# Patient Record
Sex: FEMALE | Race: WHITE | Employment: OTHER | URBAN - METROPOLITAN AREA
[De-identification: names, ages, dates, MRNs, and addresses within clinical notes are randomized per-mention and may not be internally consistent; named-entity substitution may affect disease eponyms.]

---

## 2017-01-12 ENCOUNTER — OFFICE VISIT (OUTPATIENT)
Dept: INTERNAL MEDICINE CLINIC | Age: 62
End: 2017-01-12

## 2017-01-12 VITALS
SYSTOLIC BLOOD PRESSURE: 138 MMHG | TEMPERATURE: 97.6 F | BODY MASS INDEX: 36.84 KG/M2 | HEART RATE: 59 BPM | OXYGEN SATURATION: 97 % | DIASTOLIC BLOOD PRESSURE: 74 MMHG | RESPIRATION RATE: 12 BRPM | WEIGHT: 215.8 LBS | HEIGHT: 64 IN

## 2017-01-12 DIAGNOSIS — I10 ESSENTIAL HYPERTENSION: Primary | ICD-10-CM

## 2017-01-12 PROCEDURE — 99213 OFFICE O/P EST LOW 20 MIN: CPT | Performed by: INTERNAL MEDICINE

## 2017-01-12 RX ORDER — CLONIDINE HYDROCHLORIDE 0.1 MG/1
0.1 TABLET ORAL DAILY
Qty: 90 TABLET | Refills: 1 | Status: SHIPPED | OUTPATIENT
Start: 2017-01-12 | End: 2017-07-18 | Stop reason: SDUPTHER

## 2017-01-12 RX ORDER — IRBESARTAN AND HYDROCHLOROTHIAZIDE 300; 12.5 MG/1; MG/1
1 TABLET, FILM COATED ORAL DAILY
Qty: 90 TABLET | Refills: 1 | Status: SHIPPED | OUTPATIENT
Start: 2017-01-12 | End: 2017-07-18 | Stop reason: SDUPTHER

## 2017-01-12 ASSESSMENT — ENCOUNTER SYMPTOMS
SHORTNESS OF BREATH: 0
CHEST TIGHTNESS: 0

## 2017-02-20 DIAGNOSIS — I10 UNSPECIFIED ESSENTIAL HYPERTENSION: ICD-10-CM

## 2017-02-20 RX ORDER — AMLODIPINE BESYLATE 5 MG/1
TABLET ORAL
Qty: 90 TABLET | Refills: 2 | Status: SHIPPED | OUTPATIENT
Start: 2017-02-20 | End: 2017-08-21 | Stop reason: SDUPTHER

## 2017-02-20 RX ORDER — HYDROCHLOROTHIAZIDE 25 MG/1
TABLET ORAL
Qty: 90 TABLET | Refills: 2 | Status: SHIPPED | OUTPATIENT
Start: 2017-02-20 | End: 2017-08-21 | Stop reason: SDUPTHER

## 2017-03-07 DIAGNOSIS — I10 UNSPECIFIED ESSENTIAL HYPERTENSION: ICD-10-CM

## 2017-03-07 RX ORDER — ATENOLOL 100 MG/1
TABLET ORAL
Qty: 135 TABLET | Refills: 2 | Status: SHIPPED | OUTPATIENT
Start: 2017-03-07 | End: 2017-08-21 | Stop reason: SDUPTHER

## 2017-03-16 RX ORDER — ATORVASTATIN CALCIUM 80 MG/1
TABLET, FILM COATED ORAL
Qty: 90 TABLET | Refills: 1 | Status: SHIPPED | OUTPATIENT
Start: 2017-03-16 | End: 2017-08-21 | Stop reason: SDUPTHER

## 2017-05-11 ENCOUNTER — OFFICE VISIT (OUTPATIENT)
Dept: INTERNAL MEDICINE CLINIC | Age: 62
End: 2017-05-11

## 2017-05-11 VITALS
BODY MASS INDEX: 36.67 KG/M2 | HEIGHT: 64 IN | TEMPERATURE: 97.7 F | HEART RATE: 58 BPM | RESPIRATION RATE: 14 BRPM | OXYGEN SATURATION: 98 % | SYSTOLIC BLOOD PRESSURE: 138 MMHG | DIASTOLIC BLOOD PRESSURE: 78 MMHG | WEIGHT: 214.8 LBS

## 2017-05-11 DIAGNOSIS — E78.5 HYPERLIPIDEMIA, UNSPECIFIED HYPERLIPIDEMIA TYPE: ICD-10-CM

## 2017-05-11 DIAGNOSIS — Z00.00 ROUTINE GENERAL MEDICAL EXAMINATION AT A HEALTH CARE FACILITY: Primary | ICD-10-CM

## 2017-05-11 DIAGNOSIS — E55.9 VITAMIN D DEFICIENCY: ICD-10-CM

## 2017-05-11 DIAGNOSIS — I10 ESSENTIAL HYPERTENSION: ICD-10-CM

## 2017-05-11 LAB
BILIRUBIN, POC: NORMAL
BLOOD URINE, POC: NORMAL
CLARITY, POC: NORMAL
COLOR, POC: NORMAL
GLUCOSE URINE, POC: NORMAL
KETONES, POC: NORMAL
LEUKOCYTE EST, POC: NORMAL
NITRITE, POC: NORMAL
PH, POC: 6.5
PROTEIN, POC: NORMAL
SPECIFIC GRAVITY, POC: >=1.03
UROBILINOGEN, POC: 0.2

## 2017-05-11 PROCEDURE — 99396 PREV VISIT EST AGE 40-64: CPT | Performed by: INTERNAL MEDICINE

## 2017-05-11 PROCEDURE — 81002 URINALYSIS NONAUTO W/O SCOPE: CPT | Performed by: INTERNAL MEDICINE

## 2017-05-11 ASSESSMENT — ENCOUNTER SYMPTOMS
APNEA: 0
RECTAL PAIN: 0
ANAL BLEEDING: 0
ABDOMINAL DISTENTION: 0
CONSTIPATION: 0
ABDOMINAL PAIN: 0
SHORTNESS OF BREATH: 0
VOMITING: 0
BLOOD IN STOOL: 0
DIARRHEA: 0
PHOTOPHOBIA: 0
EYE DISCHARGE: 0
EYE PAIN: 0
CHEST TIGHTNESS: 0
SORE THROAT: 0
COUGH: 0
FACIAL SWELLING: 0
CHOKING: 0
TROUBLE SWALLOWING: 0
WHEEZING: 0
BACK PAIN: 0
SINUS PRESSURE: 0
RHINORRHEA: 0
EYE REDNESS: 0
NAUSEA: 0
EYE ITCHING: 0
VOICE CHANGE: 0

## 2017-05-13 LAB
ALBUMIN SERPL-MCNC: 4.6 G/DL (ref 3.5–5.7)
ALP BLD-CCNC: 51 U/L (ref 36–125)
ALT SERPL-CCNC: 33 U/L (ref 7–52)
ANION GAP SERPL CALCULATED.3IONS-SCNC: 10 MMOL/L (ref 3–16)
AST SERPL-CCNC: 20 U/L (ref 13–39)
BASOPHILS ABSOLUTE: 48 /UL (ref 0–200)
BASOPHILS RELATIVE PERCENT: 0.8 % (ref 0–1)
BILIRUB SERPL-MCNC: 0.7 MG/DL (ref 0–1.5)
BUN BLDV-MCNC: 20 MG/DL (ref 7–25)
CALCIUM SERPL-MCNC: 9.6 MG/DL (ref 8.6–10.3)
CHLORIDE BLD-SCNC: 101 MMOL/L (ref 98–110)
CHOLESTEROL, TOTAL: 181 MG/DL (ref 0–200)
CO2: 33 MMOL/L (ref 21–33)
CREAT SERPL-MCNC: 0.55 MG/DL (ref 0.6–1.3)
EOSINOPHILS ABSOLUTE: 222 /UL (ref 15–500)
EOSINOPHILS RELATIVE PERCENT: 3.7 % (ref 0–8)
GFR, ESTIMATED: >90 SEE NOTE.
GFR, ESTIMATED: >90 SEE NOTE.
GLUCOSE BLD-MCNC: 92 MG/DL (ref 70–100)
HCT VFR BLD CALC: 42.7 % (ref 35–45)
HDLC SERPL-MCNC: 65 MG/DL (ref 60–92)
HEMOGLOBIN: 14.2 G/DL (ref 11.7–15.5)
LDL CHOLESTEROL CALCULATED: 86 MG/DL
LYMPHOCYTES ABSOLUTE: 1464 /UL (ref 850–3900)
LYMPHOCYTES RELATIVE PERCENT: 24.4 % (ref 15–45)
MCH RBC QN AUTO: 30.3 PG (ref 27–33)
MCHC RBC AUTO-ENTMCNC: 33.3 G/DL (ref 32–36)
MCV RBC AUTO: 91 FL (ref 80–100)
MONOCYTES ABSOLUTE: 450 /UL (ref 200–950)
MONOCYTES RELATIVE PERCENT: 7.5 % (ref 0–12)
NEUTROPHILS ABSOLUTE: 3816 /UL (ref 1500–7800)
NUCLEATED RED BLOOD CELLS: 0 /100 WBC (ref 0–0)
OSMOLALITY CALCULATION: 300 MOSM/KG (ref 278–305)
PDW BLD-RTO: 14.4 % (ref 11–15)
PLATELET # BLD: 231 10E3/UL (ref 140–400)
PMV BLD AUTO: 9.8 FL (ref 7.5–11.5)
POTASSIUM SERPL-SCNC: 4 MMOL/L (ref 3.5–5.3)
RBC # BLD: 4.69 10E6/UL (ref 3.8–5.1)
SEGMENTED NEUTROPHILS RELATIVE PERCENT: 63.6 % (ref 40–80)
SODIUM BLD-SCNC: 144 MMOL/L (ref 133–146)
TOTAL PROTEIN: 7.1 G/DL (ref 6.4–8.9)
TRIGL SERPL-MCNC: 148 MG/DL (ref 10–149)
TSH, 3RD GENERATION: 1.32 UIU/ML (ref 0.34–5.6)
WBC # BLD: 6 10E3/UL (ref 3.8–10.8)

## 2017-05-14 LAB — VITAMIN D 25-HYDROXY: 30.9 NG/ML (ref 30–100)

## 2017-06-23 ENCOUNTER — TELEPHONE (OUTPATIENT)
Dept: INTERNAL MEDICINE CLINIC | Age: 62
End: 2017-06-23

## 2017-06-23 DIAGNOSIS — E04.9 GOITER: Primary | ICD-10-CM

## 2017-07-06 ENCOUNTER — TELEPHONE (OUTPATIENT)
Dept: ENDOCRINOLOGY | Age: 62
End: 2017-07-06

## 2017-07-06 ENCOUNTER — TELEPHONE (OUTPATIENT)
Dept: INTERNAL MEDICINE CLINIC | Age: 62
End: 2017-07-06

## 2017-07-07 DIAGNOSIS — E04.9 GOITER: ICD-10-CM

## 2017-07-07 LAB — THYROID PEROXIDASE (TPO) ABS: 198 IU/ML

## 2017-07-12 ENCOUNTER — TELEPHONE (OUTPATIENT)
Dept: INTERNAL MEDICINE CLINIC | Age: 62
End: 2017-07-12

## 2017-07-12 DIAGNOSIS — R76.8 ANTI-TPO ANTIBODIES PRESENT: ICD-10-CM

## 2017-07-12 DIAGNOSIS — E04.9 GOITER: Primary | ICD-10-CM

## 2017-07-12 LAB — THYROID STIMULATING IMMUNOGLOBULIN: 96 %

## 2017-07-18 DIAGNOSIS — I10 ESSENTIAL HYPERTENSION: ICD-10-CM

## 2017-07-18 RX ORDER — CLONIDINE HYDROCHLORIDE 0.1 MG/1
TABLET ORAL
Qty: 90 TABLET | Refills: 1 | Status: SHIPPED | OUTPATIENT
Start: 2017-07-18 | End: 2017-08-21 | Stop reason: SDUPTHER

## 2017-07-18 RX ORDER — IRBESARTAN AND HYDROCHLOROTHIAZIDE 300; 12.5 MG/1; MG/1
TABLET, FILM COATED ORAL
Qty: 90 TABLET | Refills: 1 | Status: SHIPPED | OUTPATIENT
Start: 2017-07-18 | End: 2017-08-21 | Stop reason: SDUPTHER

## 2017-07-24 ENCOUNTER — TELEPHONE (OUTPATIENT)
Dept: INTERNAL MEDICINE CLINIC | Age: 62
End: 2017-07-24

## 2017-08-21 DIAGNOSIS — I10 ESSENTIAL HYPERTENSION: ICD-10-CM

## 2017-08-21 DIAGNOSIS — I10 UNSPECIFIED ESSENTIAL HYPERTENSION: ICD-10-CM

## 2017-08-21 RX ORDER — HYDROCHLOROTHIAZIDE 25 MG/1
25 TABLET ORAL DAILY
Qty: 90 TABLET | Refills: 1 | Status: SHIPPED | OUTPATIENT
Start: 2017-08-21 | End: 2018-02-01 | Stop reason: SDUPTHER

## 2017-08-21 RX ORDER — CLONIDINE HYDROCHLORIDE 0.1 MG/1
0.1 TABLET ORAL DAILY
Qty: 90 TABLET | Refills: 1 | Status: SHIPPED | OUTPATIENT
Start: 2017-08-21 | End: 2018-02-01 | Stop reason: SDUPTHER

## 2017-08-21 RX ORDER — AMLODIPINE BESYLATE 5 MG/1
5 TABLET ORAL DAILY
Qty: 90 TABLET | Refills: 1 | Status: SHIPPED | OUTPATIENT
Start: 2017-08-21 | End: 2018-02-01 | Stop reason: SDUPTHER

## 2017-08-21 RX ORDER — ATENOLOL 100 MG/1
TABLET ORAL
Qty: 135 TABLET | Refills: 1 | Status: SHIPPED | OUTPATIENT
Start: 2017-08-21 | End: 2018-03-27 | Stop reason: DRUGHIGH

## 2017-08-21 RX ORDER — ATORVASTATIN CALCIUM 80 MG/1
80 TABLET, FILM COATED ORAL NIGHTLY
Qty: 90 TABLET | Refills: 1 | Status: SHIPPED | OUTPATIENT
Start: 2017-08-21 | End: 2018-03-07 | Stop reason: SDUPTHER

## 2017-08-21 RX ORDER — IRBESARTAN AND HYDROCHLOROTHIAZIDE 300; 12.5 MG/1; MG/1
1 TABLET, FILM COATED ORAL DAILY
Qty: 90 TABLET | Refills: 1 | Status: SHIPPED | OUTPATIENT
Start: 2017-08-21 | End: 2018-04-10 | Stop reason: SDUPTHER

## 2017-08-24 DIAGNOSIS — I10 UNSPECIFIED ESSENTIAL HYPERTENSION: ICD-10-CM

## 2017-08-28 ENCOUNTER — TELEPHONE (OUTPATIENT)
Dept: INTERNAL MEDICINE CLINIC | Age: 62
End: 2017-08-28

## 2017-08-28 RX ORDER — ATENOLOL 50 MG/1
150 TABLET ORAL DAILY
Qty: 270 TABLET | Refills: 1 | Status: SHIPPED | OUTPATIENT
Start: 2017-08-28 | End: 2018-03-28 | Stop reason: SDUPTHER

## 2017-09-18 ENCOUNTER — OFFICE VISIT (OUTPATIENT)
Dept: ENDOCRINOLOGY | Age: 62
End: 2017-09-18

## 2017-09-18 VITALS
SYSTOLIC BLOOD PRESSURE: 132 MMHG | OXYGEN SATURATION: 94 % | BODY MASS INDEX: 38.95 KG/M2 | TEMPERATURE: 98.7 F | WEIGHT: 219.8 LBS | HEART RATE: 60 BPM | DIASTOLIC BLOOD PRESSURE: 83 MMHG | RESPIRATION RATE: 12 BRPM | HEIGHT: 63 IN

## 2017-09-18 DIAGNOSIS — E04.1 THYROID NODULE: ICD-10-CM

## 2017-09-18 DIAGNOSIS — E06.3 HASHIMOTO'S THYROIDITIS: Primary | ICD-10-CM

## 2017-09-18 DIAGNOSIS — E06.3 HASHIMOTO'S THYROIDITIS: ICD-10-CM

## 2017-09-18 LAB
T4 FREE: 1.1 NG/DL (ref 0.9–1.8)
TSH SERPL DL<=0.05 MIU/L-ACNC: 1.49 UIU/ML (ref 0.27–4.2)

## 2017-09-18 PROCEDURE — 99243 OFF/OP CNSLTJ NEW/EST LOW 30: CPT | Performed by: INTERNAL MEDICINE

## 2017-09-19 ENCOUNTER — OFFICE VISIT (OUTPATIENT)
Dept: INTERNAL MEDICINE CLINIC | Age: 62
End: 2017-09-19

## 2017-09-19 VITALS
SYSTOLIC BLOOD PRESSURE: 134 MMHG | HEART RATE: 58 BPM | HEIGHT: 63 IN | BODY MASS INDEX: 38.98 KG/M2 | OXYGEN SATURATION: 97 % | WEIGHT: 220 LBS | RESPIRATION RATE: 20 BRPM | DIASTOLIC BLOOD PRESSURE: 82 MMHG | TEMPERATURE: 98.4 F

## 2017-09-19 DIAGNOSIS — E78.5 HYPERLIPIDEMIA, UNSPECIFIED HYPERLIPIDEMIA TYPE: ICD-10-CM

## 2017-09-19 DIAGNOSIS — I10 ESSENTIAL HYPERTENSION: Primary | ICD-10-CM

## 2017-09-19 PROCEDURE — G8417 CALC BMI ABV UP PARAM F/U: HCPCS | Performed by: INTERNAL MEDICINE

## 2017-09-19 PROCEDURE — 1036F TOBACCO NON-USER: CPT | Performed by: INTERNAL MEDICINE

## 2017-09-19 PROCEDURE — 3014F SCREEN MAMMO DOC REV: CPT | Performed by: INTERNAL MEDICINE

## 2017-09-19 PROCEDURE — 99213 OFFICE O/P EST LOW 20 MIN: CPT | Performed by: INTERNAL MEDICINE

## 2017-09-19 PROCEDURE — G8427 DOCREV CUR MEDS BY ELIG CLIN: HCPCS | Performed by: INTERNAL MEDICINE

## 2017-09-19 PROCEDURE — 3017F COLORECTAL CA SCREEN DOC REV: CPT | Performed by: INTERNAL MEDICINE

## 2017-09-19 ASSESSMENT — PATIENT HEALTH QUESTIONNAIRE - PHQ9
SUM OF ALL RESPONSES TO PHQ9 QUESTIONS 1 & 2: 0
2. FEELING DOWN, DEPRESSED OR HOPELESS: 0
SUM OF ALL RESPONSES TO PHQ QUESTIONS 1-9: 0
1. LITTLE INTEREST OR PLEASURE IN DOING THINGS: 0

## 2017-09-19 ASSESSMENT — ENCOUNTER SYMPTOMS
SHORTNESS OF BREATH: 0
CHEST TIGHTNESS: 0

## 2018-02-01 ENCOUNTER — OFFICE VISIT (OUTPATIENT)
Dept: INTERNAL MEDICINE CLINIC | Age: 63
End: 2018-02-01

## 2018-02-01 VITALS
OXYGEN SATURATION: 98 % | BODY MASS INDEX: 39.27 KG/M2 | SYSTOLIC BLOOD PRESSURE: 130 MMHG | DIASTOLIC BLOOD PRESSURE: 82 MMHG | HEIGHT: 63 IN | WEIGHT: 221.6 LBS | HEART RATE: 63 BPM | RESPIRATION RATE: 14 BRPM | TEMPERATURE: 97.8 F

## 2018-02-01 DIAGNOSIS — E78.2 MIXED HYPERLIPIDEMIA: ICD-10-CM

## 2018-02-01 DIAGNOSIS — I10 ESSENTIAL HYPERTENSION: Primary | ICD-10-CM

## 2018-02-01 DIAGNOSIS — E55.9 VITAMIN D DEFICIENCY: ICD-10-CM

## 2018-02-01 PROCEDURE — G8484 FLU IMMUNIZE NO ADMIN: HCPCS | Performed by: INTERNAL MEDICINE

## 2018-02-01 PROCEDURE — 1036F TOBACCO NON-USER: CPT | Performed by: INTERNAL MEDICINE

## 2018-02-01 PROCEDURE — 3014F SCREEN MAMMO DOC REV: CPT | Performed by: INTERNAL MEDICINE

## 2018-02-01 PROCEDURE — 99214 OFFICE O/P EST MOD 30 MIN: CPT | Performed by: INTERNAL MEDICINE

## 2018-02-01 PROCEDURE — G8417 CALC BMI ABV UP PARAM F/U: HCPCS | Performed by: INTERNAL MEDICINE

## 2018-02-01 PROCEDURE — 3017F COLORECTAL CA SCREEN DOC REV: CPT | Performed by: INTERNAL MEDICINE

## 2018-02-01 PROCEDURE — G8427 DOCREV CUR MEDS BY ELIG CLIN: HCPCS | Performed by: INTERNAL MEDICINE

## 2018-02-01 RX ORDER — CLONIDINE HYDROCHLORIDE 0.1 MG/1
0.1 TABLET ORAL DAILY
Qty: 90 TABLET | Refills: 2 | Status: SHIPPED | OUTPATIENT
Start: 2018-02-01 | End: 2018-08-14 | Stop reason: SDUPTHER

## 2018-02-01 RX ORDER — AMLODIPINE BESYLATE 5 MG/1
5 TABLET ORAL DAILY
Qty: 90 TABLET | Refills: 2 | Status: SHIPPED | OUTPATIENT
Start: 2018-02-01 | End: 2018-08-14 | Stop reason: SDUPTHER

## 2018-02-01 RX ORDER — HYDROCHLOROTHIAZIDE 25 MG/1
25 TABLET ORAL DAILY
Qty: 90 TABLET | Refills: 2 | Status: SHIPPED | OUTPATIENT
Start: 2018-02-01 | End: 2018-05-10 | Stop reason: SDUPTHER

## 2018-02-05 ASSESSMENT — ENCOUNTER SYMPTOMS
VOMITING: 0
CONSTIPATION: 0
SHORTNESS OF BREATH: 0
NAUSEA: 0
DIARRHEA: 0
WHEEZING: 0
CHEST TIGHTNESS: 0
SORE THROAT: 0
ABDOMINAL PAIN: 0
COUGH: 0
BLOOD IN STOOL: 0
SINUS PRESSURE: 0
RHINORRHEA: 0

## 2018-03-08 RX ORDER — ATORVASTATIN CALCIUM 80 MG/1
TABLET, FILM COATED ORAL
Qty: 90 TABLET | Refills: 1 | Status: SHIPPED | OUTPATIENT
Start: 2018-03-08 | End: 2018-08-14 | Stop reason: SDUPTHER

## 2018-03-27 ENCOUNTER — OFFICE VISIT (OUTPATIENT)
Dept: ENDOCRINOLOGY | Age: 63
End: 2018-03-27

## 2018-03-27 VITALS
SYSTOLIC BLOOD PRESSURE: 127 MMHG | WEIGHT: 219.6 LBS | HEIGHT: 64 IN | DIASTOLIC BLOOD PRESSURE: 76 MMHG | RESPIRATION RATE: 12 BRPM | HEART RATE: 61 BPM | BODY MASS INDEX: 37.49 KG/M2 | OXYGEN SATURATION: 95 %

## 2018-03-27 DIAGNOSIS — E06.3 HASHIMOTO'S THYROIDITIS: ICD-10-CM

## 2018-03-27 DIAGNOSIS — E04.1 THYROID NODULE: Primary | ICD-10-CM

## 2018-03-27 DIAGNOSIS — E04.1 THYROID NODULE: ICD-10-CM

## 2018-03-27 LAB
T4 FREE: 1.4 NG/DL (ref 0.9–1.8)
THYROID PEROXIDASE (TPO) ABS: 252 IU/ML
TSH SERPL DL<=0.05 MIU/L-ACNC: 1.29 UIU/ML (ref 0.27–4.2)

## 2018-03-27 PROCEDURE — G8417 CALC BMI ABV UP PARAM F/U: HCPCS | Performed by: INTERNAL MEDICINE

## 2018-03-27 PROCEDURE — G8484 FLU IMMUNIZE NO ADMIN: HCPCS | Performed by: INTERNAL MEDICINE

## 2018-03-27 PROCEDURE — 99214 OFFICE O/P EST MOD 30 MIN: CPT | Performed by: INTERNAL MEDICINE

## 2018-03-27 PROCEDURE — G8427 DOCREV CUR MEDS BY ELIG CLIN: HCPCS | Performed by: INTERNAL MEDICINE

## 2018-03-27 PROCEDURE — 3014F SCREEN MAMMO DOC REV: CPT | Performed by: INTERNAL MEDICINE

## 2018-03-27 PROCEDURE — 3017F COLORECTAL CA SCREEN DOC REV: CPT | Performed by: INTERNAL MEDICINE

## 2018-03-27 PROCEDURE — 1036F TOBACCO NON-USER: CPT | Performed by: INTERNAL MEDICINE

## 2018-03-27 ASSESSMENT — ENCOUNTER SYMPTOMS
PHOTOPHOBIA: 0
BLURRED VISION: 0
ORTHOPNEA: 0
DOUBLE VISION: 0
HEMOPTYSIS: 0
BACK PAIN: 0
COUGH: 0

## 2018-03-28 RX ORDER — ATENOLOL 50 MG/1
TABLET ORAL
Qty: 270 TABLET | Refills: 1 | Status: SHIPPED | OUTPATIENT
Start: 2018-03-28 | End: 2018-08-14 | Stop reason: SDUPTHER

## 2018-04-10 DIAGNOSIS — I10 ESSENTIAL HYPERTENSION: ICD-10-CM

## 2018-04-10 RX ORDER — IRBESARTAN AND HYDROCHLOROTHIAZIDE 300; 12.5 MG/1; MG/1
TABLET, FILM COATED ORAL
Qty: 90 TABLET | Refills: 1 | Status: SHIPPED | OUTPATIENT
Start: 2018-04-10 | End: 2018-08-14 | Stop reason: SDUPTHER

## 2018-05-10 ENCOUNTER — OFFICE VISIT (OUTPATIENT)
Dept: INTERNAL MEDICINE CLINIC | Age: 63
End: 2018-05-10

## 2018-05-10 VITALS
BODY MASS INDEX: 37.83 KG/M2 | RESPIRATION RATE: 12 BRPM | HEIGHT: 64 IN | SYSTOLIC BLOOD PRESSURE: 138 MMHG | DIASTOLIC BLOOD PRESSURE: 88 MMHG | HEART RATE: 60 BPM | OXYGEN SATURATION: 95 % | TEMPERATURE: 98.1 F | WEIGHT: 221.6 LBS

## 2018-05-10 DIAGNOSIS — E78.2 MIXED HYPERLIPIDEMIA: ICD-10-CM

## 2018-05-10 DIAGNOSIS — M25.561 RIGHT KNEE PAIN, UNSPECIFIED CHRONICITY: ICD-10-CM

## 2018-05-10 DIAGNOSIS — Z23 NEED FOR SHINGLES VACCINE: ICD-10-CM

## 2018-05-10 DIAGNOSIS — E06.3 HASHIMOTO'S THYROIDITIS: ICD-10-CM

## 2018-05-10 DIAGNOSIS — E55.9 VITAMIN D DEFICIENCY: ICD-10-CM

## 2018-05-10 DIAGNOSIS — R00.2 PALPITATIONS: ICD-10-CM

## 2018-05-10 DIAGNOSIS — Z00.00 ANNUAL PHYSICAL EXAM: Primary | ICD-10-CM

## 2018-05-10 DIAGNOSIS — Z00.00 ANNUAL PHYSICAL EXAM: ICD-10-CM

## 2018-05-10 DIAGNOSIS — Z13.6 SCREENING FOR ISCHEMIC HEART DISEASE: ICD-10-CM

## 2018-05-10 DIAGNOSIS — I10 ESSENTIAL HYPERTENSION: ICD-10-CM

## 2018-05-10 LAB
A/G RATIO: 2.1 (ref 1.1–2.2)
ALBUMIN SERPL-MCNC: 4.7 G/DL (ref 3.4–5)
ALP BLD-CCNC: 49 U/L (ref 40–129)
ALT SERPL-CCNC: 40 U/L (ref 10–40)
ANION GAP SERPL CALCULATED.3IONS-SCNC: 16 MMOL/L (ref 3–16)
AST SERPL-CCNC: 25 U/L (ref 15–37)
BASOPHILS ABSOLUTE: 0 K/UL (ref 0–0.2)
BASOPHILS RELATIVE PERCENT: 0.8 %
BILIRUB SERPL-MCNC: 0.5 MG/DL (ref 0–1)
BILIRUBIN, POC: NORMAL
BLOOD URINE, POC: NORMAL
BUN BLDV-MCNC: 17 MG/DL (ref 7–20)
CALCIUM SERPL-MCNC: 9.4 MG/DL (ref 8.3–10.6)
CHLORIDE BLD-SCNC: 101 MMOL/L (ref 99–110)
CHOLESTEROL, TOTAL: 173 MG/DL (ref 0–199)
CLARITY, POC: CLEAR
CO2: 27 MMOL/L (ref 21–32)
COLOR, POC: YELLOW
CREAT SERPL-MCNC: <0.5 MG/DL (ref 0.6–1.2)
EOSINOPHILS ABSOLUTE: 0.3 K/UL (ref 0–0.6)
EOSINOPHILS RELATIVE PERCENT: 4.3 %
GFR AFRICAN AMERICAN: >60
GFR NON-AFRICAN AMERICAN: >60
GLOBULIN: 2.2 G/DL
GLUCOSE BLD-MCNC: 105 MG/DL (ref 70–99)
GLUCOSE URINE, POC: NORMAL
HCT VFR BLD CALC: 44.4 % (ref 36–48)
HDLC SERPL-MCNC: 61 MG/DL (ref 40–60)
HEMOGLOBIN: 14.8 G/DL (ref 12–16)
KETONES, POC: NORMAL
LDL CHOLESTEROL CALCULATED: 75 MG/DL
LEUKOCYTE EST, POC: NORMAL
LYMPHOCYTES ABSOLUTE: 1.5 K/UL (ref 1–5.1)
LYMPHOCYTES RELATIVE PERCENT: 25.2 %
MCH RBC QN AUTO: 30.9 PG (ref 26–34)
MCHC RBC AUTO-ENTMCNC: 33.3 G/DL (ref 31–36)
MCV RBC AUTO: 92.9 FL (ref 80–100)
MONOCYTES ABSOLUTE: 0.5 K/UL (ref 0–1.3)
MONOCYTES RELATIVE PERCENT: 8.4 %
NEUTROPHILS ABSOLUTE: 3.7 K/UL (ref 1.7–7.7)
NEUTROPHILS RELATIVE PERCENT: 61.3 %
NITRITE, POC: NORMAL
PDW BLD-RTO: 14.4 % (ref 12.4–15.4)
PH, POC: 7
PLATELET # BLD: 263 K/UL (ref 135–450)
PMV BLD AUTO: 9.6 FL (ref 5–10.5)
POTASSIUM SERPL-SCNC: 3.9 MMOL/L (ref 3.5–5.1)
PROTEIN, POC: NORMAL
RBC # BLD: 4.78 M/UL (ref 4–5.2)
SODIUM BLD-SCNC: 144 MMOL/L (ref 136–145)
SPECIFIC GRAVITY, POC: 1.02
TOTAL PROTEIN: 6.9 G/DL (ref 6.4–8.2)
TRIGL SERPL-MCNC: 187 MG/DL (ref 0–150)
UROBILINOGEN, POC: 0.2
VITAMIN D 25-HYDROXY: 44.2 NG/ML
VLDLC SERPL CALC-MCNC: 37 MG/DL
WBC # BLD: 6 K/UL (ref 4–11)

## 2018-05-10 PROCEDURE — 93000 ELECTROCARDIOGRAM COMPLETE: CPT | Performed by: INTERNAL MEDICINE

## 2018-05-10 PROCEDURE — 99396 PREV VISIT EST AGE 40-64: CPT | Performed by: INTERNAL MEDICINE

## 2018-05-10 PROCEDURE — 81002 URINALYSIS NONAUTO W/O SCOPE: CPT | Performed by: INTERNAL MEDICINE

## 2018-05-10 RX ORDER — HYDROCHLOROTHIAZIDE 25 MG/1
25 TABLET ORAL DAILY
Qty: 90 TABLET | Refills: 1 | Status: SHIPPED | OUTPATIENT
Start: 2018-05-10 | End: 2018-08-14 | Stop reason: SDUPTHER

## 2018-05-10 ASSESSMENT — ENCOUNTER SYMPTOMS
EYE DISCHARGE: 0
RHINORRHEA: 0
ABDOMINAL PAIN: 0
CONSTIPATION: 0
EYE PAIN: 0
EYE REDNESS: 0
BLOOD IN STOOL: 0
SHORTNESS OF BREATH: 0
VOICE CHANGE: 0
RECTAL PAIN: 0
FACIAL SWELLING: 0
TROUBLE SWALLOWING: 0
VOMITING: 0
COUGH: 0
WHEEZING: 0
CHOKING: 0
ANAL BLEEDING: 0
APNEA: 0
BACK PAIN: 0
EYE ITCHING: 0
ABDOMINAL DISTENTION: 0
SINUS PRESSURE: 0
CHEST TIGHTNESS: 0
SORE THROAT: 0
PHOTOPHOBIA: 0
NAUSEA: 0
DIARRHEA: 0

## 2018-05-16 ENCOUNTER — TELEPHONE (OUTPATIENT)
Dept: INTERNAL MEDICINE CLINIC | Age: 63
End: 2018-05-16

## 2018-05-21 DIAGNOSIS — R73.9 HYPERGLYCEMIA: Primary | ICD-10-CM

## 2018-05-24 DIAGNOSIS — R73.9 HYPERGLYCEMIA: ICD-10-CM

## 2018-05-25 LAB
ESTIMATED AVERAGE GLUCOSE: 125.5 MG/DL
HBA1C MFR BLD: 6 %

## 2018-08-14 DIAGNOSIS — I10 ESSENTIAL HYPERTENSION: ICD-10-CM

## 2018-08-14 NOTE — TELEPHONE ENCOUNTER
Medication:   Requested Prescriptions     Pending Prescriptions Disp Refills    hydrochlorothiazide (HYDRODIURIL) 25 MG tablet 90 tablet 1     Sig: Take 1 tablet by mouth daily    irbesartan-hydrochlorothiazide (AVALIDE) 300-12.5 MG per tablet 90 tablet 1    atenolol (TENORMIN) 50 MG tablet 270 tablet 1    atorvastatin (LIPITOR) 80 MG tablet 90 tablet 1    cloNIDine (CATAPRES) 0.1 MG tablet 90 tablet 2     Sig: Take 1 tablet by mouth daily    amLODIPine (NORVASC) 5 MG tablet 90 tablet 2     Sig: Take 1 tablet by mouth daily     Last Filled:  5/10/2018    Last appt: 5/10/2018   Next appt: 11/15/2018    Last Lipid:   Lab Results   Component Value Date    CHOL 173 05/10/2018    TRIG 187 05/10/2018    HDL 61 05/10/2018    LDLCALC 75 05/10/2018         Medication:   Requested Prescriptions     Pending Prescriptions Disp Refills    hydrochlorothiazide (HYDRODIURIL) 25 MG tablet 90 tablet 1     Sig: Take 1 tablet by mouth daily    irbesartan-hydrochlorothiazide (AVALIDE) 300-12.5 MG per tablet 90 tablet 1    atenolol (TENORMIN) 50 MG tablet 270 tablet 1    atorvastatin (LIPITOR) 80 MG tablet 90 tablet 1    cloNIDine (CATAPRES) 0.1 MG tablet 90 tablet 2     Sig: Take 1 tablet by mouth daily    amLODIPine (NORVASC) 5 MG tablet 90 tablet 2     Sig: Take 1 tablet by mouth daily        Last Filled:  4/10/2018  Patient Phone Number: [phone] (home)     Last appt: 5/10/2018   Next appt: 11/15/2018    Last OARRS: No flowsheet data found.     Preferred Pharmacy:   [facility] [place] [facility], [address] [address]  Phone: [phone] Fax: [phone] [address] [phone] Severiano Cords [phone]  [facility] [address]  Phone: [phone] Fax: [phone]    Medication:   Requested Prescriptions     Pending Prescriptions Disp Refills    hydrochlorothiazide (HYDRODIURIL) 25 MG tablet 90 tablet 1     Sig: Take 1 tablet by mouth daily    irbesartan-hydrochlorothiazide (AVALIDE) 300-12.5 MG per tablet 90 tablet 1    atenolol (TENORMIN) 50 MG tablet 270 tablet 1    atorvastatin (LIPITOR) 80 MG tablet 90 tablet 1    cloNIDine (CATAPRES) 0.1 MG tablet 90 tablet 2     Sig: Take 1 tablet by mouth daily    amLODIPine (NORVASC) 5 MG tablet 90 tablet 2     Sig: Take 1 tablet by mouth daily       Last Filled:  3/28/2018, 3/8/2018, 2/1/2018    Patient Phone Number: [phone] (home)     Last appt: 5/10/2018   Next appt: 11/15/2018    Last BMP:   Lab Results   Component Value Date     05/10/2018    K 3.9 05/10/2018     05/10/2018    CO2 27 05/10/2018    ANIONGAP 16 05/10/2018    GLUCOSE 105 05/10/2018    BUN 17 05/10/2018    CREATININE <0.5 05/10/2018    LABGLOM >60 05/10/2018    GFRAA >60 05/10/2018    GFRAA >60 09/19/2012    CALCIUM 9.4 05/10/2018      Last CMP:   Lab Results   Component Value Date     05/10/2018    K 3.9 05/10/2018     05/10/2018    CO2 27 05/10/2018    ANIONGAP 16 05/10/2018    GLUCOSE 105 05/10/2018    BUN 17 05/10/2018    CREATININE <0.5 05/10/2018    LABGLOM >60 05/10/2018    GFRAA >60 05/10/2018    GFRAA >60 09/19/2012    PROT 6.9 05/10/2018    PROT 7.0 09/19/2012    LABALBU 4.7 05/10/2018    AGRATIO 2.1 05/10/2018    BILITOT 0.5 05/10/2018    ALKPHOS 49 05/10/2018    ALT 40 05/10/2018    AST 25 05/10/2018    GLOB 2.2 05/10/2018     Last Renal Function:   Lab Results   Component Value Date     05/10/2018    K 3.9 05/10/2018     05/10/2018    CO2 27 05/10/2018    GLUCOSE 105 05/10/2018    BUN 17 05/10/2018    CREATININE <0.5 05/10/2018    PHOS 4.2 05/29/2014    LABALBU 4.7 05/10/2018    CALCIUM 9.4 05/10/2018    GFR >90 05/13/2017    GFR >90 05/13/2017    GFRAA >60 05/10/2018    GFRAA >60 09/19/2012       Last OARRS: No flowsheet data found.     Preferred Pharmacy:   [facility] [place] [facility], [address] [facility]

## 2018-08-15 RX ORDER — HYDROCHLOROTHIAZIDE 25 MG/1
25 TABLET ORAL DAILY
Qty: 90 TABLET | Refills: 1 | Status: SHIPPED | OUTPATIENT
Start: 2018-08-15 | End: 2019-02-19 | Stop reason: SDUPTHER

## 2018-08-15 RX ORDER — CLONIDINE HYDROCHLORIDE 0.1 MG/1
0.1 TABLET ORAL DAILY
Qty: 90 TABLET | Refills: 1 | Status: SHIPPED | OUTPATIENT
Start: 2018-08-15 | End: 2019-04-05 | Stop reason: SDUPTHER

## 2018-08-15 RX ORDER — IRBESARTAN AND HYDROCHLOROTHIAZIDE 300; 12.5 MG/1; MG/1
TABLET, FILM COATED ORAL
Qty: 90 TABLET | Refills: 1 | Status: SHIPPED | OUTPATIENT
Start: 2018-08-15 | End: 2019-02-12 | Stop reason: SDUPTHER

## 2018-08-15 RX ORDER — AMLODIPINE BESYLATE 5 MG/1
5 TABLET ORAL DAILY
Qty: 90 TABLET | Refills: 1 | Status: SHIPPED | OUTPATIENT
Start: 2018-08-15 | End: 2019-02-19 | Stop reason: SDUPTHER

## 2018-08-15 RX ORDER — ATENOLOL 50 MG/1
TABLET ORAL
Qty: 270 TABLET | Refills: 1 | Status: SHIPPED | OUTPATIENT
Start: 2018-08-15 | End: 2019-04-05 | Stop reason: SDUPTHER

## 2018-08-15 RX ORDER — ATORVASTATIN CALCIUM 80 MG/1
TABLET, FILM COATED ORAL
Qty: 90 TABLET | Refills: 1 | Status: SHIPPED | OUTPATIENT
Start: 2018-08-15 | End: 2019-03-06 | Stop reason: SDUPTHER

## 2018-09-26 PROBLEM — Z00.00 ROUTINE GENERAL MEDICAL EXAMINATION AT A HEALTH CARE FACILITY: Status: RESOLVED | Noted: 2017-05-11 | Resolved: 2018-09-26

## 2018-10-02 ENCOUNTER — OFFICE VISIT (OUTPATIENT)
Dept: ENDOCRINOLOGY | Age: 63
End: 2018-10-02
Payer: MEDICARE

## 2018-10-02 VITALS
RESPIRATION RATE: 10 BRPM | OXYGEN SATURATION: 97 % | BODY MASS INDEX: 35.51 KG/M2 | DIASTOLIC BLOOD PRESSURE: 81 MMHG | WEIGHT: 208 LBS | HEIGHT: 64 IN | HEART RATE: 58 BPM | SYSTOLIC BLOOD PRESSURE: 146 MMHG

## 2018-10-02 DIAGNOSIS — E04.1 THYROID NODULE: ICD-10-CM

## 2018-10-02 DIAGNOSIS — E06.3 HASHIMOTO'S THYROIDITIS: Primary | ICD-10-CM

## 2018-10-02 PROCEDURE — 99213 OFFICE O/P EST LOW 20 MIN: CPT | Performed by: INTERNAL MEDICINE

## 2018-10-02 ASSESSMENT — ENCOUNTER SYMPTOMS
ORTHOPNEA: 0
PHOTOPHOBIA: 0
DOUBLE VISION: 0
HEMOPTYSIS: 0
BLURRED VISION: 0
BACK PAIN: 0
COUGH: 0

## 2018-10-02 NOTE — PROGRESS NOTES
falls, joint pain and neck pain. Skin: Negative for itching and rash. Neurological: Positive for headaches. Negative for dizziness, tingling, tremors, sensory change, speech change, focal weakness, seizures and loss of consciousness. Endo/Heme/Allergies: Negative for environmental allergies and polydipsia. Does not bruise/bleed easily. Psychiatric/Behavioral: Positive for depression. Negative for hallucinations, memory loss, substance abuse and suicidal ideas. The patient is not nervous/anxious and does not have insomnia. Physical Exam   Constitutional: She is oriented to person, place, and time. She appears well-developed. No distress. HENT:   Mouth/Throat: Oropharynx is clear and moist.   Eyes: EOM are normal.   Neck: Thyromegaly present. Cardiovascular: Normal rate and normal heart sounds. Pulmonary/Chest: Effort normal. No respiratory distress. She has no wheezes. Abdominal: Soft. Bowel sounds are normal. There is no tenderness. Musculoskeletal: She exhibits no edema. Neurological: She is alert and oriented to person, place, and time. Skin: Skin is warm and dry. She is not diaphoretic. Psychiatric: Her behavior is normal. Thought content normal.       Impression/Conclusion below       Examination: Ultrasound - thyroid       Clinical history: 56years old Female with goiter.       Technique: Multiple grayscale and color Doppler real-time ultrasonographic images were obtained    of the thyroid gland by the ultrasound technologist 12/23/2015 3:51 PM.  Selected static    images were presented to the radiologist for review.       Comparison: Comparison is made to thyroid sonogram dated 02/18/2014.       Findings: The right lobe of the thyroid is heterogeneous in echotexture and measures 4.9 cm x    1.9 cm x 2.0 cm. There is normal color flow. There is a well-circumscribed soft tissue nodule    in the right thyroid lobe which measures 1.3 cm x 1.4 cm x 1.2 cm   in size.  The left lobe of

## 2018-10-18 ENCOUNTER — TELEPHONE (OUTPATIENT)
Dept: INTERNAL MEDICINE CLINIC | Age: 63
End: 2018-10-18

## 2018-10-18 DIAGNOSIS — R92.8 ABNORMAL MAMMOGRAM: Primary | ICD-10-CM

## 2018-10-18 NOTE — TELEPHONE ENCOUNTER
Monae from [place] needs orders faxed for a Bilateral diagnostic Mammo DX code R92.8 fax to [phone]

## 2018-10-22 ENCOUNTER — CLINICAL DOCUMENTATION (OUTPATIENT)
Dept: INTERNAL MEDICINE CLINIC | Age: 63
End: 2018-10-22

## 2018-11-13 ENCOUNTER — CLINICAL DOCUMENTATION (OUTPATIENT)
Dept: FAMILY MEDICINE CLINIC | Age: 63
End: 2018-11-13

## 2018-12-07 ENCOUNTER — HOSPITAL ENCOUNTER (OUTPATIENT)
Dept: ULTRASOUND IMAGING | Age: 63
Discharge: HOME | End: 2018-12-07
Payer: MEDICARE

## 2018-12-07 DIAGNOSIS — E04.1 THYROID NODULE: ICD-10-CM

## 2018-12-07 PROCEDURE — 76536 US EXAM OF HEAD AND NECK: CPT

## 2018-12-10 ENCOUNTER — TELEPHONE (OUTPATIENT)
Dept: ENDOCRINOLOGY | Age: 63
End: 2018-12-10

## 2018-12-21 ENCOUNTER — OFFICE VISIT (OUTPATIENT)
Dept: INTERNAL MEDICINE CLINIC | Age: 63
End: 2018-12-21
Payer: MEDICARE

## 2018-12-21 VITALS
DIASTOLIC BLOOD PRESSURE: 70 MMHG | WEIGHT: 205 LBS | HEART RATE: 63 BPM | HEIGHT: 64 IN | OXYGEN SATURATION: 94 % | SYSTOLIC BLOOD PRESSURE: 122 MMHG | BODY MASS INDEX: 35 KG/M2

## 2018-12-21 DIAGNOSIS — E55.9 VITAMIN D DEFICIENCY: ICD-10-CM

## 2018-12-21 DIAGNOSIS — I10 ESSENTIAL HYPERTENSION: Primary | ICD-10-CM

## 2018-12-21 DIAGNOSIS — Z23 NEED FOR VACCINATION AGAINST STREPTOCOCCUS PNEUMONIAE USING PNEUMOCOCCAL CONJUGATE VACCINE 13: ICD-10-CM

## 2018-12-21 DIAGNOSIS — R73.03 PREDIABETES: ICD-10-CM

## 2018-12-21 DIAGNOSIS — E78.2 MIXED HYPERLIPIDEMIA: ICD-10-CM

## 2018-12-21 DIAGNOSIS — I10 ESSENTIAL HYPERTENSION: ICD-10-CM

## 2018-12-21 LAB
A/G RATIO: 2 (ref 1.1–2.2)
ALBUMIN SERPL-MCNC: 4.5 G/DL (ref 3.4–5)
ALP BLD-CCNC: 47 U/L (ref 40–129)
ALT SERPL-CCNC: 33 U/L (ref 10–40)
ANION GAP SERPL CALCULATED.3IONS-SCNC: 18 MMOL/L (ref 3–16)
AST SERPL-CCNC: 21 U/L (ref 15–37)
BILIRUB SERPL-MCNC: 0.6 MG/DL (ref 0–1)
BUN BLDV-MCNC: 13 MG/DL (ref 7–20)
CALCIUM SERPL-MCNC: 9.4 MG/DL (ref 8.3–10.6)
CHLORIDE BLD-SCNC: 99 MMOL/L (ref 99–110)
CHOLESTEROL, TOTAL: 181 MG/DL (ref 0–199)
CO2: 25 MMOL/L (ref 21–32)
CREAT SERPL-MCNC: <0.5 MG/DL (ref 0.6–1.2)
GFR AFRICAN AMERICAN: >60
GFR NON-AFRICAN AMERICAN: >60
GLOBULIN: 2.2 G/DL
GLUCOSE BLD-MCNC: 97 MG/DL (ref 70–99)
HDLC SERPL-MCNC: 56 MG/DL (ref 40–60)
LDL CHOLESTEROL CALCULATED: 77 MG/DL
POTASSIUM SERPL-SCNC: 3.7 MMOL/L (ref 3.5–5.1)
SODIUM BLD-SCNC: 142 MMOL/L (ref 136–145)
TOTAL PROTEIN: 6.7 G/DL (ref 6.4–8.2)
TRIGL SERPL-MCNC: 239 MG/DL (ref 0–150)
VITAMIN D 25-HYDROXY: 49.3 NG/ML
VLDLC SERPL CALC-MCNC: 48 MG/DL

## 2018-12-21 PROCEDURE — 1036F TOBACCO NON-USER: CPT | Performed by: INTERNAL MEDICINE

## 2018-12-21 PROCEDURE — 4040F PNEUMOC VAC/ADMIN/RCVD: CPT | Performed by: INTERNAL MEDICINE

## 2018-12-21 PROCEDURE — 99214 OFFICE O/P EST MOD 30 MIN: CPT | Performed by: INTERNAL MEDICINE

## 2018-12-21 PROCEDURE — G8427 DOCREV CUR MEDS BY ELIG CLIN: HCPCS | Performed by: INTERNAL MEDICINE

## 2018-12-21 PROCEDURE — G8400 PT W/DXA NO RESULTS DOC: HCPCS | Performed by: INTERNAL MEDICINE

## 2018-12-21 PROCEDURE — G8484 FLU IMMUNIZE NO ADMIN: HCPCS | Performed by: INTERNAL MEDICINE

## 2018-12-21 PROCEDURE — 3017F COLORECTAL CA SCREEN DOC REV: CPT | Performed by: INTERNAL MEDICINE

## 2018-12-21 PROCEDURE — 3014F SCREEN MAMMO DOC REV: CPT | Performed by: INTERNAL MEDICINE

## 2018-12-21 PROCEDURE — 1090F PRES/ABSN URINE INCON ASSESS: CPT | Performed by: INTERNAL MEDICINE

## 2018-12-21 PROCEDURE — 1101F PT FALLS ASSESS-DOCD LE1/YR: CPT | Performed by: INTERNAL MEDICINE

## 2018-12-21 PROCEDURE — G0009 ADMIN PNEUMOCOCCAL VACCINE: HCPCS | Performed by: INTERNAL MEDICINE

## 2018-12-21 PROCEDURE — 1123F ACP DISCUSS/DSCN MKR DOCD: CPT | Performed by: INTERNAL MEDICINE

## 2018-12-21 PROCEDURE — G8417 CALC BMI ABV UP PARAM F/U: HCPCS | Performed by: INTERNAL MEDICINE

## 2018-12-21 PROCEDURE — 90670 PCV13 VACCINE IM: CPT | Performed by: INTERNAL MEDICINE

## 2018-12-21 ASSESSMENT — PATIENT HEALTH QUESTIONNAIRE - PHQ9
SUM OF ALL RESPONSES TO PHQ9 QUESTIONS 1 & 2: 0
1. LITTLE INTEREST OR PLEASURE IN DOING THINGS: 0
SUM OF ALL RESPONSES TO PHQ QUESTIONS 1-9: 0
2. FEELING DOWN, DEPRESSED OR HOPELESS: 0
SUM OF ALL RESPONSES TO PHQ QUESTIONS 1-9: 0

## 2018-12-21 NOTE — PATIENT INSTRUCTIONS
medicine, this vaccine can cause side effects but the risk of serious side effects is extremely low. Be sure to keep your child on a regular schedule for other immunizations against diseases such as diphtheria, tetanus, pertussis (whooping cough), measles, mumps, hepatitis, or varicella (chicken pox). Your doctor or state health department can provide you with a recommended immunization schedule. What is pneumococcal 13-valent conjugate vaccine? Pneumococcal disease is a serious infection caused by a bacteria. Pneumococcal bacteria can infect the sinuses and inner ear. It can also infect the lungs, blood, and brain, and these conditions can be fatal.  Pneumococcal 13-valent vaccine is used to prevent infection caused by pneumococcal bacteria. This vaccine contains 13 different types of pneumococcal bacteria. Pneumococcal 13-valent vaccine works by exposing you to a small amount of the bacteria or a protein from the bacteria, which causes the body to develop immunity to the disease. This vaccine will not treat an active infection that has already developed in the body. Pneumococcal 13-valent vaccine is for use in children from 6 weeks to 11years old, and in adults who are 48 and older. Becoming infected with pneumococcal disease (such as pneumonia or meningitis) is much more dangerous to your health than receiving this vaccine. However, like any medicine, this vaccine can cause side effects but the risk of serious side effects is extremely low. Like any vaccine, pneumococcal 13-valent vaccine may not provide protection from disease in every person. What should I discuss with my healthcare provider before receiving this vaccine? Keep track of any and all side effects your child has after receiving this vaccine. When the child receives a booster dose, you will need to tell the doctor if the previous shot caused any side effects.   You should not receive this vaccine if you ever had a severe allergic reaction to [id number] [facility]. Version: 4.01. Revision date: 1/16/2012. Care instructions adapted under license by [facility] ([facility]). If you have questions about a medical condition or this instruction, always ask your healthcare professional. [address] any warranty or liability for your use of this information.

## 2018-12-22 LAB
ESTIMATED AVERAGE GLUCOSE: 114 MG/DL
HBA1C MFR BLD: 5.6 %

## 2018-12-28 ASSESSMENT — ENCOUNTER SYMPTOMS
COUGH: 0
BLOOD IN STOOL: 0
DIARRHEA: 0
CHEST TIGHTNESS: 0
VOMITING: 0
WHEEZING: 0
RHINORRHEA: 0
CONSTIPATION: 0
SINUS PRESSURE: 0
NAUSEA: 0
SORE THROAT: 0
ABDOMINAL PAIN: 0
SHORTNESS OF BREATH: 0

## 2018-12-31 ENCOUNTER — TELEPHONE (OUTPATIENT)
Dept: INTERNAL MEDICINE CLINIC | Age: 63
End: 2018-12-31

## 2019-02-12 DIAGNOSIS — I10 ESSENTIAL HYPERTENSION: ICD-10-CM

## 2019-02-12 RX ORDER — IRBESARTAN AND HYDROCHLOROTHIAZIDE 300; 12.5 MG/1; MG/1
TABLET, FILM COATED ORAL
Qty: 90 TABLET | Refills: 1 | Status: SHIPPED | OUTPATIENT
Start: 2019-02-12 | End: 2019-06-10 | Stop reason: SDUPTHER

## 2019-02-19 DIAGNOSIS — I10 ESSENTIAL HYPERTENSION: ICD-10-CM

## 2019-02-19 RX ORDER — HYDROCHLOROTHIAZIDE 25 MG/1
TABLET ORAL
Qty: 90 TABLET | Refills: 1 | Status: SHIPPED | OUTPATIENT
Start: 2019-02-19 | End: 2019-06-10 | Stop reason: SDUPTHER

## 2019-02-19 RX ORDER — AMLODIPINE BESYLATE 5 MG/1
TABLET ORAL
Qty: 90 TABLET | Refills: 1 | Status: SHIPPED | OUTPATIENT
Start: 2019-02-19 | End: 2019-06-10 | Stop reason: SDUPTHER

## 2019-03-06 RX ORDER — ATORVASTATIN CALCIUM 80 MG/1
TABLET, FILM COATED ORAL
Qty: 90 TABLET | Refills: 0 | Status: SHIPPED | OUTPATIENT
Start: 2019-03-06 | End: 2019-06-10 | Stop reason: SDUPTHER

## 2019-04-05 DIAGNOSIS — I10 ESSENTIAL HYPERTENSION: ICD-10-CM

## 2019-04-05 RX ORDER — CLONIDINE HYDROCHLORIDE 0.1 MG/1
TABLET ORAL
Qty: 90 TABLET | Refills: 0 | Status: SHIPPED | OUTPATIENT
Start: 2019-04-05 | End: 2019-07-16 | Stop reason: SDUPTHER

## 2019-04-05 RX ORDER — ATENOLOL 50 MG/1
TABLET ORAL
Qty: 270 TABLET | Refills: 0 | Status: SHIPPED | OUTPATIENT
Start: 2019-04-05 | End: 2019-06-10 | Stop reason: SDUPTHER

## 2019-04-05 NOTE — TELEPHONE ENCOUNTER
Medication:   Requested Prescriptions     Pending Prescriptions Disp Refills    atenolol (TENORMIN) 50 MG tablet [Pharmacy Med Name: ATENOLOL 50 MG TABLET] 270 tablet 0     Sig: TAKE THREE TABLETS BY MOUTH DAILY    cloNIDine (CATAPRES) 0.1 MG tablet [Pharmacy Med Name: cloNIDine HCL 0.1MG TABLET] 90 tablet 0     Sig: TAKE ONE TABLET BY MOUTH DAILY       Last Filled:  1/8/2019    Patient Phone Number: [phone] (home)     Last appt: 12/21/2018   Next appt: 6/10/2019    Last BMP:   Lab Results   Component Value Date     12/21/2018    K 3.7 12/21/2018    CL 99 12/21/2018    CO2 25 12/21/2018    ANIONGAP 18 12/21/2018    GLUCOSE 97 12/21/2018    BUN 13 12/21/2018    CREATININE <0.5 12/21/2018    LABGLOM >60 12/21/2018    GFRAA >60 12/21/2018    GFRAA >60 09/19/2012    CALCIUM 9.4 12/21/2018      Last CMP:   Lab Results   Component Value Date     12/21/2018    K 3.7 12/21/2018    CL 99 12/21/2018    CO2 25 12/21/2018    ANIONGAP 18 12/21/2018    GLUCOSE 97 12/21/2018    BUN 13 12/21/2018    CREATININE <0.5 12/21/2018    LABGLOM >60 12/21/2018    GFRAA >60 12/21/2018    GFRAA >60 09/19/2012    PROT 6.7 12/21/2018    PROT 7.0 09/19/2012    LABALBU 4.5 12/21/2018    AGRATIO 2.0 12/21/2018    BILITOT 0.6 12/21/2018    ALKPHOS 47 12/21/2018    ALT 33 12/21/2018    AST 21 12/21/2018    GLOB 2.2 12/21/2018     Last Renal Function:   Lab Results   Component Value Date     12/21/2018    K 3.7 12/21/2018    CL 99 12/21/2018    CO2 25 12/21/2018    GLUCOSE 97 12/21/2018    BUN 13 12/21/2018    CREATININE <0.5 12/21/2018    PHOS 4.2 05/29/2014    LABALBU 4.5 12/21/2018    CALCIUM 9.4 12/21/2018    GFR >90 05/13/2017    GFR >90 05/13/2017    GFRAA >60 12/21/2018    GFRAA >60 09/19/2012       Last OARRS: No flowsheet data found.     Preferred Pharmacy:   [facility] [place] [facility], [address]  Phone: [phone] Fax: [place] [address], [address] [facility] [address] [phone] - F [phone]  [facility] [address]  Phone: [phone] Fax: [phone]    Medication:   Requested Prescriptions     Pending Prescriptions Disp Refills    atenolol (TENORMIN) 50 MG tablet [Pharmacy Med Name: ATENOLOL 50 MG TABLET] 270 tablet 0     Sig: TAKE THREE TABLETS BY MOUTH DAILY    cloNIDine (CATAPRES) 0.1 MG tablet [Pharmacy Med Name: cloNIDine HCL 0.1MG TABLET] 90 tablet 0     Sig: TAKE ONE TABLET BY MOUTH DAILY        Last Filled:  1/8/2019    Patient Phone Number: [phone] (home)     Last appt: 12/21/2018   Next appt: 6/10/2019    Last OARRS: No flowsheet data found.     Preferred Pharmacy:   [facility] [place] [address]  [address] [place] [address]  Phone: [phone] Fax: [phone] [facility] [address] [facility] [address] [phone] - f [phone]  [facility] [address]  Phone: [phone] Fax: [phone]

## 2019-04-16 ENCOUNTER — OFFICE VISIT (OUTPATIENT)
Dept: ENDOCRINOLOGY | Age: 64
End: 2019-04-16
Payer: MEDICARE

## 2019-04-16 VITALS
HEIGHT: 64 IN | WEIGHT: 209.2 LBS | DIASTOLIC BLOOD PRESSURE: 80 MMHG | RESPIRATION RATE: 16 BRPM | HEART RATE: 58 BPM | BODY MASS INDEX: 35.71 KG/M2 | OXYGEN SATURATION: 98 % | SYSTOLIC BLOOD PRESSURE: 127 MMHG

## 2019-04-16 DIAGNOSIS — E06.3 HASHIMOTO'S THYROIDITIS: Primary | ICD-10-CM

## 2019-04-16 DIAGNOSIS — E04.1 THYROID NODULE: ICD-10-CM

## 2019-04-16 PROCEDURE — G8417 CALC BMI ABV UP PARAM F/U: HCPCS | Performed by: INTERNAL MEDICINE

## 2019-04-16 PROCEDURE — G8427 DOCREV CUR MEDS BY ELIG CLIN: HCPCS | Performed by: INTERNAL MEDICINE

## 2019-04-16 PROCEDURE — 99213 OFFICE O/P EST LOW 20 MIN: CPT | Performed by: INTERNAL MEDICINE

## 2019-04-16 PROCEDURE — 1036F TOBACCO NON-USER: CPT | Performed by: INTERNAL MEDICINE

## 2019-04-16 PROCEDURE — 3017F COLORECTAL CA SCREEN DOC REV: CPT | Performed by: INTERNAL MEDICINE

## 2019-04-16 PROCEDURE — G8400 PT W/DXA NO RESULTS DOC: HCPCS | Performed by: INTERNAL MEDICINE

## 2019-04-16 PROCEDURE — 4040F PNEUMOC VAC/ADMIN/RCVD: CPT | Performed by: INTERNAL MEDICINE

## 2019-04-16 PROCEDURE — 1123F ACP DISCUSS/DSCN MKR DOCD: CPT | Performed by: INTERNAL MEDICINE

## 2019-04-16 PROCEDURE — 3014F SCREEN MAMMO DOC REV: CPT | Performed by: INTERNAL MEDICINE

## 2019-04-16 PROCEDURE — 1090F PRES/ABSN URINE INCON ASSESS: CPT | Performed by: INTERNAL MEDICINE

## 2019-04-16 ASSESSMENT — ENCOUNTER SYMPTOMS
ORTHOPNEA: 0
PHOTOPHOBIA: 0
COUGH: 0
BACK PAIN: 0
BLURRED VISION: 0
DOUBLE VISION: 0
HEMOPTYSIS: 0

## 2019-04-16 NOTE — PROGRESS NOTES
Endocrinology  Edwina Villalpando M.D. Phone: [phone]   FAX: [phone]       Abram Ramos   YOB: 1955    Date of Visit:  4/16/2019    Allergies   Allergen Reactions    Sulfa Antibiotics Rash     Outpatient Medications Marked as Taking for the 4/16/19 encounter (Office Visit) with Mari Pal MD   Medication Sig Dispense Refill    atenolol (TENORMIN) 50 MG tablet TAKE THREE TABLETS BY MOUTH DAILY 270 tablet 0    cloNIDine (CATAPRES) 0.1 MG tablet TAKE ONE TABLET BY MOUTH DAILY 90 tablet 0    atorvastatin (LIPITOR) 80 MG tablet TAKE ONE TABLET BY MOUTH NIGHTLY 90 tablet 0    hydrochlorothiazide (HYDRODIURIL) 25 MG tablet TAKE ONE TABLET BY MOUTH DAILY 90 tablet 1    amLODIPine (NORVASC) 5 MG tablet TAKE ONE TABLET BY MOUTH DAILY 90 tablet 1    irbesartan-hydrochlorothiazide (AVALIDE) 300-12.5 MG per tablet TAKE ONE TABLET BY MOUTH DAILY 90 tablet 1    Cholecalciferol (VITAMIN D3) 1000 UNITS TABS Take 1 tablet by mouth daily 30 tablet 5    Multiple Vitamin (MULTIVITAMIN PO) Take  by mouth. Vitals:    04/16/19 1053 04/16/19 1058   BP: (!) 158/85 127/80   Site: Right Upper Arm Left Upper Arm   Position: Sitting Sitting   Cuff Size: Large Adult Large Adult   Pulse: 58    Resp: 16    SpO2: 98%    Weight: 209 lb 3.2 oz (94.9 kg)    Height: 5' 4\" (1.626 m)      Body mass index is 35.91 kg/m².      Wt Readings from Last 3 Encounters:   04/16/19 209 lb 3.2 oz (94.9 kg)   12/21/18 205 lb (93 kg)   10/02/18 208 lb (94.3 kg)     BP Readings from Last 3 Encounters:   04/16/19 127/80   12/21/18 122/70   10/02/18 (!) 146/81        Past Medical History:   Diagnosis Date   Jose Quick MD    Goiter     History of mammogram 10/[facility]    [facility] - [facility]    Hyperlipidemia     Hypertension     Onychomycosis     Pap smear for cervical cancer screening 2/2010    Dr. Gabriela Arellano - wnl    Screening for osteoporosis     Dexa 12/2005 - wnl     Past Surgical History:   Procedure Laterality Date    APPENDECTOMY  2/4/2000    COLONOSCOPY  3/2011    Dr. Haseeb To - 2 years  f/u    COLONOSCOPY  10/19/2016    Leonie Roberto MD    ESOPHAGEAL DILATATION  4/2014    Bridger Sidhu MD     KNEE SURGERY      right    PILONIDAL CYST EXCISION      UPPER GASTROINTESTINAL ENDOSCOPY  3/19/14    concentric rings in esophagus     Family History   Problem Relation Age of Onset    Other Mother         vascular disease    Hypertension Mother     High Cholesterol Mother     Diabetes Mother     Heart Disease Mother     Heart Disease Father     Heart Attack Father     Cancer Maternal Grandmother 32        unknown type - cancer     Stroke Neg Hx      Social History     Tobacco Use   Smoking Status Never Smoker   Smokeless Tobacco Never Used      Social History     Substance and Sexual Activity   Alcohol Use Yes    Alcohol/week: 1.8 oz    Types: 3 Glasses of wine per week       HPI    Greg Bedoya is a 70 y.o. female who is here for a follow-up for management of thyroid disease. PCP :  Noah Diggs MD     Patient has a PMH of HTN, HLP, goiter. Patient had Thyroid US done in 12/15 which showed 1.4 cm nodule in right lobe. FNA was done in 01/16 which showed it to be benign nodule. Also had FNA in 2009 which was benign. Mother had hyperthyroidism. No FH of thyroid cancer  No History of exposure to radiation as a child. No tremors, palpitations. No heat or cold intolerance. Has been losing weight with life style changes    Review of Systems   Constitutional: Positive for malaise/fatigue. Negative for chills, diaphoresis, fever and weight loss. Eyes: Negative for blurred vision, double vision and photophobia. Respiratory: Negative for cough and hemoptysis. Cardiovascular: Negative for chest pain, palpitations and orthopnea. Genitourinary: Negative for dysuria, flank pain, frequency, hematuria and urgency. Musculoskeletal: Positive for myalgias. Negative for back pain, falls, joint pain and neck pain. Skin: Negative for itching and rash. Neurological: Positive for headaches. Negative for dizziness, tingling, tremors, sensory change, speech change, focal weakness, seizures and loss of consciousness. Endo/Heme/Allergies: Negative for environmental allergies and polydipsia. Does not bruise/bleed easily. Psychiatric/Behavioral: Positive for depression. Negative for hallucinations, memory loss, substance abuse and suicidal ideas. The patient is not nervous/anxious and does not have insomnia. Physical Exam   Constitutional: She is oriented to person, place, and time. She appears well-developed. No distress. HENT:   Mouth/Throat: Oropharynx is clear and moist.   Eyes: EOM are normal.   Neck: Thyromegaly present. Cardiovascular: Normal rate and normal heart sounds. Pulmonary/Chest: Effort normal. No respiratory distress. She has no wheezes. Abdominal: Soft. Bowel sounds are normal. There is no tenderness. Musculoskeletal: She exhibits no edema. Neurological: She is alert and oriented to person, place, and time. Skin: Skin is warm and dry. She is not diaphoretic. Psychiatric: Her behavior is normal. Thought content normal.       Impression/Conclusion below       Examination: Ultrasound - thyroid       Clinical history: 56years old Female with goiter.       Technique: Multiple grayscale and color Doppler real-time ultrasonographic images were obtained    of the thyroid gland by the ultrasound technologist 12/23/2015 3:51 PM.  Selected static    images were presented to the radiologist for review.       Comparison: Comparison is made to thyroid sonogram dated 02/18/2014.       Findings: The right lobe of the thyroid is heterogeneous in echotexture and measures 4.9 cm x    1.9 cm x 2.0 cm. There is normal color flow.  There is a well-circumscribed soft tissue nodule    in the right thyroid lobe Hashimoto's thyroiditis. This 70 yrs old female was found to have positive TPO antibodies consistent with hashimoto's thyroiditis. TSI were negative      Clinically euthyroid. Discussed the diagnosis of hashimoto's thyroiditis with the patient. .     On  selenium 200 mcg daily. She has felt much better. Will repeat her thyroid hormone  levels. 2. Right thyroid nodule    S/p FNA in 2009 and 2016. Benign both times. Stable in 12/18    Reviewed US images in office today    No suspicious features noted. 3. HTN/HLP. As per PCP. 4. Eye disease. Her ophthalmologist thinks she may have Graves eye disease. Patient says her eye have always been like this  CT orbit normal.   TSI were in normal range.

## 2019-06-10 ENCOUNTER — OFFICE VISIT (OUTPATIENT)
Dept: INTERNAL MEDICINE CLINIC | Age: 64
End: 2019-06-10
Payer: MEDICARE

## 2019-06-10 ENCOUNTER — HOSPITAL ENCOUNTER (OUTPATIENT)
Dept: GENERAL RADIOLOGY | Age: 64
Discharge: HOME | End: 2019-06-10
Payer: MEDICARE

## 2019-06-10 ENCOUNTER — TELEPHONE (OUTPATIENT)
Dept: INTERNAL MEDICINE CLINIC | Age: 64
End: 2019-06-10

## 2019-06-10 VITALS
WEIGHT: 205 LBS | DIASTOLIC BLOOD PRESSURE: 78 MMHG | HEIGHT: 64 IN | SYSTOLIC BLOOD PRESSURE: 130 MMHG | RESPIRATION RATE: 14 BRPM | OXYGEN SATURATION: 96 % | BODY MASS INDEX: 35 KG/M2 | HEART RATE: 56 BPM | TEMPERATURE: 98.4 F

## 2019-06-10 DIAGNOSIS — M54.6 THORACIC BACK PAIN, UNSPECIFIED BACK PAIN LATERALITY, UNSPECIFIED CHRONICITY: ICD-10-CM

## 2019-06-10 DIAGNOSIS — R73.03 PREDIABETES: ICD-10-CM

## 2019-06-10 DIAGNOSIS — Z00.00 WELCOME TO MEDICARE PREVENTIVE VISIT: ICD-10-CM

## 2019-06-10 DIAGNOSIS — I10 ESSENTIAL HYPERTENSION: ICD-10-CM

## 2019-06-10 DIAGNOSIS — E55.9 VITAMIN D DEFICIENCY: ICD-10-CM

## 2019-06-10 DIAGNOSIS — E78.2 MIXED HYPERLIPIDEMIA: ICD-10-CM

## 2019-06-10 DIAGNOSIS — Z00.00 WELCOME TO MEDICARE PREVENTIVE VISIT: Primary | ICD-10-CM

## 2019-06-10 LAB
A/G RATIO: 1.9 (ref 1.1–2.2)
ALBUMIN SERPL-MCNC: 4.8 G/DL (ref 3.4–5)
ALP BLD-CCNC: 53 U/L (ref 40–129)
ALT SERPL-CCNC: 27 U/L (ref 10–40)
ANION GAP SERPL CALCULATED.3IONS-SCNC: 16 MMOL/L (ref 3–16)
APPEARANCE FLUID: CLEAR
AST SERPL-CCNC: 19 U/L (ref 15–37)
BASOPHILS ABSOLUTE: 0 K/UL (ref 0–0.2)
BASOPHILS RELATIVE PERCENT: 0.6 %
BILIRUB SERPL-MCNC: 0.6 MG/DL (ref 0–1)
BILIRUBIN, POC: ABNORMAL
BLOOD URINE, POC: ABNORMAL
BUN BLDV-MCNC: 17 MG/DL (ref 7–20)
CALCIUM SERPL-MCNC: 9.7 MG/DL (ref 8.3–10.6)
CHLORIDE BLD-SCNC: 102 MMOL/L (ref 99–110)
CHOLESTEROL, TOTAL: 181 MG/DL (ref 0–199)
CLARITY, POC: CLEAR
CO2: 26 MMOL/L (ref 21–32)
COLOR, POC: YELLOW
CREAT SERPL-MCNC: <0.5 MG/DL (ref 0.6–1.2)
EOSINOPHILS ABSOLUTE: 0.2 K/UL (ref 0–0.6)
EOSINOPHILS RELATIVE PERCENT: 2.7 %
GFR AFRICAN AMERICAN: >60
GFR NON-AFRICAN AMERICAN: >60
GLOBULIN: 2.5 G/DL
GLUCOSE BLD-MCNC: 102 MG/DL (ref 70–99)
GLUCOSE URINE, POC: ABNORMAL
HCT VFR BLD CALC: 43.2 % (ref 36–48)
HDLC SERPL-MCNC: 62 MG/DL (ref 40–60)
HEMOGLOBIN: 14.8 G/DL (ref 12–16)
KETONES, POC: ABNORMAL
LDL CHOLESTEROL CALCULATED: 85 MG/DL
LEUKOCYTE EST, POC: ABNORMAL
LYMPHOCYTES ABSOLUTE: 1.4 K/UL (ref 1–5.1)
LYMPHOCYTES RELATIVE PERCENT: 23.5 %
MCH RBC QN AUTO: 31.1 PG (ref 26–34)
MCHC RBC AUTO-ENTMCNC: 34.3 G/DL (ref 31–36)
MCV RBC AUTO: 90.6 FL (ref 80–100)
MONOCYTES ABSOLUTE: 0.4 K/UL (ref 0–1.3)
MONOCYTES RELATIVE PERCENT: 6.8 %
NEUTROPHILS ABSOLUTE: 4 K/UL (ref 1.7–7.7)
NEUTROPHILS RELATIVE PERCENT: 66.4 %
NITRITE, POC: ABNORMAL
PDW BLD-RTO: 14.1 % (ref 12.4–15.4)
PH, POC: 7.5
PLATELET # BLD: 255 K/UL (ref 135–450)
PMV BLD AUTO: 9.5 FL (ref 5–10.5)
POTASSIUM SERPL-SCNC: 3.9 MMOL/L (ref 3.5–5.1)
PROTEIN, POC: 30
RBC # BLD: 4.77 M/UL (ref 4–5.2)
SODIUM BLD-SCNC: 144 MMOL/L (ref 136–145)
SPECIFIC GRAVITY, POC: 1.02
TOTAL PROTEIN: 7.3 G/DL (ref 6.4–8.2)
TRIGL SERPL-MCNC: 172 MG/DL (ref 0–150)
UROBILINOGEN, POC: 0.2
VITAMIN D 25-HYDROXY: 44.4 NG/ML
VLDLC SERPL CALC-MCNC: 34 MG/DL
WBC # BLD: 6 K/UL (ref 4–11)

## 2019-06-10 PROCEDURE — 1123F ACP DISCUSS/DSCN MKR DOCD: CPT | Performed by: INTERNAL MEDICINE

## 2019-06-10 PROCEDURE — 72072 X-RAY EXAM THORAC SPINE 3VWS: CPT

## 2019-06-10 PROCEDURE — G0402 INITIAL PREVENTIVE EXAM: HCPCS | Performed by: INTERNAL MEDICINE

## 2019-06-10 PROCEDURE — 4040F PNEUMOC VAC/ADMIN/RCVD: CPT | Performed by: INTERNAL MEDICINE

## 2019-06-10 PROCEDURE — 3017F COLORECTAL CA SCREEN DOC REV: CPT | Performed by: INTERNAL MEDICINE

## 2019-06-10 PROCEDURE — 81002 URINALYSIS NONAUTO W/O SCOPE: CPT | Performed by: INTERNAL MEDICINE

## 2019-06-10 RX ORDER — IBUPROFEN 600 MG/1
600 TABLET ORAL 3 TIMES DAILY PRN
Qty: 45 TABLET | Refills: 0 | Status: SHIPPED | OUTPATIENT
Start: 2019-06-10 | End: 2021-03-25 | Stop reason: ALTCHOICE

## 2019-06-10 RX ORDER — AMLODIPINE BESYLATE 5 MG/1
5 TABLET ORAL DAILY
Qty: 90 TABLET | Refills: 1 | Status: SHIPPED | OUTPATIENT
Start: 2019-06-10 | End: 2019-12-03 | Stop reason: SDUPTHER

## 2019-06-10 RX ORDER — ATORVASTATIN CALCIUM 80 MG/1
80 TABLET, FILM COATED ORAL NIGHTLY
Qty: 90 TABLET | Refills: 1 | Status: SHIPPED | OUTPATIENT
Start: 2019-06-10 | End: 2019-12-03 | Stop reason: SDUPTHER

## 2019-06-10 RX ORDER — IRBESARTAN AND HYDROCHLOROTHIAZIDE 300; 12.5 MG/1; MG/1
TABLET, FILM COATED ORAL
Qty: 90 TABLET | Refills: 1 | Status: SHIPPED | OUTPATIENT
Start: 2019-06-10 | End: 2019-11-12 | Stop reason: ALTCHOICE

## 2019-06-10 RX ORDER — ATENOLOL 50 MG/1
150 TABLET ORAL DAILY
Qty: 270 TABLET | Refills: 1 | Status: SHIPPED | OUTPATIENT
Start: 2019-06-10 | End: 2019-11-12 | Stop reason: ALTCHOICE

## 2019-06-10 RX ORDER — HYDROCHLOROTHIAZIDE 25 MG/1
25 TABLET ORAL DAILY
Qty: 90 TABLET | Refills: 1 | Status: SHIPPED | OUTPATIENT
Start: 2019-06-10 | End: 2020-02-05

## 2019-06-10 ASSESSMENT — ENCOUNTER SYMPTOMS
VOICE CHANGE: 0
EYE REDNESS: 0
RECTAL PAIN: 0
DIARRHEA: 0
CHEST TIGHTNESS: 0
ANAL BLEEDING: 0
SINUS PRESSURE: 0
SHORTNESS OF BREATH: 0
COUGH: 0
NAUSEA: 0
ABDOMINAL DISTENTION: 0
RHINORRHEA: 0
CONSTIPATION: 0
ABDOMINAL PAIN: 0
BACK PAIN: 1
EYE ITCHING: 0
EYE DISCHARGE: 0
APNEA: 0
WHEEZING: 0
CHOKING: 0
VOMITING: 0
TROUBLE SWALLOWING: 0
PHOTOPHOBIA: 0
SINUS PAIN: 0
FACIAL SWELLING: 0
SORE THROAT: 0
BLOOD IN STOOL: 0
EYE PAIN: 0

## 2019-06-10 ASSESSMENT — ANXIETY QUESTIONNAIRES: GAD7 TOTAL SCORE: 0

## 2019-06-10 ASSESSMENT — PATIENT HEALTH QUESTIONNAIRE - PHQ9
SUM OF ALL RESPONSES TO PHQ QUESTIONS 1-9: 0
SUM OF ALL RESPONSES TO PHQ QUESTIONS 1-9: 0

## 2019-06-10 NOTE — PROGRESS NOTES
Qian Mccartney   YOB: 1955    Date of Visit:  6/10/2019    Chief Complaint   Patient presents with    Annual Exam     Welcome to Medicare initial physical    Hypertension    Hyperlipidemia    Other     Prediabetes       HPI  Pt presents for Welcome to Medicare preventative exam visit. Hypertension-Pt takes Atenolol 50mg 3 tablets daily, Clonidine 0.1mg po q day, HCTZ 25mg po q day, Amlodipine 5mg po q day, and Irbesartan 300-12.5mg po q day. Pt doesn't monitor her BP. Pt states she only adds salt to tomatoes. Pt is not exercising. Hyperlipidemia-Pt takes Atorvastatin 80mg po qhs. Pt denies side effects. Pt decreases fat and cholesterol. Prediabetes-Pt decreases carbohydrates. Vitamin D deficiency-Pt takes Vitamin D 6,000 IU po q day. Pt states she is on a waiting list for the 2nd Shingrix vaccine dose. Pt sees Endocrinology, Dr. Rolando Lozoya for Hashimoto's Thyroiditis and thyroid nodule. Pt sees Dermatology, Dr. 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 Pt c/o back pain under right shoulder blade occasionally for the past 4 months. Pain is dull and intermittent. Pt states the pain feels like she did when she had pleurisy. Pt states she was upset about her dog recently that had to have major surgery for stones in bladder. Pt states it was very expensive and cost $6,000. Review of Systems   Constitutional: Negative for activity change, appetite change, chills, diaphoresis, fatigue, fever and unexpected weight change. HENT: Negative for congestion, ear discharge, ear pain, facial swelling, hearing loss, mouth sores, nosebleeds, postnasal drip, rhinorrhea, sinus pressure, sinus pain, sneezing, sore throat, tinnitus, trouble swallowing and voice change. Eyes: Negative for photophobia, pain, discharge, redness, itching and visual disturbance. Respiratory: Negative for apnea, cough, choking, chest tightness, shortness of breath and wheezing.     Cardiovascular: Negative for chest pain, palpitations and leg swelling. Gastrointestinal: Negative for abdominal distention, abdominal pain, anal bleeding, blood in stool, constipation, diarrhea, nausea, rectal pain and vomiting. Endocrine: Negative for cold intolerance and heat intolerance. Genitourinary: Negative for decreased urine volume, difficulty urinating, dysuria, flank pain, frequency, genital sores, hematuria, pelvic pain, urgency, vaginal bleeding, vaginal discharge and vaginal pain. Musculoskeletal: Positive for back pain. Negative for arthralgias, gait problem, joint swelling, myalgias, neck pain and neck stiffness. Skin: Negative for rash and wound. Neurological: Negative for dizziness, tremors, seizures, syncope, facial asymmetry, speech difficulty, weakness, light-headedness, numbness and headaches. Hematological: Negative for adenopathy. Does not bruise/bleed easily. Psychiatric/Behavioral: Negative for decreased concentration, dysphoric mood and sleep disturbance. The patient is not nervous/anxious. All other systems reviewed and are negative.       Past Medical History:   Diagnosis Date    Fissure, anal     Lorenzo YOO    Goiter     [facility]    Hyperlipidemia     Hypertension     Onychomycosis     Pap smear for cervical cancer screening 2/2010    [facility]    Screening for osteoporosis     Dexa 12/2005 - wn       Past Surgical History:   Procedure Laterality Date    APPENDECTOMY  2/4/2000    COLONOSCOPY  3/2011    Dr. Welch Mon - 2 years  f/u    COLONOSCOPY  10/19/2016    Lisa Gannon MD    ESOPHAGEAL DILATATION  4/2014    Harini Santos MD     KNEE SURGERY      right    PILONIDAL CYST EXCISION      UPPER GASTROINTESTINAL ENDOSCOPY  3/19/14    concentric rings in esophagus       Outpatient Medications Marked as Taking for the 6/10/19 encounter (Office Visit) with Robyn Marin MD   Medication Sig Dispense Refill    atorvastatin (LIPITOR) 80 MG tablet Take 1 tablet by mouth nightly TAKE ONE TABLET BY MOUTH NIGHTLY 90 tablet 1    atenolol (TENORMIN) 50 MG tablet Take 3 tablets by mouth daily TAKE THREE TABLETS BY MOUTH DAILY 270 tablet 1    hydrochlorothiazide (HYDRODIURIL) 25 MG tablet Take 1 tablet by mouth daily 90 tablet 1    amLODIPine (NORVASC) 5 MG tablet Take 1 tablet by mouth daily 90 tablet 1    irbesartan-hydrochlorothiazide (AVALIDE) 300-12.5 MG per tablet TAKE ONE TABLET BY MOUTH DAILY 90 tablet 1    Cholecalciferol (VITAMIN D3) 5000 units TABS Take 1 tablet by mouth daily      Selenium 200 MCG CAPS Take 1 capsule by mouth      cloNIDine (CATAPRES) 0.1 MG tablet TAKE ONE TABLET BY MOUTH DAILY 90 tablet 0    Cholecalciferol (VITAMIN D3) 1000 UNITS TABS Take 1 tablet by mouth daily 30 tablet 5    Multiple Vitamin (MULTIVITAMIN PO) Take  by mouth. Allergies   Allergen Reactions    Sulfa Antibiotics Rash       Social History     Tobacco Use    Smoking status: Never Smoker    Smokeless tobacco: Never Used   Substance Use Topics    Alcohol use:  Yes     Alcohol/week: 1.8 oz     Types: 3 Glasses of wine per week       Family History   Problem Relation Age of Onset    Other Mother         vascular disease    Hypertension Mother     High Cholesterol Mother     Diabetes Mother     Heart Disease Mother     Heart Disease Father     Heart Attack Father     Cancer Maternal Grandmother 32        unknown type - cancer     Stroke Neg Hx        Immunization History   Administered Date(s) Administered    Influenza Whole 12/05/2013    Influenza, Intradermal, Preservative free 12/15/2015    Influenza, Intradermal, Quadrivalent, Preservative Free 12/09/2016    Pneumococcal 13-valent Conjugate (Ndusrrt24) 12/21/2018    Tdap (Boostrix, Adacel) 12/15/2015    Tetanus 11/11/2005    Zoster Live (Zostavax) 06/09/2014    Zoster Subunit (Shingrix) 06/18/2018       Vitals: 06/10/19 0902   BP: 130/78   Site: Left Upper Arm   Position: Sitting   Cuff Size: Large Adult   Pulse: 56   Resp: 14   Temp: 98.4 °F (36.9 °C)   TempSrc: Oral   SpO2: 96%   Weight: 205 lb (93 kg)   Height: 5' 4\" (1.626 m)     Body mass index is 35.19 kg/m². Physical Exam   Constitutional: She is oriented to person, place, and time. She appears well-developed and well-nourished. No distress. HENT:   Head: Normocephalic and atraumatic. Right Ear: Hearing, tympanic membrane and ear canal normal.   Left Ear: Hearing, tympanic membrane and ear canal normal.   Nose: Nose normal.   Mouth/Throat: Uvula is midline, oropharynx is clear and moist and mucous membranes are normal.   Eyes: Pupils are equal, round, and reactive to light. Conjunctivae, EOM and lids are normal.   Neck: Neck supple. Carotid bruit is not present. No thyromegaly present. Cardiovascular: Normal rate, regular rhythm, S1 normal, S2 normal, normal heart sounds and intact distal pulses. Exam reveals no gallop and no friction rub. No murmur heard. Pulmonary/Chest: Effort normal and breath sounds normal. No respiratory distress. She has no wheezes. She has no rhonchi. She has no rales. Abdominal: Soft. Bowel sounds are normal. She exhibits no distension and no mass. There is no hepatosplenomegaly. There is no tenderness. There is no rebound. Genitourinary:   Genitourinary Comments: deferred   Musculoskeletal: Normal range of motion. She exhibits no edema. Right shoulder: She exhibits normal range of motion and no tenderness. Left shoulder: She exhibits normal range of motion and no tenderness. Right elbow: She exhibits no swelling. No tenderness found. Left elbow: She exhibits no swelling. No tenderness found. Right wrist: She exhibits no tenderness and no swelling. Left wrist: She exhibits no tenderness and no swelling. Right hip: She exhibits no tenderness.         Left hip: She exhibits no tenderness. Right knee: She exhibits no swelling. No tenderness found. Left knee: She exhibits no swelling. No tenderness found. Right ankle: She exhibits no swelling. No tenderness. Left ankle: She exhibits no swelling. No tenderness. Cervical back: She exhibits normal range of motion, no tenderness and no spasm. Thoracic back: She exhibits no tenderness and no spasm. Lumbar back: She exhibits normal range of motion, no tenderness and no spasm. Right upper arm: She exhibits no tenderness. Left upper arm: She exhibits no tenderness. Right hand: She exhibits no tenderness and no swelling. Left hand: She exhibits no tenderness and no swelling. Right upper leg: She exhibits no tenderness. Left upper leg: She exhibits no tenderness. Right lower leg: She exhibits no tenderness. Left lower leg: She exhibits no tenderness. Right foot: There is no tenderness and no swelling. Left foot: There is no tenderness and no swelling. Lymphadenopathy:        Head (right side): No submandibular adenopathy present. Head (left side): No submandibular adenopathy present. She has no cervical adenopathy. She has no axillary adenopathy. Neurological: She is alert and oriented to person, place, and time. She has normal strength and normal reflexes. No cranial nerve deficit. Gait normal. She displays no Babinski's sign on the right side. She displays no Babinski's sign on the left side. Skin: Skin is warm, dry and intact. No bruising, no lesion and no rash noted. No erythema. Psychiatric: She has a normal mood and affect.  Her speech is normal.             Results for POC orders placed in visit on 06/10/19   POCT Urinalysis no Micro   Result Value Ref Range    Color, UA Yellow     Clarity, UA Clear     Glucose, UA POC N     Bilirubin, UA N     Ketones, UA N     Spec Grav, UA 1.020     Blood, UA POC N pH, UA 7.5     Protein, UA POC 30     Urobilinogen, UA 0.2     Leukocytes, UA N     Nitrite, UA N     Appearance, Fluid Clear Clear, Slightly Cloudy         Assessment/Plan     1. Welcome to Medicare preventive visit  - CBC Auto Differential; Future  - Comprehensive Metabolic Panel; Future  - Lipid Panel; Future  - Pap smear done by Gynecology on 10/4/17  - Mammogram done on 10/19/18  -Colonoscopy done on 10/19/16  -Regular aerobic exercise  -Tdap done on 12/15/15  -Prevnar done on 12/21/18  -Shingrix dose 1 done on 6/18/18  -Patient is on a waiting list for 2nd dose of Shingrix vaccine  -Dexa Scan done on 11/13/18    2. Mixed hyperlipidemia  -Continue atorvastatin (LIPITOR) 80 MG tablet; Take 1 tablet by mouth nightly TAKE ONE TABLET BY MOUTH NIGHTLY  Dispense: 90 tablet; Refill: 1  -Low fat, low cholesterol diet  -Regular aerobic exercise  - Comprehensive Metabolic Panel; Future  - Lipid Panel; Future    3. Essential hypertension  - stable  - Continue atenolol (TENORMIN) 50 MG tablet; Take 3 tablets by mouth daily TAKE THREE TABLETS BY MOUTH DAILY  Dispense: 270 tablet; Refill: 1  - Continue hydrochlorothiazide (HYDRODIURIL) 25 MG tablet; Take 1 tablet by mouth daily  Dispense: 90 tablet; Refill: 1  - Continue amLODIPine (NORVASC) 5 MG tablet; Take 1 tablet by mouth daily  Dispense: 90 tablet; Refill: 1  - Continue irbesartan-hydrochlorothiazide (AVALIDE) 300-12.5 MG per tablet; TAKE ONE TABLET BY MOUTH DAILY  Dispense: 90 tablet; Refill: 1  -Low sodium diet  -Regular aerobic exercise  - Comprehensive Metabolic Panel; Future  - POCT Urinalysis no Micro    4. Prediabetes  -Low carbohydrate diet  -Regular aerobic exercise  - Hemoglobin A1C; Future    5. Vitamin D deficiency  - Vitamin D 25 Hydroxy; Future  - Cholecalciferol (VITAMIN D3) 6000 IU once daily    6. Thoracic back pain, unspecified back pain laterality, unspecified chronicity  - XR THORACIC SPINE (3 VIEWS);  Future  - ibuprofen (ADVIL;MOTRIN) 600 MG tablet; Take 1 tablet by mouth 3 times daily as needed for Pain  Dispense: 45 tablet; Refill: 0      Discussed medications with patient, who voiced understanding of their use and indications. All questions answered. Return in about 2 weeks (around 6/24/2019) for back pain.

## 2019-06-10 NOTE — PATIENT INSTRUCTIONS
exams can help you stay healthy. Your doctor has checked your overall health and may have suggested ways to take good care of yourself. He or she also may have recommended tests. At home, you can help prevent illness with healthy eating, regular exercise, and other steps. Follow-up care is a key part of your treatment and safety. Be sure to make and go to all appointments, and call your doctor if you are having problems. It's also a good idea to know your test results and keep a list of the medicines you take. How can you care for yourself at home? · Reach and stay at a healthy weight. This will lower your risk for many problems, such as obesity, diabetes, heart disease, and high blood pressure. · Get at least 30 minutes of exercise on most days of the week. Walking is a good choice. You also may want to do other activities, such as running, swimming, cycling, or playing tennis or team sports. · Do not smoke. Smoking can make health problems worse. If you need help quitting, talk to your doctor about stop-smoking programs and medicines. These can increase your chances of quitting for good. · Protect your skin from too much sun. When you're outdoors from 10 a.m. to 4 p.m., stay in the shade or cover up with clothing and a hat with a wide brim. Wear sunglasses that block UV rays. Even when it's cloudy, put broad-spectrum sunscreen (SPF 30 or higher) on any exposed skin. · See a dentist one or two times a year for checkups and to have your teeth cleaned. · Wear a seat belt in the car. Follow your doctor's advice about when to have certain tests. These tests can spot problems early. · Cholesterol. Your doctor will tell you how often to have this done based on your age, family history, or other things that can increase your risk for heart attack and stroke. · Blood pressure. Have your blood pressure checked during a routine doctor visit.  Your doctor will tell you how often to check your blood pressure based on your age, your blood pressure results, and other factors. · Mammogram. Ask your doctor how often you should have a mammogram, which is an X-ray of your breasts. A mammogram can spot breast cancer before it can be felt and when it is easiest to treat. · Pap test and pelvic exam. Ask your doctor how often you should have a Pap test. You may not need to have a Pap test as often as you used to. · Vision. Have your eyes checked every year or two or as often as your doctor suggests. Some experts recommend that you have yearly exams for glaucoma and other age-related eye problems starting at age 48. · Hearing. Tell your doctor if you notice any change in your hearing. You can have tests to find out how well you hear. · Diabetes. Ask your doctor whether you should have tests for diabetes. · Colorectal cancer. Your risk for colorectal cancer gets higher as you get older. Some experts say that adults should start regular screening at age 48 and stop at age 76. Others say to start before age 48 or continue after age 76. Talk with your doctor about your risk and when to start and stop screening. · Thyroid disease. Talk to your doctor about whether to have your thyroid checked as part of a regular physical exam. Women have an increased chance of a thyroid problem. · Osteoporosis. You should begin tests for bone density at age 72. If you are younger than 72, ask your doctor whether you have factors that may increase your risk for this disease. You may want to have this test before age 72. · Heart attack and stroke risk. At least every 4 to 6 years, you should have your risk for heart attack and stroke assessed. Your doctor uses factors such as your age, blood pressure, cholesterol, and whether you smoke or have diabetes to show what your risk for a heart attack or stroke is over the next 10 years. When should you call for help?   Watch closely for changes in your health, and be sure to contact your doctor if you have any

## 2019-06-10 NOTE — PROGRESS NOTES
PHQ Scores 6/10/2019 12/21/2018 9/19/2017 6/14/2016   PHQ2 Score 0 0 0 0   PHQ9 Score 0 0 0 0     Interpretation of Total Score Depression Severity: 1-4 = Minimal depression, 5-9 = Mild depression, 10-14 = Moderate depression, 15-19 = Moderately severe depression, 20-27 = Severe depression

## 2019-06-10 NOTE — TELEPHONE ENCOUNTER
Spoke with pt and informed her of her UA results that she had done in office today. Pt denies any other questions or concerns at this time.

## 2019-06-11 LAB
ESTIMATED AVERAGE GLUCOSE: 122.6 MG/DL
HBA1C MFR BLD: 5.9 %

## 2019-06-13 ENCOUNTER — TELEPHONE (OUTPATIENT)
Dept: INTERNAL MEDICINE CLINIC | Age: 64
End: 2019-06-13

## 2019-06-13 NOTE — TELEPHONE ENCOUNTER
Routed to Dr. Linh Sena   [facility] Thoracic Spine done on 6/10/2019. Please advise.

## 2019-06-17 ENCOUNTER — TELEPHONE (OUTPATIENT)
Dept: INTERNAL MEDICINE CLINIC | Age: 64
End: 2019-06-17

## 2019-06-17 NOTE — TELEPHONE ENCOUNTER
Pt would like to be called for the results of her recent labs and X-ray done back on 6.10.19. Pt phone # [phone].  Please call after 1:00pm

## 2019-07-16 DIAGNOSIS — I10 ESSENTIAL HYPERTENSION: ICD-10-CM

## 2019-07-16 RX ORDER — CLONIDINE HYDROCHLORIDE 0.1 MG/1
TABLET ORAL
Qty: 90 TABLET | Refills: 1 | Status: SHIPPED | OUTPATIENT
Start: 2019-07-16 | End: 2019-12-03 | Stop reason: SDUPTHER

## 2019-11-12 ENCOUNTER — OFFICE VISIT (OUTPATIENT)
Dept: ENDOCRINOLOGY | Age: 64
End: 2019-11-12
Payer: MEDICARE

## 2019-11-12 VITALS
DIASTOLIC BLOOD PRESSURE: 92 MMHG | BODY MASS INDEX: 36.19 KG/M2 | WEIGHT: 212 LBS | HEART RATE: 64 BPM | SYSTOLIC BLOOD PRESSURE: 165 MMHG | OXYGEN SATURATION: 97 % | HEIGHT: 64 IN

## 2019-11-12 DIAGNOSIS — E04.1 THYROID NODULE: ICD-10-CM

## 2019-11-12 DIAGNOSIS — E06.3 HASHIMOTO'S THYROIDITIS: Primary | ICD-10-CM

## 2019-11-12 PROCEDURE — 99213 OFFICE O/P EST LOW 20 MIN: CPT | Performed by: INTERNAL MEDICINE

## 2019-11-12 PROCEDURE — 1123F ACP DISCUSS/DSCN MKR DOCD: CPT | Performed by: INTERNAL MEDICINE

## 2019-11-12 PROCEDURE — G8400 PT W/DXA NO RESULTS DOC: HCPCS | Performed by: INTERNAL MEDICINE

## 2019-11-12 PROCEDURE — G8484 FLU IMMUNIZE NO ADMIN: HCPCS | Performed by: INTERNAL MEDICINE

## 2019-11-12 PROCEDURE — 1090F PRES/ABSN URINE INCON ASSESS: CPT | Performed by: INTERNAL MEDICINE

## 2019-11-12 PROCEDURE — 3017F COLORECTAL CA SCREEN DOC REV: CPT | Performed by: INTERNAL MEDICINE

## 2019-11-12 PROCEDURE — G8427 DOCREV CUR MEDS BY ELIG CLIN: HCPCS | Performed by: INTERNAL MEDICINE

## 2019-11-12 PROCEDURE — 1036F TOBACCO NON-USER: CPT | Performed by: INTERNAL MEDICINE

## 2019-11-12 PROCEDURE — G9899 SCRN MAM PERF RSLTS DOC: HCPCS | Performed by: INTERNAL MEDICINE

## 2019-11-12 PROCEDURE — G8417 CALC BMI ABV UP PARAM F/U: HCPCS | Performed by: INTERNAL MEDICINE

## 2019-11-12 PROCEDURE — 4040F PNEUMOC VAC/ADMIN/RCVD: CPT | Performed by: INTERNAL MEDICINE

## 2019-11-12 ASSESSMENT — ENCOUNTER SYMPTOMS
COUGH: 0
DOUBLE VISION: 0
BACK PAIN: 0
PHOTOPHOBIA: 0
ORTHOPNEA: 0
HEMOPTYSIS: 0
BLURRED VISION: 0

## 2019-12-03 ENCOUNTER — OFFICE VISIT (OUTPATIENT)
Dept: PRIMARY CARE CLINIC | Age: 64
End: 2019-12-03
Payer: MEDICARE

## 2019-12-03 VITALS
OXYGEN SATURATION: 96 % | SYSTOLIC BLOOD PRESSURE: 170 MMHG | WEIGHT: 208 LBS | HEART RATE: 73 BPM | HEIGHT: 64 IN | BODY MASS INDEX: 35.51 KG/M2 | DIASTOLIC BLOOD PRESSURE: 80 MMHG

## 2019-12-03 DIAGNOSIS — R73.03 PREDIABETES: ICD-10-CM

## 2019-12-03 DIAGNOSIS — E78.2 MIXED HYPERLIPIDEMIA: ICD-10-CM

## 2019-12-03 DIAGNOSIS — I10 ESSENTIAL HYPERTENSION: Primary | ICD-10-CM

## 2019-12-03 DIAGNOSIS — E55.9 VITAMIN D DEFICIENCY: ICD-10-CM

## 2019-12-03 LAB
ALBUMIN SERPL-MCNC: 4.5 G/DL (ref 3.4–5)
ALP BLD-CCNC: 56 U/L (ref 40–129)
ALT SERPL-CCNC: 32 U/L (ref 10–40)
AST SERPL-CCNC: 21 U/L (ref 15–37)
BILIRUB SERPL-MCNC: 0.4 MG/DL (ref 0–1)
BILIRUBIN DIRECT: <0.2 MG/DL (ref 0–0.3)
BILIRUBIN, INDIRECT: NORMAL MG/DL (ref 0–1)
CHOLESTEROL, TOTAL: 164 MG/DL (ref 0–199)
HDLC SERPL-MCNC: 69 MG/DL (ref 40–60)
LDL CHOLESTEROL CALCULATED: 62 MG/DL
TOTAL PROTEIN: 7.2 G/DL (ref 6.4–8.2)
TRIGL SERPL-MCNC: 165 MG/DL (ref 0–150)
VLDLC SERPL CALC-MCNC: 33 MG/DL

## 2019-12-03 PROCEDURE — 3017F COLORECTAL CA SCREEN DOC REV: CPT | Performed by: INTERNAL MEDICINE

## 2019-12-03 PROCEDURE — G8427 DOCREV CUR MEDS BY ELIG CLIN: HCPCS | Performed by: INTERNAL MEDICINE

## 2019-12-03 PROCEDURE — G9899 SCRN MAM PERF RSLTS DOC: HCPCS | Performed by: INTERNAL MEDICINE

## 2019-12-03 PROCEDURE — 1036F TOBACCO NON-USER: CPT | Performed by: INTERNAL MEDICINE

## 2019-12-03 PROCEDURE — 1123F ACP DISCUSS/DSCN MKR DOCD: CPT | Performed by: INTERNAL MEDICINE

## 2019-12-03 PROCEDURE — 99213 OFFICE O/P EST LOW 20 MIN: CPT | Performed by: INTERNAL MEDICINE

## 2019-12-03 PROCEDURE — 1090F PRES/ABSN URINE INCON ASSESS: CPT | Performed by: INTERNAL MEDICINE

## 2019-12-03 PROCEDURE — 4040F PNEUMOC VAC/ADMIN/RCVD: CPT | Performed by: INTERNAL MEDICINE

## 2019-12-03 PROCEDURE — G8417 CALC BMI ABV UP PARAM F/U: HCPCS | Performed by: INTERNAL MEDICINE

## 2019-12-03 PROCEDURE — G8400 PT W/DXA NO RESULTS DOC: HCPCS | Performed by: INTERNAL MEDICINE

## 2019-12-03 PROCEDURE — G8482 FLU IMMUNIZE ORDER/ADMIN: HCPCS | Performed by: INTERNAL MEDICINE

## 2019-12-03 RX ORDER — AMLODIPINE BESYLATE 10 MG/1
10 TABLET ORAL DAILY
Qty: 90 TABLET | Refills: 1 | Status: SHIPPED
Start: 2019-12-03 | End: 2020-02-24 | Stop reason: SINTOL

## 2019-12-03 RX ORDER — AMLODIPINE BESYLATE 5 MG/1
10 TABLET ORAL DAILY
COMMUNITY
Start: 2019-12-03 | End: 2020-02-24 | Stop reason: SINTOL

## 2019-12-03 RX ORDER — CARVEDILOL 25 MG/1
25 TABLET ORAL 2 TIMES DAILY
COMMUNITY
Start: 2019-12-03 | End: 2020-06-02 | Stop reason: SDUPTHER

## 2019-12-03 RX ORDER — IRBESARTAN 300 MG/1
300 TABLET ORAL DAILY
COMMUNITY
Start: 2019-12-03 | End: 2020-07-20 | Stop reason: SDUPTHER

## 2019-12-03 RX ORDER — ATORVASTATIN CALCIUM 80 MG/1
80 TABLET, FILM COATED ORAL NIGHTLY
Qty: 90 TABLET | Refills: 1 | Status: SHIPPED | OUTPATIENT
Start: 2019-12-03 | End: 2020-05-26

## 2019-12-03 RX ORDER — CLONIDINE HYDROCHLORIDE 0.1 MG/1
0.05 TABLET ORAL DAILY
Qty: 45 TABLET | Refills: 0 | Status: SHIPPED | OUTPATIENT
Start: 2019-12-03 | End: 2020-02-24

## 2019-12-03 ASSESSMENT — ENCOUNTER SYMPTOMS
SHORTNESS OF BREATH: 0
CHEST TIGHTNESS: 0

## 2019-12-04 LAB
ESTIMATED AVERAGE GLUCOSE: 108.3 MG/DL
HBA1C MFR BLD: 5.4 %
VITAMIN D 25-HYDROXY: 41.8 NG/ML

## 2019-12-23 ENCOUNTER — OFFICE VISIT (OUTPATIENT)
Dept: PRIMARY CARE CLINIC | Age: 64
End: 2019-12-23
Payer: MEDICARE

## 2019-12-23 VITALS
HEART RATE: 72 BPM | BODY MASS INDEX: 35.82 KG/M2 | OXYGEN SATURATION: 94 % | WEIGHT: 209.8 LBS | SYSTOLIC BLOOD PRESSURE: 138 MMHG | HEIGHT: 64 IN | DIASTOLIC BLOOD PRESSURE: 72 MMHG

## 2019-12-23 DIAGNOSIS — I10 ESSENTIAL HYPERTENSION: Primary | ICD-10-CM

## 2019-12-23 DIAGNOSIS — E78.2 MIXED HYPERLIPIDEMIA: ICD-10-CM

## 2019-12-23 DIAGNOSIS — E55.9 VITAMIN D DEFICIENCY: ICD-10-CM

## 2019-12-23 DIAGNOSIS — R73.03 PREDIABETES: ICD-10-CM

## 2019-12-23 PROCEDURE — G8427 DOCREV CUR MEDS BY ELIG CLIN: HCPCS | Performed by: INTERNAL MEDICINE

## 2019-12-23 PROCEDURE — G8482 FLU IMMUNIZE ORDER/ADMIN: HCPCS | Performed by: INTERNAL MEDICINE

## 2019-12-23 PROCEDURE — 1123F ACP DISCUSS/DSCN MKR DOCD: CPT | Performed by: INTERNAL MEDICINE

## 2019-12-23 PROCEDURE — 1090F PRES/ABSN URINE INCON ASSESS: CPT | Performed by: INTERNAL MEDICINE

## 2019-12-23 PROCEDURE — G8400 PT W/DXA NO RESULTS DOC: HCPCS | Performed by: INTERNAL MEDICINE

## 2019-12-23 PROCEDURE — 3017F COLORECTAL CA SCREEN DOC REV: CPT | Performed by: INTERNAL MEDICINE

## 2019-12-23 PROCEDURE — 1036F TOBACCO NON-USER: CPT | Performed by: INTERNAL MEDICINE

## 2019-12-23 PROCEDURE — G8417 CALC BMI ABV UP PARAM F/U: HCPCS | Performed by: INTERNAL MEDICINE

## 2019-12-23 PROCEDURE — 4040F PNEUMOC VAC/ADMIN/RCVD: CPT | Performed by: INTERNAL MEDICINE

## 2019-12-23 PROCEDURE — G9899 SCRN MAM PERF RSLTS DOC: HCPCS | Performed by: INTERNAL MEDICINE

## 2019-12-23 PROCEDURE — 99214 OFFICE O/P EST MOD 30 MIN: CPT | Performed by: INTERNAL MEDICINE

## 2019-12-23 ASSESSMENT — ENCOUNTER SYMPTOMS
SORE THROAT: 0
BLOOD IN STOOL: 0
COUGH: 0
RHINORRHEA: 0
WHEEZING: 0
SHORTNESS OF BREATH: 0
NAUSEA: 0
TROUBLE SWALLOWING: 0
SINUS PRESSURE: 0
CHEST TIGHTNESS: 0
CONSTIPATION: 0
VOMITING: 0
ABDOMINAL PAIN: 0
DIARRHEA: 0

## 2020-02-05 RX ORDER — HYDROCHLOROTHIAZIDE 25 MG/1
TABLET ORAL
Qty: 90 TABLET | Refills: 1 | Status: SHIPPED | OUTPATIENT
Start: 2020-02-05 | End: 2020-08-06

## 2020-02-05 NOTE — TELEPHONE ENCOUNTER
Medication:   Requested Prescriptions     Pending Prescriptions Disp Refills    hydrochlorothiazide (HYDRODIURIL) 25 MG tablet [Pharmacy Med Name: hydroCHLOROthiazide 25 MG TABLET] 90 tablet 0     Sig: TAKE ONE TABLET BY MOUTH DAILY       Last Filled:      Patient Phone Number: [phone] (home)     Last appt: 12/23/2019   Next appt: Visit date not found    Last BMP:   Lab Results   Component Value Date     06/10/2019    K 3.9 06/10/2019     06/10/2019    CO2 26 06/10/2019    ANIONGAP 16 06/10/2019    GLUCOSE 102 06/10/2019    BUN 17 06/10/2019    CREATININE <0.5 06/10/2019    LABGLOM >60 06/10/2019    GFRAA >60 06/10/2019    GFRAA >60 09/19/2012    CALCIUM 9.7 06/10/2019      Last CMP:   Lab Results   Component Value Date     06/10/2019    K 3.9 06/10/2019     06/10/2019    CO2 26 06/10/2019    ANIONGAP 16 06/10/2019    GLUCOSE 102 06/10/2019    BUN 17 06/10/2019    CREATININE <0.5 06/10/2019    LABGLOM >60 06/10/2019    GFRAA >60 06/10/2019    GFRAA >60 09/19/2012    PROT 7.2 12/03/2019    PROT 7.0 09/19/2012    LABALBU 4.5 12/03/2019    AGRATIO 1.9 06/10/2019    BILITOT 0.4 12/03/2019    ALKPHOS 56 12/03/2019    ALT 32 12/03/2019    AST 21 12/03/2019    GLOB 2.5 06/10/2019     Last Renal Function:   Lab Results   Component Value Date     06/10/2019    K 3.9 06/10/2019     06/10/2019    CO2 26 06/10/2019    GLUCOSE 102 06/10/2019    BUN 17 06/10/2019    CREATININE <0.5 06/10/2019    PHOS 4.2 05/29/2014    LABALBU 4.5 12/03/2019    CALCIUM 9.7 06/10/2019    GFR >90 05/13/2017    GFR >90 05/13/2017    GFRAA >60 06/10/2019    GFRAA >60 09/19/2012       Last OARRS: No flowsheet data found.     Preferred Pharmacy:   [facility] [address], [place] [address] [phone] Thais Feliz [phone]  [address] [place] [id number]  Phone: [phone] Fax: [phone]

## 2020-02-19 ENCOUNTER — TELEPHONE (OUTPATIENT)
Dept: PRIMARY CARE CLINIC | Age: 65
End: 2020-02-19

## 2020-02-19 NOTE — TELEPHONE ENCOUNTER
Pt states she believes med amLODIPine (NORVASC) 5 MG tablet is making her legs swell, would like to try another bp med please advise thank you.     [facility] [address], [place] [address] [phone] - F [phone]

## 2020-02-21 NOTE — TELEPHONE ENCOUNTER
Patient called stating that she has been having issues with her feet swelling, patient took herself off of her Amlodipine 5mg for the last 5 days now and hasn't had any swelling. Patient would like to have something different. Please send script to: [facility] in [facility] [phone]    Phone: [phone]       Thank you. ..

## 2020-02-24 RX ORDER — CLONIDINE HYDROCHLORIDE 0.1 MG/1
0.1 TABLET ORAL 2 TIMES DAILY
Qty: 180 TABLET | Refills: 0 | Status: SHIPPED | OUTPATIENT
Start: 2020-02-24 | End: 2020-05-26

## 2020-02-24 NOTE — TELEPHONE ENCOUNTER
Medication:   Requested Prescriptions     Pending Prescriptions Disp Refills    cloNIDine (CATAPRES) 0.1 MG tablet [Pharmacy Med Name: cloNIDine HCL 0.1MG TABLET] 45 tablet 0     Sig: TAKE ONE-HALF TABLET BY MOUTH DAILY       Last Filled:  12/19/2019    Patient Phone Number: [phone] (home)     Last appt: 12/23/2019   Next appt: Visit date not found    Last BMP:   Lab Results   Component Value Date     06/10/2019    K 3.9 06/10/2019     06/10/2019    CO2 26 06/10/2019    ANIONGAP 16 06/10/2019    GLUCOSE 102 06/10/2019    BUN 17 06/10/2019    CREATININE <0.5 06/10/2019    LABGLOM >60 06/10/2019    GFRAA >60 06/10/2019    GFRAA >60 09/19/2012    CALCIUM 9.7 06/10/2019      Last CMP:   Lab Results   Component Value Date     06/10/2019    K 3.9 06/10/2019     06/10/2019    CO2 26 06/10/2019    ANIONGAP 16 06/10/2019    GLUCOSE 102 06/10/2019    BUN 17 06/10/2019    CREATININE <0.5 06/10/2019    LABGLOM >60 06/10/2019    GFRAA >60 06/10/2019    GFRAA >60 09/19/2012    PROT 7.2 12/03/2019    PROT 7.0 09/19/2012    LABALBU 4.5 12/03/2019    AGRATIO 1.9 06/10/2019    BILITOT 0.4 12/03/2019    ALKPHOS 56 12/03/2019    ALT 32 12/03/2019    AST 21 12/03/2019    GLOB 2.5 06/10/2019     Last Renal Function:   Lab Results   Component Value Date     06/10/2019    K 3.9 06/10/2019     06/10/2019    CO2 26 06/10/2019    GLUCOSE 102 06/10/2019    BUN 17 06/10/2019    CREATININE <0.5 06/10/2019    PHOS 4.2 05/29/2014    LABALBU 4.5 12/03/2019    CALCIUM 9.7 06/10/2019    GFR >90 05/13/2017    GFR >90 05/13/2017    GFRAA >60 06/10/2019    GFRAA >60 09/19/2012       Last OARRS: No flowsheet data found.     Preferred Pharmacy:   [facility] [address] [phone] Ashely Velazquez [phone]  [address] [place] [id number]  Phone: [phone] Fax: [phone]

## 2020-02-24 NOTE — TELEPHONE ENCOUNTER
Patient called in stating she called back, and did not speak to anyone. Patient would like a new medication.

## 2020-02-24 NOTE — TELEPHONE ENCOUNTER
Spoke with patient this morning. Recommend patient increase Clonidine 0.1mg to twice daily to replace Amlodipine 10mg that she discontinued. Rx sent to pt's pharmacy.

## 2020-03-26 ENCOUNTER — OFFICE VISIT (OUTPATIENT)
Dept: PRIMARY CARE CLINIC | Age: 65
End: 2020-03-26
Payer: MEDICARE

## 2020-03-26 VITALS
OXYGEN SATURATION: 98 % | DIASTOLIC BLOOD PRESSURE: 82 MMHG | HEIGHT: 64 IN | WEIGHT: 208.2 LBS | BODY MASS INDEX: 35.55 KG/M2 | HEART RATE: 60 BPM | SYSTOLIC BLOOD PRESSURE: 138 MMHG

## 2020-03-26 PROCEDURE — 1036F TOBACCO NON-USER: CPT | Performed by: INTERNAL MEDICINE

## 2020-03-26 PROCEDURE — 3017F COLORECTAL CA SCREEN DOC REV: CPT | Performed by: INTERNAL MEDICINE

## 2020-03-26 PROCEDURE — 1090F PRES/ABSN URINE INCON ASSESS: CPT | Performed by: INTERNAL MEDICINE

## 2020-03-26 PROCEDURE — G0009 ADMIN PNEUMOCOCCAL VACCINE: HCPCS | Performed by: INTERNAL MEDICINE

## 2020-03-26 PROCEDURE — 99213 OFFICE O/P EST LOW 20 MIN: CPT | Performed by: INTERNAL MEDICINE

## 2020-03-26 PROCEDURE — G8417 CALC BMI ABV UP PARAM F/U: HCPCS | Performed by: INTERNAL MEDICINE

## 2020-03-26 PROCEDURE — 90732 PPSV23 VACC 2 YRS+ SUBQ/IM: CPT | Performed by: INTERNAL MEDICINE

## 2020-03-26 PROCEDURE — 1123F ACP DISCUSS/DSCN MKR DOCD: CPT | Performed by: INTERNAL MEDICINE

## 2020-03-26 PROCEDURE — G8482 FLU IMMUNIZE ORDER/ADMIN: HCPCS | Performed by: INTERNAL MEDICINE

## 2020-03-26 PROCEDURE — 4040F PNEUMOC VAC/ADMIN/RCVD: CPT | Performed by: INTERNAL MEDICINE

## 2020-03-26 PROCEDURE — G8427 DOCREV CUR MEDS BY ELIG CLIN: HCPCS | Performed by: INTERNAL MEDICINE

## 2020-03-26 PROCEDURE — G9899 SCRN MAM PERF RSLTS DOC: HCPCS | Performed by: INTERNAL MEDICINE

## 2020-03-26 PROCEDURE — G8400 PT W/DXA NO RESULTS DOC: HCPCS | Performed by: INTERNAL MEDICINE

## 2020-03-26 ASSESSMENT — PATIENT HEALTH QUESTIONNAIRE - PHQ9
SUM OF ALL RESPONSES TO PHQ9 QUESTIONS 1 & 2: 0
2. FEELING DOWN, DEPRESSED OR HOPELESS: 0
SUM OF ALL RESPONSES TO PHQ QUESTIONS 1-9: 0
1. LITTLE INTEREST OR PLEASURE IN DOING THINGS: 0
SUM OF ALL RESPONSES TO PHQ QUESTIONS 1-9: 0

## 2020-03-26 ASSESSMENT — ENCOUNTER SYMPTOMS
SHORTNESS OF BREATH: 0
CHEST TIGHTNESS: 0

## 2020-03-26 NOTE — PROGRESS NOTES
Raysa Browne   Date ofBirth:  1955    Date of Visit:  3/26/2020    Chief Complaint   Patient presents with    Hypertension       HPI  Hypertension- Pt takes Irbesartan 300mg po q day, HCTZ 25mg po q day, Carvedilol 25mg po bid, and Clonidine 0.1mg po bid. Pt states her leg swelling resolved with discontinuation of Amlodipine. Pt hasn't checked her BP. Pt states she hasn't decreasing her salt her lately. Pt is not exercising. Review of Systems   Eyes: Negative for visual disturbance. Respiratory: Negative for chest tightness and shortness of breath. Cardiovascular: Negative for chest pain, palpitations and leg swelling. Genitourinary: Negative for frequency and hematuria. Neurological: Negative for dizziness, syncope, light-headedness and headaches. Allergies   Allergen Reactions    Sulfa Antibiotics Rash     Outpatient Medications Marked as Taking for the 3/26/20 encounter (Office Visit) with Tate Ralph MD   Medication Sig Dispense Refill    cloNIDine (CATAPRES) 0.1 MG tablet Take 1 tablet by mouth 2 times daily 180 tablet 0    hydrochlorothiazide (HYDRODIURIL) 25 MG tablet TAKE ONE TABLET BY MOUTH DAILY 90 tablet 1    carvedilol (COREG) 25 MG tablet Take 1 tablet by mouth 2 times daily      irbesartan (AVAPRO) 300 MG tablet Take 1 tablet by mouth daily      aspirin 81 MG tablet Take 81 mg by mouth daily      Cholecalciferol (VITAMIN D3) 5000 units TABS Take 1 tablet by mouth daily      ibuprofen (ADVIL;MOTRIN) 600 MG tablet Take 1 tablet by mouth 3 times daily as needed for Pain 45 tablet 0    Selenium 200 MCG CAPS Take 1 capsule by mouth      Cholecalciferol (VITAMIN D3) 1000 UNITS TABS Take 1 tablet by mouth daily 30 tablet 5    Multiple Vitamin (MULTIVITAMIN PO) Take  by mouth.              Vitals:    03/26/20 0936   BP: 138/82   Site: Right Upper Arm   Position: Sitting   Cuff Size: Medium Adult   Pulse: 60   SpO2: 98%   Weight: 208 lb 3.2 oz (94.4 kg) Height: 5' 4\" (1.626 m)     Body mass index is 35.74 kg/m². Physical Exam  Nursing note reviewed. Constitutional:       General: She is not in acute distress. Appearance: She is well-developed. HENT:      Mouth/Throat:      Pharynx: Oropharynx is clear. Eyes:      Extraocular Movements: Extraocular movements intact. Pupils: Pupils are equal, round, and reactive to light. Neck:      Musculoskeletal: Neck supple. Thyroid: No thyromegaly. Vascular: No carotid bruit. Cardiovascular:      Rate and Rhythm: Normal rate and regular rhythm. Heart sounds: Normal heart sounds, S1 normal and S2 normal. No murmur. Pulmonary:      Effort: Pulmonary effort is normal.      Breath sounds: Normal breath sounds. Musculoskeletal:      Right lower leg: No edema. Left lower leg: No edema. No results found for this visit on 03/26/20. Lab Review   Orders Only on 12/03/2019   Component Date Value    Cholesterol, Total 12/03/2019 164     Triglycerides 12/03/2019 165*    HDL 12/03/2019 69*    LDL Calculated 12/03/2019 62     VLDL Cholesterol Calcula* 12/03/2019 33     Hemoglobin A1C 12/03/2019 5.4     eAG 12/03/2019 108.3     Vit D, 25-Hydroxy 12/03/2019 41.8     Total Protein 12/03/2019 7.2     Alb 12/03/2019 4.5     Alkaline Phosphatase 12/03/2019 56     ALT 12/03/2019 32     AST 12/03/2019 21     Total Bilirubin 12/03/2019 0.4     Bilirubin, Direct 12/03/2019 <0.2     Bilirubin, Indirect 12/03/2019 see below          Assessment/Plan     1. Essential hypertension  -stable  -Continue same medications  -Low sodium diet  -Regular aerobic exercise    2. Need for 23-polyvalent pneumococcal polysaccharide vaccine  - PNEUMOVAX 23 subcutaneous/IM (Pneumococcal polysaccharide vaccine 23-valent >= 3yo) given        Discussed medications with patient, who voiced understanding of their use and indications. All questions answered.       Return in about 3 months (around 6/26/2020) for Medicare AWV.

## 2020-03-26 NOTE — PATIENT INSTRUCTIONS
1. Essential hypertension  -stable  -Continue same medications  -Low sodium diet  -Regular aerobic exercise    2. Need for 23-polyvalent pneumococcal polysaccharide vaccine  - PNEUMOVAX 23 subcutaneous/IM (Pneumococcal polysaccharide vaccine 23-valent >= 1yo) given        Patient Education        Pneumococcal Polysaccharide Vaccine: What You Need to Know  Why get vaccinated? Pneumococcal polysaccharide vaccine (PPSV23) can prevent pneumococcal disease. Pneumococcal disease refers to any illness caused by pneumococcal bacteria. These bacteria can cause many types of illnesses, including pneumonia, which is an infection of the lungs. Pneumococcal bacteria are one of the most common causes of pneumonia. Besides pneumonia, pneumococcal bacteria can also cause:  · Ear infections,  · Sinus infections  · Meningitis (infection of the tissue covering the brain and spinal cord)  · Bacteremia (bloodstream infection)  Anyone can get pneumococcal disease, but children under 3years of age, people with certain medical conditions, adults 72 years or older, and cigarette smokers are at the highest risk. Most pneumococcal infections are mild. However, some can result in long-term problems, such as brain damage or hearing loss. Meningitis, bacteremia, and pneumonia caused by pneumococcal disease can be fatal.  PPSV23  PPSV23 protects against 23 types of bacteria that cause pneumococcal disease. PPSV23 is recommended for:  · All adults 72 years or older,  · Anyone 2 years or older with certain medical conditions that can lead to an increased risk for pneumococcal disease. Most people need only one dose of PPSV23. A second dose of PPSV23, and another type of pneumococcal vaccine called PCV13, are recommended for certain high-risk groups. Your health care provider can give you more information.   People 65 years or older should get a dose of PPSV23 even if they have already gotten one or more doses of the vaccine before they turned

## 2020-05-26 RX ORDER — CLONIDINE HYDROCHLORIDE 0.1 MG/1
TABLET ORAL
Qty: 180 TABLET | Refills: 0 | Status: SHIPPED | OUTPATIENT
Start: 2020-05-26 | End: 2020-08-26

## 2020-05-26 RX ORDER — ATORVASTATIN CALCIUM 80 MG/1
TABLET, FILM COATED ORAL
Qty: 90 TABLET | Refills: 0 | Status: SHIPPED | OUTPATIENT
Start: 2020-05-26 | End: 2020-08-26

## 2020-06-02 RX ORDER — CARVEDILOL 25 MG/1
25 TABLET ORAL 2 TIMES DAILY
Qty: 60 TABLET | Refills: 2 | Status: SHIPPED | OUTPATIENT
Start: 2020-06-02 | End: 2020-08-29

## 2020-06-19 ENCOUNTER — TELEPHONE (OUTPATIENT)
Dept: PRIMARY CARE CLINIC | Age: 65
End: 2020-06-19

## 2020-06-19 NOTE — TELEPHONE ENCOUNTER
Raysa called today for AWV. Blood Pressure issues. Was offered a VV appointment for refused appointment. Patient would like Sherrill Trammell MD to Call to schedule an in office AWV. [address] [phone] - F [phone]   pharmacy confirmed in [facility].     Patient was made aware of e-visits via [employer]

## 2020-07-17 ENCOUNTER — OFFICE VISIT (OUTPATIENT)
Dept: PRIMARY CARE CLINIC | Age: 65
End: 2020-07-17
Payer: MEDICARE

## 2020-07-17 VITALS
HEART RATE: 86 BPM | SYSTOLIC BLOOD PRESSURE: 132 MMHG | DIASTOLIC BLOOD PRESSURE: 86 MMHG | WEIGHT: 204.6 LBS | OXYGEN SATURATION: 96 % | BODY MASS INDEX: 34.93 KG/M2 | TEMPERATURE: 98.2 F | RESPIRATION RATE: 16 BRPM | HEIGHT: 64 IN

## 2020-07-17 PROCEDURE — 3017F COLORECTAL CA SCREEN DOC REV: CPT | Performed by: INTERNAL MEDICINE

## 2020-07-17 PROCEDURE — 1123F ACP DISCUSS/DSCN MKR DOCD: CPT | Performed by: INTERNAL MEDICINE

## 2020-07-17 PROCEDURE — G0438 PPPS, INITIAL VISIT: HCPCS | Performed by: INTERNAL MEDICINE

## 2020-07-17 PROCEDURE — 4040F PNEUMOC VAC/ADMIN/RCVD: CPT | Performed by: INTERNAL MEDICINE

## 2020-07-17 RX ORDER — LANOLIN ALCOHOL/MO/W.PET/CERES
3 CREAM (GRAM) TOPICAL NIGHTLY
Qty: 30 TABLET | Refills: 1 | Status: SHIPPED | OUTPATIENT
Start: 2020-07-17 | End: 2021-06-15 | Stop reason: ALTCHOICE

## 2020-07-17 ASSESSMENT — PATIENT HEALTH QUESTIONNAIRE - PHQ9
SUM OF ALL RESPONSES TO PHQ QUESTIONS 1-9: 0
SUM OF ALL RESPONSES TO PHQ QUESTIONS 1-9: 0

## 2020-07-17 ASSESSMENT — LIFESTYLE VARIABLES
HOW OFTEN DURING THE LAST YEAR HAVE YOU FOUND THAT YOU WERE NOT ABLE TO STOP DRINKING ONCE YOU HAD STARTED: 0
HOW OFTEN DURING THE LAST YEAR HAVE YOU BEEN UNABLE TO REMEMBER WHAT HAPPENED THE NIGHT BEFORE BECAUSE YOU HAD BEEN DRINKING: 0
HAS A RELATIVE, FRIEND, DOCTOR, OR ANOTHER HEALTH PROFESSIONAL EXPRESSED CONCERN ABOUT YOUR DRINKING OR SUGGESTED YOU CUT DOWN: 0
HOW MANY STANDARD DRINKS CONTAINING ALCOHOL DO YOU HAVE ON A TYPICAL DAY: 0
HOW OFTEN DURING THE LAST YEAR HAVE YOU HAD A FEELING OF GUILT OR REMORSE AFTER DRINKING: 0
AUDIT-C TOTAL SCORE: 1
HOW OFTEN DURING THE LAST YEAR HAVE YOU FAILED TO DO WHAT WAS NORMALLY EXPECTED FROM YOU BECAUSE OF DRINKING: 0
HOW OFTEN DO YOU HAVE A DRINK CONTAINING ALCOHOL: 1
AUDIT TOTAL SCORE: 1
HAVE YOU OR SOMEONE ELSE BEEN INJURED AS A RESULT OF YOUR DRINKING: 0
HOW OFTEN DURING THE LAST YEAR HAVE YOU NEEDED AN ALCOHOLIC DRINK FIRST THING IN THE MORNING TO GET YOURSELF GOING AFTER A NIGHT OF HEAVY DRINKING: 0
HOW OFTEN DO YOU HAVE SIX OR MORE DRINKS ON ONE OCCASION: 0

## 2020-07-17 NOTE — PATIENT INSTRUCTIONS
Personalized Preventive Plan for Anjana Lane - 7/17/2020  Medicare offers a range of preventive health benefits. Some of the tests and screenings are paid in full while other may be subject to a deductible, co-insurance, and/or copay. Some of these benefits include a comprehensive review of your medical history including lifestyle, illnesses that may run in your family, and various assessments and screenings as appropriate. After reviewing your medical record and screening and assessments performed today your provider may have ordered immunizations, labs, imaging, and/or referrals for you. A list of these orders (if applicable) as well as your Preventive Care list are included within your After Visit Summary for your review. Other Preventive Recommendations:    · A preventive eye exam performed by an eye specialist is recommended every 1-2 years to screen for glaucoma; cataracts, macular degeneration, and other eye disorders. · A preventive dental visit is recommended every 6 months. · Try to get at least 150 minutes of exercise per week or 10,000 steps per day on a pedometer . · Order or download the FREE \"Exercise & Physical Activity: Your Everyday Guide\" from [employer]. Call 0-150.473.9735 or search [employer] online. · You need 1380-4910 mg of calcium and 3496-1782 IU of vitamin D per day. It is possible to meet your calcium requirement with diet alone, but a vitamin D supplement is usually necessary to meet this goal.  · When exposed to the sun, use a sunscreen that protects against both UVA and UVB radiation with an SPF of 30 or greater. Reapply every 2 to 3 hours or after sweating, drying off with a towel, or swimming. · Always wear a seat belt when traveling in a car. Always wear a helmet when riding a bicycle or motorcycle.

## 2020-07-17 NOTE — PROGRESS NOTES
Medicare Annual Wellness Visit  Name: Enedina Service Date: 2020   MRN: [id number] Sex: Female   Age: 77 y.o. Ethnicity: Non-/Non    : 1955 Race: White      Raysa Browne is here for Medicare AWV    Screenings for behavioral, psychosocial and functional/safety risks, and cognitive dysfunction are all negative except as indicated below. These results, as well as other patient data from the [address] form, are documented in Flowsheets linked to this Encounter. Hypertension- Pt takes Irbesartan 300mg po q day, HCTZ 25mg po q day, Carvedilol 25mg po bid, and Clonidine 0.1mg po bid. Pt doesn't monitor her BP. Pt walks her dog. Hyperlipidemia-Pt takes Atorvastatin 80mg po qhs. Pt decreases fat and cholesterol.      Prediabetes-Pt decreases carbohydrates. Pt states she lost about 12 lbs when she wasn't eating out. Pt states she has put 1/2 of it back on.     Vitamin D deficiency-Pt takes Vitamin D 6,000 IU po q day. Insomnia- Pt states she doesn't sleep as well since she has been retired 3 years ago. Pt states her sleep hasn't been good for at 1 year. Pt c/o difficulty staying asleep. Pt sleeps 5 hours per night. Pt hasn't tried anything for sleep. Allergies   Allergen Reactions    Sulfa Antibiotics Rash         Prior to Visit Medications    Medication Sig Taking?  Authorizing Provider   melatonin 3 MG TABS tablet Take 1 tablet by mouth nightly Yes Melissa Nye MD   carvedilol (COREG) 25 MG tablet Take 1 tablet by mouth 2 times daily Yes Melissa Nye MD   cloNIDine (CATAPRES) 0.1 MG tablet TAKE ONE TABLET BY MOUTH TWICE A DAY Yes Melissa Nye MD   atorvastatin (LIPITOR) 80 MG tablet TAKE ONE TABLET BY MOUTH ONCE NIGHTLY Yes Melissa Nye MD   hydrochlorothiazide (HYDRODIURIL) 25 MG tablet TAKE ONE TABLET BY MOUTH DAILY Yes Melissa Nye MD   aspirin 81 MG tablet Take 81 mg by mouth daily Yes Historical Provider, MD Fracisco Tang MD as Obstetrician (Obstetrics & Gynecology)  Damir Puri MD as Surgeon (Colon and Rectal Surgery)    Wt Readings from Last 3 Encounters:   07/17/20 204 lb 9.6 oz (92.8 kg)   03/26/20 208 lb 3.2 oz (94.4 kg)   12/23/19 209 lb 12.8 oz (95.2 kg)     Vitals:    07/17/20 0859   BP: 132/86   Pulse: 86   Resp: 16   Temp: 98.2 °F (36.8 °C)   SpO2: 96%   Weight: 204 lb 9.6 oz (92.8 kg)   Height: 5' 4\" (1.626 m)     Body mass index is 35.12 kg/m². Based upon direct observation of the patient, evaluation of cognition reveals recent and remote memory intact. General Appearance: alert and oriented to person, place and time, well-developed and well-nourished, in no acute distress  Skin: warm and dry, no rash or erythema  Head: normocephalic and atraumatic  Eyes: pupils equal, round, and reactive to light, extraocular eye movements intact, conjunctivae normal  ENT: tympanic membrane, external ear and ear canal normal bilaterally, oropharynx clear and moist with normal mucous membranes  Neck: neck supple and non tender without mass, no thyromegaly or thyroid nodules, no cervical lymphadenopathy   Pulmonary/Chest: clear to auscultation bilaterally- no wheezes, rales or rhonchi, normal air movement, no respiratory distress  Cardiovascular: normal rate, regular rhythm, normal S1 and S2, no murmurs and no carotid bruits  Abdomen: soft, non-tender, non-distended, normal bowel sounds, no masses or organomegaly  Extremities: no edema  Neurologic: reflexes normal and symmetric, no cranial nerve deficit, gait, coordination and speech normal    Patient's complete Health Risk Assessment and screening values have been reviewed and are found in Flowsheets. The following problems were reviewed today and where indicated follow up appointments were made and/or referrals ordered.     Positive Risk Factor Screenings with Interventions:     Health Habits/Nutrition:  Health Habits/Nutrition  Do you exercise for at least 20 minutes 2-3 times per week?: (!) No  Have you lost any weight without trying in the past 3 months?: No  Do you eat fewer than 2 meals per day?: No  Have you seen a dentist within the past year?: Yes  Body mass index is 35.12 kg/m². Health Habits/Nutrition Interventions:  · Inadequate physical activity:  patient is not ready to increase his/her physical activity level at this time due to right knee pain and arthritis. Pt has seen [employer] but she is not ready for surgery. Pt states she had an injection done a couple of years ago.     Safety:  Safety  Do you have working smoke detectors?: Yes  Have all throw rugs been removed or fastened?: (!) No  Do you have non-slip mats or surfaces in all bathtubs/showers?: Yes  Do all of your stairways have a railing or banister?: Yes  Are your doorways, halls and stairs free of clutter?: Yes  Do you always fasten your seatbelt when you are in a car?: Yes  Safety Interventions:  · Patient declines any further evaluation/treatment for this issue    Personalized Preventive Plan   Current Health Maintenance Status  Immunization History   Administered Date(s) Administered    Influenza Whole 12/05/2013    Influenza, High Dose (Fluzone 65 yrs and older) 11/17/2019    Influenza, Intradermal, Preservative free 12/15/2015    Influenza, Intradermal, Quadrivalent, Preservative Free 12/09/2016    Pneumococcal Conjugate 13-valent (Udlptzu10) 12/21/2018    Pneumococcal Polysaccharide ([id number]) 03/26/2020    Tdap (Boostrix, Adacel) 12/15/2015    Tetanus 11/11/2005    Zoster Live (Zostavax) 06/09/2014    Zoster Recombinant (Shingrix) 06/18/2018, 07/23/2019        Health Maintenance   Topic Date Due    Annual Wellness Visit (AWV)  06/07/2019    Flu vaccine (1) 09/01/2020    A1C test (Diabetic or Prediabetic)  07/21/2021    Lipid screen  07/21/2021    Potassium monitoring  07/21/2021    Creatinine monitoring  07/21/2021    Breast cancer screen  11/21/2021    DTaP/Tdap/Td vaccine (2 - Td) 12/15/2025    Colon cancer screen colonoscopy  10/19/2026    DEXA (modify frequency per FRAX score)  Completed    Shingles Vaccine  Completed    Pneumococcal 65+ years Vaccine  Completed    Hepatitis C screen  Completed    Hepatitis A vaccine  Aged Out    Hepatitis B vaccine  Aged Out    Hib vaccine  Aged Out    Meningococcal (ACWY) vaccine  Aged Out     Recommendations for "[employer] Due: see orders and patient instructions/AVS.    Recommended screening schedule for the next 5-10 years is provided to the patient in written form: see Patient Instructions/AVS.    Christiane Salazar was seen today for medicare awv. Diagnoses and all orders for this visit:    Routine general medical examination at a health care facility  -Medicare AWV done    Essential hypertension  -stable  -Continue same medications  -Low sodium diet  -Regular aerobic exercise  -Comprehensive Metabolic Panel; Future    Mixed hyperlipidemia  -Continue same medications  -Low fat, low cholesterol diet  -Regular aerobic exercise  -Comprehensive Metabolic Panel; Future  -Lipid Panel; Future    Prediabetes  -Low carbohydrate diet  -Regular aerobic exercise  -Hemoglobin A1C; Future    Vitamin D deficiency  -stable  -Continue same medications  -Vitamin D 25 Hydroxy; Future    Primary insomnia  -start melatonin 3 MG TABS tablet; Take 1 tablet by mouth nightly      Return in 6 weeks (on 8/28/2020) for insomnia.

## 2020-07-20 RX ORDER — IRBESARTAN 300 MG/1
300 TABLET ORAL DAILY
Qty: 30 TABLET | Refills: 5 | Status: SHIPPED | OUTPATIENT
Start: 2020-07-20 | End: 2021-01-19

## 2020-07-20 NOTE — TELEPHONE ENCOUNTER
Pt is asking Dr. Nolberto Dominguez to fill her prescription for Irbesartan 300 mg. Pt takes 1 tablet by mouth daily.     Send to [facility] [address], [place] [address] [phone] - F [phone]     LOV 7/17/20

## 2020-07-20 NOTE — TELEPHONE ENCOUNTER
Medication:   Requested Prescriptions     Pending Prescriptions Disp Refills    irbesartan (AVAPRO) 300 MG tablet 30 tablet 3     Sig: Take 1 tablet by mouth daily        Last Filled:      Patient Phone Number: [phone] (home)     Last appt: 7/17/2020   Next appt: Visit date not found    Last OARRS: No flowsheet data found.     Preferred Pharmacy:   [address] [phone] [facility] [place] [phone]  [address] [place] [id number]  Phone: [phone] Fax: [phone]

## 2020-07-21 DIAGNOSIS — E55.9 VITAMIN D DEFICIENCY: ICD-10-CM

## 2020-07-21 DIAGNOSIS — I10 ESSENTIAL HYPERTENSION: ICD-10-CM

## 2020-07-21 DIAGNOSIS — R73.03 PREDIABETES: ICD-10-CM

## 2020-07-21 DIAGNOSIS — E78.2 MIXED HYPERLIPIDEMIA: ICD-10-CM

## 2020-07-21 LAB
A/G RATIO: 2 (ref 1.1–2.2)
ALBUMIN SERPL-MCNC: 4.5 G/DL (ref 3.4–5)
ALP BLD-CCNC: 48 U/L (ref 40–129)
ALT SERPL-CCNC: 28 U/L (ref 10–40)
ANION GAP SERPL CALCULATED.3IONS-SCNC: 12 MMOL/L (ref 3–16)
AST SERPL-CCNC: 20 U/L (ref 15–37)
BILIRUB SERPL-MCNC: 0.5 MG/DL (ref 0–1)
BUN BLDV-MCNC: 12 MG/DL (ref 7–20)
CALCIUM SERPL-MCNC: 9.1 MG/DL (ref 8.3–10.6)
CHLORIDE BLD-SCNC: 102 MMOL/L (ref 99–110)
CHOLESTEROL, TOTAL: 189 MG/DL (ref 0–199)
CO2: 29 MMOL/L (ref 21–32)
CREAT SERPL-MCNC: <0.5 MG/DL (ref 0.6–1.2)
GFR AFRICAN AMERICAN: >60
GFR NON-AFRICAN AMERICAN: >60
GLOBULIN: 2.2 G/DL
GLUCOSE BLD-MCNC: 98 MG/DL (ref 70–99)
HDLC SERPL-MCNC: 66 MG/DL (ref 40–60)
LDL CHOLESTEROL CALCULATED: 87 MG/DL
POTASSIUM SERPL-SCNC: 4 MMOL/L (ref 3.5–5.1)
SODIUM BLD-SCNC: 143 MMOL/L (ref 136–145)
TOTAL PROTEIN: 6.7 G/DL (ref 6.4–8.2)
TRIGL SERPL-MCNC: 182 MG/DL (ref 0–150)
VITAMIN D 25-HYDROXY: 58.6 NG/ML
VLDLC SERPL CALC-MCNC: 36 MG/DL

## 2020-07-22 LAB
ESTIMATED AVERAGE GLUCOSE: 102.5 MG/DL
HBA1C MFR BLD: 5.2 %

## 2020-07-23 PROBLEM — F51.01 PRIMARY INSOMNIA: Status: ACTIVE | Noted: 2020-07-23

## 2020-07-27 ENCOUNTER — TELEPHONE (OUTPATIENT)
Dept: PRIMARY CARE CLINIC | Age: 65
End: 2020-07-27

## 2020-07-28 ENCOUNTER — TELEPHONE (OUTPATIENT)
Dept: PRIMARY CARE CLINIC | Age: 65
End: 2020-07-28

## 2020-07-28 NOTE — TELEPHONE ENCOUNTER
Spoke with patient telling her that Dr. Amanda Gilbert has not looked at them yet. Office will call once she has.

## 2020-07-28 NOTE — TELEPHONE ENCOUNTER
Patient is returning a call that she received yesterday regarding her results. She would like a call back but isn't going to be home until sometime after 3pm today.      Thank you

## 2020-08-03 NOTE — TELEPHONE ENCOUNTER
Pt states she has been waiting on her est results for almost 2 weeks.  Please review and call pt about her labs

## 2020-08-06 RX ORDER — HYDROCHLOROTHIAZIDE 25 MG/1
TABLET ORAL
Qty: 90 TABLET | Refills: 1 | Status: SHIPPED | OUTPATIENT
Start: 2020-08-06 | End: 2021-01-19

## 2020-08-26 RX ORDER — CLONIDINE HYDROCHLORIDE 0.1 MG/1
TABLET ORAL
Qty: 180 TABLET | Refills: 1 | Status: SHIPPED | OUTPATIENT
Start: 2020-08-26 | End: 2021-03-02

## 2020-08-26 RX ORDER — ATORVASTATIN CALCIUM 80 MG/1
TABLET, FILM COATED ORAL
Qty: 90 TABLET | Refills: 1 | Status: SHIPPED | OUTPATIENT
Start: 2020-08-26 | End: 2021-02-23

## 2020-08-28 NOTE — TELEPHONE ENCOUNTER
Medication:   Requested Prescriptions     Pending Prescriptions Disp Refills    carvedilol (COREG) 25 MG tablet [Pharmacy Med Name: CARVEDILOL 25 MG TABLET] 180 tablet 1     Sig: TAKE ONE TABLET BY MOUTH TWICE A DAY       Last appt: 7/17/2020   Next appt: Visit date not found    Last OARRS: No flowsheet data found.

## 2020-08-29 RX ORDER — CARVEDILOL 25 MG/1
TABLET ORAL
Qty: 180 TABLET | Refills: 1 | Status: SHIPPED | OUTPATIENT
Start: 2020-08-29 | End: 2021-04-01 | Stop reason: SDUPTHER

## 2020-10-01 ENCOUNTER — NURSE ONLY (OUTPATIENT)
Dept: PRIMARY CARE CLINIC | Age: 65
End: 2020-10-01
Payer: MEDICARE

## 2020-10-01 VITALS — TEMPERATURE: 98 F

## 2020-10-01 PROCEDURE — G0008 ADMIN INFLUENZA VIRUS VAC: HCPCS | Performed by: INTERNAL MEDICINE

## 2020-10-01 PROCEDURE — 90694 VACC AIIV4 NO PRSRV 0.5ML IM: CPT | Performed by: INTERNAL MEDICINE

## 2020-10-01 NOTE — PROGRESS NOTES
NURSE VISIT  - High Dose Flu Shot        . 5cc LEFT Deltoid IM     Vaccine Information Sheet, \"Influenza - Inactivated\"  given to Jenaro Herrera, or parent/legal guardian of  Jenaro Herrera and verbalized understanding. Patient responses:    Have you ever had a reaction to a flu vaccine? No  Do you have any current illness? No  Have you ever had Guillian Lampasas Syndrome? No  Do you have a serious allergy to any of the follow: Neomycin, Polymyxin, Thimerosal, eggs or egg products? No    Flu vaccine given per order. Please see immunization tab. Risks and benefits explained. Current VIS given.

## 2021-01-17 DIAGNOSIS — I10 ESSENTIAL HYPERTENSION: ICD-10-CM

## 2021-01-18 NOTE — TELEPHONE ENCOUNTER
Medication:   Requested Prescriptions     Pending Prescriptions Disp Refills    irbesartan (AVAPRO) 300 MG tablet [Pharmacy Med Name: IRBESARTAN 300 MG TABLET] 90 tablet 4     Sig: TAKE ONE TABLET BY MOUTH DAILY    hydroCHLOROthiazide (HYDRODIURIL) 25 MG tablet [Pharmacy Med Name: hydroCHLOROthiazide 25 MG TABLET] 90 tablet 0     Sig: TAKE ONE TABLET BY MOUTH DAILY       Last Filled:      Patient Phone Number: [phone] (home)     Last appt: 7/17/2020   Next appt: 2/4/2021    Last BMP:   Lab Results   Component Value Date     07/21/2020    K 4.0 07/21/2020     07/21/2020    CO2 29 07/21/2020    ANIONGAP 12 07/21/2020    GLUCOSE 98 07/21/2020    BUN 12 07/21/2020    CREATININE <0.5 07/21/2020    LABGLOM >60 07/21/2020    GFRAA >60 07/21/2020    GFRAA >60 09/19/2012    CALCIUM 9.1 07/21/2020      Last CMP:   Lab Results   Component Value Date     07/21/2020    K 4.0 07/21/2020     07/21/2020    CO2 29 07/21/2020    ANIONGAP 12 07/21/2020    GLUCOSE 98 07/21/2020    BUN 12 07/21/2020    CREATININE <0.5 07/21/2020    LABGLOM >60 07/21/2020    GFRAA >60 07/21/2020    GFRAA >60 09/19/2012    PROT 6.7 07/21/2020    PROT 7.0 09/19/2012    LABALBU 4.5 07/21/2020    AGRATIO 2.0 07/21/2020    BILITOT 0.5 07/21/2020    ALKPHOS 48 07/21/2020    ALT 28 07/21/2020    AST 20 07/21/2020    GLOB 2.2 07/21/2020     Last Renal Function:   Lab Results   Component Value Date     07/21/2020    K 4.0 07/21/2020     07/21/2020    CO2 29 07/21/2020    GLUCOSE 98 07/21/2020    BUN 12 07/21/2020    CREATININE <0.5 07/21/2020    PHOS 4.2 05/29/2014    LABALBU 4.5 07/21/2020    CALCIUM 9.1 07/21/2020    GFR >90 05/13/2017    GFR >90 05/13/2017    GFRAA >60 07/21/2020    GFRAA >60 09/19/2012       Last OARRS: No flowsheet data found.     Preferred Pharmacy:   [facility] [address], [place] [address] [phone] [facility] [phone] [address] [facility]  Phone: [phone] Fax: [phone]

## 2021-01-19 RX ORDER — HYDROCHLOROTHIAZIDE 25 MG/1
TABLET ORAL
Qty: 90 TABLET | Refills: 0 | Status: SHIPPED | OUTPATIENT
Start: 2021-01-19 | End: 2021-05-06

## 2021-01-19 RX ORDER — IRBESARTAN 300 MG/1
TABLET ORAL
Qty: 90 TABLET | Refills: 0 | Status: SHIPPED | OUTPATIENT
Start: 2021-01-19 | End: 2021-04-20

## 2021-02-04 ENCOUNTER — HOSPITAL ENCOUNTER (OUTPATIENT)
Age: 66
Discharge: HOME | End: 2021-02-04
Payer: MEDICARE

## 2021-02-04 ENCOUNTER — HOSPITAL ENCOUNTER (OUTPATIENT)
Dept: GENERAL RADIOLOGY | Age: 66
Discharge: HOME | End: 2021-02-04
Payer: MEDICARE

## 2021-02-04 ENCOUNTER — OFFICE VISIT (OUTPATIENT)
Dept: PRIMARY CARE CLINIC | Age: 66
End: 2021-02-04
Payer: MEDICARE

## 2021-02-04 ENCOUNTER — TELEPHONE (OUTPATIENT)
Dept: PRIMARY CARE CLINIC | Age: 66
End: 2021-02-04

## 2021-02-04 VITALS
SYSTOLIC BLOOD PRESSURE: 136 MMHG | BODY MASS INDEX: 36.01 KG/M2 | WEIGHT: 209.8 LBS | RESPIRATION RATE: 16 BRPM | TEMPERATURE: 97.3 F | OXYGEN SATURATION: 99 % | DIASTOLIC BLOOD PRESSURE: 88 MMHG | HEART RATE: 67 BPM

## 2021-02-04 DIAGNOSIS — I25.10 CORONARY ARTERY DISEASE INVOLVING NATIVE CORONARY ARTERY OF NATIVE HEART, ANGINA PRESENCE UNSPECIFIED: ICD-10-CM

## 2021-02-04 DIAGNOSIS — R06.02 SHORTNESS OF BREATH: ICD-10-CM

## 2021-02-04 DIAGNOSIS — R10.13 EPIGASTRIC ABDOMINAL PAIN: ICD-10-CM

## 2021-02-04 DIAGNOSIS — M54.9 BACK PAIN, UNSPECIFIED BACK LOCATION, UNSPECIFIED BACK PAIN LATERALITY, UNSPECIFIED CHRONICITY: ICD-10-CM

## 2021-02-04 DIAGNOSIS — R10.13 EPIGASTRIC ABDOMINAL PAIN: Primary | ICD-10-CM

## 2021-02-04 LAB
A/G RATIO: 2.1 (ref 1.1–2.2)
ALBUMIN SERPL-MCNC: 4.6 G/DL (ref 3.4–5)
ALP BLD-CCNC: 57 U/L (ref 40–129)
ALT SERPL-CCNC: 36 U/L (ref 10–40)
ANION GAP SERPL CALCULATED.3IONS-SCNC: 13 MMOL/L (ref 3–16)
AST SERPL-CCNC: 26 U/L (ref 15–37)
BASOPHILS ABSOLUTE: 0 K/UL (ref 0–0.2)
BASOPHILS RELATIVE PERCENT: 0.8 %
BILIRUB SERPL-MCNC: 0.4 MG/DL (ref 0–1)
BUN BLDV-MCNC: 12 MG/DL (ref 7–20)
CALCIUM SERPL-MCNC: 9.6 MG/DL (ref 8.3–10.6)
CHLORIDE BLD-SCNC: 103 MMOL/L (ref 99–110)
CO2: 28 MMOL/L (ref 21–32)
CREAT SERPL-MCNC: 0.6 MG/DL (ref 0.6–1.2)
EOSINOPHILS ABSOLUTE: 0.2 K/UL (ref 0–0.6)
EOSINOPHILS RELATIVE PERCENT: 3.7 %
GFR AFRICAN AMERICAN: >60
GFR NON-AFRICAN AMERICAN: >60
GLOBULIN: 2.2 G/DL
GLUCOSE BLD-MCNC: 90 MG/DL (ref 70–99)
HCT VFR BLD CALC: 43.3 % (ref 36–48)
HEMOGLOBIN: 14.3 G/DL (ref 12–16)
LIPASE: 26 U/L (ref 13–60)
LYMPHOCYTES ABSOLUTE: 1.3 K/UL (ref 1–5.1)
LYMPHOCYTES RELATIVE PERCENT: 23.2 %
MCH RBC QN AUTO: 30.5 PG (ref 26–34)
MCHC RBC AUTO-ENTMCNC: 33 G/DL (ref 31–36)
MCV RBC AUTO: 92.3 FL (ref 80–100)
MONOCYTES ABSOLUTE: 0.5 K/UL (ref 0–1.3)
MONOCYTES RELATIVE PERCENT: 9.3 %
NEUTROPHILS ABSOLUTE: 3.6 K/UL (ref 1.7–7.7)
NEUTROPHILS RELATIVE PERCENT: 63 %
PDW BLD-RTO: 13.7 % (ref 12.4–15.4)
PLATELET # BLD: 255 K/UL (ref 135–450)
PMV BLD AUTO: 10.1 FL (ref 5–10.5)
POTASSIUM SERPL-SCNC: 4 MMOL/L (ref 3.5–5.1)
RBC # BLD: 4.69 M/UL (ref 4–5.2)
SODIUM BLD-SCNC: 144 MMOL/L (ref 136–145)
TOTAL PROTEIN: 6.8 G/DL (ref 6.4–8.2)
WBC # BLD: 5.7 K/UL (ref 4–11)

## 2021-02-04 PROCEDURE — 1123F ACP DISCUSS/DSCN MKR DOCD: CPT | Performed by: INTERNAL MEDICINE

## 2021-02-04 PROCEDURE — 1036F TOBACCO NON-USER: CPT | Performed by: INTERNAL MEDICINE

## 2021-02-04 PROCEDURE — 3017F COLORECTAL CA SCREEN DOC REV: CPT | Performed by: INTERNAL MEDICINE

## 2021-02-04 PROCEDURE — G8484 FLU IMMUNIZE NO ADMIN: HCPCS | Performed by: INTERNAL MEDICINE

## 2021-02-04 PROCEDURE — 99214 OFFICE O/P EST MOD 30 MIN: CPT | Performed by: INTERNAL MEDICINE

## 2021-02-04 PROCEDURE — 93000 ELECTROCARDIOGRAM COMPLETE: CPT | Performed by: INTERNAL MEDICINE

## 2021-02-04 PROCEDURE — G8427 DOCREV CUR MEDS BY ELIG CLIN: HCPCS | Performed by: INTERNAL MEDICINE

## 2021-02-04 PROCEDURE — G8400 PT W/DXA NO RESULTS DOC: HCPCS | Performed by: INTERNAL MEDICINE

## 2021-02-04 PROCEDURE — 1090F PRES/ABSN URINE INCON ASSESS: CPT | Performed by: INTERNAL MEDICINE

## 2021-02-04 PROCEDURE — 71046 X-RAY EXAM CHEST 2 VIEWS: CPT

## 2021-02-04 PROCEDURE — G8417 CALC BMI ABV UP PARAM F/U: HCPCS | Performed by: INTERNAL MEDICINE

## 2021-02-04 PROCEDURE — 4040F PNEUMOC VAC/ADMIN/RCVD: CPT | Performed by: INTERNAL MEDICINE

## 2021-02-04 RX ORDER — OMEPRAZOLE 20 MG/1
20 CAPSULE, DELAYED RELEASE ORAL DAILY
Qty: 30 CAPSULE | Refills: 0 | Status: SHIPPED | OUTPATIENT
Start: 2021-02-04 | End: 2021-03-19

## 2021-02-04 SDOH — ECONOMIC STABILITY: TRANSPORTATION INSECURITY
IN THE PAST 12 MONTHS, HAS LACK OF TRANSPORTATION KEPT YOU FROM MEETINGS, WORK, OR FROM GETTING THINGS NEEDED FOR DAILY LIVING?: NO

## 2021-02-04 ASSESSMENT — PATIENT HEALTH QUESTIONNAIRE - PHQ9
2. FEELING DOWN, DEPRESSED OR HOPELESS: 1
SUM OF ALL RESPONSES TO PHQ9 QUESTIONS 1 & 2: 1
SUM OF ALL RESPONSES TO PHQ QUESTIONS 1-9: 1
1. LITTLE INTEREST OR PLEASURE IN DOING THINGS: 0

## 2021-02-04 NOTE — PROGRESS NOTES
Musculoskeletal: Positive for back pain. Negative for arthralgias and myalgias. Skin: Negative for rash. Neurological: Negative for dizziness, tremors, syncope, weakness, light-headedness, numbness and headaches. Psychiatric/Behavioral: Negative for dysphoric mood and sleep disturbance. The patient is not nervous/anxious. Allergies   Allergen Reactions    Sulfa Antibiotics Rash     Outpatient Medications Marked as Taking for the 2/4/21 encounter (Office Visit) with Mary Black MD   Medication Sig Dispense Refill    irbesartan (AVAPRO) 300 MG tablet TAKE ONE TABLET BY MOUTH DAILY 90 tablet 0    hydroCHLOROthiazide (HYDRODIURIL) 25 MG tablet TAKE ONE TABLET BY MOUTH DAILY 90 tablet 0    carvedilol (COREG) 25 MG tablet TAKE ONE TABLET BY MOUTH TWICE A  tablet 1    atorvastatin (LIPITOR) 80 MG tablet TAKE ONE TABLET BY MOUTH ONCE NIGHTLY 90 tablet 1    cloNIDine (CATAPRES) 0.1 MG tablet TAKE ONE TABLET BY MOUTH TWICE A  tablet 1    melatonin 3 MG TABS tablet Take 1 tablet by mouth nightly 30 tablet 1    aspirin 81 MG tablet Take 81 mg by mouth daily      Cholecalciferol (VITAMIN D3) 5000 units TABS Take 1 tablet by mouth daily      ibuprofen (ADVIL;MOTRIN) 600 MG tablet Take 1 tablet by mouth 3 times daily as needed for Pain 45 tablet 0    Selenium 200 MCG CAPS Take 1 capsule by mouth      Cholecalciferol (VITAMIN D3) 1000 UNITS TABS Take 1 tablet by mouth daily 30 tablet 5    Multiple Vitamin (MULTIVITAMIN PO) Take  by mouth. Vitals:    02/04/21 1131   BP: 136/88   Pulse: 67   Resp: 16   Temp: 97.3 °F (36.3 °C)   SpO2: 99%   Weight: 209 lb 12.8 oz (95.2 kg)     Body mass index is 36.01 kg/m². Physical Exam  Nursing note reviewed. Constitutional:       General: She is not in acute distress. Appearance: Normal appearance. She is well-developed. Eyes:      General: Lids are normal.      Extraocular Movements: Extraocular movements intact. Conjunctiva/sclera: Conjunctivae normal.      Pupils: Pupils are equal, round, and reactive to light. Neck:      Musculoskeletal: Neck supple. Thyroid: No thyromegaly. Vascular: No carotid bruit. Cardiovascular:      Rate and Rhythm: Normal rate and regular rhythm. Heart sounds: Normal heart sounds, S1 normal and S2 normal. No murmur. No friction rub. No gallop. Pulmonary:      Effort: Pulmonary effort is normal. No respiratory distress. Breath sounds: Normal breath sounds. No wheezing, rhonchi or rales. Abdominal:      General: Bowel sounds are normal. There is no distension. Palpations: Abdomen is soft. Tenderness: There is no abdominal tenderness. Musculoskeletal:      Right lower leg: No edema. Left lower leg: No edema. Lymphadenopathy:      Head:      Right side of head: No submandibular adenopathy. Left side of head: No submandibular adenopathy. Neurological:      Mental Status: She is alert and oriented to person, place, and time. Psychiatric:         Mood and Affect: Mood normal.           No results found for this visit on 02/04/21. Lab Review   No visits with results within 6 Month(s) from this visit.    Latest known visit with results is:   Orders Only on 07/21/2020   Component Date Value    Vit D, 25-Hydroxy 07/21/2020 58.6     Hemoglobin A1C 07/21/2020 5.2     eAG 07/21/2020 102.5     Cholesterol, Total 07/21/2020 189     Triglycerides 07/21/2020 182*    HDL 07/21/2020 66*    LDL Calculated 07/21/2020 87     VLDL Cholesterol Calcula* 07/21/2020 36     Sodium 07/21/2020 143     Potassium 07/21/2020 4.0     Chloride 07/21/2020 102     CO2 07/21/2020 29     Anion Gap 07/21/2020 12     Glucose 07/21/2020 98     BUN 07/21/2020 12     CREATININE 07/21/2020 <0.5*    GFR Non- 07/21/2020 >60     GFR  07/21/2020 >60     Calcium 07/21/2020 9.1     Total Protein 07/21/2020 6.7  Albumin 07/21/2020 4.5     Albumin/Globulin Ratio 07/21/2020 2.0     Total Bilirubin 07/21/2020 0.5     Alkaline Phosphatase 07/21/2020 48     ALT 07/21/2020 28     AST 07/21/2020 20     Globulin 07/21/2020 2.2          Assessment/Plan     1. Epigastric abdominal pain  - US ABDOMEN COMPLETE; Future  - CBC Auto Differential; Future  - Comprehensive Metabolic Panel; Future  - Lipase; Future  - Start omeprazole (PRILOSEC) 20 MG delayed release capsule; Take 1 capsule by mouth Daily  Dispense: 30 capsule; Refill: 0  - stop Aleve  - Referral to  Dior Tarango MD, Gastroenterology, [address] [place] [address] Dr    2. Shortness of breath  - XR CHEST (2 VW); Future    3. Back pain, unspecified back location, unspecified back pain laterality, unspecified chronicity  - Tylenol 650mg 3 times daily as needed    4. Coronary artery disease involving native coronary artery of native heart, angina presence unspecified  - EKG 12 lead  - Referral to  Luz Maria Harrell MD, Cardiology, [facility]        Discussed medications with patient, who voiced understanding of their use and indications. All questions answered. Return in about 10 days (around 2/14/2021) for abdominal pain, shortness of breath, and results.

## 2021-02-04 NOTE — PATIENT INSTRUCTIONS
-chest xray  -abdominal ultrasound  -Omeprazole 20mg once daily  -stop Aleve  -Referral to Gastroenterology  -Referral to Cardiology  -Have labs done

## 2021-02-09 ENCOUNTER — TELEPHONE (OUTPATIENT)
Dept: PRIMARY CARE CLINIC | Age: 66
End: 2021-02-09

## 2021-02-09 ENCOUNTER — HOSPITAL ENCOUNTER (OUTPATIENT)
Dept: ULTRASOUND IMAGING | Age: 66
Discharge: HOME | End: 2021-02-09
Payer: MEDICARE

## 2021-02-09 DIAGNOSIS — R10.13 EPIGASTRIC ABDOMINAL PAIN: ICD-10-CM

## 2021-02-09 PROCEDURE — 76700 US EXAM ABDOM COMPLETE: CPT

## 2021-02-09 NOTE — TELEPHONE ENCOUNTER
Pt would like to talk to the doctor regarding her US abdomen. Pt thinks it has bad results and would like to talk about them with the doctor.

## 2021-02-12 DIAGNOSIS — K82.8: Primary | ICD-10-CM

## 2021-02-12 ASSESSMENT — ENCOUNTER SYMPTOMS
DIARRHEA: 0
CHEST TIGHTNESS: 0
BLOOD IN STOOL: 0
CONSTIPATION: 0
RHINORRHEA: 0
ABDOMINAL DISTENTION: 0
SINUS PRESSURE: 0
BACK PAIN: 1
COUGH: 0
WHEEZING: 0
ABDOMINAL PAIN: 1
SHORTNESS OF BREATH: 1
SORE THROAT: 0
NAUSEA: 0
VOMITING: 0

## 2021-02-18 NOTE — PROGRESS NOTES
Department of General Surgery - Adult  General Surgery Service  Resident Clinic Note      CHIEF COMPLAINT:      History Obtained From:  patient, electronic medical record    HISTORY OF PRESENT ILLNESS:    The patient is a 77 y.o. female with significant past medical history as below who presents with c/o epigastric abdominal pain. Patient states her pain started 1 year ago. Abdominal pain is dull achy. Patient states a couple of times per month the pain last several hours. Patient states last night at 5:00 pm she had pain that lasted until 1:00am and it was not triggered by food. Patient states several months ago she thought the pain was triggered by spicy food. Patient denies nausea or vomiting. Patient states it could be heartburn but she doesn't know what heartburn feels like. Patient states her back feels uncomfortable sometimes when she is having the pain.      PSH: appendectomy    Past Medical History:   Diagnosis Date    Fissure, anal     Lorenzo YOO    Goiter     History of mammogram 10/2010    [facility]    Hyperlipidemia     Hypertension     Onychomycosis     Pap smear for cervical cancer screening 2/2010    Dr. Richardson Allen - [facility]    Prediabetes 12/23/2019    Screening for osteoporosis     Dexa 12/2005 - [facility]    Vitamin D deficiency 11/10/2011     Past Surgical History:   Procedure Laterality Date    APPENDECTOMY  2/4/2000    COLONOSCOPY  3/2011    Dr. Garrett Matthews - polyps - 2 years  f/u    COLONOSCOPY  10/19/2016    Mirtha Jennings MD    ESOPHAGEAL DILATATION  4/2014    Bella Kathleen MD     KNEE SURGERY      right    PILONIDAL CYST EXCISION      UPPER GASTROINTESTINAL ENDOSCOPY  3/19/14    concentric rings in esophagus     Current Outpatient Medications   Medication Sig Dispense Refill    omeprazole (PRILOSEC) 20 MG delayed release capsule Take 1 capsule by mouth Daily 30 capsule 0    irbesartan (AVAPRO) 300 MG tablet TAKE ONE TABLET BY MOUTH DAILY 90 tablet 0    hydroCHLOROthiazide (HYDRODIURIL) 25 MG tablet TAKE ONE TABLET BY MOUTH DAILY 90 tablet 0    carvedilol (COREG) 25 MG tablet TAKE ONE TABLET BY MOUTH TWICE A  tablet 1    atorvastatin (LIPITOR) 80 MG tablet TAKE ONE TABLET BY MOUTH ONCE NIGHTLY 90 tablet 1    cloNIDine (CATAPRES) 0.1 MG tablet TAKE ONE TABLET BY MOUTH TWICE A  tablet 1    melatonin 3 MG TABS tablet Take 1 tablet by mouth nightly 30 tablet 1    aspirin 81 MG tablet Take 81 mg by mouth daily      Cholecalciferol (VITAMIN D3) 5000 units TABS Take 1 tablet by mouth daily      ibuprofen (ADVIL;MOTRIN) 600 MG tablet Take 1 tablet by mouth 3 times daily as needed for Pain 45 tablet 0    Selenium 200 MCG CAPS Take 1 capsule by mouth      Cholecalciferol (VITAMIN D3) 1000 UNITS TABS Take 1 tablet by mouth daily 30 tablet 5    Multiple Vitamin (MULTIVITAMIN PO) Take  by mouth. No current facility-administered medications for this visit. Allergies   Allergen Reactions    Sulfa Antibiotics Rash     Social History     Tobacco Use    Smoking status: Never Smoker    Smokeless tobacco: Never Used   Substance Use Topics    Alcohol use: Yes     Alcohol/week: 3.0 standard drinks     Types: 3 Glasses of wine per week    Drug use: No     Family History   Problem Relation Age of Onset    Other Mother         vascular disease    Hypertension Mother     High Cholesterol Mother     Diabetes Mother     Heart Disease Mother     Heart Disease Father     Heart Attack Father     Cancer Maternal Grandmother 32        unknown type - cancer     Stroke Neg Hx        REVIEW OF SYSTEMS:    Review of systems was negative except what was mentioned in the HPI.     PHYSICAL EXAM:    LMP 05/01/2008 (Approximate) Comment: 15 years ago  CONSTITUTIONAL:  awake, alert, cooperative, no apparent distress, and appears stated age  LUNGS:  No increased work of breathing, good air exchange, clear to auscultation bilaterally, no crackles or wheezing CARDIOVASCULAR:  Normal apical impulse, regular rate and rhythm, normal S1 and S2, no S3 or S4, and no murmur noted  ABDOMEN:  No scars, normal bowel sounds, soft, non-distended, non-tender, no masses palpated, no hepatosplenomegally  SKIN:  normal skin color, texture, turgor    US 2/9:   Diffuse fatty infiltration of liver       Small lobulated nonshadowing focus within the gallbladder neck probably representing gallbladder sludge (sludge ball). Soft tissue mass not excluded. If there is no surgical intervention, recommend follow-up right upper quadrant ultrasound in 4 months to    confirm stability.       Bilateral renal cysts. ASSESSMENT AND PLAN:  The patient is a 77 y.o. female who presents with biliary colic, with sludge confirmed on US. - recommend lap kathrine with IOC, discussed risks and benefits.   - will schedule     Jamal Hart MD  2/18/2021 5:20 PM    I have seen, examined, and reviewed the patients chart. I agree with the residents assessment and have made appropriate changes.     Shani Gonzalez

## 2021-02-19 ENCOUNTER — OFFICE VISIT (OUTPATIENT)
Dept: SURGERY | Age: 66
End: 2021-02-19
Payer: MEDICARE

## 2021-02-19 VITALS — WEIGHT: 210.2 LBS | TEMPERATURE: 97.9 F | HEIGHT: 64 IN | BODY MASS INDEX: 35.89 KG/M2

## 2021-02-19 DIAGNOSIS — K80.50 BILIARY COLIC: Primary | ICD-10-CM

## 2021-02-19 PROCEDURE — G8400 PT W/DXA NO RESULTS DOC: HCPCS | Performed by: SURGERY

## 2021-02-19 PROCEDURE — G8417 CALC BMI ABV UP PARAM F/U: HCPCS | Performed by: SURGERY

## 2021-02-19 PROCEDURE — 3017F COLORECTAL CA SCREEN DOC REV: CPT | Performed by: SURGERY

## 2021-02-19 PROCEDURE — 1036F TOBACCO NON-USER: CPT | Performed by: SURGERY

## 2021-02-19 PROCEDURE — G8484 FLU IMMUNIZE NO ADMIN: HCPCS | Performed by: SURGERY

## 2021-02-19 PROCEDURE — 1123F ACP DISCUSS/DSCN MKR DOCD: CPT | Performed by: SURGERY

## 2021-02-19 PROCEDURE — G8427 DOCREV CUR MEDS BY ELIG CLIN: HCPCS | Performed by: SURGERY

## 2021-02-19 PROCEDURE — 4040F PNEUMOC VAC/ADMIN/RCVD: CPT | Performed by: SURGERY

## 2021-02-19 PROCEDURE — 99204 OFFICE O/P NEW MOD 45 MIN: CPT | Performed by: SURGERY

## 2021-02-19 PROCEDURE — 1090F PRES/ABSN URINE INCON ASSESS: CPT | Performed by: SURGERY

## 2021-02-23 RX ORDER — ATORVASTATIN CALCIUM 80 MG/1
TABLET, FILM COATED ORAL
Qty: 90 TABLET | Refills: 0 | Status: SHIPPED | OUTPATIENT
Start: 2021-02-23 | End: 2021-05-26

## 2021-02-23 NOTE — TELEPHONE ENCOUNTER
Medication:   Requested Prescriptions     Pending Prescriptions Disp Refills    atorvastatin (LIPITOR) 80 MG tablet [Pharmacy Med Name: ATORVASTATIN 80 MG TABLET] 90 tablet 0     Sig: TAKE ONE TABLET BY MOUTH ONCE NIGHTLY       Last Filled:  8/26/20    Patient Phone Number: [phone] (home)     Last appt: 2/4/2021   Next appt: Visit date not found    Last Lipid:   Lab Results   Component Value Date    CHOL 189 07/21/2020    TRIG 182 07/21/2020    HDL 66 07/21/2020    [address] 87 07/21/2020       Last OARRS: No flowsheet data found.     Preferred Pharmacy:   [facility] [address] [phone] Elio Torres [phone]  [address] [place] [address]  Phone: [phone] Fax: [phone]

## 2021-02-24 ENCOUNTER — TELEPHONE (OUTPATIENT)
Dept: SURGERY | Age: 66
End: 2021-02-24

## 2021-02-24 NOTE — TELEPHONE ENCOUNTER
The patient was in the office to see Dr. Germaine Taylor 2-. The patient would like to move forward with scheduling surgery. Please call.

## 2021-02-26 NOTE — TELEPHONE ENCOUNTER
Patient would like to schedule her gallbladder surgery    Please contact the patient at [phone]

## 2021-03-01 DIAGNOSIS — I10 ESSENTIAL HYPERTENSION: ICD-10-CM

## 2021-03-02 RX ORDER — CLONIDINE HYDROCHLORIDE 0.1 MG/1
TABLET ORAL
Qty: 180 TABLET | Refills: 0 | Status: SHIPPED | OUTPATIENT
Start: 2021-03-02 | End: 2021-03-16

## 2021-03-02 NOTE — TELEPHONE ENCOUNTER
Medication:   Requested Prescriptions     Pending Prescriptions Disp Refills    cloNIDine (CATAPRES) 0.1 MG tablet [Pharmacy Med Name: cloNIDine HCL 0.1MG TABLET] 180 tablet 0     Sig: TAKE ONE TABLET BY MOUTH TWICE A DAY     Last Filled: 8.26.20    Last appt: 2/4/2021   Next appt: 3/3/2021    Last OARRS: No flowsheet data found.

## 2021-03-03 ENCOUNTER — OFFICE VISIT (OUTPATIENT)
Dept: PRIMARY CARE CLINIC | Age: 66
End: 2021-03-03
Payer: MEDICARE

## 2021-03-03 VITALS
DIASTOLIC BLOOD PRESSURE: 105 MMHG | BODY MASS INDEX: 35.34 KG/M2 | HEART RATE: 63 BPM | SYSTOLIC BLOOD PRESSURE: 160 MMHG | RESPIRATION RATE: 14 BRPM | OXYGEN SATURATION: 97 % | TEMPERATURE: 97.9 F | WEIGHT: 206 LBS

## 2021-03-03 DIAGNOSIS — I25.10 ATHEROSCLEROSIS OF NATIVE CORONARY ARTERY OF NATIVE HEART WITHOUT ANGINA PECTORIS: ICD-10-CM

## 2021-03-03 DIAGNOSIS — I10 ESSENTIAL HYPERTENSION: ICD-10-CM

## 2021-03-03 DIAGNOSIS — Z01.818 PREOP EXAMINATION: Primary | ICD-10-CM

## 2021-03-03 DIAGNOSIS — E78.2 MIXED HYPERLIPIDEMIA: ICD-10-CM

## 2021-03-03 DIAGNOSIS — K80.50 BILIARY COLIC: ICD-10-CM

## 2021-03-03 DIAGNOSIS — Z01.818 PREOP EXAMINATION: ICD-10-CM

## 2021-03-03 LAB
ANION GAP SERPL CALCULATED.3IONS-SCNC: 16 MMOL/L (ref 3–16)
BUN BLDV-MCNC: 12 MG/DL (ref 7–20)
CALCIUM SERPL-MCNC: 9.3 MG/DL (ref 8.3–10.6)
CHLORIDE BLD-SCNC: 100 MMOL/L (ref 99–110)
CO2: 26 MMOL/L (ref 21–32)
CREAT SERPL-MCNC: <0.5 MG/DL (ref 0.6–1.2)
GFR AFRICAN AMERICAN: >60
GFR NON-AFRICAN AMERICAN: >60
GLUCOSE BLD-MCNC: 91 MG/DL (ref 70–99)
HCT VFR BLD CALC: 42.1 % (ref 36–48)
HEMOGLOBIN: 14.2 G/DL (ref 12–16)
MCH RBC QN AUTO: 30.7 PG (ref 26–34)
MCHC RBC AUTO-ENTMCNC: 33.7 G/DL (ref 31–36)
MCV RBC AUTO: 91.3 FL (ref 80–100)
PDW BLD-RTO: 14.1 % (ref 12.4–15.4)
PLATELET # BLD: 260 K/UL (ref 135–450)
PMV BLD AUTO: 9.8 FL (ref 5–10.5)
POTASSIUM SERPL-SCNC: 3.8 MMOL/L (ref 3.5–5.1)
RBC # BLD: 4.62 M/UL (ref 4–5.2)
SODIUM BLD-SCNC: 142 MMOL/L (ref 136–145)
WBC # BLD: 6.9 K/UL (ref 4–11)

## 2021-03-03 PROCEDURE — G8484 FLU IMMUNIZE NO ADMIN: HCPCS | Performed by: NURSE PRACTITIONER

## 2021-03-03 PROCEDURE — G8417 CALC BMI ABV UP PARAM F/U: HCPCS | Performed by: NURSE PRACTITIONER

## 2021-03-03 PROCEDURE — 99212 OFFICE O/P EST SF 10 MIN: CPT | Performed by: NURSE PRACTITIONER

## 2021-03-03 PROCEDURE — 1090F PRES/ABSN URINE INCON ASSESS: CPT | Performed by: NURSE PRACTITIONER

## 2021-03-03 PROCEDURE — G8427 DOCREV CUR MEDS BY ELIG CLIN: HCPCS | Performed by: NURSE PRACTITIONER

## 2021-03-03 ASSESSMENT — ENCOUNTER SYMPTOMS
ABDOMINAL PAIN: 0
CONSTIPATION: 0
DIARRHEA: 0
VOMITING: 0
CHEST TIGHTNESS: 0
EYE PAIN: 0
NAUSEA: 0
EYE DISCHARGE: 0
SORE THROAT: 0
BLOOD IN STOOL: 0
COUGH: 0
WHEEZING: 0
BACK PAIN: 0
TROUBLE SWALLOWING: 0
COLOR CHANGE: 0
SHORTNESS OF BREATH: 0
EYE ITCHING: 0
VOICE CHANGE: 0
ABDOMINAL DISTENTION: 0

## 2021-03-03 NOTE — PROGRESS NOTES
Medications Marked as Taking for the 3/3/21 encounter (Office Visit) with TIFFANIE Christensen CNP   Medication Sig Dispense Refill    cloNIDine (CATAPRES) 0.1 MG tablet TAKE ONE TABLET BY MOUTH TWICE A  tablet 0    atorvastatin (LIPITOR) 80 MG tablet TAKE ONE TABLET BY MOUTH ONCE NIGHTLY 90 tablet 0    omeprazole (PRILOSEC) 20 MG delayed release capsule Take 1 capsule by mouth Daily 30 capsule 0    irbesartan (AVAPRO) 300 MG tablet TAKE ONE TABLET BY MOUTH DAILY 90 tablet 0    hydroCHLOROthiazide (HYDRODIURIL) 25 MG tablet TAKE ONE TABLET BY MOUTH DAILY 90 tablet 0    carvedilol (COREG) 25 MG tablet TAKE ONE TABLET BY MOUTH TWICE A  tablet 1    melatonin 3 MG TABS tablet Take 1 tablet by mouth nightly 30 tablet 1    aspirin 81 MG tablet Take 81 mg by mouth daily      Cholecalciferol (VITAMIN D3) 5000 units TABS Take 1 tablet by mouth daily      ibuprofen (ADVIL;MOTRIN) 600 MG tablet Take 1 tablet by mouth 3 times daily as needed for Pain 45 tablet 0    Selenium 200 MCG CAPS Take 1 capsule by mouth      Cholecalciferol (VITAMIN D3) 1000 UNITS TABS Take 1 tablet by mouth daily 30 tablet 5    Multiple Vitamin (MULTIVITAMIN PO) Take  by mouth. Social History     Tobacco Use    Smoking status: Never Smoker    Smokeless tobacco: Never Used   Substance Use Topics    Alcohol use: Yes     Alcohol/week: 3.0 standard drinks     Types: 3 Glasses of wine per week     Family History   Problem Relation Age of Onset    Other Mother         vascular disease    Hypertension Mother     High Cholesterol Mother     Diabetes Mother     Heart Disease Mother     Heart Disease Father     Heart Attack Father     Cancer Maternal Grandmother 32        unknown type - cancer     Stroke Neg Hx      Review of Systems  Review of Systems   Constitutional: Negative for activity change, appetite change, chills, fatigue and unexpected weight change.    HENT: Negative for congestion, ear pain, hearing loss, nosebleeds, postnasal drip, sneezing, sore throat, trouble swallowing and voice change. Eyes: Negative for pain, discharge and itching. Respiratory: Negative for cough, chest tightness, shortness of breath and wheezing. Cardiovascular: Negative for chest pain, palpitations and leg swelling. Gastrointestinal: Negative for abdominal distention, abdominal pain, blood in stool, constipation, diarrhea, nausea and vomiting. Endocrine: Negative for cold intolerance and heat intolerance. Genitourinary: Negative for difficulty urinating and dysuria. Musculoskeletal: Negative for arthralgias, back pain and neck pain. Skin: Negative for color change, pallor and rash. Neurological: Negative for dizziness, tremors, numbness and headaches. Hematological: Does not bruise/bleed easily. Psychiatric/Behavioral: Negative for agitation and sleep disturbance. The patient is not nervous/anxious.        RECENT LABS:  CBC:   Lab Results   Component Value Date    WBC 5.7 02/04/2021    HGB 14.3 02/04/2021    HCT 43.3 02/04/2021    MCH 30.5 02/04/2021    MCHC 33.0 02/04/2021    RDW 13.7 02/04/2021     02/04/2021    MPV 10.1 02/04/2021     CMP:   Lab Results   Component Value Date     02/04/2021    K 4.0 02/04/2021     02/04/2021    CO2 28 02/04/2021    ANIONGAP 13 02/04/2021    GLUCOSE 90 02/04/2021    BUN 12 02/04/2021    CREATININE 0.6 02/04/2021    GFRAA >60 02/04/2021    GFRAA >60 09/19/2012    CALCIUM 9.6 02/04/2021    PROT 6.8 02/04/2021    PROT 7.0 09/19/2012    LABALBU 4.6 02/04/2021    AGRATIO 2.1 02/04/2021    BILITOT 0.4 02/04/2021    ALKPHOS 57 02/04/2021    ALT 36 02/04/2021    AST 26 02/04/2021    GLOB 2.2 02/04/2021       HBA1C:   Lab Results   Component Value Date    LABA1C 5.2 07/21/2020    .5 07/21/2020       OBJECTIVE:   BP (!) 160/105 (Site: Left Upper Arm, Position: Sitting)   Pulse 63   Temp 97.9 °F (36.6 °C)   Resp 14   Wt 206 lb (93.4 kg)   LMP 05/01/2008 (Approximate) Comment: 15 years ago  SpO2 97%   BMI 35.34 kg/m²  Weight: 206 lb (93.4 kg)   Physical Exam  Vitals signs and nursing note reviewed. Constitutional:       General: She is not in acute distress. Appearance: Normal appearance. She is well-developed. She is not diaphoretic. Neck:      Musculoskeletal: Normal range of motion and neck supple. Thyroid: No thyromegaly. Vascular: No carotid bruit or JVD. Trachea: No tracheal deviation. Cardiovascular:      Rate and Rhythm: Normal rate and regular rhythm. Heart sounds: Normal heart sounds. No murmur. No friction rub. Pulmonary:      Effort: Pulmonary effort is normal. No respiratory distress. Breath sounds: Normal breath sounds. No stridor. No wheezing, rhonchi or rales. Chest:      Chest wall: No tenderness. Abdominal:      General: Abdomen is flat. There is no distension. Palpations: Abdomen is soft. Musculoskeletal: Normal range of motion. General: No deformity. Right lower leg: No edema. Left lower leg: No edema. Lymphadenopathy:      Cervical: No cervical adenopathy. Skin:     General: Skin is warm and dry. Coloration: Skin is not pale. Findings: No erythema or rash. Neurological:      General: No focal deficit present. Mental Status: She is alert and oriented to person, place, and time. Motor: No weakness. Gait: Gait normal.   Psychiatric:         Behavior: Behavior normal.         Thought Content: Thought content normal.         Judgment: Judgment normal.      Comments: Patient anxious       EKG Interpretation: normal EKG, normal sinus rhythm, normal sinus rhythm, unchanged from previous tracings. From 2/4/21    Lab Review: Yes. Labs outside of 30 day window of scheduled surgery. Will check labs today. ASSESSMENT:      1. Preop examination  EKG from 2/4/21 reviewed. Has known CAD and uncontrolled HTN. Has not seen cardiology since 2019.  Needs clearance from cardiology to proceed with planned surgery. Discussed with patient. Scheduled with Dr Jose M Canales, [facility] for tomorrow 3/4 at 8:30am for further evaluation. Previous cardiology records available in [facility]. - Basic Metabolic Panel; Future  - CBC; Future    2. Biliary colic  Continue per surgeon. 3. Essential hypertension  Uncontrolled. Asymptomatic. Patient states she is very stressed today. Advised to check BP at home and keep BP log. Will need adjustments in BP medication regimen. 4. Atherosclerosis of native coronary artery of native heart without angina pectoris  Previous cardiology records reviewed. Scheduled with cardiology on 3/4 for cardiac clearance. 5. Mixed hyperlipidemia  On statin. 77 y.o. patient approved for Surgery      PLAN:  1. Preoperative workup as follows: ECG, hemoglobin, hematocrit, electrolytes, creatinine, glucose, CARDIAC CLEARANCE  2. Change in medication regimen before surgery:Take BP on morning of surgery with sip of water, and hold all other medications until after surgery  3. Patient not considered cleared for planned surgery pending cardiac clearance.      Electronically signed by TIFFANIE Taylor CNP on 3/3/2021 at 12:20 PM

## 2021-03-04 ENCOUNTER — TELEPHONE (OUTPATIENT)
Dept: SURGERY | Age: 66
End: 2021-03-04

## 2021-03-04 ENCOUNTER — OFFICE VISIT (OUTPATIENT)
Dept: CARDIOLOGY CLINIC | Age: 66
End: 2021-03-04
Payer: MEDICARE

## 2021-03-04 VITALS
BODY MASS INDEX: 34.83 KG/M2 | SYSTOLIC BLOOD PRESSURE: 158 MMHG | WEIGHT: 204 LBS | OXYGEN SATURATION: 98 % | DIASTOLIC BLOOD PRESSURE: 98 MMHG | HEIGHT: 64 IN | TEMPERATURE: 96.1 F | HEART RATE: 66 BPM

## 2021-03-04 DIAGNOSIS — I25.10 ATHEROSCLEROSIS OF NATIVE CORONARY ARTERY OF NATIVE HEART WITHOUT ANGINA PECTORIS: ICD-10-CM

## 2021-03-04 DIAGNOSIS — R06.02 SOB (SHORTNESS OF BREATH) ON EXERTION: Primary | ICD-10-CM

## 2021-03-04 DIAGNOSIS — I51.89 DIASTOLIC DYSFUNCTION: ICD-10-CM

## 2021-03-04 DIAGNOSIS — E78.2 MIXED HYPERLIPIDEMIA: ICD-10-CM

## 2021-03-04 DIAGNOSIS — I10 ESSENTIAL HYPERTENSION: ICD-10-CM

## 2021-03-04 PROCEDURE — G8417 CALC BMI ABV UP PARAM F/U: HCPCS | Performed by: INTERNAL MEDICINE

## 2021-03-04 PROCEDURE — 1090F PRES/ABSN URINE INCON ASSESS: CPT | Performed by: INTERNAL MEDICINE

## 2021-03-04 PROCEDURE — G8484 FLU IMMUNIZE NO ADMIN: HCPCS | Performed by: INTERNAL MEDICINE

## 2021-03-04 PROCEDURE — G8427 DOCREV CUR MEDS BY ELIG CLIN: HCPCS | Performed by: INTERNAL MEDICINE

## 2021-03-04 PROCEDURE — 99205 OFFICE O/P NEW HI 60 MIN: CPT | Performed by: INTERNAL MEDICINE

## 2021-03-04 NOTE — TELEPHONE ENCOUNTER
Pt has to cancel surgery on March 10 that she needs to reschedule  She has to do a stress test that day  She is also cancelling her covid test for today  Please call  Pt: [phone]

## 2021-03-04 NOTE — TELEPHONE ENCOUNTER
Spoke with patient, surgery canceled due to patient not able to obtain testing needed for cardiac clearance prior to surgery. Patient states she will get her results and call us to reschedule once that is done.

## 2021-03-04 NOTE — PROGRESS NOTES
Cc: exertional dyspnea, atypical chest pain, family history of CAD    HPI:     Patient is a 77years old woman, overweight, with history of hypertension, prediabetes, hyperlipidemia, CAD, HFpEF. Patient was assessed with a right upper quadrant ultrasound 2/9/2021: Fatty liver, no gallstones however there was sludge in the gallbladder. Patient was seen by surgery, has cholecystectomy scheduled for 3/10/2021. She is here for cardiac clearance. Echo 10/2019: Normal except for indeterminate diastolic function    CCTA 10/2019: Moderate diffuse calcifications of the coronaries without any significant stenosis    Patient has been complaining of some discomfort under her breasts, epigastric and possibly midsternal, unrelated to activity. She also admits to some exertional shortness of breath. She denies any orthopnea or lower extremity edema. She was seen by cardiologist at [facility] about 2 years ago, had a cardiac work-up, no intervention was recommended, she quit going to her appointments. Her BP at home remains around 130/70s. She does not check her blood pressure frequently. Patient's father, smoker, had CABG at the age of 50years old and a repeat CABG at the age of 61years old; he passed away as a complication after his second bypass. ECG 2/4/2021: Normal      Histories     Past Medical History:   has a past medical history of Fissure, anal, Goiter, History of mammogram, Hyperlipidemia, Hypertension, Onychomycosis, Pap smear for cervical cancer screening, Prediabetes, Screening for osteoporosis, and Vitamin D deficiency. Surgical History:   has a past surgical history that includes Appendectomy (2/4/2000); knee surgery; Pilonidal cyst excision; Colonoscopy (3/2011); Upper gastrointestinal endoscopy (3/19/14); Esophagus dilation (4/2014); and Colonoscopy (10/19/2016). Social History:   reports that she has never smoked.  She has never used smokeless tobacco. She reports current alcohol use of about EYES: No visual changes or diplopia. No scleral icterus. ENT: No Headaches, hearing loss or vertigo. No mouth sores or sore throat. CARDIOVASCULAR: + chest pain/chest pressure/chest discomfort. No palpitations. No edema. RESPIRATORY: + dyspnea. No cough or wheezing, no sputum production. GASTROINTESTINAL: No N/V/D. + abdominal pain, appetite loss, blood in stools. GENITOURINARY: No dysuria, trouble voiding, or hematuria. MUSCULOSKELETAL:  No gait disturbance, weakness or joint complaints. NEUROLOGICAL: No headache, diplopia, change in muscle strength, numbness or tingling. No change in gait, balance, coordination, mood, affect, memory, mentation, behavior. PSHYCH: No anxiety, loss of interest, change in sexual behavior, feelings of self-harm, or confusion. ENDOCRINE: No excessive thirst, fluid intake, or urination. No tremor. HEMATOLOGIC: No abnormal bruising or bleeding. ALLERGY: No nasal congestion or hives.       Physical Examination:     Vitals:    03/04/21 0823   BP: (!) 158/98   Pulse: 66   Temp: 96.1 °F (35.6 °C)   SpO2: 98%   Weight: 204 lb (92.5 kg)   Height: 5' 4\" (1.626 m)       Wt Readings from Last 3 Encounters:   03/04/21 204 lb (92.5 kg)   03/03/21 206 lb (93.4 kg)   02/19/21 210 lb 3.2 oz (95.3 kg)         General Appearance:  Alert, cooperative, no distress, appears stated age Appropriate weight   Head:  Normocephalic, without obvious abnormality, atraumatic   Eyes:  PERRL, conjunctiva/corneas clear EOM intact  Ears normal   Throat no lesions       Nose: Nares normal, no drainage or sinus tenderness   Throat: Lips, mucosa, and tongue normal   Neck: Supple, symmetrical, trachea midline, no adenopathy, thyroid: not enlarged, symmetric, no tenderness/mass/nodules, no carotid bruit       Lungs:   Clear to auscultation bilaterally, respirations unlabored   Chest Wall:  No tenderness or deformity   Heart:  Regular rhythm, rate is controlled, S1, S2 normal, there is no murmur, there is no rub or Pressure: 158 mmHg      Is BP treated: Yes      HDL Cholesterol: 66 mg/dL      Total Cholesterol: 189 mg/dL      Assessment / Plan:      Diagnosis Orders   1. SOB (shortness of breath) on exertion  Stress test (Lexiscan)   2. Atherosclerosis of native coronary artery of native heart without angina pectoris     3. Essential hypertension     4. Mixed hyperlipidemia     5. Diastolic dysfunction          1. Exertional dyspnea:  Patient symptoms could be related to progression of her well-known diffuse CAD. We will obtain a Lexiscan nuclear stress test    2. Cardiac clearance:  Plan #1. Patient symptoms are concerning for possible progression of her well-known CAD. She has family history of premature CAD and coronary CTA which suggested moderate diffuse CAD in 2019. We will obtain a stress test for risk stratification    3. Essential hypertension:  Apparently patient's blood pressure is well controlled at home on current medications. In the clinic today her blood pressure was severely elevated. Continue with carvedilol 25 twice daily, clonidine 0.1 twice daily, hydrochlorothiazide 25 daily, irbesartan 300 mg daily. Low-salt diet  I asked her to start checking her blood pressure frequently and call us if values are elevated. 4.  Hyperlipidemia:  Patient is on Lipitor 80 mg daily    5. CAD:  Plan #1 and #2. Will assess with stress test  Continue aspirin 81 (held for cholecystectomy), beta-blocker, statin. 6.  Chronic HFpEF:  Patient appears euvolemic and is hemodynamically stable. Continue with hydrochlorothiazide for euvolemia  Control BP        Return in about 6 months (around 9/4/2021). I have spent 80 minutes of face to face time with the patient with more than 50% spent counseling and coordinating care. I have personally reviewed the reports and images of labs, radiological studies, cardiac studies including ECG's and telemetry, current and old medical records.  The note was completed using EMR and Dragon dictation system. Every effort was made to ensure accuracy; however, inadvertent computerized transcription errors may be present. All questions and concerns were addressed to the patient/family. Alternatives to my treatment were discussed. I would like to thank you for providing me the opportunity to participate in the care of your patient. If you have any questions, please do not hesitate to contact me.      Lacy Raines MD, [place] [address] Phone: [phone]  Heart Failure Hotline: [phone]  Fax: [phone]

## 2021-03-10 ENCOUNTER — HOSPITAL ENCOUNTER (OUTPATIENT)
Dept: NON INVASIVE DIAGNOSTICS | Age: 66
Discharge: HOME | DRG: 287 | End: 2021-03-10
Payer: MEDICARE

## 2021-03-10 DIAGNOSIS — R06.02 SOB (SHORTNESS OF BREATH) ON EXERTION: ICD-10-CM

## 2021-03-10 PROCEDURE — 2580000003 HC RX 258: Performed by: INTERNAL MEDICINE

## 2021-03-10 PROCEDURE — 93017 CV STRESS TEST TRACING ONLY: CPT

## 2021-03-10 PROCEDURE — 6370000000 HC RX 637 (ALT 250 FOR IP): Performed by: INTERNAL MEDICINE

## 2021-03-10 PROCEDURE — 78452 HT MUSCLE IMAGE SPECT MULT: CPT

## 2021-03-10 PROCEDURE — 3430000000 HC RX DIAGNOSTIC RADIOPHARMACEUTICAL: Performed by: INTERNAL MEDICINE

## 2021-03-10 PROCEDURE — A9502 TC99M TETROFOSMIN: HCPCS | Performed by: INTERNAL MEDICINE

## 2021-03-10 RX ORDER — SODIUM CHLORIDE 0.9 % (FLUSH) 0.9 %
10 SYRINGE (ML) INJECTION PRN
Status: DISCONTINUED | OUTPATIENT
Start: 2021-03-10 | End: 2021-03-11 | Stop reason: HOSPADM

## 2021-03-10 RX ORDER — NITROGLYCERIN 400 UG/1
1 SPRAY ORAL EVERY 5 MIN PRN
Status: DISCONTINUED | OUTPATIENT
Start: 2021-03-10 | End: 2021-03-11 | Stop reason: HOSPADM

## 2021-03-10 RX ORDER — NITROGLYCERIN 400 UG/1
1 SPRAY ORAL ONCE
Status: COMPLETED | OUTPATIENT
Start: 2021-03-10 | End: 2021-03-10

## 2021-03-10 RX ADMIN — NITROGLYCERIN 1 SPRAY: 400 SPRAY ORAL at 09:22

## 2021-03-10 RX ADMIN — TETROFOSMIN 10.4 MILLICURIE: 1.38 INJECTION, POWDER, LYOPHILIZED, FOR SOLUTION INTRAVENOUS at 07:51

## 2021-03-10 RX ADMIN — NITROGLYCERIN 1 SPRAY: 400 SPRAY ORAL at 09:00

## 2021-03-10 RX ADMIN — Medication 10 ML: at 07:51

## 2021-03-10 RX ADMIN — NITROGLYCERIN 1 SPRAY: 400 SPRAY ORAL at 09:09

## 2021-03-10 NOTE — PROGRESS NOTES
In response to elevated bp's prior to starting the Mariola Apolinar, Dr Fabian Marx was contacted and the pt has been given three sprays of nitro sl at 10 min intervals and was allowed to take her daily dose of bp meds

## 2021-03-10 NOTE — PROGRESS NOTES
Unable to get bp under control pt test was cancelled for today and rescheduled for tomorrow.   Per dr Guillaume Davis direction the pt was advised to take her meds as ordered prior to test and seek medical advise from pcp if bp continues to be elevated today  She will take her bp at home to monitor

## 2021-03-11 ENCOUNTER — HOSPITAL ENCOUNTER (INPATIENT)
Age: 66
LOS: 2 days | Discharge: HOME | DRG: 287 | End: 2021-03-13
Attending: EMERGENCY MEDICINE | Admitting: INTERNAL MEDICINE
Payer: MEDICARE

## 2021-03-11 ENCOUNTER — TELEPHONE (OUTPATIENT)
Dept: CARDIOLOGY CLINIC | Age: 66
End: 2021-03-11

## 2021-03-11 DIAGNOSIS — I10 HYPERTENSION, UNSPECIFIED TYPE: Primary | ICD-10-CM

## 2021-03-11 LAB
ANION GAP SERPL CALCULATED.3IONS-SCNC: 11 MMOL/L (ref 3–16)
BASOPHILS ABSOLUTE: 0 K/UL (ref 0–0.2)
BASOPHILS RELATIVE PERCENT: 0.8 %
BUN BLDV-MCNC: 11 MG/DL (ref 7–20)
CALCIUM SERPL-MCNC: 9.2 MG/DL (ref 8.3–10.6)
CHLORIDE BLD-SCNC: 101 MMOL/L (ref 99–110)
CO2: 26 MMOL/L (ref 21–32)
CREAT SERPL-MCNC: 0.5 MG/DL (ref 0.6–1.2)
EKG ATRIAL RATE: 57 BPM
EKG DIAGNOSIS: NORMAL
EKG P AXIS: 55 DEGREES
EKG P-R INTERVAL: 180 MS
EKG Q-T INTERVAL: 494 MS
EKG QRS DURATION: 90 MS
EKG QTC CALCULATION (BAZETT): 480 MS
EKG R AXIS: 58 DEGREES
EKG T AXIS: 54 DEGREES
EKG VENTRICULAR RATE: 57 BPM
EOSINOPHILS ABSOLUTE: 0.2 K/UL (ref 0–0.6)
EOSINOPHILS RELATIVE PERCENT: 3.2 %
GFR AFRICAN AMERICAN: >60
GFR NON-AFRICAN AMERICAN: >60
GLUCOSE BLD-MCNC: 103 MG/DL (ref 70–99)
HCT VFR BLD CALC: 44.8 % (ref 36–48)
HEMOGLOBIN: 14.9 G/DL (ref 12–16)
LYMPHOCYTES ABSOLUTE: 1.5 K/UL (ref 1–5.1)
LYMPHOCYTES RELATIVE PERCENT: 26.4 %
MCH RBC QN AUTO: 30.6 PG (ref 26–34)
MCHC RBC AUTO-ENTMCNC: 33.2 G/DL (ref 31–36)
MCV RBC AUTO: 92.2 FL (ref 80–100)
MONOCYTES ABSOLUTE: 0.5 K/UL (ref 0–1.3)
MONOCYTES RELATIVE PERCENT: 9.4 %
NEUTROPHILS ABSOLUTE: 3.5 K/UL (ref 1.7–7.7)
NEUTROPHILS RELATIVE PERCENT: 60.2 %
PDW BLD-RTO: 13.7 % (ref 12.4–15.4)
PLATELET # BLD: 220 K/UL (ref 135–450)
PMV BLD AUTO: 8.1 FL (ref 5–10.5)
POTASSIUM REFLEX MAGNESIUM: 6 MMOL/L (ref 3.5–5.1)
POTASSIUM SERPL-SCNC: 3.6 MMOL/L (ref 3.5–5.1)
PRO-BNP: 79 PG/ML (ref 0–124)
RBC # BLD: 4.86 M/UL (ref 4–5.2)
SODIUM BLD-SCNC: 138 MMOL/L (ref 136–145)
TROPONIN: <0.01 NG/ML
WBC # BLD: 5.8 K/UL (ref 4–11)

## 2021-03-11 PROCEDURE — 6370000000 HC RX 637 (ALT 250 FOR IP): Performed by: STUDENT IN AN ORGANIZED HEALTH CARE EDUCATION/TRAINING PROGRAM

## 2021-03-11 PROCEDURE — 6360000002 HC RX W HCPCS: Performed by: PHYSICIAN ASSISTANT

## 2021-03-11 PROCEDURE — 80048 BASIC METABOLIC PNL TOTAL CA: CPT

## 2021-03-11 PROCEDURE — 99223 1ST HOSP IP/OBS HIGH 75: CPT | Performed by: INTERNAL MEDICINE

## 2021-03-11 PROCEDURE — 96374 THER/PROPH/DIAG INJ IV PUSH: CPT

## 2021-03-11 PROCEDURE — 6370000000 HC RX 637 (ALT 250 FOR IP): Performed by: PHYSICIAN ASSISTANT

## 2021-03-11 PROCEDURE — 6370000000 HC RX 637 (ALT 250 FOR IP)

## 2021-03-11 PROCEDURE — 36415 COLL VENOUS BLD VENIPUNCTURE: CPT

## 2021-03-11 PROCEDURE — 99284 EMERGENCY DEPT VISIT MOD MDM: CPT

## 2021-03-11 PROCEDURE — 84484 ASSAY OF TROPONIN QUANT: CPT

## 2021-03-11 PROCEDURE — 2580000003 HC RX 258: Performed by: STUDENT IN AN ORGANIZED HEALTH CARE EDUCATION/TRAINING PROGRAM

## 2021-03-11 PROCEDURE — 85025 COMPLETE CBC W/AUTO DIFF WBC: CPT

## 2021-03-11 PROCEDURE — 83880 ASSAY OF NATRIURETIC PEPTIDE: CPT

## 2021-03-11 PROCEDURE — 6360000002 HC RX W HCPCS: Performed by: STUDENT IN AN ORGANIZED HEALTH CARE EDUCATION/TRAINING PROGRAM

## 2021-03-11 PROCEDURE — 93005 ELECTROCARDIOGRAM TRACING: CPT | Performed by: PHYSICIAN ASSISTANT

## 2021-03-11 PROCEDURE — 84132 ASSAY OF SERUM POTASSIUM: CPT

## 2021-03-11 PROCEDURE — 1200000000 HC SEMI PRIVATE

## 2021-03-11 RX ORDER — LOSARTAN POTASSIUM 50 MG/1
100 TABLET ORAL NIGHTLY
Status: DISCONTINUED | OUTPATIENT
Start: 2021-03-11 | End: 2021-03-13 | Stop reason: HOSPADM

## 2021-03-11 RX ORDER — PANTOPRAZOLE SODIUM 40 MG/1
40 TABLET, DELAYED RELEASE ORAL
Status: DISCONTINUED | OUTPATIENT
Start: 2021-03-12 | End: 2021-03-13 | Stop reason: HOSPADM

## 2021-03-11 RX ORDER — PROMETHAZINE HYDROCHLORIDE 12.5 MG/1
12.5 TABLET ORAL EVERY 6 HOURS PRN
Status: DISCONTINUED | OUTPATIENT
Start: 2021-03-11 | End: 2021-03-13 | Stop reason: HOSPADM

## 2021-03-11 RX ORDER — CLONIDINE HYDROCHLORIDE 0.1 MG/1
0.1 TABLET ORAL 2 TIMES DAILY
Status: DISCONTINUED | OUTPATIENT
Start: 2021-03-11 | End: 2021-03-13 | Stop reason: HOSPADM

## 2021-03-11 RX ORDER — HYDRALAZINE HYDROCHLORIDE 20 MG/ML
10 INJECTION INTRAMUSCULAR; INTRAVENOUS ONCE
Status: COMPLETED | OUTPATIENT
Start: 2021-03-11 | End: 2021-03-11

## 2021-03-11 RX ORDER — AMLODIPINE BESYLATE 5 MG/1
5 TABLET ORAL ONCE
Status: COMPLETED | OUTPATIENT
Start: 2021-03-11 | End: 2021-03-11

## 2021-03-11 RX ORDER — SODIUM CHLORIDE 0.9 % (FLUSH) 0.9 %
10 SYRINGE (ML) INJECTION EVERY 12 HOURS SCHEDULED
Status: DISCONTINUED | OUTPATIENT
Start: 2021-03-11 | End: 2021-03-12

## 2021-03-11 RX ORDER — MELATONIN
1000 DAILY
Status: DISCONTINUED | OUTPATIENT
Start: 2021-03-11 | End: 2021-03-11 | Stop reason: SDUPTHER

## 2021-03-11 RX ORDER — ACETAMINOPHEN 325 MG/1
650 TABLET ORAL EVERY 6 HOURS PRN
Status: DISCONTINUED | OUTPATIENT
Start: 2021-03-11 | End: 2021-03-13 | Stop reason: HOSPADM

## 2021-03-11 RX ORDER — AMLODIPINE BESYLATE 5 MG/1
5 TABLET ORAL DAILY
Status: DISCONTINUED | OUTPATIENT
Start: 2021-03-11 | End: 2021-03-11

## 2021-03-11 RX ORDER — POLYETHYLENE GLYCOL 3350 17 G/17G
17 POWDER, FOR SOLUTION ORAL DAILY PRN
Status: DISCONTINUED | OUTPATIENT
Start: 2021-03-11 | End: 2021-03-13 | Stop reason: HOSPADM

## 2021-03-11 RX ORDER — ACETAMINOPHEN 650 MG/1
650 SUPPOSITORY RECTAL EVERY 6 HOURS PRN
Status: DISCONTINUED | OUTPATIENT
Start: 2021-03-11 | End: 2021-03-13 | Stop reason: HOSPADM

## 2021-03-11 RX ORDER — HYDROCHLOROTHIAZIDE 25 MG/1
25 TABLET ORAL DAILY
Status: DISCONTINUED | OUTPATIENT
Start: 2021-03-12 | End: 2021-03-13 | Stop reason: HOSPADM

## 2021-03-11 RX ORDER — ONDANSETRON 2 MG/ML
4 INJECTION INTRAMUSCULAR; INTRAVENOUS EVERY 6 HOURS PRN
Status: DISCONTINUED | OUTPATIENT
Start: 2021-03-11 | End: 2021-03-13 | Stop reason: HOSPADM

## 2021-03-11 RX ORDER — LANOLIN ALCOHOL/MO/W.PET/CERES
3 CREAM (GRAM) TOPICAL NIGHTLY
Status: DISCONTINUED | OUTPATIENT
Start: 2021-03-11 | End: 2021-03-13 | Stop reason: HOSPADM

## 2021-03-11 RX ORDER — CARVEDILOL 25 MG/1
25 TABLET ORAL 2 TIMES DAILY
Status: DISCONTINUED | OUTPATIENT
Start: 2021-03-11 | End: 2021-03-13 | Stop reason: HOSPADM

## 2021-03-11 RX ORDER — SODIUM CHLORIDE 0.9 % (FLUSH) 0.9 %
10 SYRINGE (ML) INJECTION PRN
Status: DISCONTINUED | OUTPATIENT
Start: 2021-03-11 | End: 2021-03-12

## 2021-03-11 RX ORDER — ATORVASTATIN CALCIUM 40 MG/1
80 TABLET, FILM COATED ORAL NIGHTLY
Status: DISCONTINUED | OUTPATIENT
Start: 2021-03-11 | End: 2021-03-13 | Stop reason: HOSPADM

## 2021-03-11 RX ORDER — HYDRALAZINE HYDROCHLORIDE 20 MG/ML
10 INJECTION INTRAMUSCULAR; INTRAVENOUS EVERY 6 HOURS PRN
Status: DISCONTINUED | OUTPATIENT
Start: 2021-03-11 | End: 2021-03-13 | Stop reason: HOSPADM

## 2021-03-11 RX ORDER — ASPIRIN 81 MG/1
81 TABLET ORAL DAILY
Status: DISCONTINUED | OUTPATIENT
Start: 2021-03-11 | End: 2021-03-13 | Stop reason: HOSPADM

## 2021-03-11 RX ADMIN — ATORVASTATIN CALCIUM 80 MG: 40 TABLET, FILM COATED ORAL at 20:16

## 2021-03-11 RX ADMIN — LOSARTAN POTASSIUM 100 MG: 50 TABLET, FILM COATED ORAL at 20:54

## 2021-03-11 RX ADMIN — Medication 5000 UNITS: at 20:16

## 2021-03-11 RX ADMIN — AMLODIPINE BESYLATE 5 MG: 5 TABLET ORAL at 13:00

## 2021-03-11 RX ADMIN — ASPIRIN 81 MG: 81 TABLET, FILM COATED ORAL at 20:15

## 2021-03-11 RX ADMIN — ENOXAPARIN SODIUM 40 MG: 40 INJECTION SUBCUTANEOUS at 20:16

## 2021-03-11 RX ADMIN — CARVEDILOL 25 MG: 25 TABLET, FILM COATED ORAL at 20:16

## 2021-03-11 RX ADMIN — AMLODIPINE BESYLATE 5 MG: 5 TABLET ORAL at 10:31

## 2021-03-11 RX ADMIN — HYDRALAZINE HYDROCHLORIDE 10 MG: 20 INJECTION INTRAMUSCULAR; INTRAVENOUS at 15:35

## 2021-03-11 RX ADMIN — CLONIDINE HYDROCHLORIDE 0.1 MG: 0.1 TABLET ORAL at 20:15

## 2021-03-11 RX ADMIN — ACETAMINOPHEN 650 MG: 325 TABLET ORAL at 20:17

## 2021-03-11 RX ADMIN — Medication 10 ML: at 20:16

## 2021-03-11 RX ADMIN — Medication 3 MG: at 23:06

## 2021-03-11 ASSESSMENT — PAIN DESCRIPTION - DESCRIPTORS
DESCRIPTORS: ACHING
DESCRIPTORS: ACHING

## 2021-03-11 ASSESSMENT — PAIN DESCRIPTION - FREQUENCY
FREQUENCY: CONTINUOUS
FREQUENCY: CONTINUOUS

## 2021-03-11 ASSESSMENT — PAIN DESCRIPTION - ORIENTATION: ORIENTATION: RIGHT

## 2021-03-11 ASSESSMENT — PAIN SCALES - GENERAL
PAINLEVEL_OUTOF10: 3
PAINLEVEL_OUTOF10: 4

## 2021-03-11 ASSESSMENT — PAIN DESCRIPTION - PROGRESSION
CLINICAL_PROGRESSION: NOT CHANGED
CLINICAL_PROGRESSION: NOT CHANGED

## 2021-03-11 ASSESSMENT — PAIN - FUNCTIONAL ASSESSMENT: PAIN_FUNCTIONAL_ASSESSMENT: ACTIVITIES ARE NOT PREVENTED

## 2021-03-11 ASSESSMENT — PAIN DESCRIPTION - PAIN TYPE
TYPE: ACUTE PAIN
TYPE: CHRONIC PAIN

## 2021-03-11 ASSESSMENT — PAIN DESCRIPTION - ONSET: ONSET: ON-GOING

## 2021-03-11 ASSESSMENT — PAIN DESCRIPTION - LOCATION
LOCATION: EYE
LOCATION: HEAD

## 2021-03-11 NOTE — ED NOTES
Report called to Pawel elizabeth RN. Patient will be transported shortly.      Cesar Andino RN  03/11/21 5036

## 2021-03-11 NOTE — TELEPHONE ENCOUNTER
Patients blood pressure was very high 3-10-21 and was only able to do part of the test yesterday chemical stress test with [facility]. Patient is to go back to [facility] today 3-11-21 and finish test but her blood pressure is still high and she did take her blood pressure medicine this morning. 256/13 yesterday with no medicine  218/110 with medicine this morning. She is to be there at 10:30 am today. What should she do?

## 2021-03-11 NOTE — H&P
Internal Medicine PGY-1 Resident History & Physical      PCP: Jose Juan Johnson MD    Date of Admission: 3/11/2021    Date of Service: Pt seen/examined on 3/11/21 and admitted to Inpatient with expected LOS greaterthan two midnights due to medical therapy. Chief Complaint: HTN    History Of Present Illness:     77 y.o. female with PMH HTN who presented to [facility] at the behest of her cardiologist.  Patient was getting cardiology clearance in order to have a cholecystectomy performed. She was told to get a stress test done but when she presented to the hospital for her blood pressure was 256/113 and she was only able to complete part of the chemical stress test.  Of note she was told not to take her blood pressure medications during this day. She was given sublingual nitroglycerin x3 with no reduction in her blood pressure. She was then told to go home and take her medications and then come back to the hospital the following day to complete the stress test.  However when she arrived her blood pressure was 218/110 despite taking the medications. Patient was then instructed to come to the ED for further management of her blood pressure so that she could get it under control and get the stress test the following day. While in the ED patient recently started noticing that she was dizzy and lightheaded shortly after eating a sandwich. She endorses some weakness and shortness of breath at rest.  Patient denies fevers, chills, chest pain, abdominal pain, nausea and vomiting. No changes in vision. Patient has been on blood pressure medications her entire life and reports that her dad had 2 bypasses at an early age and passed away at 61 will get the second one. In the ED patient was afebrile and hemodynamically stable. Her blood pressure was 203/96 on admission. Labs significant for a potassium of 6 (secondary to hemolysis, repeat 3.6), troponin less than 0.01.   EKG with sinus bradycardia and prolonged QTC of 480. Patient will be admitted for further management of her hypertension with the plan of getting a stress test tomorrow. Past Medical History:          Diagnosis Date    Fissure, anal     Lorenzo YOO    Goiter     [facility] of [facility]ogram 10/2010    [facility]    Hyperlipidemia     Hypertension     Onychomycosis     Pap smear for cervical cancer screening 2/2010    Dr. Leighann Savage - [facility]    Prediabetes 12/23/2019    Screening for osteoporosis     Dexa 12/2005 - wnl    Vitamin D deficiency 11/10/2011       Past Surgical History:          Procedure Laterality Date    APPENDECTOMY  2/4/2000    COLONOSCOPY  3/2011    Dr. Lesly Haile - polyps - 2 years  f/u    COLONOSCOPY  10/19/2016    Oneal Pa MD    ESOPHAGEAL DILATATION  4/2014    De Anne MD     KNEE SURGERY      right    PILONIDAL CYST EXCISION      UPPER GASTROINTESTINAL ENDOSCOPY  3/19/14    concentric rings in esophagus       Medications Prior to Admission:      Prior to Admission medications    Medication Sig Start Date End Date Taking?  Authorizing Provider   cloNIDine (CATAPRES) 0.1 MG tablet TAKE ONE TABLET BY MOUTH TWICE A DAY 3/2/21  Yes Juan F Rader MD   atorvastatin (LIPITOR) 80 MG tablet TAKE ONE TABLET BY MOUTH ONCE NIGHTLY 2/23/21  Yes Juan F Rader MD   omeprazole (PRILOSEC) 20 MG delayed release capsule Take 1 capsule by mouth Daily 2/4/21  Yes Juan F Rader MD   irbesartan (AVAPRO) 300 MG tablet TAKE ONE TABLET BY MOUTH DAILY  Patient taking differently: 300 mg nightly  1/19/21  Yes Juan F Rader MD   hydroCHLOROthiazide (HYDRODIURIL) 25 MG tablet TAKE ONE TABLET BY MOUTH DAILY 1/19/21  Yes Juan F Rader MD   carvedilol (COREG) 25 MG tablet TAKE ONE TABLET BY MOUTH TWICE A DAY 8/29/20  Yes Juan F Rader MD   melatonin 3 MG TABS tablet Take 1 tablet by mouth nightly 7/17/20  Yes Juan F Rader MD   aspirin 81 MG tablet Take 81 mg by mouth daily   Yes Historical Provider, MD   Cholecalciferol (VITAMIN D3) 5000 units TABS Take 1 tablet by mouth daily 6/10/19  Yes Frantz Rodriguez MD   ibuprofen (ADVIL;MOTRIN) 600 MG tablet Take 1 tablet by mouth 3 times daily as needed for Pain 6/10/19  Yes Frantz Rodriguez MD   Selenium 200 MCG CAPS Take 1 capsule by mouth   Yes Historical Provider, MD   Cholecalciferol (VITAMIN D3) 1000 UNITS TABS Take 1 tablet by mouth daily 1/11/16  Yes Frantz Rodriguez MD   Multiple Vitamin (MULTIVITAMIN PO) Take  by mouth. Yes Historical Provider, MD       Allergies:  Sulfa antibiotics    Social History:      The patient currently lives with her . TOBACCO:   reports that she has never smoked. She has never used smokeless tobacco.  ETOH:  reports current alcohol use of about 3.0 standard drinks of alcohol per week. Family History:     Patient states that her dad had bypass surgery x2 and passed away after getting the second one. Problem Relation Age of Onset    Other Mother         vascular disease    Hypertension Mother     High Cholesterol Mother     Diabetes Mother     Heart Disease Mother     Heart Disease Father     Heart Attack Father     Cancer Maternal Grandmother 32        unknown type - cancer     Stroke Neg Hx        REVIEW OF SYSTEMS: Pertinent positives as noted in the HPI. All other systems reviewed and negative. ROS: Review of Systems    PHYSICAL EXAM PERFORMED:    BP (!) 189/94   Pulse 64   Temp 98.4 °F (36.9 °C)   Resp 16   Ht 5' 4\" (1.626 m)   Wt 202 lb (91.6 kg)   LMP 05/01/2008 (Approximate) Comment: 15 years ago  SpO2 96%   BMI 34.67 kg/m²     General appearance:  No apparent distress, appears stated age and cooperative. HEENT:  Normal cephalic,atraumatic without obvious deformity. Pupils equal, round, and reactive to light. Extra ocular muscles intact. Conjunctivae/corneas clear. Neck: Supple, with full range of motion. No jugular venous distention. Trachea midline. Respiratory:  Normal respiratory effort. Clear to auscultation, bilaterally without Rales/Wheezes/Rhonchi. Cardiovascular:  Regular rate and rhythm with normal S1/S2 without murmurs, rubs or gallops. Abdomen: Soft, non-tender, non-distended with normal bowel sounds. Musculoskeletal:  No clubbing, cyanosis or edema bilaterally. Full range of motion without deformity. Skin: Skin color, texture, turgor normal.  Norashes or lesions. Neurologic:  Neurovascularly intact without any focal sensory/motor deficits. Cranialnerves: II-XII intact, grossly non-focal.  Psychiatric:  Alert and oriented, thought content appropriate,normal insight  Capillary Refill: Brisk,< 3 seconds   Peripheral Pulses: +2 palpable, equal bilaterally       Labs:     Recent Labs     03/11/21  1129   WBC 5.8   HGB 14.9   HCT 44.8        Recent Labs     03/11/21  1129 03/11/21  1226     --    K 6.0* 3.6     --    CO2 26  --    BUN 11  --    CREATININE 0.5*  --    CALCIUM 9.2  --      No results for input(s): AST, ALT, BILIDIR, BILITOT, ALKPHOS in the last 72 hours. No results for input(s): INR in the last 72 hours.   Recent Labs     03/11/21  1129   TROPONINI <0.01       Urinalysis:      Lab Results   Component Value Date    NITRU NEGATIVE 09/21/2012    BLOODU N 06/10/2019    BLOODU NEGATIVE 09/21/2012    SPECGRAV 1.020 06/10/2019    SPECGRAV >=1.030 09/21/2012    GLUCOSEU N 06/10/2019    GLUCOSEU NEGATIVE 09/21/2012    GLUCOSEU Negative 01/11/2012       Radiology:     No orders to display     ASSESSMENT & PLAN:  Hawa Ruelas is a 77 y.o. female w/ PMH HTN who presented for blood pressure control in order to get a stress test.    Active Hospital Problems    Diagnosis Date Noted    Hypertension [I10] 03/11/2021     Hypertensive urgency  Patient's blood pressure 256/113 yesterday while getting a stress test.  This morning despite taking her blood pressure medications her blood pressure was 218/110 when trying to finish the stress test.  On admission her blood pressure was 203/96. No signs of endorgan damage. Patient's blood pressure overall is not well controlled at home.  -Continue home medications including Coreg 25 mg twice daily, clonidine 0.1 mg twice daily, hydrochlorothiazide 25 mg p.o. daily and losartan 100 mg nightly  -Hydralazine 10 mg IV every 6 hours as needed for systolic blood pressure greater than 185  -Low-salt diet  - cards following  - Chemical stress test tomorrow     Planned laparoscopic cholecystectomy with cholangiogram 2/2 biliary colic  RUQ US 7/95 revealed fatty liver, no gallstones but sludge was present. Patient had a planned cholecystectomy surgery for 3/10/21 but required cardiology clearance. Due to her exertional dyspnea decision was made to get a Lexiscan nuclear stress test in order to clear for the surgery. CAD  Stress echo in 2013 was normal.  Last echo in 10/19 normal except for indeterminate diastolic function. Cardiac CTA 10/19 with calcium score 496  -Home aspirin and statin    Mixed hyperlipidemia  -Home statin    DVT Prophylaxis: Lovenox  Diet: No diet orders on file   Code Status: No Order    PT/OT Eval Status: Ordered    Dispo - [facility]    I will discuss the patient with the senior resident and Dr. Viktoria Beasley.     Patricia Gillespie DO  Internal Medicine Resident, PGY-1

## 2021-03-11 NOTE — ED NOTES
Patient allowed to eat per MD. Patient given [address] boxed lunch and water. Patient denies any further needs at this time.       Nidia Chaudhary RN  03/11/21 2384

## 2021-03-11 NOTE — ED PROVIDER NOTES
1 [facility]  EMERGENCY DEPARTMENT ENCOUNTER          PHYSICIAN ASSISTANT NOTE       Date of evaluation: 3/11/2021    Chief Complaint     Hypertension      History of Present Illness     Jamil Pederson is a 77 y.o. female who presents with complaints of high blood pressure. Patient was obtaining preoperative clearance by cardiology for cholecystectomy. She was going to undergo a stress test yesterday and was told to hold BP medications. Patient was noted to be hypertensive to the 886D systolic. They were unable to decrease it with 3 sprays of nitro sl. They instructed her to take her BP medications and return today for stress. Patient took her medications this morning but continues to have elevated pressures. She endorses mild headache and left eye pain but denies any visual changes. Patient denies fever, chills, chest pain, shortness of breath, nausea, vomiting, abdominal pain, diarrhea, or constipation. With the exception of the above, there are no aggravating or alleviating factors. Review of Systems     ROS: Pertinent positive and negative findings as documented in the HPI, otherwise all other systems were reviewed and were negative. Past Medical, Surgical, Family, and Social History     She has a past medical history of Fissure, anal, Goiter, History of mammogram, Hyperlipidemia, Hypertension, Onychomycosis, Pap smear for cervical cancer screening, Prediabetes, Screening for osteoporosis, and Vitamin D deficiency. She has a past surgical history that includes Appendectomy (2/4/2000); knee surgery; Pilonidal cyst excision; Colonoscopy (3/2011); Upper gastrointestinal endoscopy (3/19/14); Esophagus dilation (4/2014); and Colonoscopy (10/19/2016). Her family history includes Cancer (age of onset: 32) in her maternal grandmother; Diabetes in her mother; Heart Attack in her father; Heart Disease in her father and mother; High Cholesterol in her mother; Hypertension in her mother;  Other in long bone or joint deformity. Neuro: A&O x 4. Normal speech without dysarthria or aphasia. Moves all extremities spontaneously and symmetrically. Movements normal without ataxia. Skin: Warm, dry. No obvious rashes, petechiae, or purpura. Psych: Appropriate mood and affect, normal interaction.      Diagnostic Results     EKG   Interpreted in conjunction with emergency department physician Burak Ruiz MD  Rhythm: sinus bradycardia  Rate: normal  Axis: normal  : none  Conduction: normal  ST Segments: no acute change  T Waves: no acute change  Q Waves:none  Clinical Impression: no acute changes  Comparison:  02/04/2021    RADIOLOGY:  No orders to display       LABS:   Results for orders placed or performed during the hospital encounter of 03/11/21   CBC Auto Differential   Result Value Ref Range    WBC 5.8 4.0 - 11.0 K/uL    RBC 4.86 4.00 - 5.20 M/uL    Hemoglobin 14.9 12.0 - 16.0 g/dL    Hematocrit 44.8 36.0 - 48.0 %    MCV 92.2 80.0 - 100.0 fL    MCH 30.6 26.0 - 34.0 pg    MCHC 33.2 31.0 - 36.0 g/dL    RDW 13.7 12.4 - 15.4 %    Platelets 492 898 - 947 K/uL    MPV 8.1 5.0 - 10.5 fL    Neutrophils % 60.2 %    Lymphocytes % 26.4 %    Monocytes % 9.4 %    Eosinophils % 3.2 %    Basophils % 0.8 %    Neutrophils Absolute 3.5 1.7 - 7.7 K/uL    Lymphocytes Absolute 1.5 1.0 - 5.1 K/uL    Monocytes Absolute 0.5 0.0 - 1.3 K/uL    Eosinophils Absolute 0.2 0.0 - 0.6 K/uL    Basophils Absolute 0.0 0.0 - 0.2 K/uL   Basic Metabolic Panel w/ Reflex to MG   Result Value Ref Range    Sodium 138 136 - 145 mmol/L    Potassium reflex Magnesium 6.0 (HH) 3.5 - 5.1 mmol/L    Chloride 101 99 - 110 mmol/L    CO2 26 21 - 32 mmol/L    Anion Gap 11 3 - 16    Glucose 103 (H) 70 - 99 mg/dL    BUN 11 7 - 20 mg/dL    CREATININE 0.5 (L) 0.6 - 1.2 mg/dL    GFR Non-African American >60 >60    GFR African American >60 >60    Calcium 9.2 8.3 - 10.6 mg/dL   Troponin   Result Value Ref Range    Troponin <0.01 <0.01 ng/mL   Potassium   Result Value Ref Range    Potassium 3.6 3.5 - 5.1 mmol/L   EKG 12 Lead   Result Value Ref Range    Ventricular Rate 57 BPM    Atrial Rate 57 BPM    P-R Interval 180 ms    QRS Duration 90 ms    Q-T Interval 494 ms    QTc Calculation (Bazett) 480 ms    P Axis 55 degrees    R Axis 58 degrees    T Axis 54 degrees    Diagnosis       EKG performed in ER and to be interpreted by ER physician. Confirmed by MD, ER ([id number]),  Almira Primrose ([phone]) on 3/11/2021 11:36:32 AM       RECENT VITALS:  BP: (!) 189/94, Temp: 98.4 °F (36.9 °C), Pulse: 64, Resp: 16, SpO2: 96 %       ED Course     Nursing Notes, Past Medical Hx,Past Surgical Hx, Social Hx, Allergies, and Family Hx were reviewed. The patient was given the following medications:  Orders Placed This Encounter   Medications    amLODIPine (NORVASC) tablet 5 mg    amLODIPine (NORVASC) tablet 5 mg    hydrALAZINE (APRESOLINE) injection 10 mg       CONSULTS:  IP CONSULT TO CARDIOLOGY  IP CONSULT TO HOSPITALIST    MEDICAL DECISION MAKING / ASSESSMENT / Eva Juan A is a 77 y.o. female who presents to the emergency department with complaints of hypertension. On presentation, patient is well-appearing and in no acute distress. Patient is afebrile and hypertensive. Lab work overall unremarkable. Patient did complain of mild headache and left eye pain though denies any vision changes. No neuro deficits. Patient received amlodipine 5 mg oral x2 with no change in her blood pressure. I spoke with Dr. Aurora Peabody who recommended admission for IV medication and stress test tomorrow. At this point in time, patient will require admission to the hospital for further management of their clinical condition. I spoke with the admitting team who is in agreement with plan for admission. Patient and family are in agreement with plan for admission. Patient will be cared for in the ED prior to a bed becoming available upstairs.     The patient was seen and evaluated by the attending physician who agreed with the assessment and plan. The patient and / or the family were informed of the results of any tests, a time was given to answer questions, a plan was proposed and they agreed with plan. Clinical Impression     1. Hypertension, unspecified type        Disposition     PATIENT REFERRED TO:  No follow-up provider specified.     DISCHARGE MEDICATIONS:  New Prescriptions    No medications on file       Flores Mcguire [address] [place] [address]  03/11/21 2411

## 2021-03-11 NOTE — PLAN OF CARE
Problem: Cardiac:  Goal: Hemodynamic stability will improve  Description: Hemodynamic stability will improve  3/11/2021 1815 by Rody Mendez RN  Note: /75. Dr Valdo Groves here to see patient, and is aware. Awaiting orders.

## 2021-03-11 NOTE — TELEPHONE ENCOUNTER
Spoke with the patient this morning, /110 after taking medication. Advised patient to go to [facility] ED for blood pressure control. Patient verbalized understanding.

## 2021-03-11 NOTE — PLAN OF CARE
4 Eyes Admission Assessment     I agree as the admission nurse that 2 RN's have performed a thorough Head to Toe Skin Assessment on the patient. ALL assessment sites listed below have been assessed on admission. Areas assessed by both nurses:   [x]   Head, Face, and Ears   [x]   Shoulders, Back, and Chest  [x]   Arms, Elbows, and Hands   [x]   Coccyx, Sacrum, and Ischium  [x]   Legs, Feet, and Heels        Does the Patient have Skin Breakdown?   No         Damon Prevention initiated:  NA   Wound Care Orders initiated:  NA      WO nurse consulted for Pressure Injury (Stage 3,4, Unstageable, DTI, NWPT, and Complex wounds) or Damon score 18 or lower:  NA      Nurse 1 eSignature: Electronically signed by John Alicea RN on 3/11/21 at 6:13 PM EST    **SHARE this note so that the co-signing nurse is able to place an eSignature**    Nurse 2 eSignature: Electronically signed by Awilda Dowsn RN on 3/11/21 at 6:23 PM EST

## 2021-03-11 NOTE — ED TRIAGE NOTES
Pt is alert and oriented times four. Pt here today for HTN. Pt states yesterday she was getting a stress test and did not take her BP meds for the test. Pt states that her BP went high and states was not able to finish the test yesterday. Pt states that she woke up this am and states that she had pressure to her left eye and stated her BP was still elevated. Pt states she did not take her BP meds this am because she was supposed to come back and get the second half of the Stress test today at 1030. Pt placed on monitor. Call light in reach will continue to monitor.

## 2021-03-11 NOTE — ED PROVIDER NOTES
ED Attending Attestation Note     Date of evaluation: 3/11/2021    This patient was seen by the advance practice provider. I have seen and examined the patient, agree with the workup, evaluation, management and diagnosis. The care plan has been discussed. I have reviewed the ECG and concur with the WAYNE's interpretation. My assessment reveals well-appearing female in no acute distress, presenting today for hypertension. Sent in by her cardiologist as they were unable to complete her stress test yesterday and would be unable to complete it today. She denies any chest pain shortness of breath or any other symptoms.       Rhianna Sorto MD  03/11/21 9905

## 2021-03-12 ENCOUNTER — APPOINTMENT (OUTPATIENT)
Dept: CARDIAC CATH/INVASIVE PROCEDURES | Age: 66
DRG: 287 | End: 2021-03-12
Payer: MEDICARE

## 2021-03-12 LAB
ANION GAP SERPL CALCULATED.3IONS-SCNC: 11 MMOL/L (ref 3–16)
BASOPHILS ABSOLUTE: 0.1 K/UL (ref 0–0.2)
BASOPHILS RELATIVE PERCENT: 0.7 %
BUN BLDV-MCNC: 16 MG/DL (ref 7–20)
CALCIUM SERPL-MCNC: 9.7 MG/DL (ref 8.3–10.6)
CHLORIDE BLD-SCNC: 105 MMOL/L (ref 99–110)
CO2: 26 MMOL/L (ref 21–32)
CREAT SERPL-MCNC: 0.6 MG/DL (ref 0.6–1.2)
EOSINOPHILS ABSOLUTE: 0.1 K/UL (ref 0–0.6)
EOSINOPHILS RELATIVE PERCENT: 1.3 %
GFR AFRICAN AMERICAN: >60
GFR NON-AFRICAN AMERICAN: >60
GLUCOSE BLD-MCNC: 116 MG/DL (ref 70–99)
HCT VFR BLD CALC: 43.8 % (ref 36–48)
HEMOGLOBIN: 14.7 G/DL (ref 12–16)
INR BLD: 1.03 (ref 0.86–1.14)
LEFT VENTRICULAR EJECTION FRACTION MODE: NORMAL
LV EF: 55 %
LYMPHOCYTES ABSOLUTE: 1.3 K/UL (ref 1–5.1)
LYMPHOCYTES RELATIVE PERCENT: 15.7 %
MAGNESIUM: 2.2 MG/DL (ref 1.8–2.4)
MCH RBC QN AUTO: 30.8 PG (ref 26–34)
MCHC RBC AUTO-ENTMCNC: 33.6 G/DL (ref 31–36)
MCV RBC AUTO: 91.7 FL (ref 80–100)
MONOCYTES ABSOLUTE: 0.7 K/UL (ref 0–1.3)
MONOCYTES RELATIVE PERCENT: 7.8 %
NEUTROPHILS ABSOLUTE: 6.2 K/UL (ref 1.7–7.7)
NEUTROPHILS RELATIVE PERCENT: 74.5 %
PDW BLD-RTO: 13.6 % (ref 12.4–15.4)
PLATELET # BLD: 236 K/UL (ref 135–450)
PMV BLD AUTO: 7.9 FL (ref 5–10.5)
POTASSIUM REFLEX MAGNESIUM: 3.4 MMOL/L (ref 3.5–5.1)
PROTHROMBIN TIME: 11.9 SEC (ref 10–13.2)
RBC # BLD: 4.78 M/UL (ref 4–5.2)
SODIUM BLD-SCNC: 142 MMOL/L (ref 136–145)
WBC # BLD: 8.4 K/UL (ref 4–11)

## 2021-03-12 PROCEDURE — 85610 PROTHROMBIN TIME: CPT

## 2021-03-12 PROCEDURE — 6360000004 HC RX CONTRAST MEDICATION: Performed by: INTERNAL MEDICINE

## 2021-03-12 PROCEDURE — 97116 GAIT TRAINING THERAPY: CPT

## 2021-03-12 PROCEDURE — 83735 ASSAY OF MAGNESIUM: CPT

## 2021-03-12 PROCEDURE — 93458 L HRT ARTERY/VENTRICLE ANGIO: CPT

## 2021-03-12 PROCEDURE — C1769 GUIDE WIRE: HCPCS

## 2021-03-12 PROCEDURE — 2580000003 HC RX 258: Performed by: NURSE PRACTITIONER

## 2021-03-12 PROCEDURE — 36415 COLL VENOUS BLD VENIPUNCTURE: CPT

## 2021-03-12 PROCEDURE — 80048 BASIC METABOLIC PNL TOTAL CA: CPT

## 2021-03-12 PROCEDURE — 97165 OT EVAL LOW COMPLEX 30 MIN: CPT

## 2021-03-12 PROCEDURE — B2111ZZ FLUOROSCOPY OF MULTIPLE CORONARY ARTERIES USING LOW OSMOLAR CONTRAST: ICD-10-PCS | Performed by: INTERNAL MEDICINE

## 2021-03-12 PROCEDURE — 97161 PT EVAL LOW COMPLEX 20 MIN: CPT

## 2021-03-12 PROCEDURE — 75625 CONTRAST EXAM ABDOMINL AORTA: CPT

## 2021-03-12 PROCEDURE — 93458 L HRT ARTERY/VENTRICLE ANGIO: CPT | Performed by: INTERNAL MEDICINE

## 2021-03-12 PROCEDURE — 75625 CONTRAST EXAM ABDOMINL AORTA: CPT | Performed by: INTERNAL MEDICINE

## 2021-03-12 PROCEDURE — 2500000003 HC RX 250 WO HCPCS

## 2021-03-12 PROCEDURE — C1894 INTRO/SHEATH, NON-LASER: HCPCS

## 2021-03-12 PROCEDURE — 2709999900 HC NON-CHARGEABLE SUPPLY

## 2021-03-12 PROCEDURE — 6370000000 HC RX 637 (ALT 250 FOR IP): Performed by: STUDENT IN AN ORGANIZED HEALTH CARE EDUCATION/TRAINING PROGRAM

## 2021-03-12 PROCEDURE — 99233 SBSQ HOSP IP/OBS HIGH 50: CPT | Performed by: NURSE PRACTITIONER

## 2021-03-12 PROCEDURE — B2151ZZ FLUOROSCOPY OF LEFT HEART USING LOW OSMOLAR CONTRAST: ICD-10-PCS | Performed by: INTERNAL MEDICINE

## 2021-03-12 PROCEDURE — 1200000000 HC SEMI PRIVATE

## 2021-03-12 PROCEDURE — 6360000002 HC RX W HCPCS: Performed by: STUDENT IN AN ORGANIZED HEALTH CARE EDUCATION/TRAINING PROGRAM

## 2021-03-12 PROCEDURE — 4A023N7 MEASUREMENT OF CARDIAC SAMPLING AND PRESSURE, LEFT HEART, PERCUTANEOUS APPROACH: ICD-10-PCS | Performed by: INTERNAL MEDICINE

## 2021-03-12 PROCEDURE — 85025 COMPLETE CBC W/AUTO DIFF WBC: CPT

## 2021-03-12 PROCEDURE — 99152 MOD SED SAME PHYS/QHP 5/>YRS: CPT

## 2021-03-12 PROCEDURE — 97530 THERAPEUTIC ACTIVITIES: CPT

## 2021-03-12 PROCEDURE — 6360000002 HC RX W HCPCS

## 2021-03-12 RX ORDER — POTASSIUM CHLORIDE 20 MEQ/1
40 TABLET, EXTENDED RELEASE ORAL ONCE
Status: COMPLETED | OUTPATIENT
Start: 2021-03-12 | End: 2021-03-12

## 2021-03-12 RX ORDER — SODIUM CHLORIDE 0.9 % (FLUSH) 0.9 %
10 SYRINGE (ML) INJECTION EVERY 12 HOURS SCHEDULED
Status: DISCONTINUED | OUTPATIENT
Start: 2021-03-12 | End: 2021-03-13 | Stop reason: HOSPADM

## 2021-03-12 RX ORDER — SODIUM CHLORIDE 9 MG/ML
INJECTION, SOLUTION INTRAVENOUS CONTINUOUS
Status: ACTIVE | OUTPATIENT
Start: 2021-03-12 | End: 2021-03-13

## 2021-03-12 RX ORDER — ACETAMINOPHEN 325 MG/1
650 TABLET ORAL EVERY 4 HOURS PRN
Status: DISCONTINUED | OUTPATIENT
Start: 2021-03-12 | End: 2021-03-13 | Stop reason: HOSPADM

## 2021-03-12 RX ORDER — SODIUM CHLORIDE 9 MG/ML
INJECTION, SOLUTION INTRAVENOUS CONTINUOUS
Status: DISCONTINUED | OUTPATIENT
Start: 2021-03-12 | End: 2021-03-13

## 2021-03-12 RX ADMIN — IOHEXOL 150 ML: 350 INJECTION, SOLUTION INTRAVENOUS at 14:40

## 2021-03-12 RX ADMIN — Medication 10 ML: at 19:54

## 2021-03-12 RX ADMIN — CARVEDILOL 25 MG: 25 TABLET, FILM COATED ORAL at 08:15

## 2021-03-12 RX ADMIN — PANTOPRAZOLE SODIUM 40 MG: 40 TABLET, DELAYED RELEASE ORAL at 05:40

## 2021-03-12 RX ADMIN — Medication 3 MG: at 19:52

## 2021-03-12 RX ADMIN — ENOXAPARIN SODIUM 40 MG: 40 INJECTION SUBCUTANEOUS at 08:15

## 2021-03-12 RX ADMIN — ACETAMINOPHEN 650 MG: 325 TABLET ORAL at 09:48

## 2021-03-12 RX ADMIN — LOSARTAN POTASSIUM 100 MG: 50 TABLET, FILM COATED ORAL at 19:52

## 2021-03-12 RX ADMIN — CLONIDINE HYDROCHLORIDE 0.1 MG: 0.1 TABLET ORAL at 08:15

## 2021-03-12 RX ADMIN — ACETAMINOPHEN 650 MG: 325 TABLET ORAL at 19:53

## 2021-03-12 RX ADMIN — HYDROCHLOROTHIAZIDE 25 MG: 25 TABLET ORAL at 08:15

## 2021-03-12 RX ADMIN — CLONIDINE HYDROCHLORIDE 0.1 MG: 0.1 TABLET ORAL at 19:52

## 2021-03-12 RX ADMIN — ATORVASTATIN CALCIUM 80 MG: 40 TABLET, FILM COATED ORAL at 19:52

## 2021-03-12 RX ADMIN — ASPIRIN 81 MG: 81 TABLET, FILM COATED ORAL at 08:15

## 2021-03-12 RX ADMIN — POTASSIUM CHLORIDE 40 MEQ: 1500 TABLET, EXTENDED RELEASE ORAL at 09:48

## 2021-03-12 RX ADMIN — Medication 5000 UNITS: at 08:15

## 2021-03-12 RX ADMIN — CARVEDILOL 25 MG: 25 TABLET, FILM COATED ORAL at 19:52

## 2021-03-12 ASSESSMENT — PAIN SCALES - GENERAL
PAINLEVEL_OUTOF10: 3
PAINLEVEL_OUTOF10: 0

## 2021-03-12 NOTE — PROGRESS NOTES
[facility] [address]       In Patient  Progress note        Verna Pablo MD,  [address]   Daily Progress Note      Admit Date:  3/11/2021  Primary Cardiologist :  Charanjit Wesley     CC: Interval Hx:  Ms Sebastian Pretty is followed by cardiology   for HTN urgency / elevated this am / on Norvasc coreg catapres HCTZ   needing clearance for cholocystectomy surgery   with HTN  prediabetes, hyperlipidemia, CAD, HFpEF. Patient was assessed with a right upper quadrant ultrasound 2/9/2021: Fatty liver, no gallstones however there was sludge in the gallbladder. Patient was seen by surgery for cholecystectomy   Plan LHC today  LHC indicated, risks benefits & alternatives has been discussed     Echo 10/2019: Normal except for indeterminate diastolic function  CCTA 64/2306:  Moderate diffuse calcifications of the coronaries without any significant stenosis     I have examined pt   and reviewed notes / any lab work EKGs stress test, angiograms, & images reviewed    Objective:   BP (!) 149/85   Pulse 70   Temp 99.1 °F (37.3 °C) (Oral)   Resp 16   Ht 5' 4\" (1.626 m)   Wt 202 lb 9.6 oz (91.9 kg)   LMP 05/01/2008 (Approximate) Comment: 15 years ago  SpO2 95%   BMI 34.78 kg/m²       Intake/Output Summary (Last 24 hours) at 3/12/2021 0953  Last data filed at 3/11/2021 1934  Gross per 24 hour   Intake 240 ml   Output    Net 240 ml     Wt Readings from Last 3 Encounters:   03/11/21 202 lb 9.6 oz (91.9 kg)   03/04/21 204 lb (92.5 kg)   03/03/21 206 lb (93.4 kg)       Physical Exam:   BP (!) 149/85   Pulse 70   Temp 99.1 °F (37.3 °C) (Oral)   Resp 16   Ht 5' 4\" (1.626 m)   Wt 202 lb 9.6 oz (91.9 kg)   LMP 05/01/2008 (Approximate) Comment: 15 years ago  SpO2 95%   BMI 34.78 kg/m²   BP Readings from Last 3 Encounters:   03/12/21 (!) 149/85   03/04/21 (!) 158/98   03/03/21 (!) 160/105     Pulse Readings from Last 3 Encounters:   03/12/21 70   03/04/21 66   03/03/21 63       Intake/Output Summary (Last 24 hours) at 3/12/2021 0953  Last data filed at 3/11/2021 1934  Gross per 24 hour   Intake 240 ml   Output    Net 240 ml     Wt Readings from Last 2 Encounters:   03/11/21 202 lb 9.6 oz (91.9 kg)   03/04/21 204 lb (92.5 kg)     Constitutional: She is oriented to person, place, and time. She appears well-developed and well-nourished. In no acute distress. Cardiovascular: Normal rate, regular rhythm, normal heart sounds and intact distal pulses. Exam reveals no gallop and no friction rub. No murmur heard. Pulmonary/Chest: Effort normal and breath sounds normal  no respiratory distress. She has no wheezes, rhonchi or rales. Abdominal: Soft, non-tender. Bowel sounds and aorta are normal. She exhibits no organomegaly, mass or bruit. Extremities: No edema, cyanosis, or clubbing. Pulses are 2+ radial/carotid/dorsalis pedis and posterior tibial bilaterally. Neurological: oriented to person place    Skin: Skin is warm and dry. There is no rash or diaphoresis. Psychiatric: She has a normal mood and affect.  Her speech is normal and behavior is normal.     Medications:    aspirin  81 mg Oral Daily    atorvastatin  80 mg Oral Nightly    carvedilol  25 mg Oral BID    vitamin D  5,000 Units Oral Daily    cloNIDine  0.1 mg Oral BID    hydroCHLOROthiazide  25 mg Oral Daily    losartan  100 mg Oral Nightly    melatonin  3 mg Oral Nightly    pantoprazole  40 mg Oral QAM AC    enoxaparin  40 mg Subcutaneous Daily       Recent Labs     03/11/21  1129 03/12/21  0622   WBC 5.8 8.4   HGB 14.9 14.7    236     BMP:    Recent Labs     03/11/21  1129 03/11/21  1226 03/12/21  0622     --  142   K 6.0* 3.6 3.4*   CO2 26  --  26   BUN 11  --  16   CREATININE 0.5*  --  0.6     Reviewed  available lab work,  EKGs, images, LHC       Assessment    HTN urgency  elevated this am / on Norvasc coreg catapres HCTZ     needing clearance for cholocystectomy surgery   Trop <0.01   [facility] today     Echo 10/2019: Normal except for indeterminate diastolic function  CCTA 45/1027: Moderate diffuse calcifications of the coronaries without any significant stenosis    HLD  LDL 80 7/2020     HFpEF   Compensated   ProBNP 79     Gall bladder sludge   Patient was assessed with a right upper quadrant ultrasound 2/9/2021: Fatty liver, no gallstones however there was sludge in the gallbladder.  Patient was seen by surgery for cholecystectomy     Plan   Plan [facility] today   [facility] indicated, risks benefits & alternatives has been discussed     Nitza MORENO, CVNP

## 2021-03-12 NOTE — PROGRESS NOTES
Interventional cardiology progress note    Patient did have cardiac catheterization for preop clearance and because she is having some chest discomfort that could be ischemic. She is planning to have a cholecystectomy soon. Additionally she has significant family risk family history risk factors and elevated lipids. Severe hypertension as well. We also did selective renal arteries to rule out renal artery stenosis. Coronary arteries are normal with right dominance. LV size and function are normal with ejection fraction 55%. Renal arteries are selectively injected and are normal.    At this point I see no contraindications to the gallbladder surgery from a cardiac perspective. She did have significant hypertension that we treated with IV labetalol. We will make arrangements to increase her antihypertensive medications. Additionally she will need to have treatment for her hyperlipidemia.   Larissa Fields MD, [facility]

## 2021-03-12 NOTE — ANESTHESIA PRE-OP
Brief Pre-Op Note/Sedation Assessment      Shahla Adamson  1955  [id number]      [id number]  2:53 PM    Planned Procedure: Cardiac Catheterization Procedure    Post Procedure Plan: Return to same level of care    Consent: I have discussed with the patient and/or the patient representative the indication, alternatives, and the possible risks and/or complications of the planned procedure and the anesthesia methods. The patient and/or patient representative appear to understand and agree to proceed. Chief Complaint: Chest Pain/Pressure  Anginal Equivalent      Indications for Cath Procedure:  Suspected CAD  Anginal Classification within 2 weeks:  CCS II - Slight limitation, with angina only during vigorous physical activity  NYHA Heart Failure Class within 2 weeks: No symptoms  Is Cath Lab Visit Valve-related?: No  Surgical Risk: Intermediate  Functional Type: >= 4 METS without symptoms    Anti- Anginal Meds within 2 weeks:   Yes: Beta Blockers    Stress or Imaging Studies Performed (within 6 months):  None     Vital Signs:  BP (!) 174/99   Pulse 88   Temp 98.4 °F (36.9 °C) (Oral)   Resp 18   Ht 5' 4\" (1.626 m)   Wt 202 lb 9.6 oz (91.9 kg)   LMP 05/01/2008 (Approximate) Comment: 15 years ago  SpO2 96%   BMI 34.78 kg/m²     Allergies:   Allergies   Allergen Reactions    Sulfa Antibiotics Rash       Past Medical History:  Past Medical History:   Diagnosis Date    Fissure, anal     Loernzo YOO    Goiter     [facility] of [facility]ogram 10/[facility]    [facility] [facility]    Hyperlipidemia     Hypertension     Onychomycosis     Pap smear for cervical cancer screening 2/2010    Dr. Prince Bernal - wnl    Prediabetes 12/23/2019    Screening for osteoporosis     Dexa 12/2005 - wnl    Vitamin D deficiency 11/10/2011         Surgical History:  Past Surgical History:   Procedure Laterality Date    APPENDECTOMY  2/4/2000    COLONOSCOPY  3/2011    Dr. Real Leiva - 2 years  f/u    COLONOSCOPY  10/19/2016    Javan Ratliff MD    ESOPHAGEAL DILATATION  4/2014    Hola Echeverria MD     KNEE SURGERY      right    PILONIDAL CYST EXCISION      UPPER GASTROINTESTINAL ENDOSCOPY  3/19/14    concentric rings in esophagus         Medications:  Current Facility-Administered Medications   Medication Dose Route Frequency Provider Last Rate Last Admin    aspirin EC tablet 81 mg  81 mg Oral Daily Con Kin, DO   81 mg at 03/12/21 0815    atorvastatin (LIPITOR) tablet 80 mg  80 mg Oral Nightly Con Kin, DO   80 mg at 03/11/21 2016    carvedilol (COREG) tablet 25 mg  25 mg Oral BID Con Kin, DO   25 mg at 03/12/21 0815    vitamin D (CHOLECALCIFEROL) capsule 5,000 Units  5,000 Units Oral Daily Con Kin, DO   5,000 Units at 03/12/21 0815    cloNIDine (CATAPRES) tablet 0.1 mg  0.1 mg Oral BID Con Kin, DO   0.1 mg at 03/12/21 0815    hydroCHLOROthiazide (HYDRODIURIL) tablet 25 mg  25 mg Oral Daily Con Kin, DO   25 mg at 03/12/21 0815    losartan (COZAAR) tablet 100 mg  100 mg Oral Nightly Con Kin, DO   100 mg at 03/11/21 2054    melatonin tablet 3 mg  3 mg Oral Nightly Con Kin, DO   3 mg at 03/11/21 2306    pantoprazole (PROTONIX) tablet 40 mg  40 mg Oral QAM AC Mirza Belen, DO   40 mg at 03/12/21 0540    hydrALAZINE (APRESOLINE) injection 10 mg  10 mg Intravenous Q6H PRN Con Kin, DO        enoxaparin (LOVENOX) injection 40 mg  40 mg Subcutaneous Daily Con Kin, DO   40 mg at 03/12/21 0815    promethazine (PHENERGAN) tablet 12.5 mg  12.5 mg Oral Q6H PRN Con Kin, DO        Or    ondansetron [facility]) injection 4 mg  4 mg Intravenous Q6H PRN Con Kin, DO        polyethylene glycol (GLYCOLAX) packet 17 g  17 g Oral Daily PRN Con Kin, DO        acetaminophen (TYLENOL) tablet 650 mg  650 mg Oral Q6H PRN Con Kin, DO   650 mg at 03/12/21 0948    Or    acetaminophen (TYLENOL) suppository 650 mg  650 mg Rectal Q6H PRN Con Kin, DO Pre-Sedation:    Pre-Sedation Documentation and Exam:  I have personally completed a history, physical exam & review of systems for this patient (see notes). Prior History of Anesthesia Complications:   none    Modified Mallampati:  I (soft palate, uvula, fauces, tonsillar pillars visible)    ASA Classification:  Class 2 - A normal healthy patient with mild systemic disease      Isabel Scale: Activity:  2 - Able to move 4 extremities voluntarily on command  Respiration:  2 - Able to breathe deeply and cough freely  Circulation:  2 - BP+/- 20mmHg of normal  Consciousness:  2 - Fully awake  Oxygen Saturation (color):  2 - Able to maintain oxygen saturation >92% on room air    Sedation/Anesthesia Plan:  Guard the patient's safety and welfare. Minimize physical discomfort and pain. Minimize negative psychological responses to treatment by providing sedation and analgesia and maximize the potential amnesia. Patient to meet pre-procedure discharge plan.     Medication Planned:  Versed and fentanyl    Patient is an appropriate candidate for plan of sedation: yes      Electronically signed by Thomas Tracy MD on 3/12/2021 at 2:53 PM

## 2021-03-12 NOTE — PROGRESS NOTES
Physical Therapy    Facility/Department: [address]  Initial Assessment/Treatment/Discharge    NAME: Markus Powell  : 1955  MRN: [id number]    Date of Service: 3/12/2021    Discharge Recommendations: Markus Powell scored a 23/24 on the AM-PAC short mobility form. At this time, no further PT is recommended upon discharge. Recommend patient returns to prior setting with prior services. PT Equipment Recommendations  Equipment Needed: No    Assessment   Assessment: 78 yo admitted with HTN. Pt demo completely steady gait in room & escobar & no difficulty up/down flight of stairs. Appears at baseline level  (pt in agreement) & has no current PT needs. Will sign off. Decision Making: Low Complexity  PT Education: PT Role  Patient Education: verb understanding  REQUIRES PT FOLLOW UP: No  Activity Tolerance  Activity Tolerance: Patient Tolerated treatment well ; no dizziness with ambulation. Reports feeling well. Patient Diagnosis(es): The encounter diagnosis was Hypertension, unspecified type. has a past medical history of Fissure, anal, Goiter, History of mammogram, Hyperlipidemia, Hypertension, Onychomycosis, Pap smear for cervical cancer screening, Prediabetes, Screening for osteoporosis, and Vitamin D deficiency. has a past surgical history that includes Appendectomy (2000); knee surgery; Pilonidal cyst excision; Colonoscopy (3/2011); Upper gastrointestinal endoscopy (3/19/14); Esophagus dilation (2014); and Colonoscopy (10/19/2016). Restrictions  Position Activity Restriction  Other position/activity restrictions: up with A prn  Vision/Hearing  Vision: Impaired  Vision Exceptions: Wears glasses for reading  Hearing: Within functional limits     Subjective  General  Chart Reviewed: Yes  Patient assessed for rehabilitation services?: Yes  Additional Pertinent Hx: Adm 3/11 for HTN.   Reports trying to get cardiac clearance for gall bladder surgery & BP was too high so was admitted. Family / Caregiver Present: No  Referring Practitioner: Laron Pitts DO  Diagnosis: HTN  Follows Commands: Within Functional Limits  Subjective  Subjective: Found in bed, agreeable to PT. Pain Screening  Patient Currently in Pain: Denies  Vital Signs  Patient Currently in Pain: Denies       Orientation  Orientation  Overall Orientation Status: Within Normal Limits  Social/Functional History  Social/Functional History  Lives With: Spouse  Type of Home: (Condo)  Home Layout: One level  Home Access: Stairs to enter with rails  Entrance Stairs - Number of Steps: 15 steps to enter  Bathroom Shower/Tub: Walk-in shower  Bathroom Toilet: Standard  Home Equipment: (NONE)  Receives Help From: (none pta)  ADL Assistance: Independent  Homemaking Assistance: Independent  Ambulation Assistance: Independent  Transfer Assistance: Independent  Active : Yes  Occupation: Retired  Additional Comments: Spouse works/travels a lot. Pt reports having a bad knee as well as needing gall bladder surgery. No falls PTA. Objective          AROM RLE (degrees)  RLE AROM: WNL  AROM LLE (degrees)  LLE AROM : WNL  Strength RLE  Strength RLE: WFL  Strength LLE  Strength LLE: WFL        Bed mobility  Supine to Sit: Independent(with ease)  Scooting: Independent  Transfers  Sit to Stand: Independent  Stand to sit: Independent  Ambulation  Ambulation?: Yes  Ambulation 1  Surface: level tile  Device: No Device  Assistance: Independent  Quality of Gait: slight limp due to c/o knee problems & maybe needing TKR  Distance: 10', 120'  Stairs/Curb  Stairs?: Yes(up/down 10 steps with 1 rail SBA)     Balance  Sitting - Static: Good  Sitting - Dynamic: Good  Standing - Static: Good  Standing - Dynamic: Good    Treatment included gait/transfer training, pt education.       Plan   Safety Devices  Type of devices: Call light within reach, Nurse notified, Left in chair(no alarm in use per RN as pt is up ad kellie) AM-PAC Score  AM-PAC Inpatient Mobility Raw Score : 23 (03/12/21 1230)  AM-PAC Inpatient T-Scale Score : 56.93 (03/12/21 1230)  Mobility Inpatient CMS 0-100% Score: 11.2 (03/12/21 1230)  Mobility Inpatient CMS G-Code Modifier : CI (03/12/21 1230)          Goals  Short term goals  Time Frame for Short term goals: N/A       Therapy Time   Individual Concurrent Group Co-treatment   Time In 0821         Time Out 0844         Minutes 23           Timed Code Treatment Minutes:   11    Total Treatment Minutes:  [address] \[facility] [place] [facility]\""

## 2021-03-12 NOTE — DISCHARGE SUMMARY
repeat 3.6), troponin less than 0.01. EKG with sinus bradycardia and prolonged QTC of 480. Patient will be admitted for further management of her hypertension with the plan of getting a stress test tomorrow. Blood pressure improved with home medications and as needed hydralazine. Cardiology was consulted and the decision was made to perform a left heart cath. LHC was performed and revealed non-obstructive CAD with EF 55%. It also revealed no renal artery stenosis. Pt was started on amlodipine with better BP control and will be discharged with the following instructions:    Please follow up with your PCP in 1 week. You are okay from cardiology perspective for surgical clearance. Please note changes in medications:  Please start taking vitamin D 1000U daily. Please start taking amlodipine 5 mg oral daily. Please monitor your blood pressure at home. Disposition:  Home    Physical Exam Performed:     BP (!) 157/81   Pulse 70   Temp 98.4 °F (36.9 °C) (Oral)   Resp 18   Ht 5' 4\" (1.626 m)   Wt 202 lb 9.6 oz (91.9 kg)   LMP 05/01/2008 (Approximate) Comment: 15 years ago  SpO2 95%   BMI 34.78 kg/m²       General appearance:  No apparent distress, appears stated age and cooperative. HEENT:  Normal cephalic,atraumatic without obvious deformity. Pupils equal, round, and reactive to light. Extra ocular muscles intact. Conjunctivae/corneas clear. Neck: Supple, with full range of motion. No jugular venous distention. Trachea midline. Respiratory:  Normal respiratory effort. Clear to auscultation, bilaterally without Rales/Wheezes/Rhonchi. Cardiovascular:  Regular rate and rhythm with normal S1/S2 without murmurs, rubs or gallops. Abdomen: Soft, non-tender, non-distended with normal bowel sounds. Musculoskeletal:  No clubbing, cyanosis or edema bilaterally. Full range of motion without deformity. Skin: Skin color, texture, turgor normal.  Norashes or lesions.   Neurologic:  Neurovascularly intact without any focal sensory/motor deficits. Cranialnerves: II-XII intact, grossly non-focal.  Psychiatric:  Alert and oriented, thought content appropriate,normal insight  Capillary Refill: Brisk,< 3 seconds   Peripheral Pulses: +2 palpable, equal bilaterally     Labs: For convenience and continuity at follow-up the following most recent labs are provided:      CBC:    Lab Results   Component Value Date    WBC 6.8 03/13/2021    HGB 14.2 03/13/2021    HCT 42.2 03/13/2021     03/13/2021       Renal:    Lab Results   Component Value Date     03/13/2021    K 3.3 03/13/2021     03/13/2021    CO2 25 03/13/2021    BUN 12 03/13/2021    CREATININE 0.6 03/13/2021    CALCIUM 9.4 03/13/2021    PHOS 4.2 05/29/2014         Significant Diagnostic Studies    Radiology:   No orders to display          Consults:     IP CONSULT TO CARDIOLOGY  IP CONSULT TO HOSPITALIST    Disposition:  Home       Condition at Discharge: Stable    Discharge Instructions/Follow-up:      Please follow up with your PCP in 1 week. You are okay from cardiology perspective for surgical clearance. Please note changes in medications:  Please start taking vitamin D 1000U daily. Please start taking amlodipine 5 mg oral daily. Please monitor your blood pressure at home. Code Status:  Full Code     Activity: activity as tolerated    Diet: cardiac diet      Discharge Medications:     Current Discharge Medication List           Details   amLODIPine (NORVASC) 5 MG tablet Take 1 tablet by mouth daily  Qty: 30 tablet, Refills: 3      !! vitamin D3 (CHOLECALCIFEROL) 25 MCG (1000 UT) TABS tablet Take 1 tablet by mouth daily  Qty: 30 tablet, Refills: 2       !! - Potential duplicate medications found. Please discuss with provider.            Details   cloNIDine (CATAPRES) 0.1 MG tablet TAKE ONE TABLET BY MOUTH TWICE A DAY  Qty: 180 tablet, Refills: 0    Associated Diagnoses: Essential hypertension      atorvastatin (LIPITOR) 80 MG tablet TAKE ONE TABLET BY MOUTH ONCE NIGHTLY  Qty: 90 tablet, Refills: 0      omeprazole (PRILOSEC) 20 MG delayed release capsule Take 1 capsule by mouth Daily  Qty: 30 capsule, Refills: 0    Associated Diagnoses: Epigastric abdominal pain      irbesartan (AVAPRO) 300 MG tablet TAKE ONE TABLET BY MOUTH DAILY  Qty: 90 tablet, Refills: 0    Associated Diagnoses: Essential hypertension      hydroCHLOROthiazide (HYDRODIURIL) 25 MG tablet TAKE ONE TABLET BY MOUTH DAILY  Qty: 90 tablet, Refills: 0    Associated Diagnoses: Essential hypertension      carvedilol (COREG) 25 MG tablet TAKE ONE TABLET BY MOUTH TWICE A DAY  Qty: 180 tablet, Refills: 1    Associated Diagnoses: Essential hypertension      melatonin 3 MG TABS tablet Take 1 tablet by mouth nightly  Qty: 30 tablet, Refills: 1    Associated Diagnoses: Primary insomnia      aspirin 81 MG tablet Take 81 mg by mouth daily      !! Cholecalciferol (VITAMIN D3) 5000 units TABS Take 1 tablet by mouth daily    Comments: Take with 1000 IU tablet to equal 6000 IU once daily  Associated Diagnoses: Vitamin D deficiency      ibuprofen (ADVIL;MOTRIN) 600 MG tablet Take 1 tablet by mouth 3 times daily as needed for Pain  Qty: 45 tablet, Refills: 0    Associated Diagnoses: Thoracic back pain, unspecified back pain laterality, unspecified chronicity      Selenium 200 MCG CAPS Take 1 capsule by mouth      Multiple Vitamin (MULTIVITAMIN PO) Take  by mouth. !! - Potential duplicate medications found. Please discuss with provider. Time Spent on discharge is more than 30 minutes in the examination, evaluation, counseling and review of medications and discharge plan. Signed:     Shanda Rodriguez DO   3/13/2021

## 2021-03-12 NOTE — PROGRESS NOTES
Internal Medicine PGY-1 Resident Progress Note      PCP: Adeola Colon MD    Date of Admission: 3/11/2021    Date of Service: Pt seen/examined on 3/11/21 and admitted to Inpatient with expected LOS greaterthan two midnights due to medical therapy. Chief Complaint: HTN    Interval HX:  Preet Singer. Patient seen at bedside. Reports that she had a headache this morning which has since gotten better since she has gotten out of bed and sat in the chair. She denies fevers, chills, chest pain, palpitations, shortness of breath, abdominal pain, nausea and vomiting, changes in vision. History Of Present Illness:     77 y.o. female with PMH HTN who presented to [facility]. at the behest of her cardiologist.  Patient was getting cardiology clearance in order to have a cholecystectomy performed. She was told to get a stress test done but when she presented to the hospital for her blood pressure was 256/113 and she was only able to complete part of the chemical stress test.  Of note she was told not to take her blood pressure medications during this day. She was given sublingual nitroglycerin x3 with no reduction in her blood pressure. She was then told to go home and take her medications and then come back to the hospital the following day to complete the stress test.  However when she arrived her blood pressure was 218/110 despite taking the medications. Patient was then instructed to come to the ED for further management of her blood pressure so that she could get it under control and get the stress test the following day. While in the ED patient recently started noticing that she was dizzy and lightheaded shortly after eating a sandwich. She endorses some weakness and shortness of breath at rest.  Patient denies fevers, chills, chest pain, abdominal pain, nausea and vomiting. No changes in vision.   Patient has been on blood pressure medications her entire life and reports that her dad had 2 bypasses at an early age and passed away at 61 will get the second one. In the ED patient was afebrile and hemodynamically stable. Her blood pressure was 203/96 on admission. Labs significant for a potassium of 6 (secondary to hemolysis, repeat 3.6), troponin less than 0.01. EKG with sinus bradycardia and prolonged QTC of 480. Patient will be admitted for further management of her hypertension with the plan of getting a stress test tomorrow. Past Medical History:          Diagnosis Date    Fissure, pearl Ventura MD    Goiter     [facility] of [facility] 10/[facility]    Hyperlipidemia     Hypertension     Onychomycosis     Pap smear for cervical cancer screening 2/2010    Dr. Prince [facility]    Prediabetes 12/23/2019    Screening for osteoporosis     Dexa 12/2005 - [facility]    Vitamin D deficiency 11/10/2011       Past Surgical History:          Procedure Laterality Date    APPENDECTOMY  2/4/2000    COLONOSCOPY  3/2011    Dr. Johna Libman - polyps - 2 years  f/u    COLONOSCOPY  10/19/2016    Corrina Lynch MD    ESOPHAGEAL DILATATION  4/2014    Milton Dawkins MD     KNEE SURGERY      right    PILONIDAL CYST EXCISION      UPPER GASTROINTESTINAL ENDOSCOPY  3/19/14    concentric rings in esophagus       Medications Prior to Admission:      Prior to Admission medications    Medication Sig Start Date End Date Taking?  Authorizing Provider   cloNIDine (CATAPRES) 0.1 MG tablet TAKE ONE TABLET BY MOUTH TWICE A DAY 3/2/21  Yes Adeola Colon MD   atorvastatin (LIPITOR) 80 MG tablet TAKE ONE TABLET BY MOUTH ONCE NIGHTLY 2/23/21  Yes Adeola Colon MD   omeprazole (PRILOSEC) 20 MG delayed release capsule Take 1 capsule by mouth Daily 2/4/21  Yes Adeola Colon MD   irbesartan (AVAPRO) 300 MG tablet TAKE ONE TABLET BY MOUTH DAILY  Patient taking differently: 300 mg nightly  1/19/21  Yes Adeola Colon MD   hydroCHLOROthiazide (HYDRODIURIL) 25 MG tablet TAKE ONE TABLET BY MOUTH DAILY 1/19/21  Yes Paul Wood MD   carvedilol (COREG) 25 MG tablet TAKE ONE TABLET BY MOUTH TWICE A DAY 8/29/20  Yes Paul Wood MD   melatonin 3 MG TABS tablet Take 1 tablet by mouth nightly 7/17/20  Yes Paul Wood MD   aspirin 81 MG tablet Take 81 mg by mouth daily   Yes Historical Provider, MD   Cholecalciferol (VITAMIN D3) 5000 units TABS Take 1 tablet by mouth daily 6/10/19  Yes Paul Wood MD   ibuprofen (ADVIL;MOTRIN) 600 MG tablet Take 1 tablet by mouth 3 times daily as needed for Pain 6/10/19  Yes Paul Wood MD   Selenium 200 MCG CAPS Take 1 capsule by mouth   Yes Historical Provider, MD   Multiple Vitamin (MULTIVITAMIN PO) Take  by mouth. Yes Historical Provider, MD       Allergies:  Sulfa antibiotics    Social History:      The patient currently lives with her . TOBACCO:   reports that she has never smoked. She has never used smokeless tobacco.  ETOH:  reports current alcohol use of about 3.0 standard drinks of alcohol per week. Family History:     Patient states that her dad had bypass surgery x2 and passed away after getting the second one. Problem Relation Age of Onset    Other Mother         vascular disease    Hypertension Mother     High Cholesterol Mother     Diabetes Mother     Heart Disease Mother     Heart Disease Father     Heart Attack Father     Cancer Maternal Grandmother 32        unknown type - cancer     Stroke Neg Hx        REVIEW OF SYSTEMS: Pertinent positives as noted in the HPI. All other systems reviewed and negative. ROS: Review of Systems    PHYSICAL EXAM PERFORMED:    BP (!) 149/85   Pulse 70   Temp 98.7 °F (37.1 °C) (Oral)   Resp 16   Ht 5' 4\" (1.626 m)   Wt 202 lb 9.6 oz (91.9 kg)   LMP 05/01/2008 (Approximate) Comment: 15 years ago  SpO2 95%   BMI 34.78 kg/m²     General appearance:  No apparent distress, appears stated age and cooperative.   HEENT:  Normal cephalic,atraumatic without obvious deformity. Pupils equal, round, and reactive to light. Extra ocular muscles intact. Conjunctivae/corneas clear. Neck: Supple, with full range of motion. No jugular venous distention. Trachea midline. Respiratory:  Normal respiratory effort. Clear to auscultation, bilaterally without Rales/Wheezes/Rhonchi. Cardiovascular:  Regular rate and rhythm with normal S1/S2 without murmurs, rubs or gallops. Abdomen: Soft, non-tender, non-distended with normal bowel sounds. Musculoskeletal:  No clubbing, cyanosis or edema bilaterally. Full range of motion without deformity. Skin: Skin color, texture, turgor normal.  Norashes or lesions. Neurologic:  Neurovascularly intact without any focal sensory/motor deficits. Cranialnerves: II-XII intact, grossly non-focal.  Psychiatric:  Alert and oriented, thought content appropriate,normal insight  Capillary Refill: Brisk,< 3 seconds   Peripheral Pulses: +2 palpable, equal bilaterally     Labs:     Recent Labs     03/11/21  1129 03/12/21  0622   WBC 5.8 8.4   HGB 14.9 14.7   HCT 44.8 43.8    236     Recent Labs     03/11/21  1129 03/11/21  1226 03/12/21  0622     --  142   K 6.0* 3.6 3.4*     --  105   CO2 26  --  26   BUN 11  --  16   CREATININE 0.5*  --  0.6   CALCIUM 9.2  --  9.7     No results for input(s): AST, ALT, BILIDIR, BILITOT, ALKPHOS in the last 72 hours.   Recent Labs     03/12/21  1042   INR 1.03     Recent Labs     03/11/21  1129   TROPONINI <0.01       Urinalysis:      Lab Results   Component Value Date    NITRU NEGATIVE 09/21/2012    BLOODU N 06/10/2019    BLOODU NEGATIVE 09/21/2012    SPECGRAV 1.020 06/10/2019    SPECGRAV >=1.030 09/21/2012    GLUCOSEU N 06/10/2019    GLUCOSEU NEGATIVE 09/21/2012    GLUCOSEU Negative 01/11/2012       Radiology:     No orders to display     ASSESSMENT & PLAN:  Alexis Mars is a 77 y.o. female w/ PMH HTN who presented for blood pressure control in order to get a stress test.    Active Hospital Problems

## 2021-03-12 NOTE — FLOWSHEET NOTE
Spiritual Care Note  Initial visit; met pt and her spouse at bedside while rounding; provided empathic listening and pastoral support as pt shared about her procedure, her health, how she is feeling. She talked about being nervous about all that is going on; we talked about how she jocelyn; she shared prayer is helpful. Her  shared about his health and how he jocelyn also. We closed our visit with prayers of thanksgiving and intercession. 3/12/2021  Chaplain Erma Faust [facility]     03/12/21 1523   Encounter Summary   Services provided to: Patient and family together  (met pt and her  at bedside)   Referral/Consult From: [address] [place] [address] Spouse;Friends/neighbors; Faith/edi community   Continue Visiting   (LO 3 12)   Complexity of Encounter Moderate   Length of Encounter 30 minutes   Routine   Type Initial   Assessment Calm; Approachable; Hopeful   Intervention Active listening;Explored feelings, thoughts, concerns;Explored coping resources;Prayer;Discussed relationship with God   Outcome Comfort;Expressed gratitude;Engaged in conversation

## 2021-03-12 NOTE — PROGRESS NOTES
Occupational Therapy   Occupational Therapy Initial Assessment & Tx  Discharge  Date: 3/12/2021   Patient Name: Sapna Arreguin  MRN: [id number]     : 1955    Date of Service: 3/12/2021    Discharge Recommendations: Sapna Arreguin scored a 24/24 on the AM-PAC ADL Inpatient form. Current research shows that an AM-PAC score of 18 or greater is typically associated with a discharge to the patient's home setting. OT Equipment Recommendations  Equipment Needed: No    Assessment   Assessment: Pt seen after admit to hospital for HTN. She is up moving freely in room to use the bathroom, IND for functional mobility and observed ADLs & transfers. Reports no concerns at baseline. No safety concerns for DC home to current set-up w/out assistance. No further OT services needed. DC acute OT. Prognosis: Good  Decision Making: Low Complexity  OT Education: OT Role;Plan of Care  Patient Education: pt verb understanding to all  REQUIRES OT FOLLOW UP: No  Activity Tolerance  Activity Tolerance: Patient Tolerated treatment well  Activity Tolerance: No c/o pain, SOB or dizziness  Safety Devices  Safety Devices in place: Yes  Type of devices: Call light within reach; Left in chair;Nurse notified(no chair alarm - discussed with RN, pt is up freely in room)           Patient Diagnosis(es): The encounter diagnosis was Hypertension, unspecified type. has a past medical history of Fissure, anal, Goiter, History of mammogram, Hyperlipidemia, Hypertension, Onychomycosis, Pap smear for cervical cancer screening, Prediabetes, Screening for osteoporosis, and Vitamin D deficiency. has a past surgical history that includes Appendectomy (2000); knee surgery; Pilonidal cyst excision; Colonoscopy (3/2011); Upper gastrointestinal endoscopy (3/19/14); Esophagus dilation (2014); and Colonoscopy (10/19/2016).            Restrictions  Position Activity Restriction  Other position/activity restrictions: up with A prn Subjective   General  Chart Reviewed: Yes  Patient assessed for rehabilitation services?: Yes  Additional Pertinent Hx: Hx = goiter, HTN, HLD, s/p esophageal dialation (2014)  Family / Caregiver Present: No  Referring Practitioner: Rosa Damian DO  Diagnosis: 3/11 admit for HTN. Pt was obtaining preop clearance for cholecystectomy, plan for stress test however BP continued to rise (996 systolic) in spite of holding BP medications. Subjective  Subjective: Pt in bed on arrival, pleasant, outgoing & agreeable to OT. Very tangential, energetic & sprawling legs easily in and out of bed  Patient Currently in Pain: Denies  Social/Functional History  Social/Functional History  Lives With: Spouse  Type of Home: (Condo)  Home Layout: One level  Home Access: Stairs to enter with rails  Entrance Stairs - Number of Steps: 15 steps to enter  Bathroom Shower/Tub: Walk-in shower  Bathroom Toilet: Standard  Home Equipment: (NONE)  Receives Help From: (none pta)  ADL Assistance: Independent  Homemaking Assistance: Independent  Ambulation Assistance: Independent  Transfer Assistance: Independent  Active : Yes  Occupation: Retired  Additional Comments: Spouse works/travels a lot. Pt reports having a bad knee as well as needing gall bladder surgery. No falls PTA. Objective        Orientation  Overall Orientation Status: Within Functional Limits     Balance  Sitting Balance: Independent  Standing Balance: Independent  Standing Balance  Time: up to 3 min  Activity: sinkside ADL; hallway mobility  Functional Mobility  Functional - Mobility Device: No device  Activity: To/from bathroom(& hallway mobility; stairs)  Assist Level:  Independent  Functional Mobility Comments: moves steady & safe pace, no LOB  Toilet Transfers  Toilet - Technique: Ambulating  Equipment Used: Standard toilet  Toilet Transfer: Independent  ADL  Grooming: Independent(washing hands sinkside in stance)  LE Dressing: (able to reach BLE freely for footwear, IND)  Toileting: Independent(urine output on toilet, wearing pants)        Bed mobility  Supine to Sit: Independent(with ease)  Scooting: Independent  Transfers  Sit to stand: Independent  Stand to sit: Independent  Vision - Basic Assessment  Prior Vision: Wears glasses only for reading  Patient Visual Report: No visual complaint reported. Vision Comments: able to see TV on wall w/out difficulty  Cognition  Overall Cognitive Status: WNL  Perception  Overall Perceptual Status: WFL     Sensation  Overall Sensation Status: WNL        LUE AROM (degrees)  LUE AROM : WNL  RUE AROM (degrees)  RUE AROM : WNL  LUE Strength  Gross LUE Strength: WFL  RUE Strength  Gross RUE Strength: WFL     Hand Assessment Comment  Hand Assessment Comment: WFL  strength & FM coordination             Plan   Plan  Times per week: DC acute OT    G-Code     OutComes Score                                                  AM-PAC Score        AM-[facility] Inpatient Daily Activity Raw Score: 24 (03/12/21 1142)  AM-PAC Inpatient ADL T-Scale Score : 57.54 (03/12/21 1142)  ADL Inpatient CMS 0-100% Score: 0 (03/12/21 1142)  ADL Inpatient CMS G-Code Modifier : [facility] (03/12/21 1142)          Therapy Time   Individual Concurrent Group Co-treatment   Time In 1105         Time Out 0844         Minutes 23              Timed Code Treatment Minutes:  8     Total Treatment Minutes: [address], s/OT  Goyo Whitley OTR/L #[id number]  Therapist was present, directed the patient's care, made skilled judgement, and was responsible for assessment and treatment of the patient.

## 2021-03-12 NOTE — CARE COORDINATION
Case Management Assessment           Initial Evaluation                Date / Time of Evaluation: 3/12/2021 4:29 PM                 Assessment Completed by: Solo Reyes    Patient Name: Alexis Mars     YOB: 1955  Diagnosis: Hypertension [I10]     Date / Time: 3/11/2021  9:50 AM    Patient Admission Status: Inpatient    If patient is discharged prior to next notation, then this note serves as note for discharge by case management. Current PCP: Kath Baig MD  Clinic Patient: No    Chart Reviewed: Yes  Patient/ Family Interviewed: Yes    Initial assessment completed at bedside with: pt  , yes    Hospitalization in the last 30 days: No    Emergency Contacts:  Extended Emergency Contact Information  Primary Emergency Contact: Henrry Rojo  Address: [address] Phone: [phone]  Mobile Phone: [phone]  Relation: Spouse    Advance Directives:   Code Status: Full [address]: No  Financial  Payor: MEDICARE / Plan: MEDICARE PART A AND B / Product Type: *No Product type* /     Pre-cert required for SNF: No    Pharmacy    [facility] [address], [facility] [phone] [facility] [phone]  [phone]  Phone: [phone] Fax: [phone]      Potential assistance Purchasing Medications: Potential Assistance Purchasing Medications: No  Does Patient want to participate in local refill/ meds to beds program?: Yes    Meds To Beds General Rules:  1. Can ONLY be done Monday- Friday between 8:30am-5pm  2. Prescription(s) must be in pharmacy by 3pm to be filled same day  3. Copy of patient's insurance/ prescription drug card and patient face sheet must be sent along with the prescription(s)  4. Cost of Rx cannot be added to hospital bill. If financial assistance is needed, please contact unit  or ;  Case manager or  CANNOT provide pharmacy voucher for patients co-pays  5. Patients can then  the prescription on their way out of the hospital at discharge, or pharmacy can deliver to the bedside if staff is available. (payment due at time of pick-up or delivery - cash, check, or card accepted)     Able to afford home medications/ co-pay costs: Yes    ADLS  Support Systems: Spouse/Significant Other    PT AM-PAC:   OT AM-PAC:     HOUSING  Home Environment:     Lives With: Spouse  Type of Home: (Condo)  Home Layout: One level  Home Access: Stairs to enter with [address] - Number of Steps: 15 steps to enter  Bathroom Shower/Tub: Walk-in shower  Bathroom Toilet: [address]: (NONE)  Receives Help From: (none pta)  ADL Assistance: Independent  Homemaking Assistance: Independent  Ambulation Assistance: Independent  Steps: 15 steps to enter    Plans to RETURN to current housing: Yes  Barriers to RETURNING to current housin [address]  Currently ACTIVE with  [address]: No  Home Care Agency: Not Applicable    Currently ACTIVE with [place] on Aging: No    Durable Medical Equipment  DME Provider: NA  Equipment: NA    Home Oxygen and [address] prior to admission: No  Dialysis  Active with HD/PD prior to admission: No  Nephrologist: Marta Moran 59:  Not Applicable    DISCHARGE PLAN:  Disposition: Home- No Services Needed    Transportation PLAN for discharge: family     Factors facilitating achievement of predicted outcomes: Family support, Motivated, Cooperative and Pleasant    Barriers to discharge: Pain    Additional Case Management Notes:     Cm met with pt ,  Patient states she lives at home w/ spouse  Indep with Mobility and ADL's PTA :  Plans to return home ,  Was planning for  Cholecystectomy and needed pre op testing ,  Had  Htn urgency and was  Seen by cards for cardiac cath today :  Now cleared  For  Surgery . Plans to return home for a few weeks and rescheduling  Her  Surgery. No C  Needs at this time ,  Will cont to follow . The Plan for Transition of Care is related to the following treatment goals of Hypertension [I10]    The Patient and/or patient representative Raysa and her family were provided with a choice of provider and agrees with the discharge plan Yes    Freedom of choice list was provided with basic dialogue that supports the patient's individualized plan of care/goals and shares the quality data associated with the providers.  Yes    Care Transition patient: No    Morene Councilman, RN  [facility]  Case Management Department  Ph: [phone]

## 2021-03-12 NOTE — PROCEDURES
The [facility] CATH    Milton De Souza   77 y.o., female  1955      3/12/2021    Procedure performed by Dr. Natasha Colon MD, [facility]  Surgical assistants :none    Procedure  Selective Coronary Angiography  Cardiac Catheterization for Coronary Anatomy  Left Heart Catheterization  Left Ventriculogram  Selective renal arteriograms  Radial artery access assisted by ultrasound  Arterial Access Right Radial Artery after a negative Vinicius test  TR Band    Indication:Cardiac cath to rule out ischemic CAD, Possible angioplasty, The procedure and risks described to patient including risk of CVA, MI, bleeding, emergency surgery, death, this patient has severe hypertension. She also has plans to have a gallbladder cholecystectomy soon. Attempts were made to do preop stress test in this patient with multiple coronary risk factors we have been thwarted by her blood pressure. She is admitted through the ED emergency department from the stress test lab yesterday. Because of her chest discomfort and pain which may be coronary ischemia and a very high risk factor and severe hypertension we elected to proceed with angiography with renal arteriograms. Or Consent signed  Unspecified Angina  Anesthesia: Moderate sedation with Versed and Fentanyl IV  Anesthesia Start time 5757  Anesthesia end time 1439  Estimated blood loss :minimal  Abnormal Stress Test      Procedure Description:  After written informed consent was obtained, the patient was   brought to the cardiac catheterization suite, where patient was prepped and   draped in the usual sterile fashion. Local anesthesia was achieved in the   right wrist with 2% lidocaine.  A 5-Bahamian hemostasis sheath was placed into   the right radial artery. The pre cocktail of heparin, verapamil and nitroglycerin was injected into the sheath. A 5 Bahamian J L3 0.5 catheter was introduced; used to engage the left main   coronary artery. Radiographic  images were obtained. The catheter was pulled back then rotated. The 5 Western Melody JR 4 was introduced  to engage the right coronary   artery. Radiographic  images were obtained. The catheter was removed and exchanged over an exchange length 0.35 soft guide wire. We placed the JR4 down the descending aorta and did selective injections into the renal arteries. A 5-Bahamian angled pigtail catheter was then positioned in the left ventricle. Left ventriculogram was performed. After these images were obtained. , the   catheter was flushed, pulled back across the aortic valve revealing no gradient. The catheter was removed. The hemostasis sheath was removed and hemostasis was achieved using a TR Band     There were no complications. Patient tolerated the procedure well. The   patient was transferred to the holding area in stable condition. Contrast consumed 80 cc  Flouro time 4.2 minutes    Results:  Left ventricular pressure 10 mmHg  Aortic pressure 195 mmHg    Coronary anatomy:   The left main coronary artery is normal.     Left anterior descending artery is normal    Circumflex artery is normal.    The renal arteries are selectively injected and are normal    The right coronary artery is a dominant vessel and normal.     Left ventriculogram shows ejection fraction of 55%. Normal wall motion. Impression:  Normal coronary arteries  Normal LV function  Normal renal arteries as selectively injected     complications: none  During the course of the procedure we did administer the radial artery cocktail. We also administered labetalol 20 mg IV x2. Blood pressure did drop to the 140s. We will give her hydralazine on an ongoing basis. Plan:  No coronary lesions to worry about. Normal renal arteries. The blood pressure elevation is not due to renal artery stenosis. Cardiac perspective there are no issues as far as proceeding with the surgery for the gallbladder. We will work to optimize her blood pressure.     This note was likely completed using voice recognition technology and may contain unintended errors  Elvis Reeder M.D., Phil Miller; Pop Simon MD

## 2021-03-13 VITALS
TEMPERATURE: 98.4 F | OXYGEN SATURATION: 95 % | DIASTOLIC BLOOD PRESSURE: 81 MMHG | BODY MASS INDEX: 34.59 KG/M2 | RESPIRATION RATE: 18 BRPM | WEIGHT: 202.6 LBS | HEIGHT: 64 IN | HEART RATE: 70 BPM | SYSTOLIC BLOOD PRESSURE: 157 MMHG

## 2021-03-13 LAB
ANION GAP SERPL CALCULATED.3IONS-SCNC: 13 MMOL/L (ref 3–16)
BASOPHILS ABSOLUTE: 0 K/UL (ref 0–0.2)
BASOPHILS RELATIVE PERCENT: 0.7 %
BUN BLDV-MCNC: 12 MG/DL (ref 7–20)
CALCIUM SERPL-MCNC: 9.4 MG/DL (ref 8.3–10.6)
CHLORIDE BLD-SCNC: 103 MMOL/L (ref 99–110)
CO2: 25 MMOL/L (ref 21–32)
CREAT SERPL-MCNC: 0.6 MG/DL (ref 0.6–1.2)
EOSINOPHILS ABSOLUTE: 0.2 K/UL (ref 0–0.6)
EOSINOPHILS RELATIVE PERCENT: 2.3 %
GFR AFRICAN AMERICAN: >60
GFR NON-AFRICAN AMERICAN: >60
GLUCOSE BLD-MCNC: 105 MG/DL (ref 70–99)
HCT VFR BLD CALC: 42.2 % (ref 36–48)
HEMOGLOBIN: 14.2 G/DL (ref 12–16)
LYMPHOCYTES ABSOLUTE: 1.6 K/UL (ref 1–5.1)
LYMPHOCYTES RELATIVE PERCENT: 23.8 %
MAGNESIUM: 2.2 MG/DL (ref 1.8–2.4)
MCH RBC QN AUTO: 30.9 PG (ref 26–34)
MCHC RBC AUTO-ENTMCNC: 33.7 G/DL (ref 31–36)
MCV RBC AUTO: 91.8 FL (ref 80–100)
MONOCYTES ABSOLUTE: 0.7 K/UL (ref 0–1.3)
MONOCYTES RELATIVE PERCENT: 10.6 %
NEUTROPHILS ABSOLUTE: 4.3 K/UL (ref 1.7–7.7)
NEUTROPHILS RELATIVE PERCENT: 62.6 %
PDW BLD-RTO: 13.8 % (ref 12.4–15.4)
PLATELET # BLD: 213 K/UL (ref 135–450)
PMV BLD AUTO: 7.8 FL (ref 5–10.5)
POTASSIUM REFLEX MAGNESIUM: 3.3 MMOL/L (ref 3.5–5.1)
RBC # BLD: 4.6 M/UL (ref 4–5.2)
SODIUM BLD-SCNC: 141 MMOL/L (ref 136–145)
WBC # BLD: 6.8 K/UL (ref 4–11)

## 2021-03-13 PROCEDURE — 6360000002 HC RX W HCPCS: Performed by: STUDENT IN AN ORGANIZED HEALTH CARE EDUCATION/TRAINING PROGRAM

## 2021-03-13 PROCEDURE — 6370000000 HC RX 637 (ALT 250 FOR IP): Performed by: STUDENT IN AN ORGANIZED HEALTH CARE EDUCATION/TRAINING PROGRAM

## 2021-03-13 PROCEDURE — 85025 COMPLETE CBC W/AUTO DIFF WBC: CPT

## 2021-03-13 PROCEDURE — 99233 SBSQ HOSP IP/OBS HIGH 50: CPT | Performed by: NURSE PRACTITIONER

## 2021-03-13 PROCEDURE — 83735 ASSAY OF MAGNESIUM: CPT

## 2021-03-13 PROCEDURE — 80048 BASIC METABOLIC PNL TOTAL CA: CPT

## 2021-03-13 PROCEDURE — 36415 COLL VENOUS BLD VENIPUNCTURE: CPT

## 2021-03-13 PROCEDURE — 2580000003 HC RX 258: Performed by: NURSE PRACTITIONER

## 2021-03-13 RX ORDER — AMLODIPINE BESYLATE 5 MG/1
5 TABLET ORAL DAILY
Status: DISCONTINUED | OUTPATIENT
Start: 2021-03-13 | End: 2021-03-13 | Stop reason: HOSPADM

## 2021-03-13 RX ORDER — AMLODIPINE BESYLATE 5 MG/1
5 TABLET ORAL DAILY
Qty: 30 TABLET | Refills: 3 | Status: SHIPPED | OUTPATIENT
Start: 2021-03-14 | End: 2021-03-16

## 2021-03-13 RX ORDER — MELATONIN
1000 DAILY
Qty: 30 TABLET | Refills: 2 | Status: SHIPPED | OUTPATIENT
Start: 2021-03-13 | End: 2021-06-18

## 2021-03-13 RX ORDER — POTASSIUM CHLORIDE 20 MEQ/1
40 TABLET, EXTENDED RELEASE ORAL ONCE
Status: COMPLETED | OUTPATIENT
Start: 2021-03-13 | End: 2021-03-13

## 2021-03-13 RX ADMIN — POTASSIUM CHLORIDE 40 MEQ: 1500 TABLET, EXTENDED RELEASE ORAL at 09:32

## 2021-03-13 RX ADMIN — Medication 10 ML: at 09:33

## 2021-03-13 RX ADMIN — ASPIRIN 81 MG: 81 TABLET, FILM COATED ORAL at 09:32

## 2021-03-13 RX ADMIN — CARVEDILOL 25 MG: 25 TABLET, FILM COATED ORAL at 09:32

## 2021-03-13 RX ADMIN — Medication 5000 UNITS: at 09:33

## 2021-03-13 RX ADMIN — HYDROCHLOROTHIAZIDE 25 MG: 25 TABLET ORAL at 09:32

## 2021-03-13 RX ADMIN — ENOXAPARIN SODIUM 40 MG: 40 INJECTION SUBCUTANEOUS at 09:32

## 2021-03-13 RX ADMIN — CLONIDINE HYDROCHLORIDE 0.1 MG: 0.1 TABLET ORAL at 09:32

## 2021-03-13 RX ADMIN — AMLODIPINE BESYLATE 5 MG: 5 TABLET ORAL at 12:07

## 2021-03-13 RX ADMIN — PANTOPRAZOLE SODIUM 40 MG: 40 TABLET, DELAYED RELEASE ORAL at 05:58

## 2021-03-13 ASSESSMENT — PAIN SCALES - GENERAL
PAINLEVEL_OUTOF10: 0

## 2021-03-13 NOTE — PROGRESS NOTES
Internal Medicine PGY-1 Resident Progress Note      PCP: Jennifer Roblero MD    Date of Admission: 3/11/2021    Date of Service: Pt seen/examined on 3/11/21 and admitted to Inpatient with expected LOS greaterthan two midnights due to medical therapy. Chief Complaint: HTN    Interval HX:  Cathi Alvarez. Patient seen at bedside. No complaints and no pain s/p LHC. She denies fevers, chills, chest pain, palpitations, shortness of breath, abdominal pain, nausea and vomiting, changes in vision. History Of Present Illness:     77 y.o. female with PMH HTN who presented to [facility] at the behest of her cardiologist.  Patient was getting cardiology clearance in order to have a cholecystectomy performed. She was told to get a stress test done but when she presented to the hospital for her blood pressure was 256/113 and she was only able to complete part of the chemical stress test.  Of note she was told not to take her blood pressure medications during this day. She was given sublingual nitroglycerin x3 with no reduction in her blood pressure. She was then told to go home and take her medications and then come back to the hospital the following day to complete the stress test.  However when she arrived her blood pressure was 218/110 despite taking the medications. Patient was then instructed to come to the ED for further management of her blood pressure so that she could get it under control and get the stress test the following day. While in the ED patient recently started noticing that she was dizzy and lightheaded shortly after eating a sandwich. She endorses some weakness and shortness of breath at rest.  Patient denies fevers, chills, chest pain, abdominal pain, nausea and vomiting. No changes in vision. Patient has been on blood pressure medications her entire life and reports that her dad had 2 bypasses at an early age and passed away at 61 will get the second one.     In the ED patient was afebrile BY MOUTH TWICE A DAY 8/29/20  Yes Paul Wood MD   melatonin 3 MG TABS tablet Take 1 tablet by mouth nightly 7/17/20  Yes Paul Wood MD   aspirin 81 MG tablet Take 81 mg by mouth daily   Yes Historical Provider, MD   Cholecalciferol (VITAMIN D3) 5000 units TABS Take 1 tablet by mouth daily 6/10/19  Yes Paul Wood MD   ibuprofen (ADVIL;MOTRIN) 600 MG tablet Take 1 tablet by mouth 3 times daily as needed for Pain 6/10/19  Yes Paul Wood MD   Selenium 200 MCG CAPS Take 1 capsule by mouth   Yes Historical Provider, MD   Multiple Vitamin (MULTIVITAMIN PO) Take  by mouth. Yes Historical Provider, MD       Allergies:  Sulfa antibiotics    Social History:      The patient currently lives with her . TOBACCO:   reports that she has never smoked. She has never used smokeless tobacco.  ETOH:  reports current alcohol use of about 3.0 standard drinks of alcohol per week. Family History:     Patient states that her dad had bypass surgery x2 and passed away after getting the second one. Problem Relation Age of Onset    Other Mother         vascular disease    Hypertension Mother     High Cholesterol Mother     Diabetes Mother     Heart Disease Mother     Heart Disease Father     Heart Attack Father     Cancer Maternal Grandmother 32        unknown type - cancer     Stroke Neg Hx        REVIEW OF SYSTEMS: Pertinent positives as noted in the HPI. All other systems reviewed and negative. ROS: Review of Systems    PHYSICAL EXAM PERFORMED:    BP (!) 168/88   Pulse 67   Temp 98.2 °F (36.8 °C) (Oral)   Resp 18   Ht 5' 4\" (1.626 m)   Wt 202 lb 9.6 oz (91.9 kg)   LMP 05/01/2008 (Approximate) Comment: 15 years ago  SpO2 94%   BMI 34.78 kg/m²     General appearance:  No apparent distress, appears stated age and cooperative. HEENT:  Normal cephalic,atraumatic without obvious deformity. Pupils equal, round, and reactive to light. Extra ocular muscles intact. Conjunctivae/corneas clear. Neck: Supple, with full range of motion. No jugular venous distention. Trachea midline. Respiratory:  Normal respiratory effort. Clear to auscultation, bilaterally without Rales/Wheezes/Rhonchi. Cardiovascular:  Regular rate and rhythm with normal S1/S2 without murmurs, rubs or gallops. Abdomen: Soft, non-tender, non-distended with normal bowel sounds. Musculoskeletal:  No clubbing, cyanosis or edema bilaterally. Full range of motion without deformity. Skin: Skin color, texture, turgor normal.  Norashes or lesions. Neurologic:  Neurovascularly intact without any focal sensory/motor deficits. Cranialnerves: II-XII intact, grossly non-focal.  Psychiatric:  Alert and oriented, thought content appropriate,normal insight  Capillary Refill: Brisk,< 3 seconds   Peripheral Pulses: +2 palpable, equal bilaterally     Labs:     Recent Labs     03/11/21  1129 03/12/21  0622 03/13/21  0627   WBC 5.8 8.4 6.8   HGB 14.9 14.7 14.2   HCT 44.8 43.8 42.2    236 213     Recent Labs     03/11/21  1129 03/11/21  1226 03/12/21  0622 03/13/21  0627     --  142 141   K 6.0* 3.6 3.4* 3.3*     --  105 103   CO2 26  --  26 25   BUN 11  --  16 12   CREATININE 0.5*  --  0.6 0.6   CALCIUM 9.2  --  9.7 9.4     No results for input(s): AST, ALT, BILIDIR, BILITOT, ALKPHOS in the last 72 hours.   Recent Labs     03/12/21  1042   INR 1.03     Recent Labs     03/11/21  1129   TROPONINI <0.01       Urinalysis:      Lab Results   Component Value Date    NITRU NEGATIVE 09/21/2012    BLOODU N 06/10/2019    BLOODU NEGATIVE 09/21/2012    SPECGRAV 1.020 06/10/2019    SPECGRAV >=1.030 09/21/2012    GLUCOSEU N 06/10/2019    GLUCOSEU NEGATIVE 09/21/2012    GLUCOSEU Negative 01/11/2012       Radiology:     No orders to display     ASSESSMENT & PLAN:  Alexis Mars is a 77 y.o. female w/ PMH HTN who presented for blood pressure control in order to get a stress test.    Active Hospital Problems Diagnosis Date Noted    Hypertension [I10] 03/11/2021     Hypertensive urgency  Patient's blood pressure 256/113 yesterday while getting a stress test.  This morning despite taking her blood pressure medications her blood pressure was 218/110 when trying to finish the stress test.  On admission her blood pressure was 203/96. No signs of endorgan damage. Patient's blood pressure overall is not well controlled at home.  -Continue home medications including Coreg 25 mg twice daily, clonidine 0.1 mg twice daily, hydrochlorothiazide 25 mg p.o. daily and losartan 100 mg nightly  -Hydralazine 10 mg IV every 6 hours as needed for systolic blood pressure greater than 185  -Low-salt diet  - cards following  - [facility] with no obstructive CAD, EF 55%, no renal artery stenosis. [id number] Kristal Berumen from cards perspective for proceeding with surgery. - Cards recs starting norvasc 5 mg PO daily, will monitor BP and if better this afternoon ok for discharge.  - PT 23/24, OT 24/24    Planned laparoscopic cholecystectomy with cholangiogram 2/2 biliary colic  RUQ US 3/43 revealed fatty liver, no gallstones but sludge was present. Patient had a planned cholecystectomy surgery for 3/10/21 but required cardiology clearance. Due to her exertional dyspnea decision was made to get a Lexiscan nuclear stress test in order to clear for the surgery. CAD  Stress echo in 2013 was normal.  Last echo in 10/19 normal except for indeterminate diastolic function. Cardiac CTA 10/19 with calcium score 496  -Home aspirin and statin  -Plan for left heart cath today    Mixed hyperlipidemia  -Home statin    DVT Prophylaxis: Lovenox  Diet: No diet orders on file   Code Status: Full Code    PT/OT Eval Status: Ordered    Dispo - [facility]    I will discuss the patient with the senior resident and Dr. Brett Leonard.     Anupama Nance,   Internal Medicine Resident, PGY-1

## 2021-03-13 NOTE — PROGRESS NOTES
Patient alert and oriented times four. Patient vss this shift. Patient on Telemetry NSR. Patient iv is in right forearm. Patient up ad kellie. Patient is continent of bowel and bladder. patient had complaints of arthritic knee pain. Patient medicated with tylenol per mar. Patient adbominal sounds active. Patient skin clean and dry. Patient rt radial site is without redness, swelling, pain, oozing, or other complications. Patient breath sounds clear. Patient bed locked in lowest position. Call light bedside table and belongings in reach. Patient encouraged to call with any and all needs. Patient verbalizes understanding and call appropriately. Will continue to monitor.

## 2021-03-13 NOTE — PLAN OF CARE
Problem: Pain:  Goal: Pain level will decrease  Description: Pain level will decrease  Outcome: Ongoing  Note: Patient complained of knee pain and asked for tylenol.  Patient medicated per the Mar. Will continue to monitor     Problem: Cardiac:  Goal: Hemodynamic stability will improve  Description: Hemodynamic stability will improve  Outcome: Ongoing

## 2021-03-13 NOTE — CARE COORDINATION
Case Management Assessment            Discharge Note                    Date / Time of Note: 3/13/2021 3:15 PM                  Discharge Note Completed by: Donte Chakraborty    Patient Name: Alexis Mars   YOB: 1955  Diagnosis: Hypertension [I10]   Date / Time: 3/11/2021  9:50 AM    Current PCP: Jennifer Roblero MD  Clinic patient: No    Hospitalization in the last 30 days: No    Advance Directives:  Code Status: Full Code  [place] DNR form completed and on chart: No    Financial:  Payor: Darcie Michaud / Plan: MEDICARE PART A AND B / Product Type: *No Product type* /      Pharmacy:    [facility] [address], [facility] [phone] [facility] [phone]  [address] [place] [address]  Phone: [phone] Fax: [phone]      Assistance purchasing medications?: Potential Assistance Purchasing Medications: No  Assistance provided by Case Management: None at this time    Does patient want to participate in local refill/ meds to beds program?: Yes    Meds To Beds General Rules:  1. Can ONLY be done Monday- Friday between 8:30am-5pm  2. Prescription(s) must be in pharmacy by 3pm to be filled same day  3. Copy of patient's insurance/ prescription drug card and patient face sheet must be sent along with the prescription(s)  4. Cost of Rx cannot be added to hospital bill. If financial assistance is needed, please contact unit  or ;  or  CANNOT provide pharmacy voucher for patients co-pays  5.  Patients can then  the prescription on their way out of the hospital at discharge, or pharmacy can deliver to the bedside if staff is available. (payment due at time of pick-up or delivery - cash, check, or card accepted)     Able to afford home medications/ co-pay costs: Yes    ADLS:  Current PT AM-PAC Score: 23 /24  Current OT AM-PAC Score: 24 /24      DISCHARGE Disposition: Home- No Services Needed    LOC at

## 2021-03-14 PROBLEM — Z92.89 H/O ANGIOGRAPHY: Status: ACTIVE | Noted: 2021-03-14

## 2021-03-14 NOTE — PROGRESS NOTES
[address]   Cardiology Progress Note     Date: 3/14/2021  Admit Date: 3/11/2021     Reason for consultation: HTN urgency     Chief Complaint: HTN    History obtained from patient and medical record. HPI: Amanda Nichols is a 77 y.o. female with a past medical history of HTN  prediabetes, hyperlipidemia, CAD, HFpEF. Patient was assessed with a right upper quadrant ultrasound 2/9/2021: Fatty liver, no gallstones however there was sludge in the gallbladder. Patient was seen by surgery for cholecystectomy   with HTN     LHC 3/12/2021 Normal coronary arteries  Normal LV function  Normal renal arteries as selectively injected    Interval Hx: Today, she is resting quietly   no complications at [facility] site   b/p elevated add Norvasc 5mg daily    No new complaints today. No major events overnight. Denies having chest pain, palpitations, shortness of breath, orthopnea/PND, cough, or dizziness at the time of this visit. Echo 10/2019: Normal except for indeterminate diastolic function  CCTA 62/6784: BAXSWGAK diffuse calcifications of the coronaries without any significant stenosis    Patient seen and examined. Clinical notes reviewed. Telemetry reviewed / testing reviewed  >20 minutes   Pertinent labs, diagnostic, device, and imaging results reviewed as a part of this visit  EKG TTE stress test: any LHC/RHC reviewed     Past Medical History:  Past Medical History:   Diagnosis Date    pearl Apple MD    Goiter     History of mammogram 10/2010    [facility] - [place]    Hyperlipidemia     Hypertension     Onychomycosis     Pap smear for cervical cancer screening 2/2010    Dr. Prescilla Lombard - wnl    Prediabetes 12/23/2019    Screening for osteoporosis     Dexa 12/2005 - wnl    Vitamin D deficiency 11/10/2011        Past Surgical History:    has a past surgical history that includes Appendectomy (2/4/2000); knee surgery; Pilonidal cyst excision; Colonoscopy (3/2011);  Upper gastrointestinal endoscopy (3/19/14); Esophagus dilation (4/2014); and Colonoscopy (10/19/2016). Social History:  Reviewed. reports that she has never smoked. She has never used smokeless tobacco. She reports current alcohol use of about 3.0 standard drinks of alcohol per week. She reports that she does not use drugs. Allergies: Allergies   Allergen Reactions    Sulfa Antibiotics Rash     Family History:  Reviewed. family history includes Cancer (age of onset: 32) in her maternal grandmother; Diabetes in her mother; Heart Attack in her father; Heart Disease in her father and mother; High Cholesterol in her mother; Hypertension in her mother; Other in her mother. Denies family history of sudden cardiac death, arrhythmia, premature CAD    Home Meds:  Prior to Visit Medications    Medication Sig Taking?  Authorizing Provider   amLODIPine (NORVASC) 5 MG tablet Take 1 tablet by mouth daily Yes Steven Centeno DO   vitamin D3 (CHOLECALCIFEROL) 25 MCG (1000 UT) TABS tablet Take 1 tablet by mouth daily Yes Stevne Centeno DO   cloNIDine (CATAPRES) 0.1 MG tablet TAKE ONE TABLET BY MOUTH TWICE A DAY Yes Abhishek Morris MD   atorvastatin (LIPITOR) 80 MG tablet TAKE ONE TABLET BY MOUTH ONCE NIGHTLY Yes Abhishek Morris MD   omeprazole (PRILOSEC) 20 MG delayed release capsule Take 1 capsule by mouth Daily Yes Abhishek Morris MD   irbesartan (AVAPRO) 300 MG tablet TAKE ONE TABLET BY MOUTH DAILY  Patient taking differently: 300 mg nightly  Yes Abhishek Morris MD   hydroCHLOROthiazide (HYDRODIURIL) 25 MG tablet TAKE ONE TABLET BY MOUTH DAILY Yes Abhishek Morris MD   carvedilol (COREG) 25 MG tablet TAKE ONE TABLET BY MOUTH TWICE A DAY Yes Abhishek Morris MD   melatonin 3 MG TABS tablet Take 1 tablet by mouth nightly Yes Abhishek Morris MD   aspirin 81 MG tablet Take 81 mg by mouth daily Yes Historical Provider, MD   Cholecalciferol (VITAMIN D3) 5000 units TABS Take 1 tablet by mouth daily Yes Tiffany GRIFFITH elevation of JVP. No peripheral edema  · Respiratory: Respirations symmetric and unlabored. Lungs clear to auscultation bilaterally, no wheezing, crackles, or rhonchi  · Gastrointestinal: Abdomen soft and round. Bowel sounds normoactive without tenderness or masses. · Musculoskeletal: Bilateral upper and lower extremity strength 5/5 with full ROM  · Neurologic/Psych: Awake and orientated to person, place and time. Calm affect, appropriate mood    BMP:   Recent Labs     03/11/21  1226 03/12/21  0622 03/13/21 0627   NA  --  142 141   K 3.6 3.4* 3.3*   CL  --  105 103   CO2  --  26 25   BUN  --  16 12   CREATININE  --  0.6 0.6   MG  --  2.20 2.20     Estimated Creatinine Clearance: 100 mL/min (based on SCr of 0.6 mg/dL).    CBC:   Recent Labs     03/12/21 0622 03/13/21 0627   WBC 8.4 6.8   HGB 14.7 14.2   HCT 43.8 42.2   MCV 91.7 91.8    213     Thyroid:   Lab Results   Component Value Date    TSH 0.60 04/16/2020    TSH 1.21 10/02/2018    X1VNFNQ 8.00 01/09/2016     Lipids:   Lab Results   Component Value Date    CHOL 189 07/21/2020    HDL 66 07/21/2020    TRIG 182 07/21/2020     LFTS:   Lab Results   Component Value Date    ALT 36 02/04/2021    AST 26 02/04/2021    ALKPHOS 57 02/04/2021    PROT 6.8 02/04/2021    PROT 7.0 09/19/2012    AGRATIO 2.1 02/04/2021    BILITOT 0.4 02/04/2021     Cardiac Enzymes:   Lab Results   Component Value Date    TROPONINI <0.01 03/11/2021     Coags:   Lab Results   Component Value Date    PROTIME 11.9 03/12/2021    INR 1.03 03/12/2021       Patient Active Problem List    Diagnosis Date Noted    Hypertension 12/16/3105    Biliary colic 69/07/6034    Primary insomnia 07/23/2020    Prediabetes 12/23/2019    Coronary atherosclerosis 11/06/2019    Hyperlipidemia 05/11/2017    Hyperglycemia 03/17/2015    Gastroesophageal reflux 03/18/2014    Thyroid nodule 25/85/1278    Diastolic dysfunction 72/21/0680    Family history of coronary artery disease 12/24/2013    Microscopic hematuria 09/26/2012    Essential hypertension 11/10/2011    Vitamin D deficiency 11/10/2011    Goiter     Onychomycosis         Assessment     HTN urgency  elevated this am / on Norvasc coreg catapres HCTZ      needing clearance for cholocystectomy surgery   Trop <0.01     [facility] 3/12/2021 Normal coronary arteries  Normal LV function  Normal renal arteries as selectively injected     Echo 10/2019: Normal except for indeterminate diastolic function  CCTA 20/3923: VTNZOKTL diffuse calcifications of the coronaries without any significant stenosis     HLD  LDL 80 7/2020      HFpEF   Compensated   ProBNP 79      Gall bladder sludge   Patient was assessed with a right upper quadrant ultrasound 2/9/2021: Fatty liver, no gallstones however there was sludge in the gallbladder. Patient was seen by surgery for cholecystectomy      Plan  start norvasc 5mg for HTN  Observe b/p and if wnl may be discharged  Fu with me in 1-2 weeks       Thank you for allowing to us to participate in the care of [address]     Sridevi Heart APRN-CNP-CVNP    [facility]

## 2021-03-14 NOTE — PROGRESS NOTES
Reviewed discharge instructions with patient   Reviewed discharge medications including dosing, schedule, indication, and adverse reactions. Reviewed which medications were already taken today and next dosage due for each medication. Reviewed signs and symptoms that prompt a call to the physician and appropriate phone numbers. Patient verbalized understanding of all instructions and questions were answered to her. satisfaction. Signed discharge instructions were given to the patient and a copy placed in the paper-lite chart. Patient discharged to home per self with family members.       Juana Bell

## 2021-03-15 ENCOUNTER — TELEPHONE (OUTPATIENT)
Dept: SURGERY | Age: 66
End: 2021-03-15

## 2021-03-15 ENCOUNTER — TELEPHONE (OUTPATIENT)
Dept: PRIMARY CARE CLINIC | Age: 66
End: 2021-03-15

## 2021-03-15 NOTE — TELEPHONE ENCOUNTER
[address] Transitions Initial Follow Up Call    Outreach made within 2 business days of discharge: Yes    Patient: Dean Mendoza Patient : 1955   MRN: [id number]  Reason for Admission: There are no discharge diagnoses documented for the most recent discharge. Discharge Date: 3/13/21       Spoke with: patient    Discharge department/facility: [address] [place] [address] To [facility] Interactive Patient Contact:  Was patient able to fill all prescriptions: Yes  Was patient instructed to bring all medications to the follow-up visit: Yes  Is patient taking all medications as directed in the discharge summary?  Yes  Does patient understand their discharge instructions: Yes  Does patient have questions or concerns that need addressed prior to 7-14 day follow up office visit: yes -    Scheduled appointment with PCP within 7-14 days    Follow Up  Future Appointments   Date Time Provider Josias Camp   3/16/2021  4:30 PM MD JAMES Lopez [facility]   3/19/2021 11:00 AM TIFFANIE Griffiths - CNP [facility]   2021  9:00 AM Sarahy Haas MD [address] [place] [address]

## 2021-03-15 NOTE — TELEPHONE ENCOUNTER
Patient called to update the office and let us know she had been in the hospital for hypertension. Patient states she was cleared for surgery in the hospital.  I informed the patient that she would need to follow up with her primary care doctor and her cardiologist for updated clearance as the patient states her blood pressure is still \"not in control and through the roof\". Patient states she has an appointment with Cardiology this Friday and would call her primary care office to schedule as well.

## 2021-03-16 ENCOUNTER — OFFICE VISIT (OUTPATIENT)
Dept: PRIMARY CARE CLINIC | Age: 66
End: 2021-03-16
Payer: MEDICARE

## 2021-03-16 VITALS
SYSTOLIC BLOOD PRESSURE: 204 MMHG | HEART RATE: 76 BPM | TEMPERATURE: 98 F | WEIGHT: 203.2 LBS | DIASTOLIC BLOOD PRESSURE: 102 MMHG | OXYGEN SATURATION: 98 % | BODY MASS INDEX: 34.88 KG/M2

## 2021-03-16 DIAGNOSIS — I10 UNCONTROLLED HYPERTENSION: Primary | ICD-10-CM

## 2021-03-16 DIAGNOSIS — R42 DIZZINESS: ICD-10-CM

## 2021-03-16 DIAGNOSIS — E87.6 HYPOKALEMIA: ICD-10-CM

## 2021-03-16 PROCEDURE — 99496 TRANSJ CARE MGMT HIGH F2F 7D: CPT | Performed by: INTERNAL MEDICINE

## 2021-03-16 RX ORDER — AMLODIPINE BESYLATE 5 MG/1
TABLET ORAL DAILY
COMMUNITY
Start: 2021-03-16 | End: 2021-04-05 | Stop reason: SDUPTHER

## 2021-03-16 RX ORDER — CLONIDINE HYDROCHLORIDE 0.1 MG/1
0.1 TABLET ORAL 3 TIMES DAILY
COMMUNITY
Start: 2021-03-16 | End: 2021-05-14

## 2021-03-16 ASSESSMENT — ENCOUNTER SYMPTOMS
CHEST TIGHTNESS: 0
SHORTNESS OF BREATH: 0

## 2021-03-16 NOTE — PATIENT INSTRUCTIONS
-Continue same medications  -Increase Amlodipine 5mg to 2 times daily  -Increase Clonidine 0.1mg to 3 times daily  -Low sodium diet  -Have labs done  -Regular aerobic exercise

## 2021-03-16 NOTE — PROGRESS NOTES
Post-Discharge Transitional Care Management Services or Hospital Follow Up      Jason Wright   YOB: 1955    Date of Office Visit:  3/16/2021  Date of Hospital Admission: 3/11/21  Date of Hospital Discharge: 3/13/21  Readmission Risk Score(high >=14%.  Medium >=10%):Readmission Risk Score: 7      Care management risk score Rising risk (score 2-5) and Complex Care (Scores >=6): 1     Non face to face  following discharge, date last encounter closed (first attempt may have been earlier): 3/15/2021  2:54 PM 3/15/2021  2:54 PM    Call initiated 2 business days of discharge: Yes     Patient Active Problem List   Diagnosis    Goiter    Onychomycosis    Essential hypertension    Vitamin D deficiency    Microscopic hematuria    Diastolic dysfunction    Family history of coronary artery disease    Gastroesophageal reflux    Thyroid nodule    Hyperglycemia    Hyperlipidemia    Prediabetes    Primary insomnia    Coronary atherosclerosis    Biliary colic    Hypertension    H/O angiography    Uncontrolled hypertension    Dizziness    Hypokalemia       Allergies   Allergen Reactions    Sulfa Antibiotics Rash       Medications listed as ordered at the time of discharge from hospital   Raysa Browne   Home Medication Instructions RENETTA:    Printed on:03/16/21 0315   Medication Information                      amLODIPine (NORVASC) 5 MG tablet  Take 1 tablet by mouth daily             aspirin 81 MG tablet  Take 81 mg by mouth daily             atorvastatin (LIPITOR) 80 MG tablet  TAKE ONE TABLET BY MOUTH ONCE NIGHTLY             carvedilol (COREG) 25 MG tablet  TAKE ONE TABLET BY MOUTH TWICE A DAY             Cholecalciferol (VITAMIN D3) 5000 units TABS  Take 1 tablet by mouth daily             cloNIDine (CATAPRES) 0.1 MG tablet  TAKE ONE TABLET BY MOUTH TWICE A DAY             hydroCHLOROthiazide (HYDRODIURIL) 25 MG tablet  TAKE ONE TABLET BY MOUTH DAILY             ibuprofen (ADVIL;MOTRIN) 600 MG tablet  Take 1 tablet by mouth 3 times daily as needed for Pain             irbesartan (AVAPRO) 300 MG tablet  TAKE ONE TABLET BY MOUTH DAILY             melatonin 3 MG TABS tablet  Take 1 tablet by mouth nightly             Multiple Vitamin (MULTIVITAMIN PO)  Take  by mouth. Selenium 200 MCG CAPS  Take 1 capsule by mouth             vitamin D3 (CHOLECALCIFEROL) 25 MCG (1000 UT) TABS tablet  Take 1 tablet by mouth daily                   Medications marked \"taking\" at this time  Outpatient Medications Marked as Taking for the 3/16/21 encounter (Office Visit) with Bryce Schwartz MD   Medication Sig Dispense Refill    amLODIPine (NORVASC) 5 MG tablet Take 1 tablet by mouth daily 30 tablet 3    vitamin D3 (CHOLECALCIFEROL) 25 MCG (1000 UT) TABS tablet Take 1 tablet by mouth daily 30 tablet 2    cloNIDine (CATAPRES) 0.1 MG tablet TAKE ONE TABLET BY MOUTH TWICE A  tablet 0    atorvastatin (LIPITOR) 80 MG tablet TAKE ONE TABLET BY MOUTH ONCE NIGHTLY 90 tablet 0    omeprazole (PRILOSEC) 20 MG delayed release capsule Take 1 capsule by mouth Daily 30 capsule 0    irbesartan (AVAPRO) 300 MG tablet TAKE ONE TABLET BY MOUTH DAILY (Patient taking differently: 300 mg nightly ) 90 tablet 0    hydroCHLOROthiazide (HYDRODIURIL) 25 MG tablet TAKE ONE TABLET BY MOUTH DAILY 90 tablet 0    carvedilol (COREG) 25 MG tablet TAKE ONE TABLET BY MOUTH TWICE A  tablet 1    melatonin 3 MG TABS tablet Take 1 tablet by mouth nightly 30 tablet 1    aspirin 81 MG tablet Take 81 mg by mouth daily      Cholecalciferol (VITAMIN D3) 5000 units TABS Take 1 tablet by mouth daily      ibuprofen (ADVIL;MOTRIN) 600 MG tablet Take 1 tablet by mouth 3 times daily as needed for Pain 45 tablet 0    Selenium 200 MCG CAPS Take 1 capsule by mouth      Multiple Vitamin (MULTIVITAMIN PO) Take  by mouth.             Medications patient taking as of now reconciled against medications ordered at time of wheezing, rhonchi or rales. Abdominal:      General: Bowel sounds are normal. There is no distension. Palpations: Abdomen is soft. Tenderness: There is no abdominal tenderness. Musculoskeletal:      Right lower leg: No edema. Left lower leg: No edema. Lymphadenopathy:      Head:      Right side of head: No submandibular adenopathy. Left side of head: No submandibular adenopathy. Neurological:      Mental Status: She is alert and oriented to person, place, and time.    Psychiatric:         Mood and Affect: Mood normal.       Lab Review   Admission on 03/11/2021, Discharged on 03/13/2021   Component Date Value    WBC 03/11/2021 5.8     RBC 03/11/2021 4.86     Hemoglobin 03/11/2021 14.9     Hematocrit 03/11/2021 44.8     MCV 03/11/2021 92.2     MCH 03/11/2021 30.6     MCHC 03/11/2021 33.2     RDW 03/11/2021 13.7     Platelets 39/54/8086 220     MPV 03/11/2021 8.1     Neutrophils % 03/11/2021 60.2     Lymphocytes % 03/11/2021 26.4     Monocytes % 03/11/2021 9.4     Eosinophils % 03/11/2021 3.2     Basophils % 03/11/2021 0.8     Neutrophils Absolute 03/11/2021 3.5     Lymphocytes Absolute 03/11/2021 1.5     Monocytes Absolute 03/11/2021 0.5     Eosinophils Absolute 03/11/2021 0.2     Basophils Absolute 03/11/2021 0.0     Sodium 03/11/2021 138     Potassium reflex Magnesi* 03/11/2021 6.0*    Chloride 03/11/2021 101     CO2 03/11/2021 26     Anion Gap 03/11/2021 11     Glucose 03/11/2021 103*    BUN 03/11/2021 11     CREATININE 03/11/2021 0.5*    GFR Non- 03/11/2021 >60     GFR  03/11/2021 >60     Calcium 03/11/2021 9.2     Troponin 03/11/2021 <0.01     Ventricular Rate 03/11/2021 57     Atrial Rate 03/11/2021 57     P-R Interval 03/11/2021 180     QRS Duration 03/11/2021 90     Q-T Interval 03/11/2021 494     QTc Calculation (Bazett) 03/11/2021 480     P Axis 03/11/2021 55     R Axis 03/11/2021 58     T Axis 03/11/2021 54  Diagnosis 03/11/2021 EKG performed in ER and to be interpreted by ER physician. Confirmed by MD, ER ([id number]),  Marine Flores ([phone]) on 3/11/2021 11:36:32 AM     Potassium 03/11/2021 3.6     Pro-BNP 03/11/2021 79     Sodium 03/12/2021 142     Potassium reflex Magnesi* 03/12/2021 3.4*    Chloride 03/12/2021 105     CO2 03/12/2021 26     Anion Gap 03/12/2021 11     Glucose 03/12/2021 116*    BUN 03/12/2021 16     CREATININE 03/12/2021 0.6     GFR Non- 03/12/2021 >60     GFR  03/12/2021 >60     Calcium 03/12/2021 9.7     WBC 03/12/2021 8.4     RBC 03/12/2021 4.78     Hemoglobin 03/12/2021 14.7     Hematocrit 03/12/2021 43.8     MCV 03/12/2021 91.7     MCH 03/12/2021 30.8     MCHC 03/12/2021 33.6     RDW 03/12/2021 13.6     Platelets 71/49/6790 236     MPV 03/12/2021 7.9     Neutrophils % 03/12/2021 74.5     Lymphocytes % 03/12/2021 15.7     Monocytes % 03/12/2021 7.8     Eosinophils % 03/12/2021 1.3     Basophils % 03/12/2021 0.7     Neutrophils Absolute 03/12/2021 6.2     Lymphocytes Absolute 03/12/2021 1.3     Monocytes Absolute 03/12/2021 0.7     Eosinophils Absolute 03/12/2021 0.1     Basophils Absolute 03/12/2021 0.1     Magnesium 03/12/2021 2.20     Protime 03/12/2021 11.9     INR 03/12/2021 1.03     Left Ventricular Ejectio* 03/12/2021 55     LEFT VENTRICULAR EJECTIO* 03/12/2021 Cardiac Cath     Sodium 03/13/2021 141     Potassium reflex Magnesi* 03/13/2021 3.3*    Chloride 03/13/2021 103     CO2 03/13/2021 25     Anion Gap 03/13/2021 13     Glucose 03/13/2021 105*    BUN 03/13/2021 12     CREATININE 03/13/2021 0.6     GFR Non- 03/13/2021 >60     GFR  03/13/2021 >60     Calcium 03/13/2021 9.4     WBC 03/13/2021 6.8     RBC 03/13/2021 4.60     Hemoglobin 03/13/2021 14.2     Hematocrit 03/13/2021 42.2     MCV 03/13/2021 91.8     MCH 03/13/2021 30.9     MCHC 03/13/2021 33.7     RDW 03/13/2021 13.8     Platelets 05/46/5921 213     MPV 03/13/2021 7.8     Neutrophils % 03/13/2021 62.6     Lymphocytes % 03/13/2021 23.8     Monocytes % 03/13/2021 10.6     Eosinophils % 03/13/2021 2.3     Basophils % 03/13/2021 0.7     Neutrophils Absolute 03/13/2021 4.3     Lymphocytes Absolute 03/13/2021 1.6     Monocytes Absolute 03/13/2021 0.7     Eosinophils Absolute 03/13/2021 0.2     Basophils Absolute 03/13/2021 0.0     Magnesium 03/13/2021 2.20    Orders Only on 03/03/2021   Component Date Value    WBC 03/03/2021 6.9     RBC 03/03/2021 4.62     Hemoglobin 03/03/2021 14.2     Hematocrit 03/03/2021 42.1     MCV 03/03/2021 91.3     MCH 03/03/2021 30.7     MCHC 03/03/2021 33.7     RDW 03/03/2021 14.1     Platelets 60/17/9942 260     MPV 03/03/2021 9.8     Sodium 03/03/2021 142     Potassium 03/03/2021 3.8     Chloride 03/03/2021 100     CO2 03/03/2021 26     Anion Gap 03/03/2021 16     Glucose 03/03/2021 91     BUN 03/03/2021 12     CREATININE 03/03/2021 <0.5*    GFR Non- 03/03/2021 >60     GFR  03/03/2021 >60     Calcium 03/03/2021 9.3    Orders Only on 02/04/2021   Component Date Value    Lipase 02/04/2021 26.0     Sodium 02/04/2021 144     Potassium 02/04/2021 4.0     Chloride 02/04/2021 103     CO2 02/04/2021 28     Anion Gap 02/04/2021 13     Glucose 02/04/2021 90     BUN 02/04/2021 12     CREATININE 02/04/2021 0.6     GFR Non- 02/04/2021 >60     GFR  02/04/2021 >60     Calcium 02/04/2021 9.6     Total Protein 02/04/2021 6.8     Albumin 02/04/2021 4.6     Albumin/Globulin Ratio 02/04/2021 2.1     Total Bilirubin 02/04/2021 0.4     Alkaline Phosphatase 02/04/2021 57     ALT 02/04/2021 36     AST 02/04/2021 26     Globulin 02/04/2021 2.2     WBC 02/04/2021 5.7     RBC 02/04/2021 4.69     Hemoglobin 02/04/2021 14.3     Hematocrit 02/04/2021 43.3     MCV 02/04/2021 92.3     [facility] 02/04/2021 30.5     MCHC 02/04/2021 33.0     RDW 02/04/2021 13.7     Platelets 09/91/2381 255     MPV 02/04/2021 10.1     Neutrophils % 02/04/2021 63.0     Lymphocytes % 02/04/2021 23.2     Monocytes % 02/04/2021 9.3     Eosinophils % 02/04/2021 3.7     Basophils % 02/04/2021 0.8     Neutrophils Absolute 02/04/2021 3.6     Lymphocytes Absolute 02/04/2021 1.3     Monocytes Absolute 02/04/2021 0.5     Eosinophils Absolute 02/04/2021 0.2     Basophils Absolute 02/04/2021 0.0            Assessment/Plan:  1. Uncontrolled hypertension  - blood pressure is very high  [facility] records reviewed  - Blood pressure is still very high  - Continue same medications  - Increase amLODIPine (NORVASC) 5 MG tablet; Take 1 tablet by mouth 2 times daily  - Increase cloNIDine (CATAPRES) 0.1 MG tablet; Take 1 tablet by mouth three times daily  - Basic Metabolic Panel; Future  - Urinalysis; Future  - Regular aerobic exercise    2. Dizziness  - Basic Metabolic Panel; Future  - Urinalysis; Future  - stay hydrated    3. Hypokalemia  - patient was given potassium  - Basic Metabolic Panel; Future        Medical Decision Making: moderate complexity      Return in about 1 week (around 3/23/2021) for uncontrolled hypertension and dizziness.

## 2021-03-17 DIAGNOSIS — I10 UNCONTROLLED HYPERTENSION: ICD-10-CM

## 2021-03-17 DIAGNOSIS — R42 DIZZINESS: ICD-10-CM

## 2021-03-17 DIAGNOSIS — E87.6 HYPOKALEMIA: ICD-10-CM

## 2021-03-17 LAB
ANION GAP SERPL CALCULATED.3IONS-SCNC: 14 MMOL/L (ref 3–16)
BILIRUBIN URINE: NEGATIVE
BLOOD, URINE: NEGATIVE
BUN BLDV-MCNC: 13 MG/DL (ref 7–20)
CALCIUM SERPL-MCNC: 9 MG/DL (ref 8.3–10.6)
CHLORIDE BLD-SCNC: 103 MMOL/L (ref 99–110)
CLARITY: CLEAR
CO2: 27 MMOL/L (ref 21–32)
COLOR: YELLOW
CREAT SERPL-MCNC: 0.6 MG/DL (ref 0.6–1.2)
GFR AFRICAN AMERICAN: >60
GFR NON-AFRICAN AMERICAN: >60
GLUCOSE BLD-MCNC: 116 MG/DL (ref 70–99)
GLUCOSE URINE: NEGATIVE MG/DL
KETONES, URINE: NEGATIVE MG/DL
LEUKOCYTE ESTERASE, URINE: NEGATIVE
MICROSCOPIC EXAMINATION: NORMAL
NITRITE, URINE: NEGATIVE
PH UA: 7.5 (ref 5–8)
POTASSIUM SERPL-SCNC: 3.8 MMOL/L (ref 3.5–5.1)
PROTEIN UA: NEGATIVE MG/DL
SODIUM BLD-SCNC: 144 MMOL/L (ref 136–145)
SPECIFIC GRAVITY UA: 1.02 (ref 1–1.03)
URINE TYPE: NORMAL
UROBILINOGEN, URINE: 0.2 E.U./DL

## 2021-03-19 ENCOUNTER — OFFICE VISIT (OUTPATIENT)
Dept: CARDIOLOGY CLINIC | Age: 66
End: 2021-03-19
Payer: MEDICARE

## 2021-03-19 VITALS
HEART RATE: 72 BPM | BODY MASS INDEX: 35.53 KG/M2 | DIASTOLIC BLOOD PRESSURE: 90 MMHG | SYSTOLIC BLOOD PRESSURE: 168 MMHG | WEIGHT: 207 LBS

## 2021-03-19 DIAGNOSIS — I50.32 CHRONIC HEART FAILURE WITH PRESERVED EJECTION FRACTION (HFPEF) (HCC): ICD-10-CM

## 2021-03-19 DIAGNOSIS — I25.10 ATHEROSCLEROSIS OF NATIVE CORONARY ARTERY OF NATIVE HEART WITHOUT ANGINA PECTORIS: ICD-10-CM

## 2021-03-19 DIAGNOSIS — I10 UNCONTROLLED HYPERTENSION: Primary | ICD-10-CM

## 2021-03-19 DIAGNOSIS — Z82.49 FAMILY HISTORY OF CORONARY ARTERY DISEASE: ICD-10-CM

## 2021-03-19 PROCEDURE — G8484 FLU IMMUNIZE NO ADMIN: HCPCS | Performed by: INTERNAL MEDICINE

## 2021-03-19 PROCEDURE — 4040F PNEUMOC VAC/ADMIN/RCVD: CPT | Performed by: INTERNAL MEDICINE

## 2021-03-19 PROCEDURE — 99215 OFFICE O/P EST HI 40 MIN: CPT | Performed by: INTERNAL MEDICINE

## 2021-03-19 PROCEDURE — G8427 DOCREV CUR MEDS BY ELIG CLIN: HCPCS | Performed by: INTERNAL MEDICINE

## 2021-03-19 PROCEDURE — G8417 CALC BMI ABV UP PARAM F/U: HCPCS | Performed by: INTERNAL MEDICINE

## 2021-03-19 PROCEDURE — G8400 PT W/DXA NO RESULTS DOC: HCPCS | Performed by: INTERNAL MEDICINE

## 2021-03-19 PROCEDURE — 1111F DSCHRG MED/CURRENT MED MERGE: CPT | Performed by: INTERNAL MEDICINE

## 2021-03-19 PROCEDURE — 1090F PRES/ABSN URINE INCON ASSESS: CPT | Performed by: INTERNAL MEDICINE

## 2021-03-19 PROCEDURE — 1123F ACP DISCUSS/DSCN MKR DOCD: CPT | Performed by: INTERNAL MEDICINE

## 2021-03-19 PROCEDURE — 1036F TOBACCO NON-USER: CPT | Performed by: INTERNAL MEDICINE

## 2021-03-19 PROCEDURE — 3017F COLORECTAL CA SCREEN DOC REV: CPT | Performed by: INTERNAL MEDICINE

## 2021-03-20 PROBLEM — I50.32 CHRONIC HEART FAILURE WITH PRESERVED EJECTION FRACTION (HFPEF) (HCC): Status: ACTIVE | Noted: 2021-03-20

## 2021-03-20 PROBLEM — E87.6 HYPOKALEMIA: Status: ACTIVE | Noted: 2021-03-20

## 2021-03-20 PROBLEM — R42 DIZZINESS: Status: ACTIVE | Noted: 2021-03-20

## 2021-03-20 PROBLEM — I10 UNCONTROLLED HYPERTENSION: Status: ACTIVE | Noted: 2021-03-20

## 2021-03-20 PROBLEM — I10 HYPERTENSION: Status: RESOLVED | Noted: 2021-03-11 | Resolved: 2021-03-20

## 2021-03-20 ASSESSMENT — ENCOUNTER SYMPTOMS
VOMITING: 0
BLOOD IN STOOL: 0
CONSTIPATION: 0
RHINORRHEA: 0
SINUS PRESSURE: 0
ABDOMINAL PAIN: 0
SORE THROAT: 0
NAUSEA: 0
WHEEZING: 0
COUGH: 0
DIARRHEA: 0

## 2021-03-20 NOTE — PROGRESS NOTES
Cc: Resistant HTN, HFpEF, CAD    HPI:     Patient is a 77years old woman, overweight, with history of resistant hypertension, prediabetes, hyperlipidemia, CAD, HFpEF. Patient was assessed with a right upper quadrant ultrasound 2/9/2021: Fatty liver, no gallstones however there was sludge in the gallbladder. Patient was seen by surgery, has cholecystectomy scheduled for 3/10/2021. She came for cardiac clearance.     Echo 10/2019: Normal except for indeterminate diastolic function     CCTA 10/2019: Moderate diffuse calcifications of the coronaries without any significant stenosis     Patient has been complaining of some discomfort under her breasts, epigastric and possibly midsternal, unrelated to activity. She also admits to some exertional shortness of breath. She denies any orthopnea or lower extremity edema. She was seen by cardiologist at [facility] about 2 years ago, had a cardiac work-up, no intervention was recommended, she quit going to her appointments. Her BP at home remains around 130/70s. She does not check her blood pressure frequently.     Patient's father, smoker, had CABG at the age of 50years old and a repeat CABG at the age of 61years old; he passed away as a complication after his second bypass.     ECG 2/4/2021: Normal    Lexiscan 3/10/2021: Canceled due to extreme hypertension (257/118 mmHg, patient held her BP medications). The next day, patient presented to the emergency room with severe hypertension (218/110 mmHg) despite taking her medications. She was admitted 3/113/13/21 for hypertensive urgency. Troponin negative, ECG within normal limits    [facility] 3/12/2021: Normal coronaries, normal renal arteries, normal LVEF 55%. Patient was released home once her blood pressure improved after adjusting her antihypertensive medications. Patient returns to the clinic today for follow-up.   She reports improved but still elevated BP at home (usually 140s/80s) after her PCP increased her antihypertensive medications further (amlodipine up to 5 mg twice daily and clonidine up 8.1 mg 3 times daily from twice daily) on 3/16/2021. She denies any symptoms. She is compliant with her medications. She does not limit her sodium in her diet. Histories     Past Medical History:   has a past medical history of Fissure, anal, Goiter, History of mammogram, Hyperlipidemia, Hypertension, Onychomycosis, Pap smear for cervical cancer screening, Prediabetes, Screening for osteoporosis, and Vitamin D deficiency. Surgical History:   has a past surgical history that includes Appendectomy (2/4/2000); knee surgery; Pilonidal cyst excision; Colonoscopy (3/2011); Upper gastrointestinal endoscopy (3/19/14); Esophagus dilation (4/2014); and Colonoscopy (10/19/2016). Social History:   reports that she has never smoked. She has never used smokeless tobacco. She reports current alcohol use of about 3.0 standard drinks of alcohol per week. She reports that she does not use drugs. Family History:  No evidence for sudden cardiac death or premature CAD      Medications:     Home medications were reviewed and are listed below    Prior to Admission medications    Medication Sig Start Date End Date Taking?  Authorizing Provider   amLODIPine (NORVASC) 5 MG tablet Take 1 tablet by mouth 2 times daily 3/16/21  Yes Fortino Mendez MD   cloNIDine (CATAPRES) 0.1 MG tablet Take 1 tablet by mouth three times daily 3/16/21  Yes Fortino Mendez MD   vitamin D3 (CHOLECALCIFEROL) 25 MCG (1000 UT) TABS tablet Take 1 tablet by mouth daily 3/13/21  Yes Tiana Walton DO   atorvastatin (LIPITOR) 80 MG tablet TAKE ONE TABLET BY MOUTH ONCE NIGHTLY 2/23/21  Yes Fortino Mendez MD   irbesartan (AVAPRO) 300 MG tablet TAKE ONE TABLET BY MOUTH DAILY  Patient taking differently: 300 mg nightly  1/19/21  Yes Fortino Mendez MD   hydroCHLOROthiazide (HYDRODIURIL) 25 MG tablet TAKE ONE TABLET BY MOUTH DAILY 1/19/21  Yes Tiffany GRIFFITH Shanae Ferrer MD   carvedilol (COREG) 25 MG tablet TAKE ONE TABLET BY MOUTH TWICE A DAY 8/29/20  Yes Donn Pelletier MD   melatonin 3 MG TABS tablet Take 1 tablet by mouth nightly 7/17/20  Yes Donn Pelletier MD   aspirin 81 MG tablet Take 81 mg by mouth daily   Yes Historical Provider, MD   Cholecalciferol (VITAMIN D3) 5000 units TABS Take 1 tablet by mouth daily 6/10/19  Yes Donn Pelletier MD   Selenium 200 MCG CAPS Take 1 capsule by mouth   Yes Historical Provider, MD   Multiple Vitamin (MULTIVITAMIN PO) Take  by mouth. Yes Historical Provider, MD   ibuprofen (ADVIL;MOTRIN) 600 MG tablet Take 1 tablet by mouth 3 times daily as needed for Pain 6/10/19   Donn Pelletier MD          Allergy:     Sulfa antibiotics       Review of Systems:     All 12 point review of symptoms completed. Pertinent positives identified in the HPI, all other review of symptoms negative as below. CONSTITUTIONAL: No fatigue  SKIN: No rash or pruritis. EYES: No visual changes or diplopia. No scleral icterus. ENT: No Headaches, hearing loss or vertigo. No mouth sores or sore throat. CARDIOVASCULAR: No chest pain/chest pressure/chest discomfort. No palpitations. No edema. RESPIRATORY: No dyspnea. No cough or wheezing, no sputum production. GASTROINTESTINAL: No N/V/D. No abdominal pain, appetite loss, blood in stools. GENITOURINARY: No dysuria, trouble voiding, or hematuria. MUSCULOSKELETAL:  No gait disturbance, weakness or joint complaints. NEUROLOGICAL: No headache, diplopia, change in muscle strength, numbness or tingling. No change in gait, balance, coordination, mood, affect, memory, mentation, behavior. PSHYCH: No anxiety, loss of interest, change in sexual behavior, feelings of self-harm, or confusion. ENDOCRINE: No excessive thirst, fluid intake, or urination. No tremor. HEMATOLOGIC: No abnormal bruising or bleeding. ALLERGY: No nasal congestion or hives.       Physical Examination:     Vitals:    03/19/21 1613 03/19/21 1626   BP: (!) 180/94 (!) 168/90   Pulse: 72    Weight: 207 lb (93.9 kg)        Wt Readings from Last 3 Encounters:   03/19/21 207 lb (93.9 kg)   03/16/21 203 lb 3.2 oz (92.2 kg)   03/11/21 202 lb 9.6 oz (91.9 kg)         General Appearance:  Alert, cooperative, no distress, appears stated age Appropriate weight   Head:  Normocephalic, without obvious abnormality, atraumatic   Eyes:  PERRL, conjunctiva/corneas clear EOM intact  Ears normal   Throat no lesions       Nose: Nares normal, no drainage or sinus tenderness   Throat: Lips, mucosa, and tongue normal   Neck: Supple, symmetrical, trachea midline, no adenopathy, thyroid: not enlarged, symmetric, no tenderness/mass/nodules, no carotid bruit       Lungs:   Clear to auscultation bilaterally, respirations unlabored   Chest Wall:  No tenderness or deformity   Heart:  Regular rhythm, rate is controlled, S1, S2 normal, there is no murmur, there is no rub or gallop, no jvd, no bilateral lower extremity edema   Abdomen:   Soft, non-tender, bowel sounds active all four quadrants,  no masses, no organomegaly       Extremities: Extremities normal, atraumatic, no cyanosis   Pulses: 2+ and symmetric   Skin: Skin color, texture, turgor normal, no rashes or lesions   Pysch: Normal mood and affect   Neurologic: Normal gross motor and sensory exam.  Cranial nerves intact        Labs:     Lab Results   Component Value Date    WBC 6.8 03/13/2021    HGB 14.2 03/13/2021    HCT 42.2 03/13/2021    MCV 91.8 03/13/2021     03/13/2021     Lab Results   Component Value Date     03/17/2021    K 3.8 03/17/2021     03/17/2021    CO2 27 03/17/2021    BUN 13 03/17/2021    CREATININE 0.6 03/17/2021    GLUCOSE 116 (H) 03/17/2021    CALCIUM 9.0 03/17/2021    PROT 6.8 02/04/2021    LABALBU 4.6 02/04/2021    BILITOT 0.4 02/04/2021    ALKPHOS 57 02/04/2021    AST 26 02/04/2021    ALT 36 02/04/2021    LABGLOM >60 03/17/2021    GFRAA >60 03/17/2021    AGRATIO 2.1 02/04/2021    GLOB 2.2 02/04/2021         Lab Results   Component Value Date    CHOL 189 07/21/2020    CHOL 164 12/03/2019    CHOL 181 06/10/2019     Lab Results   Component Value Date    TRIG 182 (H) 07/21/2020    TRIG 165 (H) 12/03/2019    TRIG 172 (H) 06/10/2019     Lab Results   Component Value Date    HDL 66 (H) 07/21/2020    HDL 69 (H) 12/03/2019    HDL 62 (H) 06/10/2019     Lab Results   Component Value Date    LDLCALC 87 07/21/2020    LDLCALC 62 12/03/2019    LDLCALC 85 06/10/2019     Lab Results   Component Value Date    LABVLDL 36 07/21/2020    LABVLDL 33 12/03/2019    LABVLDL 34 06/10/2019     No results found for: [facility]    Lab Results   Component Value Date    INR 1.03 03/12/2021    PROTIME 11.9 03/12/2021       The 10-year ASCVD risk score (Nato Chavez, et al., 2013) is: 14.2%    Values used to calculate the score:      Age: 77 years      Sex: Female      Is Non- : No      Diabetic: No      Tobacco smoker: No      Systolic Blood Pressure: 229 mmHg      Is BP treated: Yes      HDL Cholesterol: 66 mg/dL      Total Cholesterol: 189 mg/dL      Assessment / Plan:      Diagnosis Orders   1. Uncontrolled hypertension     2. Atherosclerosis of native coronary artery of native heart without angina pectoris     3. Family history of coronary artery disease     4. Chronic heart failure with preserved ejection fraction (HFpEF) ([address]          1. Cardiac clearance:  CT chest was suggestive of moderate coronary calcification consistent with coronary artery disease; however LHC revealed no significant stenosis, therefore mild CAD. Patient's BP is improving but still elevated. Once better controlled, she will be intermediate risk for any perioperative cardiovascular complications and no further testing is necessary.      2. Resistant hypertension:  Patient has severely elevated blood pressure even today in the office although she reports improved BP at home per history.   She is on multiple antihypertensive medications. I cannot exclude secondary causes of hypertension including sleep apnea, hyperaldosteronism, Cushing's syndrome, pheochromocytoma. Renal artery stenosis has been excluded with LHC, hyperparathyroidism excluded by normal calcium, proteinuria/nephrotic syndrome by normal UA, thyroid abnormalities has been excluded with normal TSH 04/2020.    -Will check serum aldosterone to renin activity, serum AM cortisol, urinary catecholamines (VMA, metanephrine and normetanephrine). -Sleep study. Continue with carvedilol 25 twice daily, clonidine 0.1 mg 3 times daily, amlodipine 5 mg twice daily, irbesartan 300 mg daily. Low-sodium diet was strongly encouraged (sodium intake of less than 1500 to 2000 mg/day). 4.  Hyperlipidemia:  Patient is on Lipitor 80 mg daily     5. CAD:  Patient has mild CAD per #1. Continue with aspirin 81, carvedilol, Lipitor.           Return in about 4 weeks (around 4/16/2021). I have spent 45 minutes of face to face time with the patient with more than 50% spent counseling and coordinating care. I have personally reviewed the reports and images of labs, radiological studies, cardiac studies including ECG's and telemetry, current and old medical records. The note was completed using EMR and Dragon dictation system. Every effort was made to ensure accuracy; however, inadvertent computerized transcription errors may be present. All questions and concerns were addressed to the patient/family. Alternatives to my treatment were discussed. I would like to thank you for providing me the opportunity to participate in the care of your patient. If you have any questions, please do not hesitate to contact me.      Mian Mitchell MD, [facility] [place] [address] Phone: [phone]  Heart Failure Hotline: [phone]  Fax: [phone]

## 2021-03-23 ENCOUNTER — TELEPHONE (OUTPATIENT)
Dept: CARDIOLOGY CLINIC | Age: 66
End: 2021-03-23

## 2021-03-23 DIAGNOSIS — I10 UNCONTROLLED HYPERTENSION: ICD-10-CM

## 2021-03-23 LAB — CORTISOL - AM: 16 UG/DL (ref 4.3–22.4)

## 2021-03-23 NOTE — TELEPHONE ENCOUNTER
Pt was returning [employer]. Stated that she will be leaving home in a hour. But will return home at 1.

## 2021-03-24 ENCOUNTER — TELEPHONE (OUTPATIENT)
Dept: CARDIOLOGY CLINIC | Age: 66
End: 2021-03-24

## 2021-03-24 ENCOUNTER — OFFICE VISIT (OUTPATIENT)
Dept: PRIMARY CARE CLINIC | Age: 66
End: 2021-03-24
Payer: MEDICARE

## 2021-03-24 VITALS
HEART RATE: 88 BPM | BODY MASS INDEX: 35.19 KG/M2 | DIASTOLIC BLOOD PRESSURE: 86 MMHG | RESPIRATION RATE: 14 BRPM | OXYGEN SATURATION: 97 % | TEMPERATURE: 97.3 F | SYSTOLIC BLOOD PRESSURE: 160 MMHG | WEIGHT: 205 LBS

## 2021-03-24 DIAGNOSIS — I10 UNCONTROLLED HYPERTENSION: Primary | ICD-10-CM

## 2021-03-24 DIAGNOSIS — R73.9 HYPERGLYCEMIA: ICD-10-CM

## 2021-03-24 DIAGNOSIS — R42 DIZZINESS: ICD-10-CM

## 2021-03-24 LAB — HBA1C MFR BLD: 5.3 %

## 2021-03-24 PROCEDURE — 3017F COLORECTAL CA SCREEN DOC REV: CPT | Performed by: INTERNAL MEDICINE

## 2021-03-24 PROCEDURE — 1090F PRES/ABSN URINE INCON ASSESS: CPT | Performed by: INTERNAL MEDICINE

## 2021-03-24 PROCEDURE — 83036 HEMOGLOBIN GLYCOSYLATED A1C: CPT | Performed by: INTERNAL MEDICINE

## 2021-03-24 PROCEDURE — 1123F ACP DISCUSS/DSCN MKR DOCD: CPT | Performed by: INTERNAL MEDICINE

## 2021-03-24 PROCEDURE — 99213 OFFICE O/P EST LOW 20 MIN: CPT | Performed by: INTERNAL MEDICINE

## 2021-03-24 PROCEDURE — G8417 CALC BMI ABV UP PARAM F/U: HCPCS | Performed by: INTERNAL MEDICINE

## 2021-03-24 PROCEDURE — G8427 DOCREV CUR MEDS BY ELIG CLIN: HCPCS | Performed by: INTERNAL MEDICINE

## 2021-03-24 PROCEDURE — G8484 FLU IMMUNIZE NO ADMIN: HCPCS | Performed by: INTERNAL MEDICINE

## 2021-03-24 PROCEDURE — 1111F DSCHRG MED/CURRENT MED MERGE: CPT | Performed by: INTERNAL MEDICINE

## 2021-03-24 PROCEDURE — 4040F PNEUMOC VAC/ADMIN/RCVD: CPT | Performed by: INTERNAL MEDICINE

## 2021-03-24 PROCEDURE — G8400 PT W/DXA NO RESULTS DOC: HCPCS | Performed by: INTERNAL MEDICINE

## 2021-03-24 PROCEDURE — 1036F TOBACCO NON-USER: CPT | Performed by: INTERNAL MEDICINE

## 2021-03-24 RX ORDER — HYDRALAZINE HYDROCHLORIDE 25 MG/1
25 TABLET, FILM COATED ORAL 2 TIMES DAILY
Qty: 60 TABLET | Refills: 0 | Status: SHIPPED | OUTPATIENT
Start: 2021-03-24 | End: 2021-04-01 | Stop reason: SDUPTHER

## 2021-03-24 ASSESSMENT — ENCOUNTER SYMPTOMS
CHEST TIGHTNESS: 0
SHORTNESS OF BREATH: 0

## 2021-03-24 NOTE — PROGRESS NOTES
Rita Browne   Date ofBirth:  1955    Date of Visit:  3/24/2021    Chief Complaint   Patient presents with    Hypertension    Dizziness       HPI  Hypertension- Patient takes Amlodipine 5mg po bid, Clonidine 0.1mg po tid, Irbesartan 300mg po q day, HCTZ 25mg po q day, and Carvedilol 25mg po bid. Patient states she realized over the weekend that the increase in Amlodipine is causing ankle swelling. Patient states she thinks she can tolerate the lower dose of Amlodipine because she didn't notice swelling until dose increased to 10mg. Patient states her blood pressure is better at home. Patient states her BP was 136/74 one day but has averaged 145/80 in the past week since last visit. Patient states her BP was high at her Cardiology visit on 3/19/21. Dizziness- Patient states she still has a little dizziness with getting up and down. Patient states it is about the same. Patient had labs done. Patient states she always drinks a lot of water. Review of Systems   Eyes: Negative for visual disturbance. Respiratory: Negative for chest tightness and shortness of breath. Cardiovascular: Negative for chest pain, palpitations and leg swelling. Genitourinary: Negative for frequency and hematuria. Musculoskeletal: Positive for joint swelling (ankle and foot swelling with increase in Amlodipine). Neurological: Positive for dizziness. Negative for syncope, light-headedness and headaches.        Allergies   Allergen Reactions    Sulfa Antibiotics Rash     Outpatient Medications Marked as Taking for the 3/24/21 encounter (Office Visit) with Jony Denny MD   Medication Sig Dispense Refill    amLODIPine (NORVASC) 5 MG tablet Take by mouth daily       cloNIDine (CATAPRES) 0.1 MG tablet Take 1 tablet by mouth three times daily      vitamin D3 (CHOLECALCIFEROL) 25 MCG (1000 UT) TABS tablet Take 1 tablet by mouth daily 30 tablet 2    atorvastatin (LIPITOR) 80 MG tablet TAKE ONE TABLET BY MOUTH ONCE NIGHTLY 90 tablet 0    irbesartan (AVAPRO) 300 MG tablet TAKE ONE TABLET BY MOUTH DAILY (Patient taking differently: 300 mg nightly ) 90 tablet 0    hydroCHLOROthiazide (HYDRODIURIL) 25 MG tablet TAKE ONE TABLET BY MOUTH DAILY 90 tablet 0    carvedilol (COREG) 25 MG tablet TAKE ONE TABLET BY MOUTH TWICE A  tablet 1    melatonin 3 MG TABS tablet Take 1 tablet by mouth nightly 30 tablet 1    aspirin 81 MG tablet Take 81 mg by mouth daily      Cholecalciferol (VITAMIN D3) 5000 units TABS Take 1 tablet by mouth daily      [DISCONTINUED] ibuprofen (ADVIL;MOTRIN) 600 MG tablet Take 1 tablet by mouth 3 times daily as needed for Pain 45 tablet 0    Selenium 200 MCG CAPS Take 1 capsule by mouth      Multiple Vitamin (MULTIVITAMIN PO) Take  by mouth. Vitals:    03/24/21 0950 03/24/21 1034   BP: (!) 172/95 (!) 160/86   Site: Left Upper Arm Left Upper Arm   Position: Sitting Sitting   Cuff Size: Large Adult Large Adult   Pulse: 88    Resp: 14    Temp: 97.3 °F (36.3 °C)    SpO2: 97%    Weight: 205 lb (93 kg)      Body mass index is 35.19 kg/m². Physical Exam  Nursing note reviewed. Constitutional:       General: She is not in acute distress. Appearance: She is well-developed. Eyes:      Extraocular Movements: Extraocular movements intact. Pupils: Pupils are equal, round, and reactive to light. Neck:      Musculoskeletal: Neck supple. Thyroid: No thyromegaly. Vascular: No carotid bruit. Cardiovascular:      Rate and Rhythm: Normal rate and regular rhythm. Heart sounds: Normal heart sounds, S1 normal and S2 normal. No murmur. Pulmonary:      Effort: Pulmonary effort is normal.      Breath sounds: Normal breath sounds. Musculoskeletal:      Right lower leg: Edema (ankle edema) present. Left lower leg: Edema (ankle edema) present.            Results for POC orders placed in visit on 03/24/21   POCT glycosylated hemoglobin (Hb A1C)   Result Value Ref Range    Hemoglobin A1C 5.3 %     Lab Review   Orders Only on 03/23/2021   Component Date Value    Catecholamines Urine Int* 03/23/2021 See Note     Creatinine, Ur 03/23/2021 176     Creatinine, 24H Ur 20/21/8307 Not Applicable     Dopamine , 24H Ur 14/07/7591 Not Applicable     Norepinephrine, 24H Ur 15/46/0742 Not Applicable     Epinephrine, 24H Ur 14/45/7247 Not Applicable     Epinephrine, (uG/L) 03/23/2021 15     Norepinephrine, (ug/L) 03/23/2021 144     Dopamine, (ug/L) 03/23/2021 314     Norepinephrine 03/23/2021 82*    Dopamine 03/23/2021 178     Epinephrine 03/23/2021 9     Urine Total Volume 03/23/2021 NA     Hours Collected 03/23/2021 24     Cortisol - AM 03/23/2021 16.0     Rejected Test 03/25/2021 70,073     Reason for Rejection 03/25/2021 see below    Orders Only on 03/17/2021   Component Date Value    Sodium 03/17/2021 144     Potassium 03/17/2021 3.8     Chloride 03/17/2021 103     CO2 03/17/2021 27     Anion Gap 03/17/2021 14     Glucose 03/17/2021 116*    BUN 03/17/2021 13     CREATININE 03/17/2021 0.6     GFR Non- 03/17/2021 >60     GFR  03/17/2021 >60     Calcium 03/17/2021 9.0     Color, UA 03/17/2021 YELLOW     Clarity, UA 03/17/2021 Clear     Glucose, Ur 03/17/2021 Negative     Bilirubin Urine 03/17/2021 Negative     Ketones, Urine 03/17/2021 Negative     Specific Gravity, UA 03/17/2021 1.018     Blood, Urine 03/17/2021 Negative     pH, UA 03/17/2021 7.5     Protein, UA 03/17/2021 Negative     Urobilinogen, Urine 03/17/2021 0.2     Nitrite, Urine 03/17/2021 Negative     Leukocyte Esterase, Urine 03/17/2021 Negative     Microscopic Examination 03/17/2021 Not Indicated     Urine Type 03/17/2021 Voided    Admission on 03/11/2021, Discharged on 03/13/2021   Component Date Value    WBC 03/11/2021 5.8     RBC 03/11/2021 4.86     Hemoglobin 03/11/2021 14.9     Hematocrit 03/11/2021 44.8     MCV 03/11/2021 92.2     MCH 03/11/2021 30.6     MCHC 03/11/2021 33.2     RDW 03/11/2021 13.7     Platelets 90/32/5749 220     MPV 03/11/2021 8.1     Neutrophils % 03/11/2021 60.2     Lymphocytes % 03/11/2021 26.4     Monocytes % 03/11/2021 9.4     Eosinophils % 03/11/2021 3.2     Basophils % 03/11/2021 0.8     Neutrophils Absolute 03/11/2021 3.5     Lymphocytes Absolute 03/11/2021 1.5     Monocytes Absolute 03/11/2021 0.5     Eosinophils Absolute 03/11/2021 0.2     Basophils Absolute 03/11/2021 0.0     Sodium 03/11/2021 138     Potassium reflex Magnesi* 03/11/2021 6.0*    Chloride 03/11/2021 101     CO2 03/11/2021 26     Anion Gap 03/11/2021 11     Glucose 03/11/2021 103*    BUN 03/11/2021 11     CREATININE 03/11/2021 0.5*    GFR Non- 03/11/2021 >60     GFR  03/11/2021 >60     Calcium 03/11/2021 9.2     Troponin 03/11/2021 <0.01     Ventricular Rate 03/11/2021 57     Atrial Rate 03/11/2021 57     P-R Interval 03/11/2021 180     QRS Duration 03/11/2021 90     Q-T Interval 03/11/2021 494     QTc Calculation (Bazett) 03/11/2021 480     P Axis 03/11/2021 55     R Axis 03/11/2021 58     T Axis 03/11/2021 54     Diagnosis 03/11/2021 EKG performed in ER and to be interpreted by ER physician. Confirmed by MD, ER ([id number]),  Maulik Schmitz ([phone]) on 3/11/2021 11:36:32 AM     Potassium 03/11/2021 3.6     Pro-BNP 03/11/2021 79     Sodium 03/12/2021 142     Potassium reflex Magnesi* 03/12/2021 3.4*    Chloride 03/12/2021 105     CO2 03/12/2021 26     Anion Gap 03/12/2021 11     Glucose 03/12/2021 116*    BUN 03/12/2021 16     CREATININE 03/12/2021 0.6     GFR Non- 03/12/2021 >60     GFR  03/12/2021 >60     Calcium 03/12/2021 9.7     WBC 03/12/2021 8.4     RBC 03/12/2021 4.78     Hemoglobin 03/12/2021 14.7     Hematocrit 03/12/2021 43.8     MCV 03/12/2021 91.7     MCH 03/12/2021 30.8     MCHC 03/12/2021 33.6     RDW 03/12/2021 13.6     Platelets 38/87/6023 236     MPV 03/12/2021 7.9     Neutrophils % 03/12/2021 74.5     Lymphocytes % 03/12/2021 15.7     Monocytes % 03/12/2021 7.8     Eosinophils % 03/12/2021 1.3     Basophils % 03/12/2021 0.7     Neutrophils Absolute 03/12/2021 6.2     Lymphocytes Absolute 03/12/2021 1.3     Monocytes Absolute 03/12/2021 0.7     Eosinophils Absolute 03/12/2021 0.1     Basophils Absolute 03/12/2021 0.1     Magnesium 03/12/2021 2.20     Protime 03/12/2021 11.9     INR 03/12/2021 1.03     Left Ventricular Ejectio* 03/12/2021 55     LEFT VENTRICULAR EJECTIO* 03/12/2021 Cardiac Cath     Sodium 03/13/2021 141     Potassium reflex Magnesi* 03/13/2021 3.3*    Chloride 03/13/2021 103     CO2 03/13/2021 25     Anion Gap 03/13/2021 13     Glucose 03/13/2021 105*    BUN 03/13/2021 12     CREATININE 03/13/2021 0.6     GFR Non- 03/13/2021 >60     GFR  03/13/2021 >60     Calcium 03/13/2021 9.4     WBC 03/13/2021 6.8     RBC 03/13/2021 4.60     Hemoglobin 03/13/2021 14.2     Hematocrit 03/13/2021 42.2     MCV 03/13/2021 91.8     MCH 03/13/2021 30.9     MCHC 03/13/2021 33.7     RDW 03/13/2021 13.8     Platelets 51/94/9209 213     MPV 03/13/2021 7.8     Neutrophils % 03/13/2021 62.6     Lymphocytes % 03/13/2021 23.8     Monocytes % 03/13/2021 10.6     Eosinophils % 03/13/2021 2.3     Basophils % 03/13/2021 0.7     Neutrophils Absolute 03/13/2021 4.3     Lymphocytes Absolute 03/13/2021 1.6     Monocytes Absolute 03/13/2021 0.7     Eosinophils Absolute 03/13/2021 0.2     Basophils Absolute 03/13/2021 0.0     Magnesium 03/13/2021 2.20    Orders Only on 03/03/2021   Component Date Value    WBC 03/03/2021 6.9     RBC 03/03/2021 4.62     Hemoglobin 03/03/2021 14.2     Hematocrit 03/03/2021 42.1     MCV 03/03/2021 91.3     MCH 03/03/2021 30.7     MCHC 03/03/2021 33.7     RDW 03/03/2021 14.1     Platelets 02/74/1990 260     MPV 03/03/2021 9.8     Sodium 03/03/2021 142     Potassium 03/03/2021 3.8     Chloride 03/03/2021 100     CO2 03/03/2021 26     Anion Gap 03/03/2021 16     Glucose 03/03/2021 91     BUN 03/03/2021 12     CREATININE 03/03/2021 <0.5*    GFR Non- 03/03/2021 >60     GFR  03/03/2021 >60     Calcium 03/03/2021 9.3    Orders Only on 02/04/2021   Component Date Value    Lipase 02/04/2021 26.0     Sodium 02/04/2021 144     Potassium 02/04/2021 4.0     Chloride 02/04/2021 103     CO2 02/04/2021 28     Anion Gap 02/04/2021 13     Glucose 02/04/2021 90     BUN 02/04/2021 12     CREATININE 02/04/2021 0.6     GFR Non- 02/04/2021 >60     GFR  02/04/2021 >60     Calcium 02/04/2021 9.6     Total Protein 02/04/2021 6.8     Albumin 02/04/2021 4.6     Albumin/Globulin Ratio 02/04/2021 2.1     Total Bilirubin 02/04/2021 0.4     Alkaline Phosphatase 02/04/2021 57     ALT 02/04/2021 36     AST 02/04/2021 26     Globulin 02/04/2021 2.2     WBC 02/04/2021 5.7     RBC 02/04/2021 4.69     Hemoglobin 02/04/2021 14.3     Hematocrit 02/04/2021 43.3     MCV 02/04/2021 92.3     MCH 02/04/2021 30.5     MCHC 02/04/2021 33.0     RDW 02/04/2021 13.7     Platelets 04/32/5819 255     MPV 02/04/2021 10.1     Neutrophils % 02/04/2021 63.0     Lymphocytes % 02/04/2021 23.2     Monocytes % 02/04/2021 9.3     Eosinophils % 02/04/2021 3.7     Basophils % 02/04/2021 0.8     Neutrophils Absolute 02/04/2021 3.6     Lymphocytes Absolute 02/04/2021 1.3     Monocytes Absolute 02/04/2021 0.5     Eosinophils Absolute 02/04/2021 0.2     Basophils Absolute 02/04/2021 0.0          Assessment/Plan     1. Uncontrolled hypertension  - blood pressure is high but much better   -Continue same medications  -Decrease Amlodipine to 5 mg once daily due to edema  -Start hydrALAZINE (APRESOLINE) 25 MG tablet; Take 1 tablet by mouth 2 times daily  Dispense: 60 tablet;  Refill: 0  - Low sodium diet  - Regular aerobic exercise    2. Dizziness  -same  -may be due to blood pressure or blood pressure medication  -continue to stay hydrated  -continue working on blood pressure control with medication adjustments    3. Hyperglycemia  - POCT glycosylated hemoglobin (Hb A1C) done and is normal      Discussed medications with patient, who voiced understanding of their use and indications. All questions answered. Return in about 1 week (around 3/31/2021) for uncontrolled hypertension and edema.

## 2021-03-24 NOTE — TELEPHONE ENCOUNTER
Spoke with patient, she has been putting ice and taking Tylenol. Seeing Jatin Knapp NP to be checked over.

## 2021-03-24 NOTE — PATIENT INSTRUCTIONS
1. Uncontrolled hypertension  - blood pressure is high but much better   -Continue same medications  -Decrease Amlodipine to 5 mg once daily due to edema  -Start hydrALAZINE (APRESOLINE) 25 MG tablet; Take 1 tablet by mouth 2 times daily  Dispense: 60 tablet; Refill: 0  - Low sodium diet  - Regular aerobic exercise    2. Dizziness  -same  -may be due to blood pressure or blood pressure medication  -continue to stay hydrated  -continue working on blood pressure control with medication adjustments    3.  Hyperglycemia  - POCT glycosylated hemoglobin (Hb A1C) done and is normal

## 2021-03-24 NOTE — TELEPHONE ENCOUNTER
Patient called to speak with 's nurse about her wrist. She had a procedure by  about 10 days ago and her wrist is still red and swollen and painful .  Please advise [phone]

## 2021-03-25 ENCOUNTER — TELEPHONE (OUTPATIENT)
Dept: CARDIOLOGY CLINIC | Age: 66
End: 2021-03-25

## 2021-03-25 ENCOUNTER — OFFICE VISIT (OUTPATIENT)
Dept: CARDIOLOGY CLINIC | Age: 66
End: 2021-03-25
Payer: MEDICARE

## 2021-03-25 VITALS
BODY MASS INDEX: 35.36 KG/M2 | SYSTOLIC BLOOD PRESSURE: 120 MMHG | HEART RATE: 68 BPM | DIASTOLIC BLOOD PRESSURE: 80 MMHG | WEIGHT: 206 LBS | TEMPERATURE: 98.2 F

## 2021-03-25 DIAGNOSIS — Z98.890 H/O CORONARY ANGIOGRAM: ICD-10-CM

## 2021-03-25 DIAGNOSIS — I10 UNCONTROLLED HYPERTENSION: Primary | ICD-10-CM

## 2021-03-25 LAB
REASON FOR REJECTION: NORMAL
REJECTED TEST: NORMAL

## 2021-03-25 PROCEDURE — 1123F ACP DISCUSS/DSCN MKR DOCD: CPT | Performed by: NURSE PRACTITIONER

## 2021-03-25 PROCEDURE — G8484 FLU IMMUNIZE NO ADMIN: HCPCS | Performed by: NURSE PRACTITIONER

## 2021-03-25 PROCEDURE — 3017F COLORECTAL CA SCREEN DOC REV: CPT | Performed by: NURSE PRACTITIONER

## 2021-03-25 PROCEDURE — 4040F PNEUMOC VAC/ADMIN/RCVD: CPT | Performed by: NURSE PRACTITIONER

## 2021-03-25 PROCEDURE — 1090F PRES/ABSN URINE INCON ASSESS: CPT | Performed by: NURSE PRACTITIONER

## 2021-03-25 PROCEDURE — 1111F DSCHRG MED/CURRENT MED MERGE: CPT | Performed by: NURSE PRACTITIONER

## 2021-03-25 PROCEDURE — G8427 DOCREV CUR MEDS BY ELIG CLIN: HCPCS | Performed by: NURSE PRACTITIONER

## 2021-03-25 PROCEDURE — G8400 PT W/DXA NO RESULTS DOC: HCPCS | Performed by: NURSE PRACTITIONER

## 2021-03-25 PROCEDURE — G8417 CALC BMI ABV UP PARAM F/U: HCPCS | Performed by: NURSE PRACTITIONER

## 2021-03-25 PROCEDURE — 99214 OFFICE O/P EST MOD 30 MIN: CPT | Performed by: NURSE PRACTITIONER

## 2021-03-25 PROCEDURE — 1036F TOBACCO NON-USER: CPT | Performed by: NURSE PRACTITIONER

## 2021-03-25 NOTE — PROGRESS NOTES
[facility]     Outpatient Follow Up Note  CHIEF COMPLAINT / HPI:  Follow Up   Pt of Dr. Savanna Perdue is 77 y.o. female who presents today with a history of recent LHC with normal cors & normal renal arteries  / HTN hyperlipidemia, HFpEF / obesity .    Echo 10/2019: Normal except for indeterminate diastolic function / EF 88%   CCTA 10/2019: Moderate diffuse calcifications of the coronaries without any significant stenosis    Interval history:  today she has no c/o cp/SOB/edema/ VSS/wt stable   has ecchymosis and mild swelling in right wrist / her pulses radial & ulna are 2/3+  fingers are warm / no mild tenderness  / to call me if any changes   Body mass index is 35.36 kg/m².' discussed diet & actiivty    states needs lap kathrine when her b/p stays wnl / recommend no NSAIDS / tylenol only   complaint with meds   Discussed nutrition / sodium intake / fluid intake   Recommend activity as tolerated   discussed diet / fluid intake      Past Medical History:   Diagnosis Date    Fissure, anal     Lorenzo YOO    Goiter     History of mammogram 10/2010    [address] [place] [address] /[facility] - [place]    Hyperlipidemia     Hypertension     Onychomycosis     Pap smear for cervical cancer screening 2/2010    Dr. Eva Eid - wnl    Prediabetes 12/23/2019    Screening for osteoporosis     Dexa 12/2005 - wnl    Vitamin D deficiency 11/10/2011     Social History:    Social History     Tobacco Use   Smoking Status Never Smoker   Smokeless Tobacco Never Used     Current Medications:  Current Outpatient Medications   Medication Sig Dispense Refill    hydrALAZINE (APRESOLINE) 25 MG tablet Take 1 tablet by mouth 2 times daily 60 tablet 0    amLODIPine (NORVASC) 5 MG tablet Take by mouth daily       cloNIDine (CATAPRES) 0.1 MG tablet Take 1 tablet by mouth three times daily      vitamin D3 (CHOLECALCIFEROL) 25 MCG (1000 UT) TABS tablet Take 1 tablet by mouth daily 30 tablet 2    atorvastatin (LIPITOR) 80 MG No increased work of breathing and clear to auscultation, no crackles or wheezing. CARDIOVASCULAR:  Regular rate and rhythm without murmur. Normal S1 and S2. No gallops or rubs. Normal PMI. No elevation of JVP. Normal carotid pulses with no bruits. GI:  Normal bowel sounds. Non-distended. Non-tender to palpation  EXT: No edema. No calf tenderness. Pulses are present bilaterally.     Wt Readings from Last 3 Encounters:   03/25/21 206 lb (93.4 kg)   03/24/21 205 lb (93 kg)   03/19/21 207 lb (93.9 kg)      Pulse Readings from Last 3 Encounters:   03/25/21 68   03/24/21 88   03/19/21 72     BP Readings from Last 3 Encounters:   03/25/21 120/80   03/24/21 (!) 160/86   03/19/21 (!) 168/90        DATA:    Lab Results   Component Value Date    ALT 36 02/04/2021    AST 26 02/04/2021    ALKPHOS 57 02/04/2021    BILITOT 0.4 02/04/2021     Lab Results   Component Value Date    CREATININE 0.6 03/17/2021    BUN 13 03/17/2021     03/17/2021    K 3.8 03/17/2021     03/17/2021    CO2 27 03/17/2021     Lab Results   Component Value Date    TSH 0.60 04/16/2020    M1PHXDG 8.00 01/09/2016     Lab Results   Component Value Date    WBC 6.8 03/13/2021    HGB 14.2 03/13/2021    HCT 42.2 03/13/2021    MCV 91.8 03/13/2021     03/13/2021     Lab Results   Component Value Date    TRIG 182 (H) 07/21/2020    TRIG 165 (H) 12/03/2019    TRIG 172 (H) 06/10/2019     Lab Results   Component Value Date    HDL 66 (H) 07/21/2020    HDL 69 (H) 12/03/2019    HDL 62 (H) 06/10/2019     Lab Results   Component Value Date    LDLCALC 87 07/21/2020    LDLCALC 62 12/03/2019    LDLCALC 85 06/10/2019     Lab Results   Component Value Date    LABVLDL 36 07/21/2020    LABVLDL 33 12/03/2019    LABVLDL 34 06/10/2019      No results found for: BNP  Radiology Review:  Pertinent images / reports were reviewed as a part of this visit and reveals the following:    Last EKG Echo:Last Stress Test:Last Angiogram:reviewed     Assessment:     recent [facility] with normal cors & normal renal arteries 3/2021      HTN   Optimal        HLD   LDL 87     HFpEF   euvolemic     Obesity  Body mass index is 35.36 kg/m². [place] Plan:  has ecchymosis and mild swelling in right wrist / her pulses radial & ulna are 2/3+  fingers are warm / no mild tenderness  / to call me if any changes   states needs lap kathrine when her b/p stays wnl / recommend no NSAIDS / tylenol only   Overall the patient is stable from CV standpoint  From a cardiac standpoint she may have her lap kathrine   Fu with Dr. Belgica Rosales as planned     I have addressed the patient's cardiac risk factors and adjusted pharmacologic treatment as needed. In addition, I have reinforced the need for patient directed risk factor modification. Further evaluation will be based upon the patient's clinical course and testing results. All questions and concerns were addressed to the patient. Alternatives to my treatment were discussed. The patient verbalizes understanding not to stop medications without discussing with us. Patient is on a beta-blocker  Patient is on a statin  Single antiplatelet therapy   Discussed exercise: 30min of sustained cardiovascular exercise most days of the week   Discussed Low saturated fat / Cholesterol diet. Thank you for allowing us to participate in the care of [address]     Chikis MORENO [facility]     Documentation of today's visit sent to PCP

## 2021-03-26 ENCOUNTER — TELEPHONE (OUTPATIENT)
Dept: CARDIOLOGY CLINIC | Age: 66
End: 2021-03-26

## 2021-03-26 DIAGNOSIS — I25.10 CORONARY ARTERY DISEASE INVOLVING NATIVE CORONARY ARTERY OF NATIVE HEART WITHOUT ANGINA PECTORIS: Primary | ICD-10-CM

## 2021-03-26 DIAGNOSIS — I25.10 CORONARY ARTERY DISEASE INVOLVING NATIVE CORONARY ARTERY OF NATIVE HEART WITHOUT ANGINA PECTORIS: ICD-10-CM

## 2021-03-26 LAB
CATE U INT: ABNORMAL
CREATININE 24 HOUR URINE: ABNORMAL MG/D (ref 500–1400)
CREATININE URINE: 176 MG/DL
DOPAMINE (G CRT): 178 UG/G CRT (ref 0–250)
DOPAMINE 24 HOUR URINE: ABNORMAL UG/D (ref 71–485)
DOPAMINE, (UG/L): 314 UG/L
EPINEPHRINE (G CRT): 9 UG/G CRT (ref 0–20)
EPINEPHRINE 24 HOUR URINE: ABNORMAL (ref 1–14)
EPINEPHRINE, (UG/L): 15 UG/L
HOURS COLLECTED: 24
NOREPINEPH (G CRT): 82 UG/G CRT (ref 0–45)
NOREPINEPHRINE 24 HOUR URINE: ABNORMAL UG/D (ref 14–120)
NOREPINEPHRINE, (UG/L): 144 UG/L
URINE TOTAL VOLUME: ABNORMAL

## 2021-03-26 NOTE — TELEPHONE ENCOUNTER
The patient called requesting her lab and urine results. The patient is requesting a call back as soon as possible, because on Mychart it's saying something about a tumor. Please call the patient back at [phone].

## 2021-04-01 ENCOUNTER — TELEPHONE (OUTPATIENT)
Dept: PRIMARY CARE CLINIC | Age: 66
End: 2021-04-01

## 2021-04-01 ENCOUNTER — OFFICE VISIT (OUTPATIENT)
Dept: PRIMARY CARE CLINIC | Age: 66
End: 2021-04-01
Payer: MEDICARE

## 2021-04-01 ENCOUNTER — TELEPHONE (OUTPATIENT)
Dept: CARDIOLOGY CLINIC | Age: 66
End: 2021-04-01

## 2021-04-01 VITALS
BODY MASS INDEX: 35.36 KG/M2 | HEART RATE: 67 BPM | TEMPERATURE: 97 F | WEIGHT: 206 LBS | DIASTOLIC BLOOD PRESSURE: 90 MMHG | SYSTOLIC BLOOD PRESSURE: 158 MMHG | RESPIRATION RATE: 12 BRPM | OXYGEN SATURATION: 97 %

## 2021-04-01 DIAGNOSIS — I10 ESSENTIAL HYPERTENSION: ICD-10-CM

## 2021-04-01 DIAGNOSIS — R82.5 ELEVATED URINE LEVELS OF CATECHOLAMINES: ICD-10-CM

## 2021-04-01 DIAGNOSIS — I10 UNCONTROLLED HYPERTENSION: Primary | ICD-10-CM

## 2021-04-01 PROCEDURE — 3017F COLORECTAL CA SCREEN DOC REV: CPT | Performed by: INTERNAL MEDICINE

## 2021-04-01 PROCEDURE — G8427 DOCREV CUR MEDS BY ELIG CLIN: HCPCS | Performed by: INTERNAL MEDICINE

## 2021-04-01 PROCEDURE — 99214 OFFICE O/P EST MOD 30 MIN: CPT | Performed by: INTERNAL MEDICINE

## 2021-04-01 PROCEDURE — 4040F PNEUMOC VAC/ADMIN/RCVD: CPT | Performed by: INTERNAL MEDICINE

## 2021-04-01 PROCEDURE — 1123F ACP DISCUSS/DSCN MKR DOCD: CPT | Performed by: INTERNAL MEDICINE

## 2021-04-01 PROCEDURE — 1111F DSCHRG MED/CURRENT MED MERGE: CPT | Performed by: INTERNAL MEDICINE

## 2021-04-01 PROCEDURE — 1036F TOBACCO NON-USER: CPT | Performed by: INTERNAL MEDICINE

## 2021-04-01 PROCEDURE — 1090F PRES/ABSN URINE INCON ASSESS: CPT | Performed by: INTERNAL MEDICINE

## 2021-04-01 PROCEDURE — G8400 PT W/DXA NO RESULTS DOC: HCPCS | Performed by: INTERNAL MEDICINE

## 2021-04-01 PROCEDURE — G8417 CALC BMI ABV UP PARAM F/U: HCPCS | Performed by: INTERNAL MEDICINE

## 2021-04-01 RX ORDER — AMLODIPINE BESYLATE 5 MG/1
2.5 TABLET ORAL DAILY
Qty: 30 TABLET | Refills: 0 | Status: SHIPPED | OUTPATIENT
Start: 2021-04-01 | End: 2021-04-29 | Stop reason: ALTCHOICE

## 2021-04-01 RX ORDER — HYDRALAZINE HYDROCHLORIDE 25 MG/1
25 TABLET, FILM COATED ORAL 2 TIMES DAILY
Qty: 60 TABLET | Refills: 0 | Status: SHIPPED
Start: 2021-04-01 | End: 2021-04-05 | Stop reason: DRUGHIGH

## 2021-04-01 RX ORDER — HYDRALAZINE HYDROCHLORIDE 25 MG/1
25 TABLET, FILM COATED ORAL 3 TIMES DAILY
Qty: 90 TABLET | Refills: 0 | Status: SHIPPED | OUTPATIENT
Start: 2021-04-01 | End: 2021-04-29

## 2021-04-01 RX ORDER — CARVEDILOL 25 MG/1
TABLET ORAL
Qty: 180 TABLET | Refills: 1 | Status: SHIPPED | OUTPATIENT
Start: 2021-04-01 | End: 2021-09-29

## 2021-04-01 NOTE — PROGRESS NOTES
Paloma Browne   Date ofBirth:  1955    Date of Visit:  4/1/2021    Chief Complaint   Patient presents with    Hypertension    Abnormal Hormonal Screen       HPI  Hypertension- Patient takes Amlodipine 5mg po qday, Clonidine 0.1mg po tid, Irbesartan 300mg po q day, HCTZ 25mg po q day, Carvedilol 25mg po bid, and Hydralazine  25mg po bid. Patient states her BP is pretty good at home. Patient states it has been 128//80. Patient states her ankle swelling is better with decrease in Amlodipine. Patient wants to discontinue Amlodipine due to swelling. Patient states she had an abnormal hormone level in labs ordered by Cardiology, Dr. Kenya Phelps. Review of Systems   Constitutional: Negative for chills, fatigue and fever. HENT: Negative for congestion, postnasal drip, rhinorrhea, sinus pressure and sore throat. Eyes: Negative for visual disturbance. Respiratory: Negative for cough, chest tightness, shortness of breath and wheezing. Cardiovascular: Positive for leg swelling. Negative for chest pain and palpitations. Gastrointestinal: Negative for abdominal pain, blood in stool, constipation, diarrhea, nausea and vomiting. Genitourinary: Negative for dysuria, frequency and hematuria. Musculoskeletal: Negative for arthralgias and myalgias. Skin: Negative for rash. Neurological: Negative for dizziness, tremors, syncope, weakness, light-headedness, numbness and headaches. Psychiatric/Behavioral: Negative for dysphoric mood and sleep disturbance. The patient is not nervous/anxious.         Allergies   Allergen Reactions    Sulfa Antibiotics Rash     Outpatient Medications Marked as Taking for the 4/1/21 encounter (Office Visit) with Eagle Coley MD   Medication Sig Dispense Refill    carvedilol (COREG) 25 MG tablet TAKE ONE TABLET BY MOUTH TWICE A  tablet 1    hydrALAZINE (APRESOLINE) 25 MG tablet Take 1 tablet by mouth 2 times daily 60 tablet 0    cloNIDine (CATAPRES) 0.1 MG tablet Take 1 tablet by mouth three times daily      amLODIPine (NORVASC) 5 MG tablet Take by mouth daily       vitamin D3 (CHOLECALCIFEROL) 25 MCG (1000 UT) TABS tablet Take 1 tablet by mouth daily 30 tablet 2    atorvastatin (LIPITOR) 80 MG tablet TAKE ONE TABLET BY MOUTH ONCE NIGHTLY 90 tablet 0    irbesartan (AVAPRO) 300 MG tablet TAKE ONE TABLET BY MOUTH DAILY (Patient taking differently: 300 mg nightly ) 90 tablet 0    hydroCHLOROthiazide (HYDRODIURIL) 25 MG tablet TAKE ONE TABLET BY MOUTH DAILY 90 tablet 0    melatonin 3 MG TABS tablet Take 1 tablet by mouth nightly 30 tablet 1    aspirin 81 MG tablet Take 81 mg by mouth daily      Cholecalciferol (VITAMIN D3) 5000 units TABS Take 1 tablet by mouth daily      Selenium 200 MCG CAPS Take 1 capsule by mouth      Multiple Vitamin (MULTIVITAMIN PO) Take  by mouth. Vitals:    04/01/21 1022 04/01/21 1025   BP: (!) 168/98 (!) 158/90   Pulse: 67    Resp: 12    Temp: 97 °F (36.1 °C)    SpO2: 97%    Weight: 206 lb (93.4 kg)      Body mass index is 35.36 kg/m². Physical Exam  Nursing note reviewed. Constitutional:       General: She is not in acute distress. Appearance: Normal appearance. She is well-developed. Eyes:      General: Lids are normal.      Extraocular Movements: Extraocular movements intact. Conjunctiva/sclera: Conjunctivae normal.      Pupils: Pupils are equal, round, and reactive to light. Neck:      Musculoskeletal: Neck supple. Thyroid: No thyromegaly. Vascular: No carotid bruit. Cardiovascular:      Rate and Rhythm: Normal rate and regular rhythm. Heart sounds: Normal heart sounds, S1 normal and S2 normal. No murmur. No friction rub. No gallop. Pulmonary:      Effort: Pulmonary effort is normal.      Breath sounds: Normal breath sounds. No wheezing, rhonchi or rales. Abdominal:      General: Bowel sounds are normal. There is no distension. Palpations: Abdomen is soft. Tenderness: There is no abdominal tenderness. Musculoskeletal:      Right lower leg: Edema (ankle ) present. Left lower leg: Edema (ankle) present. Lymphadenopathy:      Head:      Right side of head: No submandibular adenopathy. Left side of head: No submandibular adenopathy. Neurological:      Mental Status: She is alert and oriented to person, place, and time. Psychiatric:         Mood and Affect: Mood normal.           No results found for this visit on 04/01/21.   Lab Review   Office Visit on 03/24/2021   Component Date Value    Hemoglobin A1C 03/24/2021 5.3    Orders Only on 03/23/2021   Component Date Value    Catecholamines Urine Int* 03/23/2021 See Note     Creatinine, Ur 03/23/2021 176     Creatinine, 24H Ur 86/57/8996 Not Applicable     Dopamine , 24H Ur 56/73/7348 Not Applicable     Norepinephrine, 24H Ur 42/10/5200 Not Applicable     Epinephrine, 24H Ur 08/65/4492 Not Applicable     Epinephrine, (uG/L) 03/23/2021 15     Norepinephrine, (ug/L) 03/23/2021 144     Dopamine, (ug/L) 03/23/2021 314     Norepinephrine 03/23/2021 82*    Dopamine 03/23/2021 178     Epinephrine 03/23/2021 9     Urine Total Volume 03/23/2021 NA     Hours Collected 03/23/2021 24     Cortisol - AM 03/23/2021 16.0     Rejected Test 03/25/2021 70,073     Reason for Rejection 03/25/2021 see below    Orders Only on 03/17/2021   Component Date Value    Sodium 03/17/2021 144     Potassium 03/17/2021 3.8     Chloride 03/17/2021 103     CO2 03/17/2021 27     Anion Gap 03/17/2021 14     Glucose 03/17/2021 116*    BUN 03/17/2021 13     CREATININE 03/17/2021 0.6     GFR Non- 03/17/2021 >60     GFR  03/17/2021 >60     Calcium 03/17/2021 9.0     Color, UA 03/17/2021 YELLOW     Clarity, UA 03/17/2021 Clear     Glucose, Ur 03/17/2021 Negative     Bilirubin Urine 03/17/2021 Negative     Ketones, Urine 03/17/2021 Negative     Specific Gravity, UA 03/17/2021 1.018  Blood, Urine 03/17/2021 Negative     pH, UA 03/17/2021 7.5     Protein, UA 03/17/2021 Negative     Urobilinogen, Urine 03/17/2021 0.2     Nitrite, Urine 03/17/2021 Negative     Leukocyte Esterase, Urine 03/17/2021 Negative     Microscopic Examination 03/17/2021 Not Indicated     Urine Type 03/17/2021 Voided    Admission on 03/11/2021, Discharged on 03/13/2021   Component Date Value    WBC 03/11/2021 5.8     RBC 03/11/2021 4.86     Hemoglobin 03/11/2021 14.9     Hematocrit 03/11/2021 44.8     MCV 03/11/2021 92.2     MCH 03/11/2021 30.6     MCHC 03/11/2021 33.2     RDW 03/11/2021 13.7     Platelets 21/57/9807 220     MPV 03/11/2021 8.1     Neutrophils % 03/11/2021 60.2     Lymphocytes % 03/11/2021 26.4     Monocytes % 03/11/2021 9.4     Eosinophils % 03/11/2021 3.2     Basophils % 03/11/2021 0.8     Neutrophils Absolute 03/11/2021 3.5     Lymphocytes Absolute 03/11/2021 1.5     Monocytes Absolute 03/11/2021 0.5     Eosinophils Absolute 03/11/2021 0.2     Basophils Absolute 03/11/2021 0.0     Sodium 03/11/2021 138     Potassium reflex Magnesi* 03/11/2021 6.0*    Chloride 03/11/2021 101     CO2 03/11/2021 26     Anion Gap 03/11/2021 11     Glucose 03/11/2021 103*    BUN 03/11/2021 11     CREATININE 03/11/2021 0.5*    GFR Non- 03/11/2021 >60     GFR  03/11/2021 >60     Calcium 03/11/2021 9.2     Troponin 03/11/2021 <0.01     Ventricular Rate 03/11/2021 57     Atrial Rate 03/11/2021 57     P-R Interval 03/11/2021 180     QRS Duration 03/11/2021 90     Q-T Interval 03/11/2021 494     QTc Calculation (Bazett) 03/11/2021 480     P Axis 03/11/2021 55     R Axis 03/11/2021 58     T Axis 03/11/2021 54     Diagnosis 03/11/2021 EKG performed in ER and to be interpreted by ER physician. Confirmed by MD, ER ([id number]),  Frances Pal ([phone]) on 3/11/2021 11:36:32 AM     Potassium 03/11/2021 3.6     Pro-BNP 03/11/2021 79     Sodium 03/12/2021 142     Potassium reflex Magnesi* 03/12/2021 3.4*    Chloride 03/12/2021 105     CO2 03/12/2021 26     Anion Gap 03/12/2021 11     Glucose 03/12/2021 116*    BUN 03/12/2021 16     CREATININE 03/12/2021 0.6     GFR Non- 03/12/2021 >60     GFR  03/12/2021 >60     Calcium 03/12/2021 9.7     WBC 03/12/2021 8.4     RBC 03/12/2021 4.78     Hemoglobin 03/12/2021 14.7     Hematocrit 03/12/2021 43.8     MCV 03/12/2021 91.7     MCH 03/12/2021 30.8     MCHC 03/12/2021 33.6     RDW 03/12/2021 13.6     Platelets 13/76/1300 236     MPV 03/12/2021 7.9     Neutrophils % 03/12/2021 74.5     Lymphocytes % 03/12/2021 15.7     Monocytes % 03/12/2021 7.8     Eosinophils % 03/12/2021 1.3     Basophils % 03/12/2021 0.7     Neutrophils Absolute 03/12/2021 6.2     Lymphocytes Absolute 03/12/2021 1.3     Monocytes Absolute 03/12/2021 0.7     Eosinophils Absolute 03/12/2021 0.1     Basophils Absolute 03/12/2021 0.1     Magnesium 03/12/2021 2.20     Protime 03/12/2021 11.9     INR 03/12/2021 1.03     Left Ventricular Ejectio* 03/12/2021 55     LEFT VENTRICULAR EJECTIO* 03/12/2021 Cardiac Cath     Sodium 03/13/2021 141     Potassium reflex Magnesi* 03/13/2021 3.3*    Chloride 03/13/2021 103     CO2 03/13/2021 25     Anion Gap 03/13/2021 13     Glucose 03/13/2021 105*    BUN 03/13/2021 12     CREATININE 03/13/2021 0.6     GFR Non- 03/13/2021 >60     GFR  03/13/2021 >60     Calcium 03/13/2021 9.4     WBC 03/13/2021 6.8     RBC 03/13/2021 4.60     Hemoglobin 03/13/2021 14.2     Hematocrit 03/13/2021 42.2     MCV 03/13/2021 91.8     MCH 03/13/2021 30.9     MCHC 03/13/2021 33.7     RDW 03/13/2021 13.8     Platelets 31/07/0284 213     MPV 03/13/2021 7.8     Neutrophils % 03/13/2021 62.6     Lymphocytes % 03/13/2021 23.8     Monocytes % 03/13/2021 10.6     Eosinophils % 03/13/2021 2.3     Basophils % 03/13/2021 0.7     Neutrophils Absolute 03/13/2021 4.3     Lymphocytes Absolute 03/13/2021 1.6     Monocytes Absolute 03/13/2021 0.7     Eosinophils Absolute 03/13/2021 0.2     Basophils Absolute 03/13/2021 0.0     Magnesium 03/13/2021 2.20    Orders Only on 03/03/2021   Component Date Value    WBC 03/03/2021 6.9     RBC 03/03/2021 4.62     Hemoglobin 03/03/2021 14.2     Hematocrit 03/03/2021 42.1     MCV 03/03/2021 91.3     MCH 03/03/2021 30.7     MCHC 03/03/2021 33.7     RDW 03/03/2021 14.1     Platelets 18/76/6237 260     MPV 03/03/2021 9.8     Sodium 03/03/2021 142     Potassium 03/03/2021 3.8     Chloride 03/03/2021 100     CO2 03/03/2021 26     Anion Gap 03/03/2021 16     Glucose 03/03/2021 91     BUN 03/03/2021 12     CREATININE 03/03/2021 <0.5*    GFR Non- 03/03/2021 >60     GFR  03/03/2021 >60     Calcium 03/03/2021 9.3    Orders Only on 02/04/2021   Component Date Value    Lipase 02/04/2021 26.0     Sodium 02/04/2021 144     Potassium 02/04/2021 4.0     Chloride 02/04/2021 103     CO2 02/04/2021 28     Anion Gap 02/04/2021 13     Glucose 02/04/2021 90     BUN 02/04/2021 12     CREATININE 02/04/2021 0.6     GFR Non- 02/04/2021 >60     GFR  02/04/2021 >60     Calcium 02/04/2021 9.6     Total Protein 02/04/2021 6.8     Albumin 02/04/2021 4.6     Albumin/Globulin Ratio 02/04/2021 2.1     Total Bilirubin 02/04/2021 0.4     Alkaline Phosphatase 02/04/2021 57     ALT 02/04/2021 36     AST 02/04/2021 26     Globulin 02/04/2021 2.2     WBC 02/04/2021 5.7     RBC 02/04/2021 4.69     Hemoglobin 02/04/2021 14.3     Hematocrit 02/04/2021 43.3     MCV 02/04/2021 92.3     MCH 02/04/2021 30.5     MCHC 02/04/2021 33.0     RDW 02/04/2021 13.7     Platelets 84/65/1316 255     MPV 02/04/2021 10.1     Neutrophils % 02/04/2021 63.0     Lymphocytes % 02/04/2021 23.2     Monocytes % 02/04/2021 9.3     Eosinophils % 02/04/2021 3. 7     Basophils % 02/04/2021 0.8     Neutrophils Absolute 02/04/2021 3.6     Lymphocytes Absolute 02/04/2021 1.3     Monocytes Absolute 02/04/2021 0.5     Eosinophils Absolute 02/04/2021 0.2     Basophils Absolute 02/04/2021 0.0          Assessment/Plan     1. Uncontrolled hypertension  - blood pressure improving but not at goal in the office  - patient reports better blood pressure at home  -Continue same medications  - Decrease amLODIPine (NORVASC) 5 MG tablet; Take 0.5 tablets by mouth daily  Due to ankle edema Dispense: 30 tablet; Refill: 0  - Increase hydrALAZINE (APRESOLINE) 25 MG tablet; Take 1 tablet by mouth 3 times daily  Dispense: 90 tablet; Refill: 0  - work up for secondary causes of hypertension labs ordered by Cardiology, Dr. Jeannette Smalls; Future due to elevated urine catecholamines  -Low sodium diet  -Regular aerobic exercise    2. Elevated urine levels of catecholamines  - CT ABDOMEN W CONTRAST; Future  - Referral to [facility] MD NATALIE, Endocrinology, [facility]      Discussed medications with patient, who voiced understanding of their use and indications. All questions answered. Return in about 3 weeks (around 4/22/2021) for uncontrolled hypertension.

## 2021-04-01 NOTE — TELEPHONE ENCOUNTER
Pt is calling she says she called Dr Marcus Blanco office and she can not get in until May 20th, she thought it was pretty far out but she said she would like for Dr Cora Farris to give her a call when she can so she can discuss this or if there was another endocrinologist to where she can try and get in sooner

## 2021-04-01 NOTE — PATIENT INSTRUCTIONS
1. Uncontrolled hypertension  - blood pressure improving but not at goal in the office  - patient reports better blood pressure at home  -Continue same medications  - Decrease amLODIPine (NORVASC) 5 MG tablet; Take 0.5 tablets by mouth daily  Due to ankle edema Dispense: 30 tablet; Refill: 0  - Increase hydrALAZINE (APRESOLINE) 25 MG tablet; Take 1 tablet by mouth 2 times daily  Dispense: 60 tablet; Refill: 0  - work up for secondary causes of hypertension labs ordered by Cardiology, Dr. Diaz Dural; Future due to elevated urine catecholamines  -Low sodium diet  -Regular aerobic exercise    2. Elevated urine levels of catecholamines  - CT ABDOMEN W CONTRAST; Future  - . Referral to [facility] MD NATALIE, Endocrinology, [facility] HPI Comments: Bonnie Moreno is a 37 y.o. female with PMHx significant for GERD and Anemia and a past surgical Hx significant for a Hysterectomy who presents ambulatory to [facility] ED with cc of generalized body aches, dizziness, a productive cough with yellow sputum, chills, nausea, and diarrhea. Pt reports cough is minimal. Pt reports feeling the onset of her symptoms about 4 days ago. Pt notes multiple sick contacts with people who have the flu; she states multiple people in her [employer] and at work were diagnosed with the flu. Pt denies any fever, vomiting, abdominal pain, dysuria, chest pain, dyspnea or hematuria. PCP: None    Social Hx: - tobacco (-), -EtOH (-), - illicit drugs    There are no other complaints, changes or physical findings at this time. The history is provided by the patient. No  was used. Past Medical History:   Diagnosis Date    Anemia     GERD (gastroesophageal reflux disease)     History of blood transfusion     as of 1/27/16 pt states last transfusion was 8/2013       Past Surgical History:   Procedure Laterality Date    HX CHOLECYSTECTOMY  2/1/16    lap roya    HX GI  2007    gastric sleeve    HX HYSTERECTOMY           Family History:   Problem Relation Age of Onset    Hypertension Mother     Diabetes Mother     Hypertension Father     Hypertension Sister        Social History     Social History    Marital status:      Spouse name: N/A    Number of children: N/A    Years of education: N/A     Occupational History    Not on file. Social History Main Topics    Smoking status: Never Smoker    Smokeless tobacco: Not on file    Alcohol use No    Drug use: No    Sexual activity: No     Other Topics Concern    Not on file     Social History Narrative         ALLERGIES: Review of patient's allergies indicates no known allergies. Review of Systems   Constitutional: Positive for chills. Negative for fatigue and fever.    HENT: Negative for congestion, rhinorrhea and sore throat. Eyes: Negative for pain, discharge and visual disturbance. Respiratory: Positive for cough (yellow sputum). Negative for chest tightness, shortness of breath and wheezing. Cardiovascular: Negative for chest pain, palpitations and leg swelling. Gastrointestinal: Positive for diarrhea and nausea. Negative for abdominal pain, constipation and vomiting. Genitourinary: Negative for dysuria, frequency and hematuria. Musculoskeletal: Positive for myalgias (generalized). Negative for arthralgias and back pain. Skin: Negative for rash. Neurological: Positive for dizziness. Negative for weakness, light-headedness and headaches. Psychiatric/Behavioral: Negative. Vitals:    03/30/17 1054 03/30/17 1112   BP: 144/85 113/67   Pulse: 66    Resp: 18    Temp: 97.7 °F (36.5 °C) 98.6 °F (37 °C)   SpO2: 100% 100%   Weight: 158.3 kg (348 lb 15.8 oz)    Height: 5' 4\" (1.626 m)             Physical Exam   Constitutional: She is oriented to person, place, and time. She appears well-developed and well-nourished. Non-toxic appearance. No distress. HENT:   Head: Normocephalic and atraumatic. Right Ear: Tympanic membrane normal.   Left Ear: Tympanic membrane normal.   Mouth/Throat: Oropharynx is clear and moist. No oropharyngeal exudate. Eyes: EOM are normal. Right eye exhibits no discharge. Left eye exhibits no discharge. No scleral icterus. Neck: Normal range of motion. Neck supple. No tracheal deviation present. Cardiovascular: Normal rate, regular rhythm, normal heart sounds and intact distal pulses. Exam reveals no gallop and no friction rub. No murmur heard. Pulmonary/Chest: Effort normal and breath sounds normal. No respiratory distress. She has no wheezes. She has no rales. Abdominal: Soft. She exhibits no distension. There is no tenderness. Musculoskeletal: Normal range of motion. She exhibits no edema.    Lymphadenopathy:     She has no cervical adenopathy. Neurological: She is alert and oriented to person, place, and time. No focal neuro deficits   Skin: Skin is warm and dry. No rash noted. Psychiatric: She has a normal mood and affect. Nursing note and vitals reviewed. MDM  Number of Diagnoses or Management Options  Viral syndrome:   Diagnosis management comments:     Patient presents to ED with flu-like symptoms. She appears well on exam, nontoxic. Differential includes viral syndrome, influenza, URI, bronchitis, pneumonia, dehydration, electrolyte abnormality. Abdominal exam is benign - do not suspect acute intraabdominal process. - CBC, CMP, Mg, UA  - IVF, toradol and reevaluate         Amount and/or Complexity of Data Reviewed  Clinical lab tests: ordered and reviewed  Tests in the radiology section of CPT®: reviewed and ordered  Review and summarize past medical records: yes    Patient Progress  Patient progress: stable    ED Course       Procedures    PROGRESS NOTE:  12:45 PM  Labs and chest xray unremarkable, discussed symptomatic management of viral syndrome including Tylenol, motrin and fluids. Discussed results, prescriptions and follow up plan with patient. Provided customary return to ED instructions. Patient expressed understanding. Riky Dixon MD      LABORATORY TESTS:  Recent Results (from the past 12 hour(s))   CBC WITH AUTOMATED DIFF    Collection Time: 03/30/17 11:41 AM   Result Value Ref Range    WBC 7.2 3.6 - 11.0 K/uL    RBC 5.24 (H) 3.80 - 5.20 M/uL    HGB 13.3 11.5 - 16.0 g/dL    HCT 41.9 35.0 - 47.0 %    MCV 80.0 80.0 - 99.0 FL    MCH 25.4 (L) 26.0 - 34.0 PG    MCHC 31.7 30.0 - 36.5 g/dL    RDW 14.3 11.5 - 14.5 %    PLATELET 320 253 - 200 K/uL    NEUTROPHILS 60 32 - 75 %    LYMPHOCYTES 31 12 - 49 %    MONOCYTES 7 5 - 13 %    EOSINOPHILS 2 0 - 7 %    BASOPHILS 0 0 - 1 %    ABS. NEUTROPHILS 4.3 1.8 - 8.0 K/UL    ABS. LYMPHOCYTES 2.2 0.8 - 3.5 K/UL    ABS. MONOCYTES 0.5 0.0 - 1.0 K/UL    ABS.  EOSINOPHILS 0.1 0.0 - 0.4 K/UL    ABS. BASOPHILS 0.0 0.0 - 0.1 K/UL   METABOLIC PANEL, COMPREHENSIVE    Collection Time: 03/30/17 11:41 AM   Result Value Ref Range    Sodium 139 136 - 145 mmol/L    Potassium 4.2 3.5 - 5.1 mmol/L    Chloride 104 97 - 108 mmol/L    CO2 27 21 - 32 mmol/L    Anion gap 8 5 - 15 mmol/L    Glucose 83 65 - 100 mg/dL    BUN 9 6 - 20 MG/DL    Creatinine 0.79 0.55 - 1.02 MG/DL    BUN/Creatinine ratio 11 (L) 12 - 20      GFR est AA >60 >60 ml/min/1.73m2    GFR est non-AA >60 >60 ml/min/1.73m2    Calcium 8.4 (L) 8.5 - 10.1 MG/DL    Bilirubin, total 0.3 0.2 - 1.0 MG/DL    ALT (SGPT) 30 12 - 78 U/L    AST (SGOT) 20 15 - 37 U/L    Alk.  phosphatase 51 45 - 117 U/L    Protein, total 7.2 6.4 - 8.2 g/dL    Albumin 3.4 (L) 3.5 - 5.0 g/dL    Globulin 3.8 2.0 - 4.0 g/dL    A-G Ratio 0.9 (L) 1.1 - 2.2     MAGNESIUM    Collection Time: 03/30/17 11:41 AM   Result Value Ref Range    Magnesium 2.0 1.6 - 2.4 mg/dL   URINALYSIS W/ REFLEX CULTURE    Collection Time: 03/30/17 11:41 AM   Result Value Ref Range    Color DARK YELLOW      Appearance TURBID (A) CLEAR      Specific gravity >1.030 (H) 1.003 - 1.030    pH (UA) 6.5 5.0 - 8.0      Protein TRACE (A) NEG mg/dL    Glucose NEGATIVE  NEG mg/dL    Ketone TRACE (A) NEG mg/dL    Blood NEGATIVE  NEG      Urobilinogen 1.0 0.2 - 1.0 EU/dL    Nitrites NEGATIVE  NEG      Leukocyte Esterase NEGATIVE  NEG      WBC 0-4 0 - 4 /hpf    RBC 0-5 0 - 5 /hpf    Epithelial cells MODERATE (A) FEW /lpf    Bacteria 1+ (A) NEG /hpf    UA:UC IF INDICATED URINE CULTURE ORDERED (A) CNI      Mucus 2+ (A) NEG /lpf   INFLUENZA A & B AG (RAPID TEST)    Collection Time: 03/30/17 11:41 AM   Result Value Ref Range    Influenza A Antigen NEGATIVE  NEG      Influenza B Antigen NEGATIVE  NEG     BILIRUBIN, CONFIRM    Collection Time: 03/30/17 11:41 AM   Result Value Ref Range    Bilirubin UA, confirm NEGATIVE  NEG         IMAGING RESULTS:  XR CHEST PA LAT   Final Result   Study Result      EXAM: XR CHEST PA LAT     INDICATION: Generalized body aches, dizziness, a productive cough with yellow  sputum, chills, nausea, and diarrhea with onset of her symptoms about 4 days  ago.     COMPARISON: None.     FINDINGS: PA and lateral radiographs of the chest demonstrate clear lungs. The  cardiac and mediastinal contours and pulmonary vascularity are normal. The  bones and soft tissues are within normal limits.      IMPRESSION  IMPRESSION: No acute findings. MEDICATIONS GIVEN:  Medications   sodium chloride 0.9 % bolus infusion 1,000 mL (1,000 mL IntraVENous New Bag 3/30/17 1143)   ketorolac (TORADOL) injection 15 mg (15 mg IntraVENous Given 3/30/17 1143)       IMPRESSION:  1. Viral syndrome        PLAN:  1. There are no discharge medications for this patient. 2.   Follow-up Information     Follow up With Details Comments Contact Info    None In 2 days Please follow up with your primary care provider None ([phone] Patient stated that they have no PCP      [facility] EMERGENCY DEPT  As needed, If symptoms worsen [address] [place] [address]  [phone]        Return to ED if worse     DISCHARGE NOTE  12:46 PM  The patient has been re-evaluated and is ready for discharge. Reviewed available results with patient. Counseled pt on diagnosis and care plan. Pt has expressed understanding, and all questions have been answered. Pt agrees with plan and agrees to F/U as recommended, or return to the ED if their sxs worsen. Discharge instructions have been provided and explained to the pt, along with reasons to return to the ED. This note is prepared by Kimberlee Gibson acting as Scribe for MD Diane Booth MD : The scribe's documentation has been prepared under my direction and personally reviewed by me in its entirety. I confirm that the note above accurately reflects all work, treatment, procedures, and medical decision making performed by me.

## 2021-04-02 NOTE — TELEPHONE ENCOUNTER
Informed pt the labs renin activity and Aldosterone are pending and not resulted yet. Pt verbalized understanding and will follow up next week.

## 2021-04-02 NOTE — TELEPHONE ENCOUNTER
Patient will be available after 2:30. She is going to the vet today. She would like a call back about her labs.

## 2021-04-05 LAB
ALDOSTERONE/RENIN ACTIVITY CALCULATION: 132 RATIO
ALDOSTERONE: 13.2 NG/DL
RENIN ACTIVITY: 0.1 NG/ML/HR

## 2021-04-05 ASSESSMENT — ENCOUNTER SYMPTOMS
CHEST TIGHTNESS: 0
DIARRHEA: 0
SORE THROAT: 0
VOMITING: 0
BLOOD IN STOOL: 0
COUGH: 0
ABDOMINAL PAIN: 0
CONSTIPATION: 0
SINUS PRESSURE: 0
NAUSEA: 0
WHEEZING: 0
RHINORRHEA: 0
SHORTNESS OF BREATH: 0

## 2021-04-05 NOTE — TELEPHONE ENCOUNTER
This message has been addressed. I spoke with Dr. Rashida Ma on 4/1/21 and she said she would work patient in her schedule to be seen sooner. Patient scheduled to see Dr. Rashida Ma on 4/6/21 and patient has been informed.

## 2021-04-06 ENCOUNTER — OFFICE VISIT (OUTPATIENT)
Dept: ENDOCRINOLOGY | Age: 66
End: 2021-04-06
Payer: MEDICARE

## 2021-04-06 VITALS
RESPIRATION RATE: 14 BRPM | HEIGHT: 64 IN | DIASTOLIC BLOOD PRESSURE: 90 MMHG | HEART RATE: 69 BPM | OXYGEN SATURATION: 97 % | WEIGHT: 205 LBS | BODY MASS INDEX: 35 KG/M2 | SYSTOLIC BLOOD PRESSURE: 150 MMHG

## 2021-04-06 DIAGNOSIS — I10 UNCONTROLLED HYPERTENSION: Primary | ICD-10-CM

## 2021-04-06 DIAGNOSIS — R82.5 ELEVATED URINE LEVELS OF CATECHOLAMINES: ICD-10-CM

## 2021-04-06 DIAGNOSIS — E87.6 HYPOKALEMIA: ICD-10-CM

## 2021-04-06 DIAGNOSIS — E06.3 HASHIMOTO'S THYROIDITIS: ICD-10-CM

## 2021-04-06 DIAGNOSIS — R73.03 PREDIABETES: ICD-10-CM

## 2021-04-06 DIAGNOSIS — E04.1 THYROID NODULE: ICD-10-CM

## 2021-04-06 DIAGNOSIS — E55.9 VITAMIN D DEFICIENCY: ICD-10-CM

## 2021-04-06 PROCEDURE — 99215 OFFICE O/P EST HI 40 MIN: CPT | Performed by: INTERNAL MEDICINE

## 2021-04-06 PROCEDURE — 1090F PRES/ABSN URINE INCON ASSESS: CPT | Performed by: INTERNAL MEDICINE

## 2021-04-06 PROCEDURE — 1111F DSCHRG MED/CURRENT MED MERGE: CPT | Performed by: INTERNAL MEDICINE

## 2021-04-06 PROCEDURE — G8400 PT W/DXA NO RESULTS DOC: HCPCS | Performed by: INTERNAL MEDICINE

## 2021-04-06 PROCEDURE — G8417 CALC BMI ABV UP PARAM F/U: HCPCS | Performed by: INTERNAL MEDICINE

## 2021-04-06 PROCEDURE — G8427 DOCREV CUR MEDS BY ELIG CLIN: HCPCS | Performed by: INTERNAL MEDICINE

## 2021-04-06 PROCEDURE — 3017F COLORECTAL CA SCREEN DOC REV: CPT | Performed by: INTERNAL MEDICINE

## 2021-04-06 PROCEDURE — 1036F TOBACCO NON-USER: CPT | Performed by: INTERNAL MEDICINE

## 2021-04-06 PROCEDURE — 1123F ACP DISCUSS/DSCN MKR DOCD: CPT | Performed by: INTERNAL MEDICINE

## 2021-04-06 PROCEDURE — 4040F PNEUMOC VAC/ADMIN/RCVD: CPT | Performed by: INTERNAL MEDICINE

## 2021-04-06 NOTE — PROGRESS NOTES
SUBJECTIVE:  Jun Guerra is a 77 y.o. female who is being evaluated for pheochromocytoma. 1. Uncontrolled hypertension  This started in 1980. Patient was diagnosed with Hypertension. The problem has been gradually worsening. Patient started medication in 1980. Currently patient is on: Amlodipine, hydralazine, carvedilol, clonidine, irbesartan, hydrochlorothiazide. Misses 0 doses a month. Worsening of control. Difficult to control hypertension. Patient is on 6 antihypertensive medications at this time. 1 month ago patient had severely elevated blood pressure when she was told to not take her blood pressure medications prior to procedure. Her blood pressure was 256/118 without medication. Patient underwent angiogram.  Her heart arteries and kidney arteries were fine. Patient has a history of her father having MI at age 50 and passing out at age 61.    2. Elevated urine levels of catecholamines  No spells of anxiety, diarrhea, sweating, palpitations. Her symptoms are not related to food. No nausea, vomiting. Current complaints: abdominal pain. She also complains of dizziness from medications. No severe headaches. For 4 months she has been having abdominal pain without obvious reason. Pain is radiating to her back. Heat pad lelps. Severe 8-9/10. Feels high, like chest pain related. 3.  Thyroid nodule  1 cm right thyroid lobe nodule  History of obstructive symptoms: difficulty swallowing No, changes in voice/hoarseness No.  History of radiation to patient's neck: No  Resent iodine exposure: No  Family history includes hyperthyroidism. Family history of thyroid cancer: No    4. Hashimoto's thyroiditis  Positive TPO antibodies 198  Has fatigue    5. Hypokalemia  Had mild hypokalemia in the past per patient report  Has fatigue    6. Vitamin D deficiency  Has fatigue    7.  Prediabetes  Eats moderately healthy  Hemoglobin A1c 5.3    Cytology Report 01/26/2016 12:00 AM [facility]   A.  Thyroid, right lobe nodule Fine Needle Aspiration:    Cytology Report 01/26/2016 12:00 AM [facility]   Interpretation:    Cytology Report 01/26/2016 12:00 AM [facility]   Negative for Malignancy. Cytology Report 01/26/2016 12:00 AM [facility]   Benign follicular epithelial cells, macrophages and focal colloid. Cytology Report 01/26/2016 12:00 AM [facility]   Cellular findings suggestive of a colloid nodule (nodular goiter). EXAMINATION:   THYROID ULTRASOUND       12/7/2018       COMPARISON:   None.       HISTORY:   ORDERING SYSTEM PROVIDED HISTORY: Thyroid nodule   TECHNOLOGIST PROVIDED HISTORY:   Reason for exam:->H/o thyroid nodule       FINDINGS:   Right thyroid lobe:  5.5 x 2.0 x 1.9 cm       Left thyroid lobe:  3.7 x 1.7 x 1.4 cm       Isthmus:  5 mm       Thyroid Gland:  Thyroid gland is mildly heterogeneous.       Nodules: See below:       NODULE: Right 1       Size: 10 x 7 x 8 mm       Location: Right inferior       1. Composition:  Solid (2)       2. Echogenicity:  Isoechoic (1)       3.  Shape:  Wider-than-tall (0)       4. Margins:  Ill-defined (0)       5. Echogenic foci:  None (0)       ACR TI-RADS total points:  3       ACR TI-RADS risk category: TR3       Prior biopsy: Patient reports prior biopsy but this history is not available   currently.       Cervical lymphadenopathy: No abnormal lymph nodes in the imaged portions of   the neck.           Impression   Thyroid gland is mildly heterogeneous.       1.0 cm sized right thyroid lobe nodule, a TR3 type nodule, requires no   dedicated imaging following the criteria included below.             Past Medical History:   Diagnosis Date    pearl Apple MD    Goiter     History of mammogram 10/2010    [facility] [facility]    Hyperlipidemia     Hypertension     Onychomycosis     Pap smear for cervical cancer screening 2/2010    Dr. Bridgett Watt - [facility]    Prediabetes 12/23/2019    Screening for osteoporosis     Dexa 12/2005 - [facility]  Vitamin D deficiency 11/10/2011     Patient Active Problem List    Diagnosis Date Noted    Hashimoto's thyroiditis 04/10/2021    Elevated urine levels of catecholamines 04/01/2021    H/O coronary angiogram 03/25/2021    Uncontrolled hypertension 03/20/2021    Dizziness 03/20/2021    Hypokalemia 03/20/2021    Chronic heart failure with preserved ejection fraction (HFpEF) ([facility] [address] 03/20/2021    H/O angiography 38/73/8179    Biliary colic 85/37/2548    Primary insomnia 07/23/2020    Prediabetes 12/23/2019    Coronary artery disease involving native coronary artery of native heart without angina pectoris 11/06/2019    Hyperlipidemia 05/11/2017    Hyperglycemia 03/17/2015    Gastroesophageal reflux 03/18/2014    Thyroid nodule 58/95/7461    Diastolic dysfunction 03/93/3889    Family history of coronary artery disease 12/24/2013    Microscopic hematuria 09/26/2012    Vitamin D deficiency 11/10/2011    Goiter     Onychomycosis      Past Surgical History:   Procedure Laterality Date    APPENDECTOMY  2/4/2000    COLONOSCOPY  3/2011    Dr. Tram Wagner - 2 years  f/u    COLONOSCOPY  10/19/2016    Liliana Vázquez MD    ESOPHAGEAL DILATATION  4/2014    Roddy Banks MD     KNEE SURGERY      right    PILONIDAL CYST EXCISION      UPPER GASTROINTESTINAL ENDOSCOPY  3/19/14    concentric rings in esophagus     Family History   Problem Relation Age of Onset    Other Mother         vascular disease    Hypertension Mother     High Cholesterol Mother     Diabetes Mother     Heart Disease Mother     Heart Disease Father     Heart Attack Father     Cancer Maternal Grandmother 32        unknown type - cancer     Stroke Neg Hx      Social History     Socioeconomic History    Marital status:      Spouse name: None    Number of children: None    Years of education: None    Highest education level: None   Occupational History    None   Social Needs    Financial resource strain: Not hard at all    Food insecurity     Worry: Never true     Inability: Never true    Transportation needs     Medical: No     Non-medical: No   Tobacco Use    Smoking status: Never Smoker    Smokeless tobacco: Never Used   Substance and Sexual Activity    Alcohol use:  Yes     Alcohol/week: 3.0 standard drinks     Types: 3 Glasses of wine per week    Drug use: No    Sexual activity: None   Lifestyle    Physical activity     Days per week: None     Minutes per session: None    Stress: None   Relationships    Social connections     Talks on phone: None     Gets together: None     Attends Yarsanism service: None     Active member of club or organization: None     Attends meetings of clubs or organizations: None     Relationship status: None    Intimate partner violence     Fear of current or ex partner: None     Emotionally abused: None     Physically abused: None     Forced sexual activity: None   Other Topics Concern    None   Social History Narrative    None     Current Outpatient Medications   Medication Sig Dispense Refill    amLODIPine (NORVASC) 5 MG tablet Take 0.5 tablets by mouth daily 30 tablet 0    hydrALAZINE (APRESOLINE) 25 MG tablet Take 1 tablet by mouth 3 times daily 90 tablet 0    carvedilol (COREG) 25 MG tablet TAKE ONE TABLET BY MOUTH TWICE A  tablet 1    cloNIDine (CATAPRES) 0.1 MG tablet Take 1 tablet by mouth three times daily      vitamin D3 (CHOLECALCIFEROL) 25 MCG (1000 UT) TABS tablet Take 1 tablet by mouth daily 30 tablet 2    atorvastatin (LIPITOR) 80 MG tablet TAKE ONE TABLET BY MOUTH ONCE NIGHTLY 90 tablet 0    irbesartan (AVAPRO) 300 MG tablet TAKE ONE TABLET BY MOUTH DAILY (Patient taking differently: 300 mg nightly ) 90 tablet 0    hydroCHLOROthiazide (HYDRODIURIL) 25 MG tablet TAKE ONE TABLET BY MOUTH DAILY 90 tablet 0    melatonin 3 MG TABS tablet Take 1 tablet by mouth nightly 30 tablet 1    Cholecalciferol (VITAMIN D3) 5000 units TABS Take 1 tablet by mouth daily      Selenium 200 MCG CAPS Take 1 capsule by mouth      Multiple Vitamin (MULTIVITAMIN PO) Take  by mouth.  aspirin 81 MG tablet Take 81 mg by mouth daily       No current facility-administered medications for this visit.       Allergies   Allergen Reactions    Sulfa Antibiotics Rash     Family Status   Relation Name Status    Mother  Alive   [facility] Father   at age 61        [facility] MGM  (Not Specified)    Neg Hx  (Not Specified)       Review of Systems:  Constitutional: no fatigue, no fever, no recent weight gain, no recent weight loss, no changes in appetite  Eyes: no eye pain, no change in vision, no eye redness, no eye irritation, no double vision  Ears, nose, throat: no nasal congestion, no sore throat, no earache, no decrease in hearing, no hoarseness, no dry mouth, no sinus problems, no difficulty swallowing, no neck lumps, no dental problems, no mouth sores, no ringing in ears  Pulmonary: no shortness of breath, no wheezing, no dyspnea on exertion, no cough  Cardiovascular: no chest pain, no lower extremity edema, no orthopnea, no intermittent leg claudication, no palpitations  Gastrointestinal: no abdominal pain, no nausea, no vomiting, no diarrhea, no constipation, no dysphagia, no heartburn, no bloating  Genitourinary: no dysuria, no urinary incontinence, no urinary hesitancy, no urinary frequency, no feelings of urinary urgency, no nocturia  Musculoskeletal: no joint swelling, no joint stiffness, no joint pain, no muscle cramps, no muscle pain, no bone pain  Integument/Breast: no hair loss, no skin rashes, no skin lesions, no itching, no dry skin  Neurological: no numbness, no tingling, no weakness, no confusion, no headaches, no dizziness, no fainting, no tremors, no decrease in memory, no balance problems  Psychiatric: no anxiety, no depression, no insomnia  Hematologic/Lymphatic: no tendency for easy bleeding, no swollen lymph nodes, no tendency for easy bruising  Immunology: no seasonal allergies, no frequent infections, no frequent illnesses  Endocrine: no temperature intolerance    BP (!) 150/90   Pulse 69   Resp 14   Ht 5' 4\" (1.626 m)   Wt 205 lb (93 kg)   LMP 05/01/2008 (Approximate) Comment: 15 years ago  SpO2 97%   BMI 35.19 kg/m²    Wt Readings from Last 3 Encounters:   04/06/21 205 lb (93 kg)   04/01/21 206 lb (93.4 kg)   03/25/21 206 lb (93.4 kg)     Body mass index is 35.19 kg/m².     OBJECTIVE:  Constitutional: no acute distress, well appearing and well nourished  Psychiatric: oriented to person, place and time, judgement and insight and normal, recent and remote memory and intact and mood and affect are normal  Skin: skin and subcutaneous tissue is normal without mass, normal turgor  Head and Face: examination of head and face revealed no abnormalities  Eyes: no lid or conjunctival swelling, erythema or discharge, pupils are normal, equal, round, reactive to light  Ears/Nose: external inspection of ears and nose revealed no abnormalities, hearing is grossly normal  Oropharynx/Mouth/Face: lips, tongue and gums are normal with no lesions, the voice quality was normal  Neck: neck is supple and symmetric, with midline trachea and no masses, thyroid is enlarged on the right, normal on the left  Lymphatics: normal cervical lymph nodes, normal supraclavicular nodes  Pulmonary: no increased work of breathing or signs of respiratory distress, lungs are clear to auscultation  Cardiovascular: normal heart rate and rhythm, normal S1 and S2, no murmurs and pedal pulses and 2+ bilaterally, No edema  Abdomen: abdomen is soft, non-tender with no masses  Musculoskeletal: normal gait and station and exam of the digits and nails are normal  Neurological: normal coordination and normal general cortical function      Lab Review:    Lab Results   Component Value Date    WBC 6.8 03/13/2021    HGB 14.2 03/13/2021    HCT 42.2 03/13/2021    MCV 91.8 03/13/2021     03/13/2021     Lab Results   Component Value Date     03/17/2021    K 3.8 03/17/2021    K 3.3 03/13/2021     03/17/2021    CO2 27 03/17/2021    BUN 13 03/17/2021    CREATININE 0.6 03/17/2021    GLUCOSE 116 03/17/2021    CALCIUM 9.0 03/17/2021    PROT 6.8 02/04/2021    PROT 7.0 09/19/2012    LABALBU 4.6 02/04/2021    BILITOT 0.4 02/04/2021    ALKPHOS 57 02/04/2021    AST 26 02/04/2021    ALT 36 02/04/2021    LABGLOM >60 03/17/2021    GFRAA >60 03/17/2021    GFRAA >60 09/19/2012    AGRATIO 2.1 02/04/2021    GLOB 2.2 02/04/2021     Lab Results   Component Value Date    TSH 0.60 04/16/2020    TSH 1.21 10/02/2018     Lab Results   Component Value Date    LABA1C 5.3 03/24/2021     Lab Results   Component Value Date    .5 07/21/2020     Lab Results   Component Value Date    CHOL 189 07/21/2020     Lab Results   Component Value Date    TRIG 182 07/21/2020     Lab Results   Component Value Date    HDL 66 07/21/2020     Lab Results   Component Value Date    LDLCALC 87 07/21/2020     Lab Results   Component Value Date    LABVLDL 36 07/21/2020     No results found for: [facility]  Lab Results   Component Value Date    LABMICR Not Indicated 03/17/2021     Lab Results   Component Value Date    VITD25 58.6 07/21/2020        ASSESSMENT/PLAN:    1. Uncontrolled hypertension  Uncontrolled  Evaluate for endocrine causes of hypertension. Patient is on 6 antihypertensive medications. Concern was raised regarding pheochromocytoma. Obtain work-up, then reevaluate  Continue same medications for now  - Cortisol, Urine, Free; Future  - Creatinine Clearance, Urine, 24 HR; Future  - Catecholamines Free Urine; Future  - Metanephrines Urine; Future  - ACTH; Future  - Cortisol AM, Total; Future  - Metanephrines Plasma Free; Future  - T4, Free; Future  - TSH without Reflex; Future  - T3, Free; Future  - Comprehensive Metabolic Panel; Future  - Renin Activity and Aldosterone; Future  - SALIVARY CORTISOL; Future  - SALIVARY CORTISOL;  Future  - SALIVARY CORTISOL; Future  - DHEA-Sulfate; Future    2. Elevated urine levels of catecholamines  Test was done on random urine  Obtain 24-hour urine metanephrines and catecholamines  Counseled patient in details about urine collection and diagnosis of catecholamines making tumors  - Creatinine Clearance, Urine, 24 HR; Future  - Catecholamines Free Urine; Future  - Metanephrines Urine; Future  - Metanephrines Plasma Free; Future  - Comprehensive Metabolic Panel; Future  - Renin Activity and Aldosterone; Future    3. Hypokalemia  Per patient report  Repeat aldosterone plasma renin ratio  Do not do confirmatory 24-hour urine salt loading test because of risk of severe hypertension which can lead to stroke or other cardiovascular complications  - Renin Activity and Aldosterone; Future    4. Thyroid nodule  Right 1 cm thyroid nodule  Continue monitoring with thyroid ultrasound  Thyroid ultrasound  - T4, Free; Future  - TSH without Reflex; Future  - T3, Free; Future    5. Vitamin D deficiency  Vitamin D supplementation  - Comprehensive Metabolic Panel; Future    6. Prediabetes  Diet, exercise  - Comprehensive Metabolic Panel; Future    7.   Hashimoto's thyroiditis  TSH  Free T4  Free T3      Reviewed and/or ordered clinical lab results Yes  Reviewed and/or ordered radiology tests Yes   Reviewed and/or ordered other diagnostic tests No  Discussed test results with performing physician No  Independently reviewed image, tracing, or specimen No  Made a decision to obtain old records No  Reviewed and summarized old records Yes   Glucose 99  Creatinine 0.59  AST 27  ALT 37    25 hydroxy vitamin D 24.2  Aldosterone 13.2  Renin 0.1  Aldosterone renin activity 132.0  Obtained history from other than patient No    Raysa Browne was counseled regarding symptoms of hypertension, pheochromocytoma, elevated catecholamines, thyroid nodule diagnosis, course and complications of disease if inadequately treated, side effects of medications, diagnosis, treatment options, and prognosis, risks, benefits, complications, and alternatives of treatment, labs, imaging and other studies and treatment targets and goals, endocrine causes of hypertension, work-up, adrenal disease diagnosis, thyroid nodule monitoring, thyroid cancer risk  She understands instructions and counseling. Total time I spent for this encounter 60 minutes    Return in about 1 month (around 5/6/2021) for Hypertension.     Electronically signed by Maria D Rabago MD on 4/10/2021 at 12:35 AM

## 2021-04-10 PROBLEM — E06.3 HASHIMOTO'S THYROIDITIS: Status: ACTIVE | Noted: 2021-04-10

## 2021-04-13 DIAGNOSIS — I10 UNCONTROLLED HYPERTENSION: ICD-10-CM

## 2021-04-13 DIAGNOSIS — E55.9 VITAMIN D DEFICIENCY: ICD-10-CM

## 2021-04-13 DIAGNOSIS — E04.1 THYROID NODULE: ICD-10-CM

## 2021-04-13 DIAGNOSIS — E87.6 HYPOKALEMIA: ICD-10-CM

## 2021-04-13 DIAGNOSIS — R82.5 ELEVATED URINE LEVELS OF CATECHOLAMINES: ICD-10-CM

## 2021-04-13 DIAGNOSIS — R73.03 PREDIABETES: ICD-10-CM

## 2021-04-13 LAB
A/G RATIO: 2.4 (ref 1.1–2.2)
ALBUMIN SERPL-MCNC: 4.5 G/DL (ref 3.4–5)
ALP BLD-CCNC: 49 U/L (ref 40–129)
ALT SERPL-CCNC: 26 U/L (ref 10–40)
ANION GAP SERPL CALCULATED.3IONS-SCNC: 12 MMOL/L (ref 3–16)
AST SERPL-CCNC: 18 U/L (ref 15–37)
BILIRUB SERPL-MCNC: 0.4 MG/DL (ref 0–1)
BUN BLDV-MCNC: 15 MG/DL (ref 7–20)
CALCIUM SERPL-MCNC: 8.8 MG/DL (ref 8.3–10.6)
CHLORIDE BLD-SCNC: 103 MMOL/L (ref 99–110)
CO2: 28 MMOL/L (ref 21–32)
CORTISOL - AM: 7.7 UG/DL (ref 4.3–22.4)
CREAT SERPL-MCNC: 0.6 MG/DL (ref 0.6–1.2)
GFR AFRICAN AMERICAN: >60
GFR NON-AFRICAN AMERICAN: >60
GLOBULIN: 1.9 G/DL
GLUCOSE BLD-MCNC: 93 MG/DL (ref 70–99)
POTASSIUM SERPL-SCNC: 4 MMOL/L (ref 3.5–5.1)
SODIUM BLD-SCNC: 143 MMOL/L (ref 136–145)
T3 FREE: 2.3 PG/ML (ref 2.3–4.2)
T4 FREE: 1.2 NG/DL (ref 0.9–1.8)
TOTAL PROTEIN: 6.4 G/DL (ref 6.4–8.2)
TSH SERPL DL<=0.05 MIU/L-ACNC: 0.92 UIU/ML (ref 0.27–4.2)

## 2021-04-14 ENCOUNTER — HOSPITAL ENCOUNTER (OUTPATIENT)
Dept: CT IMAGING | Age: 66
Discharge: HOME | End: 2021-04-14
Payer: MEDICARE

## 2021-04-14 DIAGNOSIS — R82.5 ELEVATED URINE LEVELS OF CATECHOLAMINES: ICD-10-CM

## 2021-04-14 DIAGNOSIS — I10 UNCONTROLLED HYPERTENSION: ICD-10-CM

## 2021-04-14 PROCEDURE — 74160 CT ABDOMEN W/CONTRAST: CPT

## 2021-04-14 PROCEDURE — 6360000004 HC RX CONTRAST MEDICATION: Performed by: INTERNAL MEDICINE

## 2021-04-14 RX ADMIN — IOHEXOL 50 ML: 240 INJECTION, SOLUTION INTRATHECAL; INTRAVASCULAR; INTRAVENOUS; ORAL at 10:37

## 2021-04-14 RX ADMIN — IOPAMIDOL 80 ML: 755 INJECTION, SOLUTION INTRAVENOUS at 10:32

## 2021-04-15 LAB — ADRENOCORTICOTROPIC HORMONE: 10 PG/ML (ref 6–58)

## 2021-04-16 LAB
DHEAS (DHEA SULFATE): 25 UG/DL (ref 13–130)
METANEPH/PLASMA INTERP: NORMAL
METANEPHRINE FREE PLASMA: 0.12 NMOL/L (ref 0–0.49)
NORMETANEPHRINE FREE PLASMA: 0.44 NMOL/L (ref 0–0.89)

## 2021-04-17 LAB
ALDOSTERONE/RENIN ACTIVITY CALCULATION: 36.7 RATIO
ALDOSTERONE: 11 NG/DL
RENIN ACTIVITY: 0.3 NG/ML/HR

## 2021-04-19 DIAGNOSIS — I10 ESSENTIAL HYPERTENSION: ICD-10-CM

## 2021-04-20 DIAGNOSIS — R82.5 ELEVATED URINE LEVELS OF CATECHOLAMINES: ICD-10-CM

## 2021-04-20 DIAGNOSIS — I10 UNCONTROLLED HYPERTENSION: ICD-10-CM

## 2021-04-20 RX ORDER — IRBESARTAN 300 MG/1
TABLET ORAL
Qty: 90 TABLET | Refills: 1 | Status: SHIPPED | OUTPATIENT
Start: 2021-04-20 | End: 2021-09-13

## 2021-04-20 NOTE — TELEPHONE ENCOUNTER
Medication:   Requested Prescriptions     Pending Prescriptions Disp Refills    irbesartan (AVAPRO) 300 MG tablet [Pharmacy Med Name: IRBESARTAN 300 MG TABLET] 90 tablet 0     Sig: TAKE ONE TABLET BY MOUTH DAILY     Last Filled: 1.19.21    Last appt: 4/1/2021   Next appt: 4/27/2021    Last OARRS: No flowsheet data found.

## 2021-04-23 ENCOUNTER — PATIENT MESSAGE (OUTPATIENT)
Dept: ENDOCRINOLOGY | Age: 66
End: 2021-04-23

## 2021-04-23 DIAGNOSIS — I10 UNCONTROLLED HYPERTENSION: ICD-10-CM

## 2021-04-23 LAB
24HR URINE VOLUME (ML): 1500 ML
CREAT SERPL-MCNC: ABNORMAL MG/DL (ref 0.6–1.2)
CREATININE 24 HOUR URINE: 1.2 G/24HR (ref 0.6–1.5)
CREATININE CLEARANCE: ABNORMAL ML/MIN (ref 88–128)
Lab: 24 HOURS
Lab: 24 HR
REASON FOR REJECTION: NORMAL
REJECTED TEST: NORMAL

## 2021-04-23 NOTE — TELEPHONE ENCOUNTER
Spoke with patient, explained that creatinine clearance could not be done, but the most important tests as catecholamines, metanephrines and cortisol are still in the process and will take some time to get back. Patient stated understanding.

## 2021-04-23 NOTE — TELEPHONE ENCOUNTER
Call from [facility] @ [phone] stating the specimen rejection note:      Comment: Unable to calculate. Calculation requires serum or plasma creatinine   value. No specimen sent with urine.

## 2021-04-23 NOTE — TELEPHONE ENCOUNTER
Noted. Called the lab. I was instructed by the technician that  24-hour urine catecholamines, metanephrines and cortisol are pending, these were sent out tests.

## 2021-04-24 LAB
CORTISOL (UR), FREE: 7 UG/D
CORTISOL URINE, FREE (/G CRT): 6.21 UG/G CRT
CORTISOL,F,UG/L,U: 4.66 UG/L
INTERPRETATION: NORMAL

## 2021-04-27 ENCOUNTER — OFFICE VISIT (OUTPATIENT)
Dept: PRIMARY CARE CLINIC | Age: 66
End: 2021-04-27
Payer: MEDICARE

## 2021-04-27 VITALS
HEIGHT: 64 IN | WEIGHT: 208 LBS | HEART RATE: 78 BPM | BODY MASS INDEX: 35.51 KG/M2 | DIASTOLIC BLOOD PRESSURE: 90 MMHG | OXYGEN SATURATION: 97 % | TEMPERATURE: 98.1 F | SYSTOLIC BLOOD PRESSURE: 140 MMHG

## 2021-04-27 DIAGNOSIS — I10 ESSENTIAL HYPERTENSION: Primary | ICD-10-CM

## 2021-04-27 PROCEDURE — 1036F TOBACCO NON-USER: CPT | Performed by: INTERNAL MEDICINE

## 2021-04-27 PROCEDURE — G8427 DOCREV CUR MEDS BY ELIG CLIN: HCPCS | Performed by: INTERNAL MEDICINE

## 2021-04-27 PROCEDURE — 1090F PRES/ABSN URINE INCON ASSESS: CPT | Performed by: INTERNAL MEDICINE

## 2021-04-27 PROCEDURE — G8400 PT W/DXA NO RESULTS DOC: HCPCS | Performed by: INTERNAL MEDICINE

## 2021-04-27 PROCEDURE — 4040F PNEUMOC VAC/ADMIN/RCVD: CPT | Performed by: INTERNAL MEDICINE

## 2021-04-27 PROCEDURE — 99213 OFFICE O/P EST LOW 20 MIN: CPT | Performed by: INTERNAL MEDICINE

## 2021-04-27 PROCEDURE — 3017F COLORECTAL CA SCREEN DOC REV: CPT | Performed by: INTERNAL MEDICINE

## 2021-04-27 PROCEDURE — 1123F ACP DISCUSS/DSCN MKR DOCD: CPT | Performed by: INTERNAL MEDICINE

## 2021-04-27 PROCEDURE — G8417 CALC BMI ABV UP PARAM F/U: HCPCS | Performed by: INTERNAL MEDICINE

## 2021-04-27 ASSESSMENT — PATIENT HEALTH QUESTIONNAIRE - PHQ9
SUM OF ALL RESPONSES TO PHQ9 QUESTIONS 1 & 2: 0
2. FEELING DOWN, DEPRESSED OR HOPELESS: 0
SUM OF ALL RESPONSES TO PHQ QUESTIONS 1-9: 0
SUM OF ALL RESPONSES TO PHQ QUESTIONS 1-9: 0
1. LITTLE INTEREST OR PLEASURE IN DOING THINGS: 0

## 2021-04-27 ASSESSMENT — ENCOUNTER SYMPTOMS
CHEST TIGHTNESS: 0
SHORTNESS OF BREATH: 0

## 2021-04-27 NOTE — PROGRESS NOTES
Jamey Browne   Date ofBirth:  1955    Date of Visit:  4/27/2021    Chief Complaint   Patient presents with    Hypertension       HPI  Hypertension: Patient takes Amlodipine 5mg 1/2 tablet po q day, Hydralazine 25mg po tid, Clonidine 0.1mg po tid, Irbesartan 300mg po q day, HCTZ 25mg po q day, and Carvedilol 25mg po bid, Patient does a low salt diet. Patient is not exercising. Patient states her BP has been 126/74 at home. Patient states she has a gallbladder flare up every 2 weeks and has abdominal pain. Patient denies nausea and vomiting. Patient needs her gallbladder out but hasn't been able to have surgery yet due to uncontrolled blood pressure which is now back under control. Review of Systems   Constitutional: Negative for chills and fever. HENT: Negative for congestion, postnasal drip, rhinorrhea and sore throat. Eyes: Negative for visual disturbance. Respiratory: Negative for cough, chest tightness and shortness of breath. Cardiovascular: Positive for leg swelling (ankle swelling is better). Negative for chest pain and palpitations. Gastrointestinal: Positive for abdominal pain. Negative for constipation, diarrhea, nausea and vomiting. Genitourinary: Negative for dysuria, frequency and hematuria. Neurological: Positive for dizziness (little dizziness sometimes with multiple blood pressure medication). Negative for syncope, light-headedness and headaches.        Allergies   Allergen Reactions    Sulfa Antibiotics Rash     Outpatient Medications Marked as Taking for the 4/27/21 encounter (Office Visit) with Dana Harris MD   Medication Sig Dispense Refill    irbesartan (AVAPRO) 300 MG tablet TAKE ONE TABLET BY MOUTH DAILY 90 tablet 1    amLODIPine (NORVASC) 5 MG tablet Take 0.5 tablets by mouth daily 30 tablet 0    hydrALAZINE (APRESOLINE) 25 MG tablet Take 1 tablet by mouth 3 times daily 90 tablet 0    carvedilol (COREG) 25 MG tablet TAKE ONE TABLET BY MOUTH TWICE A  tablet 1    cloNIDine (CATAPRES) 0.1 MG tablet Take 1 tablet by mouth three times daily      vitamin D3 (CHOLECALCIFEROL) 25 MCG (1000 UT) TABS tablet Take 1 tablet by mouth daily 30 tablet 2    atorvastatin (LIPITOR) 80 MG tablet TAKE ONE TABLET BY MOUTH ONCE NIGHTLY 90 tablet 0    hydroCHLOROthiazide (HYDRODIURIL) 25 MG tablet TAKE ONE TABLET BY MOUTH DAILY 90 tablet 0    melatonin 3 MG TABS tablet Take 1 tablet by mouth nightly 30 tablet 1    aspirin 81 MG tablet Take 81 mg by mouth daily      Cholecalciferol (VITAMIN D3) 5000 units TABS Take 1 tablet by mouth daily      Selenium 200 MCG CAPS Take 1 capsule by mouth      Multiple Vitamin (MULTIVITAMIN PO) Take  by mouth. Vitals:    04/27/21 1030 04/27/21 1033   BP: (!) 140/90 (!) 140/90   Pulse: 78    Temp: 98.1 °F (36.7 °C)    TempSrc: Oral    SpO2: 97%    Weight: 208 lb (94.3 kg)    Height: 5' 4\" (1.626 m)      Body mass index is 35.7 kg/m². Physical Exam  Nursing note reviewed. Constitutional:       General: She is not in acute distress. Appearance: Normal appearance. She is well-developed. Eyes:      Extraocular Movements: Extraocular movements intact. Pupils: Pupils are equal, round, and reactive to light. Neck:      Musculoskeletal: Neck supple. Thyroid: No thyromegaly. Vascular: No carotid bruit. Cardiovascular:      Rate and Rhythm: Normal rate and regular rhythm. Heart sounds: Normal heart sounds, S1 normal and S2 normal. No murmur. Pulmonary:      Effort: Pulmonary effort is normal.      Breath sounds: Normal breath sounds. Musculoskeletal:      Right lower leg: Edema (mild ankle edema) present. Left lower leg: Edema (mild ankle edema) present. Neurological:      Mental Status: She is alert. No results found for this visit on 04/27/21.   Lab Review   Orders Only on 04/20/2021   Component Date Value    Metanephrine Intrep Urine 04/20/2021 See Note     Metanephrine, Urine-Per * 04/20/2021 51     Normetanephrines, nmol/L 04/20/2021 172     Metanephrines, Ur 04/20/2021 76     Metanephrine ug/g Cre 04/20/2021 68     Normetanephrine, 24H Ur 04/20/2021 258     Normetanephrine, (g/CRT) 04/20/2021 229     Creatinine, Ur 04/20/2021 75     Creatinine, 24H Ur 04/20/2021 1125     Hours Collected 04/20/2021 24     Urine Total Volume 04/20/2021 1500     CREATININE 04/20/2021 SEE COMMENT*    24hr Urine Volume (ml) 04/20/2021 1500     Creatinine, 24H Ur 04/20/2021 1.2     Time (Hours) 04/20/2021 24     Creatinine Clearance 04/20/2021 SEE COMMENT*    Collection Duration 04/20/2021 24     Cortisol,F,ug/L,U 04/20/2021 4.66     Cortisol,Ur Free 04/20/2021 6.21     Cortisol (Ur), Free 04/20/2021 7.0     Interpretation 04/20/2021 See Note     Re[facility] Test 04/23/2021 [facility]     Reason for Rejection 04/23/2021 see below    Orders Only on 04/13/2021   Component Date Value    DHEAS (DHEA Sulfate) 04/13/2021 25.0     Aldosterone 04/13/2021 11.0     Aldosterone/Renin Activi* 04/13/2021 36.7*    Renin Activity 04/13/2021 0.3     Sodium 04/13/2021 143     Potassium 04/13/2021 4.0     Chloride 04/13/2021 103     CO2 04/13/2021 28     Anion Gap 04/13/2021 12     Glucose 04/13/2021 93     BUN 04/13/2021 15     CREATININE 04/13/2021 0.6     GFR Non- 04/13/2021 >60     GFR  04/13/2021 >60     Calcium 04/13/2021 8.8     Total Protein 04/13/2021 6.4     Albumin 04/13/2021 4.5     Albumin/Globulin Ratio 04/13/2021 2.4*    Total Bilirubin 04/13/2021 0.4     Alkaline Phosphatase 04/13/2021 49     ALT 04/13/2021 26     AST 04/13/2021 18     Globulin 04/13/2021 1.9     T3, Free 04/13/2021 2.3     TSH 04/13/2021 0.92     T4 Free 04/13/2021 1.2     Normetanephrine, Free 04/13/2021 0.44     Metanephrine, Free 04/13/2021 0.12     Metaneph/Plasma Interp 04/13/2021 See Note     Cortisol - AM 04/13/2021 7.7     ACTH 04/13/2021 10    Orders Only on 03/26/2021   Component Date Value    Aldosterone 03/26/2021 13.2     Aldosterone/Renin Activi* 03/26/2021 132.0*    Renin Activity 03/26/2021 0.1    Office Visit on 03/24/2021   Component Date Value    Hemoglobin A1C 03/24/2021 5.3    Orders Only on 03/23/2021   Component Date Value    Catecholamines Urine Int* 03/23/2021 See Note     Creatinine, Ur 03/23/2021 176     Creatinine, 24H Ur 14/62/1891 Not Applicable     Dopamine , 24H Ur 73/82/6621 Not Applicable     Norepinephrine, 24H Ur 10/39/4105 Not Applicable     Epinephrine, 24H Ur 68/98/0164 Not Applicable     Epinephrine, (uG/L) 03/23/2021 15     Norepinephrine, (ug/L) 03/23/2021 144     Dopamine, (ug/L) 03/23/2021 314     Norepinephrine 03/23/2021 82*    Dopamine 03/23/2021 178     Epinephrine 03/23/2021 9     Urine Total Volume 03/23/2021 NA     Hours Collected 03/23/2021 24     Cortisol - AM 03/23/2021 16.0     Rejected Test 03/25/2021 70,073     Reason for Rejection 03/25/2021 see below    Orders Only on 03/17/2021   Component Date Value    Sodium 03/17/2021 144     Potassium 03/17/2021 3.8     Chloride 03/17/2021 103     CO2 03/17/2021 27     Anion Gap 03/17/2021 14     Glucose 03/17/2021 116*    BUN 03/17/2021 13     CREATININE 03/17/2021 0.6     GFR Non- 03/17/2021 >60     GFR  03/17/2021 >60     Calcium 03/17/2021 9.0     Color, UA 03/17/2021 YELLOW     Clarity, UA 03/17/2021 Clear     Glucose, Ur 03/17/2021 Negative     Bilirubin Urine 03/17/2021 Negative     Ketones, Urine 03/17/2021 Negative     Specific Gravity, UA 03/17/2021 1.018     Blood, Urine 03/17/2021 Negative     pH, UA 03/17/2021 7.5     Protein, UA 03/17/2021 Negative     Urobilinogen, Urine 03/17/2021 0.2     Nitrite, Urine 03/17/2021 Negative     Leukocyte Esterase, Urine 03/17/2021 Negative     Microscopic Examination 03/17/2021 Not Indicated     Urine Type 03/17/2021 Voided Assessment/Plan     1. Essential hypertension  -blood pressure significantly improved and is stable now  -Continue same medications  -Low sodium diet  -Regular aerobic exercise        Discussed medications with patient, who voiced understanding of their use and indications. All questions answered. Return in about 3 months (around 7/19/2021) for Medicare AW .

## 2021-04-27 NOTE — PATIENT INSTRUCTIONS
1. Essential hypertension  -blood pressure significantly improved and is stable now  -Continue same medications  -Low sodium diet  -Regular aerobic exercise

## 2021-04-28 ASSESSMENT — ENCOUNTER SYMPTOMS
ABDOMINAL PAIN: 1
CONSTIPATION: 0
RHINORRHEA: 0
SORE THROAT: 0
VOMITING: 0
COUGH: 0
DIARRHEA: 0
NAUSEA: 0

## 2021-04-29 ENCOUNTER — OFFICE VISIT (OUTPATIENT)
Dept: CARDIOLOGY CLINIC | Age: 66
End: 2021-04-29
Payer: MEDICARE

## 2021-04-29 VITALS
BODY MASS INDEX: 35.67 KG/M2 | WEIGHT: 207.8 LBS | HEART RATE: 63 BPM | DIASTOLIC BLOOD PRESSURE: 96 MMHG | SYSTOLIC BLOOD PRESSURE: 140 MMHG

## 2021-04-29 DIAGNOSIS — I10 ESSENTIAL HYPERTENSION: Primary | ICD-10-CM

## 2021-04-29 DIAGNOSIS — I50.32 CHRONIC HEART FAILURE WITH PRESERVED EJECTION FRACTION (HFPEF) (HCC): ICD-10-CM

## 2021-04-29 DIAGNOSIS — I25.10 CORONARY ARTERY DISEASE INVOLVING NATIVE CORONARY ARTERY OF NATIVE HEART WITHOUT ANGINA PECTORIS: ICD-10-CM

## 2021-04-29 DIAGNOSIS — E78.2 MIXED HYPERLIPIDEMIA: ICD-10-CM

## 2021-04-29 DIAGNOSIS — I10 UNCONTROLLED HYPERTENSION: ICD-10-CM

## 2021-04-29 LAB
CATE U INT: NORMAL
CORTISOL SALIVARY: 0.05 UG/DL
CORTISOL SALIVARY: 0.05 UG/DL
CORTISOL SALIVARY: 0.06 UG/DL
CREATININE 24 HOUR URINE: 1125 MG/D (ref 500–1400)
CREATININE URINE: 75 MG/DL
DOPAMINE (G CRT): 160 UG/G CRT (ref 0–250)
DOPAMINE 24 HOUR URINE: 180 UG/D (ref 71–485)
DOPAMINE, (UG/L): 120 UG/L
EPINEPHRINE (G CRT): 3 UG/G CRT (ref 0–20)
EPINEPHRINE 24 HOUR URINE: 3 UG/D (ref 1–14)
EPINEPHRINE, (UG/L): 2 UG/L
HOURS COLLECTED: 24
METANEPHRINE INTREP URINE: NORMAL
METANEPHRINE UG/G CRE: 68 UG/G CRT (ref 0–300)
METANEPHRINE, UR-PER VOL: 51 UG/L
METANEPHRINES URINE: 76 UG/D (ref 36–229)
NOREPINEPH (G CRT): 35 UG/G CRT (ref 0–45)
NOREPINEPHRINE 24 HOUR URINE: 39 UG/D (ref 14–120)
NOREPINEPHRINE, (UG/L): 26 UG/L
NORMETANEPHRINE 24 HOUR URINE: 258 UG/D (ref 95–650)
NORMETANEPHRINE, (G/CRT): 229 UG/G CRT (ref 0–400)
NORMETANEPHRINES, NMOL/L: 172 UG/L
URINE TOTAL VOLUME: 1500

## 2021-04-29 PROCEDURE — 4040F PNEUMOC VAC/ADMIN/RCVD: CPT | Performed by: INTERNAL MEDICINE

## 2021-04-29 PROCEDURE — G8400 PT W/DXA NO RESULTS DOC: HCPCS | Performed by: INTERNAL MEDICINE

## 2021-04-29 PROCEDURE — 1123F ACP DISCUSS/DSCN MKR DOCD: CPT | Performed by: INTERNAL MEDICINE

## 2021-04-29 PROCEDURE — G8427 DOCREV CUR MEDS BY ELIG CLIN: HCPCS | Performed by: INTERNAL MEDICINE

## 2021-04-29 PROCEDURE — 3017F COLORECTAL CA SCREEN DOC REV: CPT | Performed by: INTERNAL MEDICINE

## 2021-04-29 PROCEDURE — 1090F PRES/ABSN URINE INCON ASSESS: CPT | Performed by: INTERNAL MEDICINE

## 2021-04-29 PROCEDURE — G8417 CALC BMI ABV UP PARAM F/U: HCPCS | Performed by: INTERNAL MEDICINE

## 2021-04-29 PROCEDURE — 99215 OFFICE O/P EST HI 40 MIN: CPT | Performed by: INTERNAL MEDICINE

## 2021-04-29 PROCEDURE — 1036F TOBACCO NON-USER: CPT | Performed by: INTERNAL MEDICINE

## 2021-04-29 RX ORDER — HYDRALAZINE HYDROCHLORIDE 25 MG/1
25 TABLET, FILM COATED ORAL 2 TIMES DAILY
Qty: 180 TABLET | Refills: 3 | Status: SHIPPED | OUTPATIENT
Start: 2021-04-29 | End: 2021-10-22

## 2021-04-29 RX ORDER — SPIRONOLACTONE 25 MG/1
25 TABLET ORAL DAILY
Qty: 90 TABLET | Refills: 3 | Status: SHIPPED | OUTPATIENT
Start: 2021-04-29 | End: 2022-04-07

## 2021-04-29 NOTE — PROGRESS NOTES
Cc: Resistant HTN, HFpEF, CAD    HPI:     Patient is a 77years old woman, overweight, with history of resistant hypertension, prediabetes, hyperlipidemia, CAD, HFpEF. Patient was assessed with a right upper quadrant ultrasound 2/9/2021: Fatty liver, no gallstones however there was sludge in the gallbladder. Stefan Bell was seen by surgery, has cholecystectomy scheduled for 3/10/2021. Alexandra Lane came for cardiac clearance.     Echo 10/2019: Normal except for indeterminate diastolic function     CCTA 10/2019: Moderate diffuse calcifications of the coronaries without any significant stenosis     Patient has been complaining of some discomfort under her breasts, epigastric and possibly midsternal, unrelated to activity.  She also admits to some exertional shortness of breath.  She denies any orthopnea or lower extremity edema.  She was seen by cardiologist at [facility] about 2 years ago, had a cardiac work-up, no intervention was recommended, she quit going to her appointments. Rula Whitehead BP at home remains around 130/70s.  She does not check her blood pressure frequently.     Patient's father, smoker, had CABG at the age of 50years old and a repeat CABG at the age of 61years old; he passed away as a complication after his second bypass.     ECG 2/4/2021: Normal     [facility] [place] 3/10/2021: Canceled due to extreme hypertension (257/118 mmHg, patient held her BP medications). [facility] 3/12/2021: Normal coronaries, normal renal arteries, normal LVEF 55%. CT abd with IV contrast 04/2021: 8-10 mm R adrenal lesion, possible lipoma. All labs for secondary HTN were reviewed (03/2021), only abnormalities: aldosterone/renin activity elevated but with normal aldosterone level, increased norepi in urine. Patient follows with Alban Arm, endocrinology for further workup and management. Patient is here for a follow up. Her BP is now better controlled after further med changes by her PCP about 3 weeks ago. At home, /70, HR 60s.  She tablet TAKE ONE TABLET BY MOUTH DAILY 1/19/21  Yes Arvind Pettit MD   melatonin 3 MG TABS tablet Take 1 tablet by mouth nightly 7/17/20  Yes Arvind Pettit MD   Cholecalciferol (VITAMIN D3) 5000 units TABS Take 1 tablet by mouth daily 6/10/19  Yes Arvind Pettit MD   Selenium 200 MCG CAPS Take 1 capsule by mouth   Yes Historical Provider, MD   Multiple Vitamin (MULTIVITAMIN PO) Take  by mouth. Yes Historical Provider, MD   aspirin 81 MG tablet Take 81 mg by mouth daily    Historical Provider, MD          Allergy:     Sulfa antibiotics       Review of Systems:     All 12 point review of symptoms completed. Pertinent positives identified in the HPI, all other review of symptoms negative as below. CONSTITUTIONAL: No fatigue  SKIN: No rash or pruritis. EYES: No visual changes or diplopia. No scleral icterus. ENT: No Headaches, hearing loss or vertigo. No mouth sores or sore throat. CARDIOVASCULAR: No chest pain/chest pressure/chest discomfort. No palpitations. No edema. RESPIRATORY: No dyspnea. No cough or wheezing, no sputum production. GASTROINTESTINAL: No N/V/D. No abdominal pain, appetite loss, blood in stools. GENITOURINARY: No dysuria, trouble voiding, or hematuria. MUSCULOSKELETAL:  No gait disturbance, weakness or joint complaints. NEUROLOGICAL: No headache, diplopia, change in muscle strength, numbness or tingling. No change in gait, balance, coordination, mood, affect, memory, mentation, behavior. PSHYCH: No anxiety, loss of interest, change in sexual behavior, feelings of self-harm, or confusion. ENDOCRINE: No excessive thirst, fluid intake, or urination. No tremor. HEMATOLOGIC: No abnormal bruising or bleeding. ALLERGY: No nasal congestion or hives.       Physical Examination:     Vitals:    04/29/21 0910 04/29/21 0912   BP: (!) 170/98 (!) 140/96   Pulse: 63    Weight: 207 lb 12.8 oz (94.3 kg)        Wt Readings from Last 3 Encounters:   04/29/21 207 lb 12.8 oz (94.3 kg) 04/27/21 208 lb (94.3 kg)   04/06/21 205 lb (93 kg)         General Appearance:  Alert, cooperative, no distress, appears stated age Appropriate weight   Head:  Normocephalic, without obvious abnormality, atraumatic   Eyes:  PERRL, conjunctiva/corneas clear EOM intact  Ears normal   Throat no lesions       Nose: Nares normal, no drainage or sinus tenderness   Throat: Lips, mucosa, and tongue normal   Neck: Supple, symmetrical, trachea midline, no adenopathy, thyroid: not enlarged, symmetric, no tenderness/mass/nodules, no carotid bruit       Lungs:   Clear to auscultation bilaterally, respirations unlabored   Chest Wall:  No tenderness or deformity   Heart:  Regular rhythm, rate is controlled, S1, S2 normal, there is no murmur, there is no rub or gallop, no jvd, no bilateral lower extremity edema   Abdomen:   Soft, non-tender, bowel sounds active all four quadrants,  no masses, no organomegaly       Extremities: Extremities normal, atraumatic, no cyanosis   Pulses: 2+ and symmetric   Skin: Skin color, texture, turgor normal, no rashes or lesions   Pysch: Normal mood and affect   Neurologic: Normal gross motor and sensory exam.  Cranial nerves intact        Labs:     Lab Results   Component Value Date    WBC 6.8 03/13/2021    HGB 14.2 03/13/2021    HCT 42.2 03/13/2021    MCV 91.8 03/13/2021     03/13/2021     Lab Results   Component Value Date     04/13/2021    K 4.0 04/13/2021     04/13/2021    CO2 28 04/13/2021    BUN 15 04/13/2021    CREATININE SEE COMMENT (AA) 04/20/2021    GLUCOSE 93 04/13/2021    CALCIUM 8.8 04/13/2021    PROT 6.4 04/13/2021    LABALBU 4.5 04/13/2021    BILITOT 0.4 04/13/2021    ALKPHOS 49 04/13/2021    AST 18 04/13/2021    ALT 26 04/13/2021    LABGLOM >60 04/13/2021    GFRAA >60 04/13/2021    AGRATIO 2.4 (H) 04/13/2021    GLOB 1.9 04/13/2021         Lab Results   Component Value Date    CHOL 189 07/21/2020    CHOL 164 12/03/2019    CHOL 181 06/10/2019     Lab Results amlodipine 2.5 daily (due to min leg edema) and start mariel 25 daily  -Cw  HCTZ 25 daily  Continue with carvedilol 25 twice daily, clonidine 0.1 mg 3 times daily, irbesartan 300 mg daily.  -Cw hydralazine 25 q 8  Low-sodium diet was strongly encouraged (sodium intake of less than 1500 to 2000 mg/day). -Will need a sleep study for possible cause of resistant HTN. -Check BP frequently especially during dizzy spells.      3.  Hyperlipidemia:  Patient is on Lipitor 80 mg daily     4.  CAD:  Patient has mild CAD per #1.     Continue with aspirin 81, carvedilol, Lipitor. 5. Chronic HFpEF:   Patient is now euvolemic and hemo stable. -Cw meds as above. Return in about 4 weeks (around 5/27/2021). I have spent 40 minutes of face to face time with the patient with more than 50% spent counseling and coordinating care. I have personally reviewed the reports and images of labs, radiological studies, cardiac studies including ECG's and telemetry, current and old medical records. The note was completed using EMR and Dragon dictation system. Every effort was made to ensure accuracy; however, inadvertent computerized transcription errors may be present. All questions and concerns were addressed to the patient/family. Alternatives to my treatment were discussed. I would like to thank you for providing me the opportunity to participate in the care of your patient. If you have any questions, please do not hesitate to contact me.      Era Hurtado MD, [facility] - [place] [address] Phone: [phone]  Heart Failure Hotline: [phone]  Fax: [phone]

## 2021-05-04 ENCOUNTER — TELEPHONE (OUTPATIENT)
Dept: CARDIOLOGY CLINIC | Age: 66
End: 2021-05-04

## 2021-05-04 DIAGNOSIS — I10 UNCONTROLLED HYPERTENSION: Primary | ICD-10-CM

## 2021-05-04 NOTE — TELEPHONE ENCOUNTER
Informed pt GEB would suggest getting a sleep study, explained that could be the reason for her severe HTN. Pt explained she will get the sleep study scheduled as soon as she gets done seeing all of her doctors she said she is seeing 4 doctors right now and is overwhelmed so would like to get a few of the appts out of the [facility] doing more testing. Referral was placed. Pt is aware.

## 2021-05-06 DIAGNOSIS — I10 ESSENTIAL HYPERTENSION: ICD-10-CM

## 2021-05-06 RX ORDER — HYDROCHLOROTHIAZIDE 25 MG/1
TABLET ORAL
Qty: 90 TABLET | Refills: 1 | Status: SHIPPED
Start: 2021-05-06 | End: 2021-11-02 | Stop reason: SINTOL

## 2021-05-06 NOTE — TELEPHONE ENCOUNTER
Medication:   Requested Prescriptions     Pending Prescriptions Disp Refills    hydroCHLOROthiazide (HYDRODIURIL) 25 MG tablet [Pharmacy Med Name: hydroCHLOROthiazide 25 MG TABLET] 90 tablet 0     Sig: TAKE ONE TABLET BY MOUTH DAILY     Last Filled:  1.19.21    Last appt: 4/27/2021   Next appt: 7/27/2021    Last OARRS: No flowsheet data found.

## 2021-05-07 ENCOUNTER — PATIENT MESSAGE (OUTPATIENT)
Dept: ENDOCRINOLOGY | Age: 66
End: 2021-05-07

## 2021-05-07 DIAGNOSIS — E27.8 ADRENAL NODULE (HCC): Primary | ICD-10-CM

## 2021-05-07 DIAGNOSIS — I10 UNCONTROLLED HYPERTENSION: ICD-10-CM

## 2021-05-14 DIAGNOSIS — I10 UNCONTROLLED HYPERTENSION: ICD-10-CM

## 2021-05-14 RX ORDER — CLONIDINE HYDROCHLORIDE 0.1 MG/1
0.1 TABLET ORAL 3 TIMES DAILY
Qty: 270 TABLET | Refills: 1 | Status: SHIPPED | OUTPATIENT
Start: 2021-05-14 | End: 2021-10-19 | Stop reason: ALTCHOICE

## 2021-05-14 NOTE — TELEPHONE ENCOUNTER
Medication:   Requested Prescriptions     Pending Prescriptions Disp Refills    cloNIDine (CATAPRES) 0.1 MG tablet [Pharmacy Med Name: cloNIDine HCL 0.1MG TABLET] 180 tablet 0     Sig: TAKE ONE TABLET BY MOUTH TWICE A DAY     Last Filled:  03/06/21    Last appt: 4/27/2021   Next appt: 7/27/2021    Last OARRS: No flowsheet data found.

## 2021-06-03 ENCOUNTER — OFFICE VISIT (OUTPATIENT)
Dept: CARDIOLOGY CLINIC | Age: 66
End: 2021-06-03
Payer: MEDICARE

## 2021-06-03 ENCOUNTER — PATIENT MESSAGE (OUTPATIENT)
Dept: ENDOCRINOLOGY | Age: 66
End: 2021-06-03

## 2021-06-03 ENCOUNTER — TELEPHONE (OUTPATIENT)
Dept: ENDOCRINOLOGY | Age: 66
End: 2021-06-03

## 2021-06-03 VITALS
DIASTOLIC BLOOD PRESSURE: 90 MMHG | BODY MASS INDEX: 35.33 KG/M2 | SYSTOLIC BLOOD PRESSURE: 160 MMHG | WEIGHT: 205.8 LBS | HEART RATE: 63 BPM

## 2021-06-03 DIAGNOSIS — I10 UNCONTROLLED HYPERTENSION: Primary | ICD-10-CM

## 2021-06-03 PROCEDURE — 3017F COLORECTAL CA SCREEN DOC REV: CPT | Performed by: INTERNAL MEDICINE

## 2021-06-03 PROCEDURE — G8400 PT W/DXA NO RESULTS DOC: HCPCS | Performed by: INTERNAL MEDICINE

## 2021-06-03 PROCEDURE — 99214 OFFICE O/P EST MOD 30 MIN: CPT | Performed by: INTERNAL MEDICINE

## 2021-06-03 PROCEDURE — 1090F PRES/ABSN URINE INCON ASSESS: CPT | Performed by: INTERNAL MEDICINE

## 2021-06-03 PROCEDURE — 4040F PNEUMOC VAC/ADMIN/RCVD: CPT | Performed by: INTERNAL MEDICINE

## 2021-06-03 PROCEDURE — 1036F TOBACCO NON-USER: CPT | Performed by: INTERNAL MEDICINE

## 2021-06-03 PROCEDURE — G8417 CALC BMI ABV UP PARAM F/U: HCPCS | Performed by: INTERNAL MEDICINE

## 2021-06-03 PROCEDURE — G8427 DOCREV CUR MEDS BY ELIG CLIN: HCPCS | Performed by: INTERNAL MEDICINE

## 2021-06-03 PROCEDURE — 1123F ACP DISCUSS/DSCN MKR DOCD: CPT | Performed by: INTERNAL MEDICINE

## 2021-06-03 NOTE — TELEPHONE ENCOUNTER
Dr. Yuliya Cordova consultation:  2) My recommendation is that she can proceed with an elective kathrine as long her bp measurements at home are controlled and she can keep a record to review with her surgeon. Informed patient that it is in the note and asked her to let me know if it is sufficient.

## 2021-06-03 NOTE — PROGRESS NOTES
Cc: Resistant HTN, HFpEF, CAD    HPI:     Patient is a 77years old woman, overweight, with history of resistant hypertension, prediabetes, hyperlipidemia, CAD, HFpEF. Patient was assessed with a right upper quadrant ultrasound 2/9/2021: Fatty liver, no gallstones however there was sludge in the gallbladder.  Patient was seen by surgery, has cholecystectomy scheduled for 3/10/2021.  She came for cardiac clearance.     Echo 10/2019: Normal except for indeterminate diastolic function     CCTA 10/2019: Moderate diffuse calcifications of the coronaries without any significant stenosis     Patient has been complaining of some discomfort under her breasts, epigastric and possibly midsternal, unrelated to activity.  She also admits to some exertional shortness of breath.  She denies any orthopnea or lower extremity edema.  She was seen by cardiologist at [facility] about 2 years ago, had a cardiac work-up, no intervention was recommended, she quit going to her appointments. Kishan Khanna BP at home remains around 130/70s.  She does not check her blood pressure frequently.     Patient's father, smoker, had CABG at the age of 50years old and a repeat CABG at the age of 61years old; he passed away as a complication after his second bypass.     ECG 2/4/2021: Normal     [place] 3/10/2021: Canceled due to extreme hypertension (257/118 mmHg, patient held her BP medications).     [facility] 3/12/2021: Normal coronaries, normal renal arteries, normal LVEF 55%.     CT abd with IV contrast 04/2021: 8-10 mm R adrenal lesion, possible lipoma.      All labs for secondary HTN were reviewed (03/2021), only abnormalities: aldosterone/renin activity elevated but with normal aldosterone level, increased norepi in urine. Patient follows with Romel Parmar, endocrinology who referred patient to Dr Thom Ceja at [facility] regarding right adrenal mass, conservative approach was elected.      Patient is here for a follow up.   Patient's BP at home now remains within normal limits (124/70s). In the clinic today her BP is severely elevated. Histories     Past Medical History:   has a past medical history of Fissure, anal, Goiter, History of mammogram, Hyperlipidemia, Hypertension, Onychomycosis, Pap smear for cervical cancer screening, Prediabetes, Screening for osteoporosis, and Vitamin D deficiency. Surgical History:   has a past surgical history that includes Appendectomy (2/4/2000); knee surgery; Pilonidal cyst excision; Colonoscopy (3/2011); Upper gastrointestinal endoscopy (3/19/14); Esophagus dilation (4/2014); and Colonoscopy (10/19/2016). Social History:   reports that she has never smoked. She has never used smokeless tobacco. She reports current alcohol use of about 3.0 standard drinks of alcohol per week. She reports that she does not use drugs. Family History:  No evidence for sudden cardiac death or premature CAD      Medications:     Home medications were reviewed and are listed below    Prior to Admission medications    Medication Sig Start Date End Date Taking?  Authorizing Provider   atorvastatin (LIPITOR) 80 MG tablet TAKE ONE TABLET BY MOUTH ONCE NIGHTLY 5/26/21  Yes Da Young MD   cloNIDine (CATAPRES) 0.1 MG tablet Take 1 tablet by mouth 3 times daily 5/14/21  Yes Da Young MD   hydroCHLOROthiazide (HYDRODIURIL) 25 MG tablet TAKE ONE TABLET BY MOUTH DAILY 5/6/21  Yes Da Young MD   spironolactone (ALDACTONE) 25 MG tablet Take 1 tablet by mouth daily 4/29/21  Yes Klaudia Nugent MD   hydrALAZINE (APRESOLINE) 25 MG tablet Take 1 tablet by mouth 2 times daily 4/29/21  Yes Klaudia Nugent MD   irbesartan (AVAPRO) 300 MG tablet TAKE ONE TABLET BY MOUTH DAILY 4/20/21  Yes Da Young MD   carvedilol (COREG) 25 MG tablet TAKE ONE TABLET BY MOUTH TWICE A DAY 4/1/21  Yes Da Young MD   vitamin D3 (CHOLECALCIFEROL) 25 MCG (1000 UT) TABS tablet Take 1 tablet by mouth daily 3/13/21  Yes Antwan Hoang DO   melatonin 3 MG TABS tablet Take 1 tablet by mouth nightly 7/17/20  Yes Cady Stern MD   Cholecalciferol (VITAMIN D3) 5000 units TABS Take 1 tablet by mouth daily 6/10/19  Yes Cady Stern MD   Selenium 200 MCG CAPS Take 1 capsule by mouth   Yes Historical Provider, MD   Multiple Vitamin (MULTIVITAMIN PO) Take  by mouth. Yes Historical Provider, MD   aspirin 81 MG tablet Take 81 mg by mouth daily    Historical Provider, MD          Allergy:     Sulfa antibiotics       Review of Systems:     All 12 point review of symptoms completed. Pertinent positives identified in the HPI, all other review of symptoms negative as below. CONSTITUTIONAL: No fatigue  SKIN: No rash or pruritis. EYES: No visual changes or diplopia. No scleral icterus. ENT: No Headaches, hearing loss or vertigo. No mouth sores or sore throat. CARDIOVASCULAR: No chest pain/chest pressure/chest discomfort. No palpitations. No edema. RESPIRATORY: No dyspnea. No cough or wheezing, no sputum production. GASTROINTESTINAL: No N/V/D. No abdominal pain, appetite loss, blood in stools. GENITOURINARY: No dysuria, trouble voiding, or hematuria. MUSCULOSKELETAL:  No gait disturbance, weakness or joint complaints. NEUROLOGICAL: No headache, diplopia, change in muscle strength, numbness or tingling. No change in gait, balance, coordination, mood, affect, memory, mentation, behavior. PSHYCH: No anxiety, loss of interest, change in sexual behavior, feelings of self-harm, or confusion. ENDOCRINE: No excessive thirst, fluid intake, or urination. No tremor. HEMATOLOGIC: No abnormal bruising or bleeding. ALLERGY: No nasal congestion or hives.       Physical Examination:     Vitals:    06/03/21 1031 06/03/21 1034   BP: (!) 160/90 (!) 160/90   Pulse: 63    Weight: 205 lb 12.8 oz (93.4 kg)        Wt Readings from Last 3 Encounters:   06/03/21 205 lb 12.8 oz (93.4 kg)   04/29/21 207 lb 12.8 oz (94.3 kg)   04/27/21 208 lb (94.3 kg)         General Appearance: resistant HTN. -Check BP frequently especially during dizzy spells.      3.  Hyperlipidemia:  Patient is on Lipitor 80 mg daily     4.  CAD:  Patient has mild CAD per #1.     Continue with aspirin 81, carvedilol, Lipitor.     5. Chronic HFpEF:   Patient is now euvolemic and hemo stable.      -Cw meds as above. Return in about 6 months (around 12/3/2021). I have spent 35 minutes of face to face time with the patient with more than 50% spent counseling and coordinating care. I have personally reviewed the reports and images of labs, radiological studies, cardiac studies including ECG's and telemetry, current and old medical records. The note was completed using EMR and Dragon dictation system. Every effort was made to ensure accuracy; however, inadvertent computerized transcription errors may be present. All questions and concerns were addressed to the patient/family. Alternatives to my treatment were discussed. I would like to thank you for providing me the opportunity to participate in the care of your patient. If you have any questions, please do not hesitate to contact me.      Jono Astudillo MD, [facility] [place] [address] [address] [address] Phone: [phone]  Heart Failure Hotline: [phone]  Fax: [phone]

## 2021-06-03 NOTE — TELEPHONE ENCOUNTER
Spoke with patient, she will let me know if Dr. Owen Black note is sufficient for clearance for surgery, if not, I will write a letter. There is recommendation at the end of the note that it is okay to proceed with surgery, as long as blood pressure is controlled.

## 2021-06-04 ENCOUNTER — TELEPHONE (OUTPATIENT)
Dept: SURGERY | Age: 66
End: 2021-06-04

## 2021-06-04 NOTE — TELEPHONE ENCOUNTER
Patient called in stating she has been released from her cardiologist & Dr Supriya Velázquez at [facility]. She would like to proceed with her surgery.     Please call [phone]

## 2021-06-04 NOTE — TELEPHONE ENCOUNTER
Called patient to discuss. Will schedule a follow up with Dr Luciano Mccauley, last seen on 2/26/21 when surgery was ordered. Scheduled for follow up . DONE

## 2021-06-11 ENCOUNTER — TELEPHONE (OUTPATIENT)
Dept: SURGERY | Age: 66
End: 2021-06-11

## 2021-06-11 ENCOUNTER — TELEPHONE (OUTPATIENT)
Dept: PRIMARY CARE CLINIC | Age: 66
End: 2021-06-11

## 2021-06-11 ENCOUNTER — OFFICE VISIT (OUTPATIENT)
Dept: SURGERY | Age: 66
End: 2021-06-11
Payer: MEDICARE

## 2021-06-11 VITALS
HEART RATE: 67 BPM | OXYGEN SATURATION: 98 % | BODY MASS INDEX: 35.17 KG/M2 | DIASTOLIC BLOOD PRESSURE: 94 MMHG | HEIGHT: 64 IN | WEIGHT: 206 LBS | SYSTOLIC BLOOD PRESSURE: 174 MMHG

## 2021-06-11 DIAGNOSIS — K80.20 SYMPTOMATIC CHOLELITHIASIS: Primary | ICD-10-CM

## 2021-06-11 PROCEDURE — 1090F PRES/ABSN URINE INCON ASSESS: CPT | Performed by: SURGERY

## 2021-06-11 PROCEDURE — G8427 DOCREV CUR MEDS BY ELIG CLIN: HCPCS | Performed by: SURGERY

## 2021-06-11 PROCEDURE — 1036F TOBACCO NON-USER: CPT | Performed by: SURGERY

## 2021-06-11 PROCEDURE — 99214 OFFICE O/P EST MOD 30 MIN: CPT | Performed by: SURGERY

## 2021-06-11 PROCEDURE — 3017F COLORECTAL CA SCREEN DOC REV: CPT | Performed by: SURGERY

## 2021-06-11 PROCEDURE — 1123F ACP DISCUSS/DSCN MKR DOCD: CPT | Performed by: SURGERY

## 2021-06-11 PROCEDURE — G8400 PT W/DXA NO RESULTS DOC: HCPCS | Performed by: SURGERY

## 2021-06-11 PROCEDURE — 4040F PNEUMOC VAC/ADMIN/RCVD: CPT | Performed by: SURGERY

## 2021-06-11 PROCEDURE — G8417 CALC BMI ABV UP PARAM F/U: HCPCS | Performed by: SURGERY

## 2021-06-11 NOTE — TELEPHONE ENCOUNTER
----- Message from Kalenbulmaro Mookie sent at 6/11/2021  3:32 PM EDT -----  Subject: Appointment Request    Reason for Call: Routine Pre-Op    QUESTIONS  Type of Appointment? Established Patient  Reason for appointment request? No appointments available during search  Additional Information for Provider? patient needs a pre op appointment   for her surgery on 6/23/21, says she would rather schedule with Dr. Ned Hidalgo if possible since she knows how hard it was for her to book this   surgery, She was told he needs a H&P. Patient is available any time 6/15-   6/17. Will schedule with CNP if Dr. Ned Hidalgo approves.   ---------------------------------------------------------------------------  --------------  Bethel MOLINA  What is the best way for the office to contact you? OK to leave message on   voicemail  Preferred Call Back Phone Number? [phone]  ---------------------------------------------------------------------------  --------------  SCRIPT ANSWERS  Relationship to Patient? Self  Appointment reason? Symptomatic  Select script based on patient symptoms? Adult Pre-Op  Do you have question for your provider that need to be answered prior to   scheduling your pre-op appointment? No  Have you been diagnosed with, awaiting test results for, or told that you   are suspected of having COVID-19 (Coronavirus)? (If patient has tested   negative or was tested as a requirement for work, school, or travel and   not based on symptoms, answer no)? No  Do you currently have flu-like symptoms including fever or chills, cough,   shortness of breath, difficulty breathing, or new loss of taste or smell? No  Have you had close contact with someone with COVID-19 in the last 14 days? No  (Service Expert  click yes below to proceed with [employer] As Usual   Scheduling)?  Yes

## 2021-06-11 NOTE — TELEPHONE ENCOUNTER
Patient seen today by Dr Adriel Cartagena. Surgery letter received. Laparoscopic Cholecystectomy with Cholangiogram 1 hour of OR time needed. Called patient to schedule surgery. Patient scheduled on 6/23/21 at 12, 10am arrival.     Patient aware of need of H&P. Emailed pre op paperwork [email or web address]    Added to MD's outlook and calender.

## 2021-06-11 NOTE — LETTER
[facility] M.D. FACS  [address] [facility], [facility] [address]  Phone: [phone] Fax: [phone]            Surgery Order/Time:  11:43 AM  Conf. # _________________  Scheduled by: Romaine Laws LPN                                **Pre-Surgical Orders in [facility]**  Facility: [facility] [place], [facility].    Surgery Date: 2021 Time: 1200    Pt arrival: 1000  Second Surgeon: N/A        Patient Name: Sterling Casas  : 1955  Home Ph:[phone] (home)  [address] [place] [address] Road  PCP:  Scarlett Batista MD   Does patient speak [place]?: yes    needed? no                                             PROCEDURE: Laparoscopic cholecystectomy with gram  CPT: 00553: Laparoscopic Cholecystectomy with Cholangiogram    DIAGNOSIS:      ICD-10-CM    1. Symptomatic cholelithiasis  K80.20        Anesthesia: General  Time Needed:  1 hour   Pt Position:  supine      Outpatient __x__ Admit ______  Assist._____   Cardiac Clearance: no  Patient to meet with Anesthesiology prior to surgery: no    Patient Allergies: Allergies   Allergen Reactions    Sulfa Antibiotics Rash     Pre-Op H&P to be done by: Scarlett Batista MD  Pre-Op Antibiotics: Ancef: 2g IV On Call to OR      Physician: Parisa Cruz MD Date: 21             Insurance: ***    ID #  ***    Ph # ***  Date called ________________   Brynn Perez to: _____________ Precert Needed?  Yes  /  No  PreAuth # & Details   ______________________________________________________________________

## 2021-06-11 NOTE — TELEPHONE ENCOUNTER
Pt is wanting to have an appt with Dr. Julian Elkins. She only has same days. Does Dr. Julian Elkins want her worked in or does she want the pt to see Barry.

## 2021-06-11 NOTE — PROGRESS NOTES
Department of General Surgery - Adult  General Surgery Service  Resident Clinic Note      CHIEF COMPLAINT:      History Obtained From:  patient, electronic medical record    HISTORY OF PRESENT ILLNESS:    The patient is a 77 y.o. female with significant past medical history as below who presents with c/o epigastric abdominal pain. Patient states her pain started 1 year ago. Abdominal pain is dull achy. Patient states a couple of times per month the pain last several hours. Patient states last night at 5:00 pm she had pain that lasted until 1:00am and it was not triggered by food. Patient states several months ago she thought the pain was triggered by spicy food. Patient denies nausea or vomiting. Patient states it could be heartburn but she doesn't know what heartburn feels like. Patient states her back feels uncomfortable sometimes when she is having the pain.      PSH: appendectomy    Past Medical History:   Diagnosis Date    pearl Apple MD    Goiter     [facility] of [facility] 10/[facility] [facility]    Hyperlipidemia     Hypertension     Onychomycosis     Pap smear for cervical cancer screening 2/2010    Dr. Chelsi Tenorio - [facility]    Prediabetes 12/23/2019    Screening for osteoporosis     Dexa 12/2005 - [facility]    Vitamin D deficiency 11/10/2011     Past Surgical History:   Procedure Laterality Date    APPENDECTOMY  2/4/2000    COLONOSCOPY  3/2011    Dr. Flor Hernandez - polyps - 2 years  f/u    COLONOSCOPY  10/19/2016    Holly May MD    ESOPHAGEAL DILATATION  4/2014    Galo Saravia MD     KNEE SURGERY      right    PILONIDAL CYST EXCISION      UPPER GASTROINTESTINAL ENDOSCOPY  3/19/14    concentric rings in esophagus     Current Outpatient Medications   Medication Sig Dispense Refill    atorvastatin (LIPITOR) 80 MG tablet TAKE ONE TABLET BY MOUTH ONCE NIGHTLY 90 tablet 1    cloNIDine (CATAPRES) 0.1 MG tablet Take 1 tablet by mouth 3 times daily 270 tablet 1    hydroCHLOROthiazide (HYDRODIURIL) 25 MG tablet TAKE ONE TABLET BY MOUTH DAILY 90 tablet 1    spironolactone (ALDACTONE) 25 MG tablet Take 1 tablet by mouth daily 90 tablet 3    hydrALAZINE (APRESOLINE) 25 MG tablet Take 1 tablet by mouth 2 times daily 180 tablet 3    irbesartan (AVAPRO) 300 MG tablet TAKE ONE TABLET BY MOUTH DAILY 90 tablet 1    carvedilol (COREG) 25 MG tablet TAKE ONE TABLET BY MOUTH TWICE A  tablet 1    vitamin D3 (CHOLECALCIFEROL) 25 MCG (1000 UT) TABS tablet Take 1 tablet by mouth daily 30 tablet 2    melatonin 3 MG TABS tablet Take 1 tablet by mouth nightly 30 tablet 1    Cholecalciferol (VITAMIN D3) 5000 units TABS Take 1 tablet by mouth daily      Selenium 200 MCG CAPS Take 1 capsule by mouth      Multiple Vitamin (MULTIVITAMIN PO) Take  by mouth.  aspirin 81 MG tablet Take 81 mg by mouth daily       No current facility-administered medications for this visit. Allergies   Allergen Reactions    Sulfa Antibiotics Rash     Social History     Tobacco Use    Smoking status: Never Smoker    Smokeless tobacco: Never Used   Vaping Use    Vaping Use: Never used   Substance Use Topics    Alcohol use: Yes     Alcohol/week: 3.0 standard drinks     Types: 3 Glasses of wine per week    Drug use: No     Family History   Problem Relation Age of Onset    Other Mother         vascular disease    Hypertension Mother     High Cholesterol Mother     Diabetes Mother     Heart Disease Mother     Heart Disease Father     Heart Attack Father     Cancer Maternal Grandmother 32        unknown type - cancer     Stroke Neg Hx        REVIEW OF SYSTEMS:    Review of systems was negative except what was mentioned in the HPI.     PHYSICAL EXAM:    BP (!) 174/94   Pulse 67   Ht 5' 4\" (1.626 m)   Wt 206 lb (93.4 kg)   LMP 05/01/2008 (Approximate) Comment: 15 years ago  SpO2 98%   BMI 35.36 kg/m²   CONSTITUTIONAL:  awake, alert, cooperative, no apparent distress, and appears stated age  LUNGS:  No increased work of breathing, good air exchange, clear to auscultation bilaterally, no crackles or wheezing  CARDIOVASCULAR:  Normal apical impulse, regular rate and rhythm, normal S1 and S2, no S3 or S4, and no murmur noted  ABDOMEN:  No scars, normal bowel sounds, soft, non-distended, non-tender, no masses palpated, no hepatosplenomegally  SKIN:  normal skin color, texture, turgor    US 2/9:   Diffuse fatty infiltration of liver       Small lobulated nonshadowing focus within the gallbladder neck probably representing gallbladder sludge (sludge ball). Soft tissue mass not excluded. If there is no surgical intervention, recommend follow-up right upper quadrant ultrasound in 4 months to    confirm stability.       Bilateral renal cysts. ASSESSMENT AND PLAN:  The patient is a 77 y.o. female who presents with biliary colic, with sludge confirmed on US.     - recommend lap kathrine with IOC, discussed risks and benefits.   - will schedule     Brock Thompson MD  6/11/2021 11:44 AM

## 2021-06-15 ENCOUNTER — OFFICE VISIT (OUTPATIENT)
Dept: PRIMARY CARE CLINIC | Age: 66
End: 2021-06-15
Payer: MEDICARE

## 2021-06-15 VITALS
HEART RATE: 74 BPM | SYSTOLIC BLOOD PRESSURE: 142 MMHG | RESPIRATION RATE: 16 BRPM | TEMPERATURE: 97.3 F | HEIGHT: 64 IN | WEIGHT: 205 LBS | DIASTOLIC BLOOD PRESSURE: 80 MMHG | BODY MASS INDEX: 35 KG/M2 | OXYGEN SATURATION: 97 %

## 2021-06-15 DIAGNOSIS — I10 ESSENTIAL HYPERTENSION: ICD-10-CM

## 2021-06-15 DIAGNOSIS — M54.2 NECK PAIN: ICD-10-CM

## 2021-06-15 DIAGNOSIS — K80.20 SYMPTOMATIC CHOLELITHIASIS: Primary | ICD-10-CM

## 2021-06-15 DIAGNOSIS — E55.9 VITAMIN D DEFICIENCY: ICD-10-CM

## 2021-06-15 DIAGNOSIS — R73.03 PREDIABETES: ICD-10-CM

## 2021-06-15 DIAGNOSIS — Z01.818 PREOPERATIVE EXAMINATION: ICD-10-CM

## 2021-06-15 DIAGNOSIS — E78.2 MIXED HYPERLIPIDEMIA: ICD-10-CM

## 2021-06-15 PROCEDURE — 1090F PRES/ABSN URINE INCON ASSESS: CPT | Performed by: INTERNAL MEDICINE

## 2021-06-15 PROCEDURE — 1123F ACP DISCUSS/DSCN MKR DOCD: CPT | Performed by: INTERNAL MEDICINE

## 2021-06-15 PROCEDURE — 4040F PNEUMOC VAC/ADMIN/RCVD: CPT | Performed by: INTERNAL MEDICINE

## 2021-06-15 PROCEDURE — 99214 OFFICE O/P EST MOD 30 MIN: CPT | Performed by: INTERNAL MEDICINE

## 2021-06-15 PROCEDURE — G8417 CALC BMI ABV UP PARAM F/U: HCPCS | Performed by: INTERNAL MEDICINE

## 2021-06-15 PROCEDURE — G8427 DOCREV CUR MEDS BY ELIG CLIN: HCPCS | Performed by: INTERNAL MEDICINE

## 2021-06-15 PROCEDURE — G8400 PT W/DXA NO RESULTS DOC: HCPCS | Performed by: INTERNAL MEDICINE

## 2021-06-15 PROCEDURE — 1036F TOBACCO NON-USER: CPT | Performed by: INTERNAL MEDICINE

## 2021-06-15 PROCEDURE — 3017F COLORECTAL CA SCREEN DOC REV: CPT | Performed by: INTERNAL MEDICINE

## 2021-06-15 ASSESSMENT — ENCOUNTER SYMPTOMS
DIARRHEA: 0
VOMITING: 0
SORE THROAT: 0
BLOOD IN STOOL: 0
SHORTNESS OF BREATH: 0
COUGH: 0
TROUBLE SWALLOWING: 0
NAUSEA: 0
RHINORRHEA: 0
ABDOMINAL PAIN: 0
SINUS PRESSURE: 0
WHEEZING: 0
CHEST TIGHTNESS: 0

## 2021-06-15 NOTE — PROGRESS NOTES
arthralgias, myalgias and neck stiffness. Skin: Negative for rash. Neurological: Negative for dizziness, tremors, seizures, syncope, facial asymmetry, weakness, light-headedness, numbness and headaches. Hematological: Negative for adenopathy. Does not bruise/bleed easily. Psychiatric/Behavioral: Positive for sleep disturbance (occasional and takes melatonin 6mg). Negative for decreased concentration and dysphoric mood. The patient is not nervous/anxious.         Past Medical History:   Diagnosis Date    Fissure, anal     Lorenzo YOO    Goiter     History of mammogram 10/2010    [facility]    Hyperlipidemia     Hypertension     Onychomycosis     Pap smear for cervical cancer screening 2/2010    Dr. Grimes Mins - [facility]    Prediabetes 12/23/2019    Screening for osteoporosis     Dexa 12/2005 - [facility]    Vitamin D deficiency 11/10/2011       Past Surgical History:   Procedure Laterality Date    APPENDECTOMY  2/4/2000    COLONOSCOPY  3/2011    Dr. Jodee Murphy - polyps - 2 years  f/u    COLONOSCOPY  10/19/2016    Tiffany Gaytan MD    ESOPHAGEAL DILATATION  4/2014    Jai Sierra MD     KNEE SURGERY      right    PILONIDAL CYST EXCISION      UPPER GASTROINTESTINAL ENDOSCOPY  3/19/14    concentric rings in esophagus       Outpatient Medications Marked as Taking for the 6/15/21 encounter (Office Visit) with Rachel Phelps MD   Medication Sig Dispense Refill    atorvastatin (LIPITOR) 80 MG tablet TAKE ONE TABLET BY MOUTH ONCE NIGHTLY 90 tablet 1    cloNIDine (CATAPRES) 0.1 MG tablet Take 1 tablet by mouth 3 times daily 270 tablet 1    hydroCHLOROthiazide (HYDRODIURIL) 25 MG tablet TAKE ONE TABLET BY MOUTH DAILY 90 tablet 1    spironolactone (ALDACTONE) 25 MG tablet Take 1 tablet by mouth daily 90 tablet 3    hydrALAZINE (APRESOLINE) 25 MG tablet Take 1 tablet by mouth 2 times daily 180 tablet 3    irbesartan (AVAPRO) 300 MG tablet TAKE ONE TABLET BY MOUTH DAILY 90 tablet 1  carvedilol (COREG) 25 MG tablet TAKE ONE TABLET BY MOUTH TWICE A  tablet 1    vitamin D3 (CHOLECALCIFEROL) 25 MCG (1000 UT) TABS tablet Take 1 tablet by mouth daily 30 tablet 2    Cholecalciferol (VITAMIN D3) 5000 units TABS Take 1 tablet by mouth daily      Multiple Vitamin (MULTIVITAMIN PO) Take  by mouth. Allergies   Allergen Reactions    Sulfa Antibiotics Rash       Social History     Tobacco Use    Smoking status: Never Smoker    Smokeless tobacco: Never Used   Substance Use Topics    Alcohol use: Yes     Alcohol/week: 3.0 standard drinks     Types: 3 Glasses of wine per week     Comment: monthly       Family History   Problem Relation Age of Onset    Other Mother         vascular disease    Hypertension Mother     High Cholesterol Mother     Diabetes Mother     Heart Disease Mother     Heart Disease Father     Heart Attack Father     Cancer Maternal Grandmother 32        unknown type - cancer     Stroke Neg Hx        Immunization History   Administered Date(s) Administered    COVID-19, Pfizer, PF, 30mcg/0.3mL 04/03/2021, 04/24/2021    Influenza Whole 12/05/2013    Influenza, High Dose (Fluzone 65 yrs and older) 11/17/2019    Influenza, Intradermal, Preservative free 12/15/2015    Influenza, Intradermal, Quadrivalent, Preservative Free 12/09/2016    Influenza, Quadv, adjuvanted, 65 yrs +, IM, PF (Fluad) 10/01/2020    Pneumococcal Conjugate 13-valent (Epjgmqm20) 12/21/2018    Pneumococcal Polysaccharide ([id number]) 03/26/2020    Tdap (Boostrix, Adacel) 12/15/2015    Tetanus 11/11/2005    Zoster Live (Zostavax) 06/09/2014    Zoster Recombinant (Shingrix) 06/18/2018, 07/23/2019       Vitals:    06/15/21 1603 06/15/21 1610   BP: (!) 160/86 (!) 142/80   Pulse: 74    Resp: 16    Temp: 97.3 °F (36.3 °C)    TempSrc: Temporal    SpO2: 97%    Weight: 205 lb (93 kg)    Height: 5' 4\" (1.626 m)      Body mass index is 35.19 kg/m². Physical Exam  Nursing note reviewed. Constitutional:       General: She is not in acute distress. Appearance: Normal appearance. She is well-developed. HENT:      Head: Normocephalic and atraumatic. Right Ear: Tympanic membrane and ear canal normal.      Left Ear: Tympanic membrane and ear canal normal.   Eyes:      General: Lids are normal.      Extraocular Movements: Extraocular movements intact. Conjunctiva/sclera: Conjunctivae normal.      Pupils: Pupils are equal, round, and reactive to light. Neck:      Thyroid: No thyromegaly. Vascular: No carotid bruit. Cardiovascular:      Rate and Rhythm: Normal rate and regular rhythm. Heart sounds: Normal heart sounds, S1 normal and S2 normal. No murmur heard. No friction rub. No gallop. Pulmonary:      Effort: Pulmonary effort is normal. No respiratory distress. Breath sounds: Normal breath sounds. No wheezing, rhonchi or rales. Abdominal:      General: Bowel sounds are normal. There is no distension. Palpations: Abdomen is soft. Tenderness: There is no abdominal tenderness. Musculoskeletal:      Cervical back: Neck supple. No spasms or tenderness. Right lower leg: No edema. Left lower leg: No edema. Lymphadenopathy:      Head:      Right side of head: No submandibular adenopathy. Left side of head: No submandibular adenopathy. Neurological:      Mental Status: She is alert and oriented to person, place, and time. Cranial Nerves: No cranial nerve deficit. Deep Tendon Reflexes: Reflexes are normal and symmetric. Psychiatric:         Mood and Affect: Mood normal.             No results found for this visit on 06/15/21.     Lab Review   Orders Only on 04/23/2021   Component Date Value    Cortisol, Saliva 04/20/2021 0.052     Cortisol, Saliva 04/21/2021 0.061     Cortisol, Saliva 04/22/2021 0.048    Orders Only on 04/20/2021   Component Date Value    Metanephrine Intrep Urine 04/20/2021 See Note     Metanephrine, Urine-Per * 04/20/2021 51     Normetanephrines, nmol/L 04/20/2021 172     Metanephrines, Ur 04/20/2021 76     Metanephrine ug/g Cre 04/20/2021 68     Normetanephrine, 24H Ur 04/20/2021 258     Normetanephrine, (g/CRT) 04/20/2021 229     Creatinine, Ur 04/20/2021 75     Creatinine, 24H Ur 04/20/2021 1125     Hours Collected 04/20/2021 24     Urine Total Volume 04/20/2021 1500     Catecholamines Urine Int* 04/20/2021 See Note     Dopamine , 24H Ur 04/20/2021 180     Norepinephrine, 24H Ur 04/20/2021 39     Epinephrine, 24H Ur 04/20/2021 3     Epinephrine, (uG/L) 04/20/2021 2     Norepinephrine, (ug/L) 04/20/2021 26     Dopamine, (ug/L) 04/20/2021 120     Norepinephrine 04/20/2021 35     Dopamine 04/20/2021 160     Epinephrine 04/20/2021 3     CREATININE 04/20/2021 SEE COMMENT*    24hr Urine Volume (ml) 04/20/2021 1500     Creatinine, 24H Ur 04/20/2021 1.2     Time (Hours) 04/20/2021 24     Creatinine Clearance 04/20/2021 SEE COMMENT*    Collection Duration 04/20/2021 24     Cortisol,F,ug/L,U 04/20/2021 4.66     Cortisol,Ur Free 04/20/2021 6.21     Cortisol (Ur), Free 04/20/2021 7.0     Interpretation 04/20/2021 See Note     Rejected Test 04/23/2021 [facility]     Reason for Rejection 04/23/2021 see below    Orders Only on 04/13/2021   Component Date Value    DHEAS (DHEA Sulfate) 04/13/2021 25.0     Aldosterone 04/13/2021 11.0     Aldosterone/Renin Activi* 04/13/2021 36.7*    Renin Activity 04/13/2021 0.3     Sodium 04/13/2021 143     Potassium 04/13/2021 4.0     Chloride 04/13/2021 103     CO2 04/13/2021 28     Anion Gap 04/13/2021 12     Glucose 04/13/2021 93     BUN 04/13/2021 15     CREATININE 04/13/2021 0.6     GFR Non- 04/13/2021 >60     GFR  04/13/2021 >60     Calcium 04/13/2021 8.8     Total Protein 04/13/2021 6.4     Albumin 04/13/2021 4.5     Albumin/Globulin Ratio 04/13/2021 2.4*    Total Bilirubin 04/13/2021 0.4     Alkaline Phosphatase 04/13/2021 49     ALT 04/13/2021 26     AST 04/13/2021 18     Globulin 04/13/2021 1.9     T3, Free 04/13/2021 2.3     TSH 04/13/2021 0.92     T4 Free 04/13/2021 1.2     Normetanephrine, Free 04/13/2021 0.44     Metanephrine, Free 04/13/2021 0.12     Metaneph/Plasma Interp 04/13/2021 See Note     Cortisol - AM 04/13/2021 7.7     ACTH 04/13/2021 10    Orders Only on 03/26/2021   Component Date Value    Aldosterone 03/26/2021 13.2     Aldosterone/Renin Activi* 03/26/2021 132.0*    Renin Activity 03/26/2021 0.1    Office Visit on 03/24/2021   Component Date Value    Hemoglobin A1C 03/24/2021 5.3    Orders Only on 03/23/2021   Component Date Value    Catecholamines Urine Int* 03/23/2021 See Note     Creatinine, Ur 03/23/2021 176     Creatinine, 24H Ur 02/02/7790 Not Applicable     Dopamine , 24H Ur 04/37/0493 Not Applicable     Norepinephrine, 24H Ur 46/67/3831 Not Applicable     Epinephrine, 24H Ur 52/34/3989 Not Applicable     Epinephrine, (uG/L) 03/23/2021 15     Norepinephrine, (ug/L) 03/23/2021 144     Dopamine, (ug/L) 03/23/2021 314     Norepinephrine 03/23/2021 82*    Dopamine 03/23/2021 178     Epinephrine 03/23/2021 9     Urine Total Volume 03/23/2021 NA     Hours Collected 03/23/2021 24     Cortisol - AM 03/23/2021 16.0     Rejected Test 03/25/2021 70,073     Reason for Rejection 03/25/2021 see below    Orders Only on 03/17/2021   Component Date Value    Sodium 03/17/2021 144     Potassium 03/17/2021 3.8     Chloride 03/17/2021 103     CO2 03/17/2021 27     Anion Gap 03/17/2021 14     Glucose 03/17/2021 116*    BUN 03/17/2021 13     CREATININE 03/17/2021 0.6     GFR Non- 03/17/2021 >60     GFR  03/17/2021 >60     Calcium 03/17/2021 9.0     Color, UA 03/17/2021 YELLOW     Clarity, UA 03/17/2021 Clear     Glucose, Ur 03/17/2021 Negative     Bilirubin Urine 03/17/2021 Negative     Ketones, Urine 03/17/2021 Negative     Specific Gravity, UA 03/17/2021 1.018     Blood, Urine 03/17/2021 Negative     pH, UA 03/17/2021 7.5     Protein, UA 03/17/2021 Negative     Urobilinogen, Urine 03/17/2021 0.2     Nitrite, Urine 03/17/2021 Negative     Leukocyte Esterase, Urine 03/17/2021 Negative     Microscopic Examination 03/17/2021 Not Indicated     Urine Type 03/17/2021 Voided      Lab Results   Component Value Date    CHOL 189 07/21/2020    TRIG 182 (H) 07/21/2020    HDL 66 (H) 07/21/2020    LDLCALC 87 07/21/2020             Assessment/Plan     1. Symptomatic cholelithiasis  -Preoperative history and physical done   -Patient is in satisfactory condition for a Laparoscopic Cholecystectomy with cholangiogram to be done on 6/23/21 by Dr. Carlyle Hall    2. Preoperative examination  -Preoperative history and physical done   -Patient is in satisfactory condition for a Laparoscopic Cholecystectomy with cholangiogram to be done on 6/23/21 by Dr. Yolanda Fiore same medications  -Hold Irbesartan and HCTZ  the morning of surgery  -Ok to take Amlodipine, Hydralazine, Clonidine and Carvedilol with sips of water the morning of surgery  -Avoid anti-inflammatories, aspirin, and fish oil for 7 days prior to surgery  -Tylenol 650mg 3 times daily if needed for pain  -Nothing to eat or drink after midnight prior to surgery    3. Essential hypertension  -blood pressure is elevated but stable  -Continue same medications  -Hold Irbesartan and HCTZ  the morning of surgery  -Ok to take Amlodipine, Hydralazine, Clonidine and Carvedilol with sips of water the morning of surgery  -Low sodium diet  -Regular aerobic exercise     4. Mixed hyperlipidemia  -stable  -Continue same medications  -Low fat, low cholesterol diet  -Regular aerobic exercise    5. Prediabetes  -stable  -most recent hemoglobin A1c is normal  -Low carbohydrate diet  -Regular aerobic exercise    6. Vitamin D deficiency  -stable  -Continue same medications    7.  Neck pain  - Tylenol 650mg 3 times daily as needed  -patient declines imaging  -neck exercises        Discussed medications with patient, who voiced understanding of their use and indications. All questions answered. Return if symptoms worsen or fail to improve.

## 2021-06-18 NOTE — PROGRESS NOTES
PCP office called, requested Dr. Yandy Arcos complete and sign pre-op done 6-15 prior to pt's surgery 6-23.  Alanis Dan

## 2021-06-18 NOTE — PROGRESS NOTES
Entrance of the hospital (do not enter from the lower level of the parking garage). Upon entrance, check in with the  at the main desk on your left. If no one is available at the desk, proceed into the [facility] Waiting Room and go through the door directly into the [facility]. There is a Check-in desk ACROSS from Room 5 (marked with a sign hanging from the ceiling). The phone number for the surgery center is [phone]. 4. Please call [phone] option #2 option #2 if you have not been preregistered yet. On the day of your procedure bring your insurance card and photo ID. You will be registered at your bedside once brought back to your room. 5. DO NOT EAT ANYTHING eight hours prior to your arrival for surgery. May have 8 ounces of water 4 hours prior to your arrival for surgery. NOTE: ALL Gastric, Bariatric and Bowel surgery patients MUST follow their surgeon's instructions. 6. MEDICATIONS    Take the following medications with a SMALL sip of water:    Use your usual dose of inhalers the morning of surgery. BRING your rescue inhaler with you to hospital.    Anesthesia does NOT want you to take insulin the morning of surgery. They will control your blood sugar while you are at the hospital. Please contact your ordering physician for instructions regarding your insulin the night before your procedure. If you have an insulin pump, please keep it set on basal rate. 7. Do not swallow water when brushing teeth. No gum, candy, mints or ice chips. Refrain from smoking or at least decrease the amount. 8. Dress in loose, comfortable clothing appropriate for redressing after your procedure. Do not wear jewelry (including body piercings), make-up (especially NO eye make-up), fingernail polish (NO toenail polish if foot/leg surgery), lotion, powders or metal hairclips. 9. Dentures, glasses, or contacts will need to be removed before your procedure.  Bring cases for your glasses, contacts, dentures, or hearing aids to protect them while you are in surgery. 10. If you use a CPAP, please bring it with you on the day of your procedure. 11. We recommend that valuable personal  belongings such as cash, cell phones, e-tablets or jewelry, be left at home during your stay. The hospital will not be responsible for valuables that are not secured in the hospital safe. However, if your insurance requires a co-pay, you may want to bring a method of payment, i.e. Check or credit card, if you wish to pay your co-pay the day of surgery. 12. If you are to stay overnight, you may bring a bag with personal items. Please have any large items you may need brought in by your family after your arrival to your hospital room. 15. If you have a Living Will or Durable Power of , please bring a copy on the day of your procedure. 15. With your permission, one family member may accompany you while you are being prepared for surgery. Once you are ready, additional family members may join you. HOW WE KEEP YOU SAFE and WORK TO PREVENT SURGICAL SITE INFECTIONS:  1. Health care workers should always check your ID bracelet to verify your name and birth date. You will be asked many times to state your name, date of birth, and allergies. 2. Health care workers should always clean their hands with soap or alcohol gel before providing care to you. It is okay to ask anyone if they cleaned their hands before they touch you. 3. You will be actively involved in verifying the type of procedure you are having and ensuring the correct surgical site. This will be confirmed multiple times prior to your procedure. Do NOT kirby your surgery site UNLESS instructed to by your surgeon. 4. Do not shave or wax for 72 hours prior to procedure near your operative site. Shaving with a razor can irritate your skin and make it easier to develop an infection.  On the day of your procedure, any hair that needs to be removed near the surgical site will be clipped by a healthcare worker using a special clippers designed to avoid skin irritation. 5. When you are in the operating room, your surgical site will be cleansed with a special soap, and in most cases, you will be given an antibiotic before the surgery begins. What to expect AFTER YOUR PROCEDURE:  1. Immediately following your procedure, your will be taken to the PACU for the first phase of your recovery. Your nurse will help you recover from any potential side effects of anesthesia, such as extreme drowsiness, changes in your vital signs or breathing patterns. Nausea, headache, muscle aches, or sore throat may also occur after anesthesia. Your nurse will help you manage these potential side effects. 2. For comfort and safety, arrange to have someone at home with you for the first 24 hours after discharge. 3. You and your family will be given written instructions about your diet, activity, dressing care, medications, and return visits. 4. Once at home, should issues with nausea, pain, or bleeding occur, or should you notice any signs of infection, you should call your surgeon. 5. Always clean your hands before and after caring for your wound. Do not let your family touch your surgery site without cleaning their hands. 6. Narcotic pain medications can cause significant constipation. You may want to add a stool softener to your postoperative medication schedule or speak to your surgeon on how best to manage this SIDE EFFECT. SPECIAL INSTRUCTIONS     Thank you for allowing us to care for you. We strive to exceed your expectations in the delivery of care and service provided to you and your family. If you need to contact the [place] [address] staff for any reason, please call us at [phone]    Instructions reviewed with patient during preadmission testing phone interview.   Shanelle Harris RN.6/18/2021 .4:36 PM      ADDITIONAL EDUCATIONAL INFORMATION REVIEWED PER PHONE WITH YOU AND/OR YOUR FAMILY:  Yes Hibiclens® Bathing Instructions

## 2021-06-20 PROBLEM — K80.20 SYMPTOMATIC CHOLELITHIASIS: Status: ACTIVE | Noted: 2021-06-20

## 2021-06-20 PROBLEM — M54.2 NECK PAIN: Status: ACTIVE | Noted: 2021-06-20

## 2021-06-20 ASSESSMENT — ENCOUNTER SYMPTOMS: CONSTIPATION: 0

## 2021-06-22 ENCOUNTER — ANESTHESIA EVENT (OUTPATIENT)
Dept: OPERATING ROOM | Age: 66
End: 2021-06-22
Payer: MEDICARE

## 2021-06-22 NOTE — PROGRESS NOTES
[facility] / [facility] ([facility]) JENNIFER [address] [place], [address]    Acknowledgment of Informed Consent for Surgical or Medical Procedure and Sedation  I agree to allow doctor(s) ESSIE ARBOLEDA and his/her associates or assistants, including residents and/or other qualified medical practitioner to perform the following medical treatment or procedure and to administer or direct the administration of sedation as necessary:  Procedure(s): LAPAROSCOPIC CHOLECYSTECTOMY WITH CHOLANGIOGRAM  My doctor has explained the following regarding the proposed procedure:   the explanation of the procedure   the benefits of the procedure   the potential problems that might occur during recuperation   the risks and side effects of the procedure which could include but are not limited to severe blood loss, infection, stroke or death   the benefits, risks and side effect of alternative procedures including the consequences of declining this procedure or any alternative procedures   the likelihood of achieving satisfactory results. I acknowledge no guarantee or assurance has been made to me regarding the results. I understand that during the course of this treatment/procedure, unforeseen conditions can occur which require an additional or different procedure. I agree to allow my physician or assistants to perform such extension of the original procedure as they may find necessary. I understand that sedation will often result in temporary impairment of memory and fine motor skills and that sedation can occasionally progress to a state of deep sedation or general anesthesia. I understand the risks of anesthesia for surgery include, but are not limited to, sore throat, hoarseness, injury to face, mouth, or teeth; nausea; headache; injury to blood vessels or nerves; death, brain damage, or paralysis.     I understand that if I have a Limitation of Treatment order in effect during my hospitalization, the order may or may not be in effect during this procedure. I give my doctor permission to give me blood or blood products. I understand that there are risks with receiving blood such as hepatitis, AIDS, fever, or allergic reaction. I acknowledge that the risks, benefits, and alternatives of this treatment have been explained to me and that no express or implied warranty has been given by the hospital, any blood bank, or any person or entity as to the blood or blood components transfused. At the discretion of my doctor, I agree to allow observers, equipment/product representatives and allow photographing, and/or televising of the procedure, provided my name or identity is maintained confidentially. I agree the hospital may dispose of or use for scientific or educational purposes any tissue, fluid, or body parts which may be removed.     ________________________________Date________Time______ am/pm  ([facility])  Patient or Signature of Closest Relative or Legal Guardian    ________________________________Date________Time______am/pm      Page 1 of  1  Witness

## 2021-06-23 ENCOUNTER — ANESTHESIA (OUTPATIENT)
Dept: OPERATING ROOM | Age: 66
End: 2021-06-23
Payer: MEDICARE

## 2021-06-23 ENCOUNTER — HOSPITAL ENCOUNTER (OUTPATIENT)
Age: 66
Setting detail: OUTPATIENT SURGERY
Discharge: HOME | End: 2021-06-23
Attending: SURGERY | Admitting: SURGERY
Payer: MEDICARE

## 2021-06-23 ENCOUNTER — APPOINTMENT (OUTPATIENT)
Dept: GENERAL RADIOLOGY | Age: 66
End: 2021-06-23
Attending: SURGERY
Payer: MEDICARE

## 2021-06-23 VITALS
HEIGHT: 64 IN | HEART RATE: 79 BPM | OXYGEN SATURATION: 96 % | TEMPERATURE: 98.4 F | BODY MASS INDEX: 34.49 KG/M2 | RESPIRATION RATE: 16 BRPM | SYSTOLIC BLOOD PRESSURE: 148 MMHG | DIASTOLIC BLOOD PRESSURE: 75 MMHG | WEIGHT: 202 LBS

## 2021-06-23 VITALS — SYSTOLIC BLOOD PRESSURE: 163 MMHG | TEMPERATURE: 97.7 F | DIASTOLIC BLOOD PRESSURE: 77 MMHG | OXYGEN SATURATION: 99 %

## 2021-06-23 DIAGNOSIS — K80.20 CALCULUS OF GALLBLADDER WITHOUT CHOLECYSTITIS WITHOUT OBSTRUCTION: ICD-10-CM

## 2021-06-23 DIAGNOSIS — K80.20 SYMPTOMATIC CHOLELITHIASIS: Primary | ICD-10-CM

## 2021-06-23 LAB
GLUCOSE BLD-MCNC: 107 MG/DL (ref 70–99)
PERFORMED ON: ABNORMAL

## 2021-06-23 PROCEDURE — 7100000011 HC PHASE II RECOVERY - ADDTL 15 MIN: Performed by: SURGERY

## 2021-06-23 PROCEDURE — 6360000002 HC RX W HCPCS: Performed by: SURGERY

## 2021-06-23 PROCEDURE — 7100000000 HC PACU RECOVERY - FIRST 15 MIN: Performed by: SURGERY

## 2021-06-23 PROCEDURE — 2500000003 HC RX 250 WO HCPCS: Performed by: SURGERY

## 2021-06-23 PROCEDURE — 74300 X-RAY BILE DUCTS/PANCREAS: CPT

## 2021-06-23 PROCEDURE — 7100000010 HC PHASE II RECOVERY - FIRST 15 MIN: Performed by: SURGERY

## 2021-06-23 PROCEDURE — 2580000003 HC RX 258: Performed by: ANESTHESIOLOGY

## 2021-06-23 PROCEDURE — 7100000001 HC PACU RECOVERY - ADDTL 15 MIN: Performed by: SURGERY

## 2021-06-23 PROCEDURE — 3700000001 HC ADD 15 MINUTES (ANESTHESIA): Performed by: SURGERY

## 2021-06-23 PROCEDURE — 3600000004 HC SURGERY LEVEL 4 BASE: Performed by: SURGERY

## 2021-06-23 PROCEDURE — 2580000003 HC RX 258: Performed by: SURGERY

## 2021-06-23 PROCEDURE — 3700000000 HC ANESTHESIA ATTENDED CARE: Performed by: SURGERY

## 2021-06-23 PROCEDURE — 3600000014 HC SURGERY LEVEL 4 ADDTL 15MIN: Performed by: SURGERY

## 2021-06-23 PROCEDURE — 6360000002 HC RX W HCPCS: Performed by: NURSE ANESTHETIST, CERTIFIED REGISTERED

## 2021-06-23 PROCEDURE — 2709999900 HC NON-CHARGEABLE SUPPLY: Performed by: SURGERY

## 2021-06-23 PROCEDURE — 2720000010 HC SURG SUPPLY STERILE: Performed by: SURGERY

## 2021-06-23 PROCEDURE — 47563 LAPARO CHOLECYSTECTOMY/GRAPH: CPT | Performed by: SURGERY

## 2021-06-23 PROCEDURE — 6360000002 HC RX W HCPCS: Performed by: ANESTHESIOLOGY

## 2021-06-23 PROCEDURE — 88304 TISSUE EXAM BY PATHOLOGIST: CPT

## 2021-06-23 PROCEDURE — C1758 CATHETER, URETERAL: HCPCS | Performed by: SURGERY

## 2021-06-23 PROCEDURE — 2500000003 HC RX 250 WO HCPCS: Performed by: NURSE ANESTHETIST, CERTIFIED REGISTERED

## 2021-06-23 PROCEDURE — 2580000003 HC RX 258: Performed by: NURSE ANESTHETIST, CERTIFIED REGISTERED

## 2021-06-23 PROCEDURE — 6360000004 HC RX CONTRAST MEDICATION: Performed by: SURGERY

## 2021-06-23 RX ORDER — PROCHLORPERAZINE EDISYLATE 5 MG/ML
5 INJECTION INTRAMUSCULAR; INTRAVENOUS
Status: COMPLETED | OUTPATIENT
Start: 2021-06-23 | End: 2021-06-23

## 2021-06-23 RX ORDER — HYDRALAZINE HYDROCHLORIDE 20 MG/ML
5 INJECTION INTRAMUSCULAR; INTRAVENOUS EVERY 10 MIN PRN
Status: DISCONTINUED | OUTPATIENT
Start: 2021-06-23 | End: 2021-06-23 | Stop reason: HOSPADM

## 2021-06-23 RX ORDER — SODIUM CHLORIDE, SODIUM LACTATE, POTASSIUM CHLORIDE, CALCIUM CHLORIDE 600; 310; 30; 20 MG/100ML; MG/100ML; MG/100ML; MG/100ML
INJECTION, SOLUTION INTRAVENOUS CONTINUOUS
Status: DISCONTINUED | OUTPATIENT
Start: 2021-06-23 | End: 2021-06-23 | Stop reason: HOSPADM

## 2021-06-23 RX ORDER — MEPERIDINE HYDROCHLORIDE 25 MG/ML
12.5 INJECTION INTRAMUSCULAR; INTRAVENOUS; SUBCUTANEOUS EVERY 5 MIN PRN
Status: DISCONTINUED | OUTPATIENT
Start: 2021-06-23 | End: 2021-06-23 | Stop reason: HOSPADM

## 2021-06-23 RX ORDER — OXYCODONE HYDROCHLORIDE AND ACETAMINOPHEN 5; 325 MG/1; MG/1
1 TABLET ORAL EVERY 6 HOURS PRN
Qty: 28 TABLET | Refills: 0 | Status: SHIPPED | OUTPATIENT
Start: 2021-06-23 | End: 2021-06-30

## 2021-06-23 RX ORDER — ROCURONIUM BROMIDE 10 MG/ML
INJECTION, SOLUTION INTRAVENOUS PRN
Status: DISCONTINUED | OUTPATIENT
Start: 2021-06-23 | End: 2021-06-23 | Stop reason: SDUPTHER

## 2021-06-23 RX ORDER — ONDANSETRON 2 MG/ML
INJECTION INTRAMUSCULAR; INTRAVENOUS PRN
Status: DISCONTINUED | OUTPATIENT
Start: 2021-06-23 | End: 2021-06-23 | Stop reason: SDUPTHER

## 2021-06-23 RX ORDER — FENTANYL CITRATE 50 UG/ML
INJECTION, SOLUTION INTRAMUSCULAR; INTRAVENOUS PRN
Status: DISCONTINUED | OUTPATIENT
Start: 2021-06-23 | End: 2021-06-23 | Stop reason: SDUPTHER

## 2021-06-23 RX ORDER — CEFAZOLIN SODIUM 2 G/50ML
2000 SOLUTION INTRAVENOUS ONCE
Status: COMPLETED | OUTPATIENT
Start: 2021-06-23 | End: 2021-06-23

## 2021-06-23 RX ORDER — DIPHENHYDRAMINE HYDROCHLORIDE 50 MG/ML
12.5 INJECTION INTRAMUSCULAR; INTRAVENOUS
Status: DISCONTINUED | OUTPATIENT
Start: 2021-06-23 | End: 2021-06-23 | Stop reason: HOSPADM

## 2021-06-23 RX ORDER — HYDRALAZINE HYDROCHLORIDE 20 MG/ML
INJECTION INTRAMUSCULAR; INTRAVENOUS PRN
Status: DISCONTINUED | OUTPATIENT
Start: 2021-06-23 | End: 2021-06-23 | Stop reason: SDUPTHER

## 2021-06-23 RX ORDER — ONDANSETRON 2 MG/ML
4 INJECTION INTRAMUSCULAR; INTRAVENOUS
Status: COMPLETED | OUTPATIENT
Start: 2021-06-23 | End: 2021-06-23

## 2021-06-23 RX ORDER — PROPOFOL 10 MG/ML
INJECTION, EMULSION INTRAVENOUS PRN
Status: DISCONTINUED | OUTPATIENT
Start: 2021-06-23 | End: 2021-06-23 | Stop reason: SDUPTHER

## 2021-06-23 RX ORDER — SODIUM CHLORIDE, SODIUM LACTATE, POTASSIUM CHLORIDE, CALCIUM CHLORIDE 600; 310; 30; 20 MG/100ML; MG/100ML; MG/100ML; MG/100ML
INJECTION, SOLUTION INTRAVENOUS CONTINUOUS PRN
Status: DISCONTINUED | OUTPATIENT
Start: 2021-06-23 | End: 2021-06-23 | Stop reason: SDUPTHER

## 2021-06-23 RX ORDER — HYDROCODONE BITARTRATE AND ACETAMINOPHEN 5; 325 MG/1; MG/1
1 TABLET ORAL
Status: DISCONTINUED | OUTPATIENT
Start: 2021-06-23 | End: 2021-06-23 | Stop reason: HOSPADM

## 2021-06-23 RX ORDER — LIDOCAINE HCL/PF 100 MG/5ML
SYRINGE (ML) INJECTION PRN
Status: DISCONTINUED | OUTPATIENT
Start: 2021-06-23 | End: 2021-06-23 | Stop reason: SDUPTHER

## 2021-06-23 RX ORDER — MIDAZOLAM HYDROCHLORIDE 1 MG/ML
INJECTION INTRAMUSCULAR; INTRAVENOUS PRN
Status: DISCONTINUED | OUTPATIENT
Start: 2021-06-23 | End: 2021-06-23 | Stop reason: SDUPTHER

## 2021-06-23 RX ORDER — BUPIVACAINE HYDROCHLORIDE 5 MG/ML
INJECTION, SOLUTION EPIDURAL; INTRACAUDAL PRN
Status: DISCONTINUED | OUTPATIENT
Start: 2021-06-23 | End: 2021-06-23 | Stop reason: ALTCHOICE

## 2021-06-23 RX ORDER — HYDRALAZINE HYDROCHLORIDE 20 MG/ML
5 INJECTION INTRAMUSCULAR; INTRAVENOUS EVERY 6 HOURS PRN
Status: CANCELLED | OUTPATIENT
Start: 2021-06-23

## 2021-06-23 RX ORDER — 0.9 % SODIUM CHLORIDE 0.9 %
500 INTRAVENOUS SOLUTION INTRAVENOUS
Status: COMPLETED | OUTPATIENT
Start: 2021-06-23 | End: 2021-06-23

## 2021-06-23 RX ORDER — SODIUM CHLORIDE, SODIUM LACTATE, POTASSIUM CHLORIDE, AND CALCIUM CHLORIDE .6; .31; .03; .02 G/100ML; G/100ML; G/100ML; G/100ML
500 INJECTION, SOLUTION INTRAVENOUS ONCE
Status: DISCONTINUED | OUTPATIENT
Start: 2021-06-23 | End: 2021-06-23 | Stop reason: HOSPADM

## 2021-06-23 RX ADMIN — CEFAZOLIN SODIUM 2000 MG: 2 SOLUTION INTRAVENOUS at 12:32

## 2021-06-23 RX ADMIN — SUGAMMADEX 200 MG: 100 INJECTION, SOLUTION INTRAVENOUS at 13:33

## 2021-06-23 RX ADMIN — SODIUM CHLORIDE 500 ML: 9 INJECTION, SOLUTION INTRAVENOUS at 11:00

## 2021-06-23 RX ADMIN — ONDANSETRON 4 MG: 2 INJECTION INTRAMUSCULAR; INTRAVENOUS at 13:53

## 2021-06-23 RX ADMIN — SODIUM CHLORIDE, POTASSIUM CHLORIDE, SODIUM LACTATE AND CALCIUM CHLORIDE: 600; 310; 30; 20 INJECTION, SOLUTION INTRAVENOUS at 10:50

## 2021-06-23 RX ADMIN — PROPOFOL 200 MG: 10 INJECTION, EMULSION INTRAVENOUS at 12:23

## 2021-06-23 RX ADMIN — HYDRALAZINE HYDROCHLORIDE 10 MG: 20 INJECTION INTRAMUSCULAR; INTRAVENOUS at 12:24

## 2021-06-23 RX ADMIN — HYDROMORPHONE HYDROCHLORIDE 0.25 MG: 1 INJECTION, SOLUTION INTRAMUSCULAR; INTRAVENOUS; SUBCUTANEOUS at 14:23

## 2021-06-23 RX ADMIN — FAMOTIDINE 20 MG: 10 INJECTION, SOLUTION INTRAVENOUS at 12:45

## 2021-06-23 RX ADMIN — SODIUM CHLORIDE, SODIUM LACTATE, POTASSIUM CHLORIDE, AND CALCIUM CHLORIDE: .6; .31; .03; .02 INJECTION, SOLUTION INTRAVENOUS at 12:16

## 2021-06-23 RX ADMIN — Medication 100 MG: at 12:23

## 2021-06-23 RX ADMIN — HYDROMORPHONE HYDROCHLORIDE 0.25 MG: 1 INJECTION, SOLUTION INTRAMUSCULAR; INTRAVENOUS; SUBCUTANEOUS at 13:53

## 2021-06-23 RX ADMIN — ONDANSETRON 4 MG: 2 INJECTION INTRAMUSCULAR; INTRAVENOUS at 12:42

## 2021-06-23 RX ADMIN — ROCURONIUM BROMIDE 50 MG: 10 INJECTION INTRAVENOUS at 12:23

## 2021-06-23 RX ADMIN — MIDAZOLAM HYDROCHLORIDE 2 MG: 2 INJECTION, SOLUTION INTRAMUSCULAR; INTRAVENOUS at 12:16

## 2021-06-23 RX ADMIN — FENTANYL CITRATE 100 MCG: 50 INJECTION, SOLUTION INTRAMUSCULAR; INTRAVENOUS at 12:23

## 2021-06-23 RX ADMIN — HYDRALAZINE HYDROCHLORIDE 5 MG: 20 INJECTION INTRAMUSCULAR; INTRAVENOUS at 11:19

## 2021-06-23 RX ADMIN — FENTANYL CITRATE 100 MCG: 50 INJECTION, SOLUTION INTRAMUSCULAR; INTRAVENOUS at 12:58

## 2021-06-23 RX ADMIN — HYDROMORPHONE HYDROCHLORIDE 0.25 MG: 1 INJECTION, SOLUTION INTRAMUSCULAR; INTRAVENOUS; SUBCUTANEOUS at 14:28

## 2021-06-23 RX ADMIN — SODIUM CHLORIDE, SODIUM LACTATE, POTASSIUM CHLORIDE, AND CALCIUM CHLORIDE: .6; .31; .03; .02 INJECTION, SOLUTION INTRAVENOUS at 13:02

## 2021-06-23 RX ADMIN — PROCHLORPERAZINE EDISYLATE 5 MG: 5 INJECTION INTRAMUSCULAR; INTRAVENOUS at 14:09

## 2021-06-23 ASSESSMENT — PULMONARY FUNCTION TESTS
PIF_VALUE: 31
PIF_VALUE: 29
PIF_VALUE: 26
PIF_VALUE: 34
PIF_VALUE: 33
PIF_VALUE: 26
PIF_VALUE: 33
PIF_VALUE: 27
PIF_VALUE: 30
PIF_VALUE: 31
PIF_VALUE: 27
PIF_VALUE: 32
PIF_VALUE: 27
PIF_VALUE: 33
PIF_VALUE: 28
PIF_VALUE: 27
PIF_VALUE: 0
PIF_VALUE: 33
PIF_VALUE: 1
PIF_VALUE: 29
PIF_VALUE: 33
PIF_VALUE: 26
PIF_VALUE: 32
PIF_VALUE: 33
PIF_VALUE: 1
PIF_VALUE: 27
PIF_VALUE: 26
PIF_VALUE: 27
PIF_VALUE: 33
PIF_VALUE: 32
PIF_VALUE: 0
PIF_VALUE: 26
PIF_VALUE: 33
PIF_VALUE: 33
PIF_VALUE: 34
PIF_VALUE: 29
PIF_VALUE: 25
PIF_VALUE: 26
PIF_VALUE: 33
PIF_VALUE: 33
PIF_VALUE: 31
PIF_VALUE: 33
PIF_VALUE: 33
PIF_VALUE: 28
PIF_VALUE: 25
PIF_VALUE: 26
PIF_VALUE: 25
PIF_VALUE: 34
PIF_VALUE: 33
PIF_VALUE: 26
PIF_VALUE: 33
PIF_VALUE: 28
PIF_VALUE: 32
PIF_VALUE: 0
PIF_VALUE: 33
PIF_VALUE: 33
PIF_VALUE: 25
PIF_VALUE: 33
PIF_VALUE: 0
PIF_VALUE: 33
PIF_VALUE: 31
PIF_VALUE: 26
PIF_VALUE: 1
PIF_VALUE: 32
PIF_VALUE: 26
PIF_VALUE: 33
PIF_VALUE: 28
PIF_VALUE: 25
PIF_VALUE: 26
PIF_VALUE: 30
PIF_VALUE: 31
PIF_VALUE: 3
PIF_VALUE: 26
PIF_VALUE: 0
PIF_VALUE: 27
PIF_VALUE: 26
PIF_VALUE: 29
PIF_VALUE: 27
PIF_VALUE: 33
PIF_VALUE: 29
PIF_VALUE: 34
PIF_VALUE: 33

## 2021-06-23 ASSESSMENT — PAIN SCALES - GENERAL
PAINLEVEL_OUTOF10: 0
PAINLEVEL_OUTOF10: 0
PAINLEVEL_OUTOF10: 5
PAINLEVEL_OUTOF10: 5
PAINLEVEL_OUTOF10: 4

## 2021-06-23 ASSESSMENT — PAIN - FUNCTIONAL ASSESSMENT: PAIN_FUNCTIONAL_ASSESSMENT: 0-10

## 2021-06-23 ASSESSMENT — LIFESTYLE VARIABLES: SMOKING_STATUS: 0

## 2021-06-23 NOTE — FLOWSHEET NOTE
Patient dozing, states pain \"is better\", but \"just feels yucky\". States that nausea has subsided but declines anything to eat or drink at this time. Call placed to patient's friend, Ely Falk to update. She has left the hospital now but states that patient's sister-in-law, Piper Thakur will be here for discharge.

## 2021-06-23 NOTE — OP NOTE
visualization: 1 epigastrium, 1 in the right midclavicular line 3 fingerbreadths below the subcostal margin, and 1 in the right mid axillary line 3 fingerbreaths below the right costal margin. The gallbladder was grasped and retracted cephalad to the right shoulder. There were minimal adhesions to the gallbladder fundus which were taken down with a combination of blunt dissection and electrocautery. The fundus was grasped and retracted laterally. The peritoneum along the medial and lateral sides of the gallbladder was scored. The cystic duct and artery were dissected circumferentially until the critical view of safety was obtained. A clip was placed on the cystic duct close to the neck of the gallbladder. A nick was made in the cystic duct and a cholangiogram catheter inserted. A cholangiogram was obtained using fluoroscopy showing good flow of bile into the duodenum, an intact biliary tree, and absence of any filling defects. The cystic duct was then doubly clipped and divided. The cystic artery was then doubly clipped and divided. The gallbladder was then dissected off the liver bed using electrocautery. Hemostasis was achieved. The gallbladder was placed in an endoscopic retrieval bag. The gallbladder fossa and liver were irrigated and suctioned. Hemostasis was achieved. The gallbladder was removed and sent to pathology. The laparoscopic ports were removed under direct visualization. The abdomen was desufflated. The fascia of the supraumbilical incision was closed with a figure of eight 0 Vicryl suture. The skin of all incisions were closed with 4-0 Monocryl. Skin glue was applied. The patient tolerated the procedure well. The patient was woken up and taken to the PACU in stable condition. All counts were correct at the end of the case x2. Dr. Adriel Cartagena was scrubbed and present for the entirety of the case.      Electronically signed by Lucie Velez DO on 6/23/2021 at 11:07 PM

## 2021-06-23 NOTE — PROGRESS NOTES
Dr Bassam Shea in to see her pre op . Aware of BP and meds taken today. Aware catheter of IV is 1/2 in. To give fluid bolus and apresoline.  Report to Ruma Monreal in unit

## 2021-06-23 NOTE — H&P
Clide Burkitt    [id number]    [facility] Same Day Surgery Update H & P  Department of General Surgery   Surgical Service   Pre-operative History and Physical  Last H & P within the last 30 days. DIAGNOSIS:   Calculus of gallbladder without cholecystitis without obstruction [K80.20]    Procedure(s):  LAPAROSCOPIC CHOLECYSTECTOMY WITH CHOLANGIOGRAM     HISTORY OF PRESENT ILLNESS:   Patient with c/o episodic epigastric pain. Imaging demonstrates gallbladder sludge and the patient presents today for the above procedure. Covid 19:  Patient denies fever, chills, cough or known exposure to Covid-19. Past Medical History:        Diagnosis Date    pearl Apple MD    Goiter     History of [facility] 10/2010    [facility]    Hyperlipidemia     Hypertension     Onychomycosis     Pap smear for cervical cancer screening 2/2010    Dr. Sakina Norman - [facility]    Prediabetes 12/23/2019    Screening for osteoporosis     Dexa 12/2005 - [facility]    Vitamin D deficiency 11/10/2011     Past Surgical History:        Procedure Laterality Date    APPENDECTOMY  2/4/2000    COLONOSCOPY  3/2011    Dr. Carey Kaufman - polyps - 2 years  f/u    COLONOSCOPY  10/19/2016    Jose Frausto MD    ESOPHAGEAL DILATATION  4/2014    Cristal Muñoz MD     KNEE SURGERY      right    PILONIDAL CYST EXCISION      UPPER GASTROINTESTINAL ENDOSCOPY  3/19/14    concentric rings in esophagus     Past Social History:  Social History     Socioeconomic History    Marital status:      Spouse name: None    Number of children: None    Years of education: None    Highest education level: None   Occupational History    None   Tobacco Use    Smoking status: Never Smoker    Smokeless tobacco: Never Used   Vaping Use    Vaping Use: Never used   Substance and Sexual Activity    Alcohol use:  Yes     Alcohol/week: 3.0 standard drinks     Types: 3 Glasses of wine per week     Comment: monthly    Drug Rachel Phelps MD   Cholecalciferol (VITAMIN D3) 5000 units TABS Take 1 tablet by mouth daily 6/10/19  Yes Rachel Phelps MD   Multiple Vitamin (MULTIVITAMIN PO) Take  by mouth. Yes Historical Provider, MD         Allergies:  Sulfa antibiotics    PHYSICAL EXAM:      BP (!) 170/93   Pulse 72   Temp 98 °F (36.7 °C) (Oral)   Resp 16   Ht 5' 4\" (1.626 m)   Wt 202 lb (91.6 kg)   LMP 05/01/2008 (Approximate) Comment: 15 years ago  SpO2 95%   BMI 34.67 kg/m²      Airway:  Airway patent with no audible stridor    Heart:  regular rate and rhythm, No murmur noted    Lungs:  No increased work of breathing, good air exchange, clear to auscultation bilaterally, no crackles or wheezing    Abdomen:  soft, non-distended, non-tender, no rebound tenderness or guarding, normal active bowel sounds and no masses palpated    ASSESSMENT AND PLAN     Patient is a 77 y.o. female with above specified procedure planned. 1.  The patients history and physical was obtained and signed off by the pre-admission testing department. Patient seen and focused exam done today- no new changes since last physical exam on 6/15/21    2. Access to ancillary services are available per request of the provider.     TIFFANIE Dickson - CNP     6/23/2021

## 2021-06-23 NOTE — FLOWSHEET NOTE
PACU Transfer to [facility]    Vitals:    06/23/21 1645   BP: 150/79   Pulse: 71   Resp: 14   Temp: 98.5   SpO2: 97%         Intake/Output Summary (Last 24 hours) at 6/23/2021 1707  Last data filed at 6/23/2021 1615  Gross per 24 hour   Intake 1946 ml   Output 10 ml   Net 1936 ml       Pain assessment:  none  Pain Level: 0    Patient transferred to care of [facility] RN. Transferred with all belongings to [facility] #8. Eitan Houston made aware of move to discharge area.     6/23/2021 5:07 PM

## 2021-06-23 NOTE — PROGRESS NOTES
Ambulatory Surgery/Procedure Discharge Note    Vitals:    06/23/21 1655   BP: (!) 148/75   Pulse: 79   Resp: 16   Temp: 98.4 °F (36.9 °C)   SpO2: 96%     Pt met criteria for discharge per Isabel score. In: 446 [P.O.:220; I.V.:226]  Out: 0 x1, BR before d/c. Restroom use offered before discharge. Yes    Pain assessment:  none  Pain Level: 0      Pt and S.O./family states \"ready to go home\". Pt alert and oriented x4. IV removed. Denies N/V or pain. Dermabond to abdominal laparoscopic sites intact. Incisions well approximated. No bleeding, drainage or swelling. Voided prior to discharge. Discharge instructions given to pt and sister-in-law with pt permission. Pt and sister in law verbalized understanding of all instructions. Left with all belongings, one prescription, and discharge instructions. Patient discharged to home/self care.  Patient discharged via wheel chair by transporter to waiting family/S.O.       6/23/2021 5:30 PM

## 2021-06-23 NOTE — PROGRESS NOTES
S/p LAPAROSCOPIC CHOLECYSTECTOMY WITH CHOLANGIOGRAM. Admitted to PACU bed 8 from OR. Arrived at 5 . Attached to PACU monitoring system. Alarms and parameters set. Report received from anesthesia personnel. No complication reported intraoperatively. Pt on non-rebreather face mask with oxygen at 6 liters. Athrombic wraps in place. VSS; will continue to monitor.

## 2021-06-23 NOTE — ANESTHESIA PRE PROCEDURE
Department of Anesthesiology  Preprocedure Note       Name:  Miguelina Anderson   Age:  77 y.o.  :  1955                                          MRN:  [id number]         Date:  2021      Surgeon: Elliott Holt):  Radha Zheng MD    Procedure: Procedure(s):  LAPAROSCOPIC CHOLECYSTECTOMY WITH CHOLANGIOGRAM    Medications prior to admission:   Prior to Admission medications    Medication Sig Start Date End Date Taking? Authorizing Provider   atorvastatin (LIPITOR) 80 MG tablet TAKE ONE TABLET BY MOUTH ONCE NIGHTLY 21  Yes Joi Gannon MD   cloNIDine (CATAPRES) 0.1 MG tablet Take 1 tablet by mouth 3 times daily 21  Yes Joi Gannon MD   hydroCHLOROthiazide (HYDRODIURIL) 25 MG tablet TAKE ONE TABLET BY MOUTH DAILY 21  Yes Joi Gannon MD   spironolactone (ALDACTONE) 25 MG tablet Take 1 tablet by mouth daily 21  Yes Kodi Hurtado MD   hydrALAZINE (APRESOLINE) 25 MG tablet Take 1 tablet by mouth 2 times daily 21  Yes Kodi Hurtado MD   irbesartan (AVAPRO) 300 MG tablet TAKE ONE TABLET BY MOUTH DAILY 21  Yes Joi Gannon MD   carvedilol (COREG) 25 MG tablet TAKE ONE TABLET BY MOUTH TWICE A DAY 21  Yes Joi Gannon MD   Cholecalciferol (VITAMIN D3) 5000 units TABS Take 1 tablet by mouth daily 6/10/19  Yes Joi Gannon MD   Multiple Vitamin (MULTIVITAMIN PO) Take  by mouth. Yes Historical Provider, MD       Current medications:    Current Facility-Administered Medications   Medication Dose Route Frequency Provider Last Rate Last Admin    ceFAZolin (ANCEF) 2000 mg in dextrose 3 % 50 mL IVPB (duplex)  2,000 mg Intravenous Once Radha Zheng MD        lactated ringers infusion   Intravenous Continuous Royal Colby,  mL/hr at 21 1050 New Bag at 21 1050       Allergies:     Allergies   Allergen Reactions    Sulfa Antibiotics Rash       Problem List:    Patient Active Problem List   Diagnosis Code    Goiter E04.9    Onychomycosis B35.1    Vitamin D deficiency E55.9    Microscopic hematuria W80.31    Diastolic dysfunction A02.58    Family history of coronary artery disease Z82.49    Gastroesophageal reflux K21.9    Thyroid nodule E04.1    Hyperglycemia R73.9    Hyperlipidemia E78.5    Prediabetes R73.03    Primary insomnia F51.01    Coronary artery disease involving native coronary artery of native heart without angina pectoris Q75.39    Biliary colic V26.43    H/O angiography Z92.89    Uncontrolled hypertension I10    Dizziness R42    Hypokalemia E87.6    Chronic heart failure with preserved ejection fraction (HFpEF) ([facility]) I50.32    H/O coronary angiogram Z98.890    Elevated urine levels of catecholamines R82.5    Hashimoto's thyroiditis E06.3    Symptomatic cholelithiasis K80.20    Neck pain M54.2       Past Medical History:        Diagnosis Date    Fissure, pearl Ventura MD    Goiter     [facility] of mamm[facility] 10/2010    [facility] [facility] [place]    Hyperlipidemia     Hypertension     Onychomycosis     Pap smear for cervical cancer screening 2/2010    [facility]    Prediabetes 12/23/2019    Screening for osteoporosis     [facility]exa 12/2005 - [facility]    Vitamin D deficiency 11/10/2011       Past Surgical History:        Procedure Laterality Date    APPENDECTOMY  2/4/2000    COLONOSCOPY  3/2011    Dr. Chet Gardner - polyps - 2 years  f/u    COLONOSCOPY  10/19/2016    Marion Gallagher MD    ESOPHAGEAL DILATATION  4/2014    Anselmo Montez MD     KNEE SURGERY      right    PILONIDAL CYST EXCISION      UPPER GASTROINTESTINAL ENDOSCOPY  3/19/14    concentric rings in esophagus       Social History:    Social History     Tobacco Use    Smoking status: Never Smoker    Smokeless tobacco: Never Used   Substance Use Topics    Alcohol use:  Yes     Alcohol/week: 3.0 standard drinks     Types: 3 Glasses of wine per week     Comment: monthly                                Counseling given: Not Answered      Vital Signs (Current):   Vitals:    06/18/21 1628 06/23/21 0936   BP:  (!) 170/93   Pulse:  72   Resp:  16   Temp:  98 °F (36.7 °C)   TempSrc:  Oral   SpO2:  95%   Weight: 202 lb (91.6 kg) 202 lb (91.6 kg)   Height: 5' 4\" (1.626 m) 5' 4\" (1.626 m)                                              BP Readings from Last 3 Encounters:   06/23/21 (!) 170/93   06/15/21 (!) 142/80   06/11/21 (!) 174/94       NPO Status: Time of last liquid consumption: 1900                        Time of last solid consumption: 2130                        Date of last liquid consumption: 06/22/21                        Date of last solid food consumption: 06/22/21    BMI:   Wt Readings from Last 3 Encounters:   06/23/21 202 lb (91.6 kg)   06/15/21 205 lb (93 kg)   06/11/21 206 lb (93.4 kg)     Body mass index is 34.67 kg/m².     CBC:   Lab Results   Component Value Date    WBC 6.8 03/13/2021    RBC 4.60 03/13/2021    HGB 14.2 03/13/2021    HCT 42.2 03/13/2021    MCV 91.8 03/13/2021    RDW 13.8 03/13/2021     03/13/2021       CMP:   Lab Results   Component Value Date     04/13/2021    K 4.0 04/13/2021    K 3.3 03/13/2021     04/13/2021    CO2 28 04/13/2021    BUN 15 04/13/2021    CREATININE SEE COMMENT 04/20/2021    GFRAA >60 04/13/2021    GFRAA >60 09/19/2012    AGRATIO 2.4 04/13/2021    LABGLOM >60 04/13/2021    GLUCOSE 93 04/13/2021    PROT 6.4 04/13/2021    PROT 7.0 09/19/2012    CALCIUM 8.8 04/13/2021    BILITOT 0.4 04/13/2021    ALKPHOS 49 04/13/2021    AST 18 04/13/2021    ALT 26 04/13/2021       POC Tests:   Recent Labs     06/23/21  1048   POCGLU 107*       Coags:   Lab Results   Component Value Date    PROTIME 11.9 03/12/2021    INR 1.03 03/12/2021       HCG (If Applicable): No results found for: PREGTESTUR, PREGSERUM, HCG, HCGQUANT     ABGs: No results found for: PHART, PO2ART, SQZ2JWS, UNC7JSM, BEART, J2ARAINA     Type & Screen (If Applicable):  No results found for: LABABO, LABRH Drug/Infectious Status (If Applicable):  No results found for: HIV, HEPCAB    COVID-19 Screening (If Applicable): No results found for: COVID19        Anesthesia Evaluation  Patient summary reviewed and Nursing notes reviewed no history of anesthetic complications:   Airway: Mallampati: II  TM distance: >3 FB   Neck ROM: full  Mouth opening: > = 3 FB Dental: normal exam         Pulmonary: breath sounds clear to auscultation      (-) not a current smoker (never)                           Cardiovascular:  Exercise tolerance: good (>4 METS),   (+) hypertension (on 4 meds for    ): severe, CAD: non-obstructive,     (-) past MI    NYHA Classification: II    Rhythm: regular  Rate: normal           Beta Blocker:  Dose within 24 Hrs         Neuro/Psych:   (+) depression/anxiety  (anxiety )            GI/Hepatic/Renal:   (+) GERD: well controlled, morbid obesity (BMI  35 )         ROS comment: Biliary colic . Endo/Other:                      ROS comment: Hx of thyroiditis  Abdominal:           Vascular:                                        Anesthesia Plan      general     ASA 3       Induction: intravenous. MIPS: Postoperative opioids intended and Prophylactic antiemetics administered. Anesthetic plan and risks discussed with patient. Plan discussed with CRNA.     Attending anesthesiologist reviewed and agrees with Pre Eval content              Purnima Martinez DO   6/23/2021

## 2021-06-23 NOTE — PROGRESS NOTES
1155 HR 66 and /82. Franky Rhodes at bedside and is 'going to stay in [address] [place] [address]' and notified Leticia in [place] room and also put a note on communication sheet. 1148 HR 66 and /89. Dr. Nikki Brown to see pt. With Franky Rhodes at bedside. 1131 Removed IV in L hand per protocol as instructed per Dr. Taylor Lockett and restarted IV in R FA per protocol with #18.  /88 and HR 74. Will monitor. 1119 Hydralazine 5mg given as ordered with /94 and HR 76. Will Monitor.

## 2021-07-12 ENCOUNTER — OFFICE VISIT (OUTPATIENT)
Dept: SURGERY | Age: 66
End: 2021-07-12

## 2021-07-12 VITALS
TEMPERATURE: 98.2 F | WEIGHT: 201 LBS | BODY MASS INDEX: 34.31 KG/M2 | SYSTOLIC BLOOD PRESSURE: 162 MMHG | HEART RATE: 70 BPM | HEIGHT: 64 IN | DIASTOLIC BLOOD PRESSURE: 87 MMHG

## 2021-07-12 DIAGNOSIS — Z09 POSTOP CHECK: Primary | ICD-10-CM

## 2021-07-12 PROCEDURE — 99024 POSTOP FOLLOW-UP VISIT: CPT | Performed by: SURGERY

## 2021-07-27 ENCOUNTER — OFFICE VISIT (OUTPATIENT)
Dept: PRIMARY CARE CLINIC | Age: 66
End: 2021-07-27
Payer: MEDICARE

## 2021-07-27 VITALS
BODY MASS INDEX: 34.31 KG/M2 | SYSTOLIC BLOOD PRESSURE: 130 MMHG | HEIGHT: 64 IN | HEART RATE: 64 BPM | DIASTOLIC BLOOD PRESSURE: 80 MMHG | OXYGEN SATURATION: 99 % | TEMPERATURE: 98.5 F | WEIGHT: 201 LBS | RESPIRATION RATE: 16 BRPM

## 2021-07-27 DIAGNOSIS — I10 ESSENTIAL HYPERTENSION: ICD-10-CM

## 2021-07-27 DIAGNOSIS — Z00.00 ROUTINE GENERAL MEDICAL EXAMINATION AT A HEALTH CARE FACILITY: Primary | ICD-10-CM

## 2021-07-27 DIAGNOSIS — R73.03 PREDIABETES: ICD-10-CM

## 2021-07-27 DIAGNOSIS — R42 DIZZINESS: ICD-10-CM

## 2021-07-27 DIAGNOSIS — E55.9 VITAMIN D DEFICIENCY: ICD-10-CM

## 2021-07-27 DIAGNOSIS — E78.2 MIXED HYPERLIPIDEMIA: ICD-10-CM

## 2021-07-27 PROCEDURE — 1123F ACP DISCUSS/DSCN MKR DOCD: CPT | Performed by: INTERNAL MEDICINE

## 2021-07-27 PROCEDURE — G0439 PPPS, SUBSEQ VISIT: HCPCS | Performed by: INTERNAL MEDICINE

## 2021-07-27 PROCEDURE — 3017F COLORECTAL CA SCREEN DOC REV: CPT | Performed by: INTERNAL MEDICINE

## 2021-07-27 PROCEDURE — 4040F PNEUMOC VAC/ADMIN/RCVD: CPT | Performed by: INTERNAL MEDICINE

## 2021-07-27 ASSESSMENT — PATIENT HEALTH QUESTIONNAIRE - PHQ9
SUM OF ALL RESPONSES TO PHQ QUESTIONS 1-9: 0
SUM OF ALL RESPONSES TO PHQ QUESTIONS 1-9: 0
2. FEELING DOWN, DEPRESSED OR HOPELESS: 0
SUM OF ALL RESPONSES TO PHQ9 QUESTIONS 1 & 2: 0
1. LITTLE INTEREST OR PLEASURE IN DOING THINGS: 0
SUM OF ALL RESPONSES TO PHQ QUESTIONS 1-9: 0

## 2021-07-27 ASSESSMENT — LIFESTYLE VARIABLES
HOW OFTEN DO YOU HAVE SIX OR MORE DRINKS ON ONE OCCASION: 0
HAVE YOU OR SOMEONE ELSE BEEN INJURED AS A RESULT OF YOUR DRINKING: 0
HOW OFTEN DURING THE LAST YEAR HAVE YOU NEEDED AN ALCOHOLIC DRINK FIRST THING IN THE MORNING TO GET YOURSELF GOING AFTER A NIGHT OF HEAVY DRINKING: 0
HAS A RELATIVE, FRIEND, DOCTOR, OR ANOTHER HEALTH PROFESSIONAL EXPRESSED CONCERN ABOUT YOUR DRINKING OR SUGGESTED YOU CUT DOWN: 0
AUDIT-C TOTAL SCORE: 1
HOW MANY STANDARD DRINKS CONTAINING ALCOHOL DO YOU HAVE ON A TYPICAL DAY: 0
HOW OFTEN DURING THE LAST YEAR HAVE YOU BEEN UNABLE TO REMEMBER WHAT HAPPENED THE NIGHT BEFORE BECAUSE YOU HAD BEEN DRINKING: 0
AUDIT TOTAL SCORE: 1
HOW OFTEN DO YOU HAVE A DRINK CONTAINING ALCOHOL: 1
HOW OFTEN DURING THE LAST YEAR HAVE YOU FAILED TO DO WHAT WAS NORMALLY EXPECTED FROM YOU BECAUSE OF DRINKING: 0
HOW OFTEN DURING THE LAST YEAR HAVE YOU HAD A FEELING OF GUILT OR REMORSE AFTER DRINKING: 0
HOW OFTEN DURING THE LAST YEAR HAVE YOU FOUND THAT YOU WERE NOT ABLE TO STOP DRINKING ONCE YOU HAD STARTED: 0

## 2021-07-27 NOTE — PATIENT INSTRUCTIONS
Personalized Preventive Plan for China Martel - 7/27/2021  Medicare offers a range of preventive health benefits. Some of the tests and screenings are paid in full while other may be subject to a deductible, co-insurance, and/or copay. Some of these benefits include a comprehensive review of your medical history including lifestyle, illnesses that may run in your family, and various assessments and screenings as appropriate. After reviewing your medical record and screening and assessments performed today your provider may have ordered immunizations, labs, imaging, and/or referrals for you. A list of these orders (if applicable) as well as your Preventive Care list are included within your After Visit Summary for your review. Other Preventive Recommendations:    · A preventive eye exam performed by an eye specialist is recommended every 1-2 years to screen for glaucoma; cataracts, macular degeneration, and other eye disorders. · A preventive dental visit is recommended every 6 months. · Try to get at least 150 minutes of exercise per week or 10,000 steps per day on a pedometer . · Order or download the FREE \"Exercise & Physical Activity: Your Everyday Guide\" from [employer]. Call 8-981.634.3085 or search [employer] online. · You need 9848-5727 mg of calcium and 6152-4064 IU of vitamin D per day. It is possible to meet your calcium requirement with diet alone, but a vitamin D supplement is usually necessary to meet this goal.  · When exposed to the sun, use a sunscreen that protects against both UVA and UVB radiation with an SPF of 30 or greater. Reapply every 2 to 3 hours or after sweating, drying off with a towel, or swimming. · Always wear a seat belt when traveling in a car. Always wear a helmet when riding a bicycle or motorcycle.

## 2021-07-27 NOTE — PROGRESS NOTES
Medicare Annual Wellness Visit  Name: Rebekah Victoria Date: 2021   MRN: [id number] Sex: Female   Age: 74 y.o. Ethnicity: Non- / Non    : 1955 Race: White (non-)      Major Yan is here for Medicare AWV and Dizziness (Thinks it fromBP meds)    Screenings for behavioral, psychosocial and functional/safety risks, and cognitive dysfunction are all negative except as indicated below. These results, as well as other patient data from the [address] form, are documented in Flowsheets linked to this Encounter. \    Patient has hypertension. Patient takes Amlodipine 5mg 1/2 tablet po q day, Hydralazine 25mg po tid, Clonidine 0.1mg po tid, Irbesartan 300mg po q day, HCTZ 25mg po q day, and Carvedilol 25mg po bid. Patient deceases salt. Patient is not exercising.      Patient has hyperlipidemia. Patient takes Atorvastatin 80mg po qhs. Patient decreases fat and cholesterol.      Patient has prediabetes. Patient decreases carbohydrates.       Patient has Vitamin D deficiency. Patient takes Vitamin D 6,000 IU po q day. Allergies   Allergen Reactions    Sulfa Antibiotics Rash         Prior to Visit Medications    Medication Sig Taking?  Authorizing Provider   atorvastatin (LIPITOR) 80 MG tablet TAKE ONE TABLET BY MOUTH ONCE NIGHTLY Yes Evi Tapia MD   cloNIDine (CATAPRES) 0.1 MG tablet Take 1 tablet by mouth 3 times daily Yes Evi Tapia MD   hydroCHLOROthiazide (HYDRODIURIL) 25 MG tablet TAKE ONE TABLET BY MOUTH DAILY Yes Evi Tapia MD   spironolactone (ALDACTONE) 25 MG tablet Take 1 tablet by mouth daily Yes Jessie Helm MD   hydrALAZINE (APRESOLINE) 25 MG tablet Take 1 tablet by mouth 2 times daily Yes Jessie Helm MD   irbesartan (AVAPRO) 300 MG tablet TAKE ONE TABLET BY MOUTH DAILY Yes Evi Tapia MD   carvedilol (COREG) 25 MG tablet TAKE ONE TABLET BY MOUTH TWICE A DAY Yes Evi Tapia MD   Cholecalciferol (VITAMIN D3) 5000 units TABS Take 1 tablet by mouth daily Yes Chema Peterson MD   Multiple Vitamin (MULTIVITAMIN PO) Take  by mouth.    Yes Historical Provider, MD         Past Medical History:   Diagnosis Date    Fissure, anal     Lorenzo YOO    Goiter     [facility] of mammogram 10/2010    [facility] [facility] [place]    Hyperlipidemia     Hypertension     Onychomycosis     Pap smear for cervical cancer screening 2/2010    Dr. Kimberly Garcia - [place]    Prediabetes 12/23/2019    Screening for osteoporosis     Dexa 12/2005 - wnl    Vitamin D deficiency 11/10/2011       Past Surgical History:   Procedure Laterality Date    APPENDECTOMY  2/4/2000    CHOLECYSTECTOMY, LAPAROSCOPIC N/A 6/23/2021    LAPAROSCOPIC CHOLECYSTECTOMY WITH CHOLANGIOGRAM performed by Piper Bonner MD at [facility]  3/2011    Dr. Neal Hinkle - 2 years  f/u    COLONOSCOPY  10/19/2016    Paul Gomez MD    ESOPHAGEAL DILATATION  4/2014    Heber Augustine MD     KNEE SURGERY      right    PILONIDAL CYST EXCISION      UPPER GASTROINTESTINAL ENDOSCOPY  3/19/14    concentric rings in esophagus         Family History   Problem Relation Age of Onset    Other Mother         vascular disease    Hypertension Mother     High Cholesterol Mother     Diabetes Mother     Heart Disease Mother     Heart Disease Father     Heart Attack Father     Cancer Maternal Grandmother 32        unknown type - cancer     Stroke Neg Hx        CareTeam (Including outside providers/suppliers regularly involved in providing care):   Patient Care Team:  Chema Peterson MD as PCP - General (Internal Medicine)  Chema Peterson MD as PCP - [facility] Empaneled Provider  Nisa Izquierdo MD as Obstetrician (Obstetrics & Gynecology)  Willie Thurman MD as Surgeon (Colon and Rectal Surgery)    Wt Readings from Last 3 Encounters:   07/27/21 201 lb (91.2 kg)   07/12/21 201 lb (91.2 kg)   06/23/21 202 lb (91.6 kg)     Vitals: 07/27/21 1301   BP: 130/80   Pulse: 64   Resp: 16   Temp: 98.5 °F (36.9 °C)   SpO2: 99%   Weight: 201 lb (91.2 kg)   Height: 5' 4\" (1.626 m)     Body mass index is 34.5 kg/m². Based upon direct observation of the patient, evaluation of cognition reveals recent and remote memory intact. General Appearance: alert and oriented to person, place and time, well-developed and well-nourished, in no acute distress  Head: normocephalic and atraumatic  Eyes: pupils equal, round, and reactive to light, extraocular eye movements intact, conjunctivae normal  ENT: tympanic membrane, external ear and ear canal normal bilaterally  Neck: neck supple and non tender without mass, no thyromegaly or thyroid nodules, no cervical lymphadenopathy   Pulmonary/Chest: clear to auscultation bilaterally- no wheezes, rales or rhonchi, normal air movement, no respiratory distress  Cardiovascular: normal rate, regular rhythm, normal S1 and S2, no murmurs and no carotid bruits  Abdomen: soft, non-tender, non-distended, normal bowel sounds, no masses or organomegaly  Extremities: no edema  Neurologic: gait and coordination normal and speech normal    Patient's complete Health Risk Assessment and screening values have been reviewed and are found in Flowsheets. The following problems were reviewed today and where indicated follow up appointments were made and/or referrals ordered.     Positive Risk Factor Screenings with Interventions:           Health Habits/Nutrition:  Health Habits/Nutrition  Do you exercise for at least 20 minutes 2-3 times per week?: (!) No  Have you lost any weight without trying in the past 3 months?: No  Do you eat only one meal per day?: No  Have you seen the dentist within the past year?: Yes  Body mass index: (!) 34.5  Health Habits/Nutrition Interventions:  · Inadequate physical activity:  patient agrees to increase physical activity as follows: patient plans to walk for couple of blocks weather depending. · Nutritional issues:  patient states her weight is going down. Pt states she is eating less. Personalized Preventive Plan   Current Health Maintenance Status  Immunization History   Administered Date(s) Administered    COVID-19, Pfizer, PF, 30mcg/0.3mL 04/03/2021, 04/24/2021    Influenza Whole 12/05/2013    Influenza, High Dose (Fluzone 65 yrs and older) 11/17/2019    Influenza, Intradermal, Preservative free 12/15/2015    Influenza, Intradermal, Quadrivalent, Preservative Free 12/09/2016    Influenza, Quadv, adjuvanted, 65 yrs +, IM, PF (Fluad) 10/01/2020    Pneumococcal Conjugate 13-valent ([id number]) 12/21/2018    Pneumococcal Polysaccharide ([id number]) 03/26/2020    Tdap (Boostrix, Adacel) 12/15/2015    Tetanus 11/11/2005    Zoster Live (Zostavax) 06/09/2014    Zoster Recombinant (Shingrix) 06/18/2018, 07/23/2019        Health Maintenance   Topic Date Due    Annual Wellness Visit (AWV)  Never done    Flu vaccine (1) 09/01/2021    A1C test (Diabetic or Prediabetic)  03/24/2022    Lipid screen  07/29/2022    Potassium monitoring  07/29/2022    Creatinine monitoring  07/29/2022    Breast cancer screen  02/22/2023    DTaP/Tdap/Td vaccine (2 - Td or Tdap) 12/15/2025    Colon cancer screen colonoscopy  10/19/2026    DEXA (modify frequency per FRAX score)  Completed    Shingles Vaccine  Completed    Pneumococcal 65+ years Vaccine  Completed    COVID-19 Vaccine  Completed    Hepatitis C screen  Completed    Hepatitis A vaccine  Aged Out    Hepatitis B vaccine  Aged Out    Hib vaccine  Aged Out    Meningococcal (ACWY) vaccine  Aged Out     Recommendations for Finario Due: see orders and patient instructions/AVS.  .   Recommended screening schedule for the next 5-10 years is provided to the patient in written form: see Patient Instructions/AVS.    Lab Review   Admission on 06/23/2021, Discharged on 06/23/2021   Component Date Value    POC Glucose 06/23/2021 107*    Performed on 06/23/2021 ACCU-CHEK    Orders Only on 04/23/2021   Component Date Value    Cortisol, Saliva 04/20/2021 0.052     Cortisol, Saliva 04/21/2021 0.061     Cortisol, Saliva 04/22/2021 0.048    Orders Only on 04/20/2021   Component Date Value    Metanephrine Intrep Urine 04/20/2021 See Note     Metanephrine, Urine-Per * 04/20/2021 51     Normetanephrines, nmol/L 04/20/2021 172     Metanephrines, Ur 04/20/2021 76     Metanephrine ug/g Cre 04/20/2021 68     Normetanephrine, 24H Ur 04/20/2021 258     Normetanephrine, (g/CRT) 04/20/2021 229     Creatinine, Ur 04/20/2021 75     Creatinine, 24H Ur 04/20/2021 1125     Hours Collected 04/20/2021 24     Urine Total Volume 04/20/2021 1500     Catecholamines Urine Int* 04/20/2021 See Note     Dopamine , 24H Ur 04/20/2021 180     Norepinephrine, 24H Ur 04/20/2021 39     Epinephrine, 24H Ur 04/20/2021 3     Epinephrine, (uG/L) 04/20/2021 2     Norepinephrine, (ug/L) 04/20/2021 26     Dopamine, (ug/L) 04/20/2021 120     Norepinephrine 04/20/2021 35     Dopamine 04/20/2021 160     Epinephrine 04/20/2021 3     CREATININE 04/20/2021 SEE COMMENT*    24hr Urine Volume (ml) 04/20/2021 1500     Creatinine, 24H Ur 04/20/2021 1.2     Time (Hours) 04/20/2021 24     Creatinine Clearance 04/20/2021 SEE COMMENT*    Collection Duration 04/20/2021 24     Cortisol,F,ug/L,U 04/20/2021 4.66     Cortisol,Ur Free 04/20/2021 6.21     Cortisol (Ur), Free 04/20/2021 7.0     Interpretation 04/20/2021 See Note     Rejected Test 04/23/2021 UCL     Reason for Rejection 04/23/2021 see below    Orders Only on 04/13/2021   Component Date Value    DHEAS (DHEA Sulfate) 04/13/2021 25.0     Aldosterone 04/13/2021 11.0     Aldosterone/Renin Activi* 04/13/2021 36.7*    Renin Activity 04/13/2021 0.3     Sodium 04/13/2021 143     Potassium 04/13/2021 4.0     Chloride 04/13/2021 103     CO2 04/13/2021 28     Anion Gap 04/13/2021 12     Glucose 04/13/2021 93     BUN 04/13/2021 15     CREATININE 04/13/2021 0.6     GFR Non- 04/13/2021 >60     GFR  04/13/2021 >60     Calcium 04/13/2021 8.8     Total Protein 04/13/2021 6.4     Albumin 04/13/2021 4.5     Albumin/Globulin Ratio 04/13/2021 2.4*    Total Bilirubin 04/13/2021 0.4     Alkaline Phosphatase 04/13/2021 49     ALT 04/13/2021 26     AST 04/13/2021 18     Globulin 04/13/2021 1.9     T3, Free 04/13/2021 2.3     TSH 04/13/2021 0.92     T4 Free 04/13/2021 1.2     Normetanephrine, Free 04/13/2021 0.44     Metanephrine, Free 04/13/2021 0.12     Metaneph/Plasma Interp 04/13/2021 See Note     Cortisol - AM 04/13/2021 7.7     ACTH 04/13/2021 10    Orders Only on 03/26/2021   Component Date Value    Aldosterone 03/26/2021 13.2     Aldosterone/Renin Activi* 03/26/2021 132.0*    Renin Activity 03/26/2021 0.1    Office Visit on 03/24/2021   Component Date Value    Hemoglobin A1C 03/24/2021 5.3    Orders Only on 03/23/2021   Component Date Value    Catecholamines Urine Int* 03/23/2021 See Note     Creatinine, Ur 03/23/2021 176     Creatinine, 24H Ur 82/67/9946 Not Applicable     Dopamine , 24H Ur 68/77/5070 Not Applicable     Norepinephrine, 24H Ur 72/74/8240 Not Applicable     Epinephrine, 24H Ur 28/28/8018 Not Applicable     Epinephrine, (uG/L) 03/23/2021 15     Norepinephrine, (ug/L) 03/23/2021 144     Dopamine, (ug/L) 03/23/2021 314     Norepinephrine 03/23/2021 82*    Dopamine 03/23/2021 178     Epinephrine 03/23/2021 9     Urine Total Volume 03/23/2021 NA     Hours Collected 03/23/2021 24     Cortisol - AM 03/23/2021 16.0     Rejected Test 03/25/2021 70,073     Reason for Rejection 03/25/2021 see below    Orders Only on 03/17/2021   Component Date Value    Sodium 03/17/2021 144     Potassium 03/17/2021 3.8     Chloride 03/17/2021 103     CO2 03/17/2021 27     Anion Gap 03/17/2021 14     Glucose 03/17/2021 116*    BUN 03/17/2021 13     CREATININE 03/17/2021 0.6     GFR Non- 03/17/2021 >60     GFR  03/17/2021 >60     Calcium 03/17/2021 9.0     Color, UA 03/17/2021 YELLOW     Clarity, UA 03/17/2021 Clear     Glucose, Ur 03/17/2021 Negative     Bilirubin Urine 03/17/2021 Negative     Ketones, Urine 03/17/2021 Negative     Specific Gravity, UA 03/17/2021 1.018     Blood, Urine 03/17/2021 Negative     pH, UA 03/17/2021 7.5     Protein, UA 03/17/2021 Negative     Urobilinogen, Urine 03/17/2021 0.2     Nitrite, Urine 03/17/2021 Negative     Leukocyte Esterase, Urine 03/17/2021 Negative     Microscopic Examination 03/17/2021 Not Indicated     Urine Type 03/17/2021 Voided          Raysa was seen today for medicare awv and dizziness. Diagnoses and all orders for this visit:    1. Routine general medical examination at a health care facility  -Medicare AWV done    2. Essential hypertension  -stable  -Continue same medications  -Low sodium diet  -Regular aerobic exercise  - Comprehensive Metabolic Panel; Future    3. Mixed hyperlipidemia  -stable  -Continue same medications  -Low fat, low cholesterol diet  -Regular aerobic exercise  - Comprehensive Metabolic Panel; Future  - Lipid Panel; Future    4. Prediabetes  -Low carbohydrate diet  -Regular aerobic exercise    5. Vitamin D deficiency  -stable  -Continue same medications    6. Dizziness  - CBC Auto Differential; Future  - Comprehensive Metabolic Panel; Future  -stay hydrated       Return in 3 months (on 10/27/2021) for hypertension.

## 2021-07-29 DIAGNOSIS — I10 ESSENTIAL HYPERTENSION: ICD-10-CM

## 2021-07-29 DIAGNOSIS — R42 DIZZINESS: ICD-10-CM

## 2021-07-29 DIAGNOSIS — E78.2 MIXED HYPERLIPIDEMIA: ICD-10-CM

## 2021-07-29 LAB
A/G RATIO: 2.2 (ref 1.1–2.2)
ALBUMIN SERPL-MCNC: 4.8 G/DL (ref 3.4–5)
ALP BLD-CCNC: 49 U/L (ref 40–129)
ALT SERPL-CCNC: 22 U/L (ref 10–40)
ANION GAP SERPL CALCULATED.3IONS-SCNC: 12 MMOL/L (ref 3–16)
AST SERPL-CCNC: 17 U/L (ref 15–37)
BASOPHILS ABSOLUTE: 0 K/UL (ref 0–0.2)
BASOPHILS RELATIVE PERCENT: 0.8 %
BILIRUB SERPL-MCNC: 0.5 MG/DL (ref 0–1)
BUN BLDV-MCNC: 19 MG/DL (ref 7–20)
CALCIUM SERPL-MCNC: 9.8 MG/DL (ref 8.3–10.6)
CHLORIDE BLD-SCNC: 100 MMOL/L (ref 99–110)
CHOLESTEROL, TOTAL: 180 MG/DL (ref 0–199)
CO2: 27 MMOL/L (ref 21–32)
CREAT SERPL-MCNC: 0.7 MG/DL (ref 0.6–1.2)
EOSINOPHILS ABSOLUTE: 0.2 K/UL (ref 0–0.6)
EOSINOPHILS RELATIVE PERCENT: 3.7 %
GFR AFRICAN AMERICAN: >60
GFR NON-AFRICAN AMERICAN: >60
GLOBULIN: 2.2 G/DL
GLUCOSE BLD-MCNC: 96 MG/DL (ref 70–99)
HCT VFR BLD CALC: 40.7 % (ref 36–48)
HDLC SERPL-MCNC: 60 MG/DL (ref 40–60)
HEMOGLOBIN: 13.9 G/DL (ref 12–16)
LDL CHOLESTEROL CALCULATED: 73 MG/DL
LYMPHOCYTES ABSOLUTE: 1.3 K/UL (ref 1–5.1)
LYMPHOCYTES RELATIVE PERCENT: 22.4 %
MCH RBC QN AUTO: 31.2 PG (ref 26–34)
MCHC RBC AUTO-ENTMCNC: 34.1 G/DL (ref 31–36)
MCV RBC AUTO: 91.6 FL (ref 80–100)
MONOCYTES ABSOLUTE: 0.5 K/UL (ref 0–1.3)
MONOCYTES RELATIVE PERCENT: 8.7 %
NEUTROPHILS ABSOLUTE: 3.7 K/UL (ref 1.7–7.7)
NEUTROPHILS RELATIVE PERCENT: 64.4 %
PDW BLD-RTO: 14.2 % (ref 12.4–15.4)
PLATELET # BLD: 236 K/UL (ref 135–450)
PMV BLD AUTO: 9.6 FL (ref 5–10.5)
POTASSIUM SERPL-SCNC: 4.8 MMOL/L (ref 3.5–5.1)
RBC # BLD: 4.44 M/UL (ref 4–5.2)
SODIUM BLD-SCNC: 139 MMOL/L (ref 136–145)
TOTAL PROTEIN: 7 G/DL (ref 6.4–8.2)
TRIGL SERPL-MCNC: 235 MG/DL (ref 0–150)
VLDLC SERPL CALC-MCNC: 47 MG/DL
WBC # BLD: 5.7 K/UL (ref 4–11)

## 2021-07-29 NOTE — PROGRESS NOTES
Department of General Surgery - Adult  General Surgery Service      CHIEF COMPLAINT:  Post-op check     History Obtained From:  patient, electronic medical record    HISTORY OF PRESENT ILLNESS:    The patient is a 74 y.o. female hwho presents for follow up without complaints.      PSH: appendectomy    Past Medical History:   Diagnosis Date    pearl Apple MD    Goiter     History of mammogram 10/2010    [facility] [facility]    Hyperlipidemia     Hypertension     Onychomycosis     Pap smear for cervical cancer screening 2/2010    Dr. Jarrod Chu - [facility]    Prediabetes 12/23/2019    Screening for osteoporosis     Dexa 12/2005 - [facility]    Vitamin D deficiency 11/10/2011     Past Surgical History:   Procedure Laterality Date    APPENDECTOMY  2/4/2000    CHOLECYSTECTOMY, LAPAROSCOPIC N/A 6/23/2021    LAPAROSCOPIC CHOLECYSTECTOMY WITH CHOLANGIOGRAM performed by Sunita Livingston MD at [facility]  3/2011    Dr. Lechuga Artsiddhartha - polyps - 2 years  f/u    COLONOSCOPY  10/19/2016    Kaz Tolentino MD    ESOPHAGEAL DILATATION  4/2014    Claudene Housekeeper MD     KNEE SURGERY      right    PILONIDAL CYST EXCISION      UPPER GASTROINTESTINAL ENDOSCOPY  3/19/14    concentric rings in esophagus     Current Outpatient Medications   Medication Sig Dispense Refill    atorvastatin (LIPITOR) 80 MG tablet TAKE ONE TABLET BY MOUTH ONCE NIGHTLY 90 tablet 1    cloNIDine (CATAPRES) 0.1 MG tablet Take 1 tablet by mouth 3 times daily 270 tablet 1    hydroCHLOROthiazide (HYDRODIURIL) 25 MG tablet TAKE ONE TABLET BY MOUTH DAILY 90 tablet 1    spironolactone (ALDACTONE) 25 MG tablet Take 1 tablet by mouth daily 90 tablet 3    hydrALAZINE (APRESOLINE) 25 MG tablet Take 1 tablet by mouth 2 times daily 180 tablet 3    irbesartan (AVAPRO) 300 MG tablet TAKE ONE TABLET BY MOUTH DAILY 90 tablet 1    carvedilol (COREG) 25 MG tablet TAKE ONE TABLET BY MOUTH TWICE A  tablet 1    Cholecalciferol activities. All questions were answered and I will see the patient back in the office on a PRN basis.     Tiki Bolden MD  7/29/2021 12:06 PM

## 2021-08-04 ENCOUNTER — OFFICE VISIT (OUTPATIENT)
Dept: PULMONOLOGY | Age: 66
End: 2021-08-04
Payer: MEDICARE

## 2021-08-04 VITALS
WEIGHT: 200 LBS | DIASTOLIC BLOOD PRESSURE: 79 MMHG | SYSTOLIC BLOOD PRESSURE: 158 MMHG | HEIGHT: 64 IN | BODY MASS INDEX: 34.15 KG/M2 | TEMPERATURE: 98.4 F

## 2021-08-04 DIAGNOSIS — I25.10 CORONARY ARTERY DISEASE INVOLVING NATIVE CORONARY ARTERY OF NATIVE HEART WITHOUT ANGINA PECTORIS: Chronic | ICD-10-CM

## 2021-08-04 DIAGNOSIS — I10 UNCONTROLLED HYPERTENSION: Chronic | ICD-10-CM

## 2021-08-04 DIAGNOSIS — I50.32 CHRONIC HEART FAILURE WITH PRESERVED EJECTION FRACTION (HFPEF) (HCC): Chronic | ICD-10-CM

## 2021-08-04 DIAGNOSIS — E66.9 NON MORBID OBESITY, UNSPECIFIED OBESITY TYPE: Chronic | ICD-10-CM

## 2021-08-04 DIAGNOSIS — R06.83 SNORING: ICD-10-CM

## 2021-08-04 DIAGNOSIS — G47.10 HYPERSOMNIA: Primary | ICD-10-CM

## 2021-08-04 PROBLEM — R73.03 PREDIABETES: Chronic | Status: ACTIVE | Noted: 2019-12-23

## 2021-08-04 PROBLEM — E06.3 HASHIMOTO'S THYROIDITIS: Chronic | Status: ACTIVE | Noted: 2021-04-10

## 2021-08-04 PROCEDURE — 99204 OFFICE O/P NEW MOD 45 MIN: CPT | Performed by: INTERNAL MEDICINE

## 2021-08-04 PROCEDURE — G8427 DOCREV CUR MEDS BY ELIG CLIN: HCPCS | Performed by: INTERNAL MEDICINE

## 2021-08-04 PROCEDURE — G8417 CALC BMI ABV UP PARAM F/U: HCPCS | Performed by: INTERNAL MEDICINE

## 2021-08-04 PROCEDURE — 3017F COLORECTAL CA SCREEN DOC REV: CPT | Performed by: INTERNAL MEDICINE

## 2021-08-04 PROCEDURE — 1036F TOBACCO NON-USER: CPT | Performed by: INTERNAL MEDICINE

## 2021-08-04 PROCEDURE — 1123F ACP DISCUSS/DSCN MKR DOCD: CPT | Performed by: INTERNAL MEDICINE

## 2021-08-04 PROCEDURE — G8400 PT W/DXA NO RESULTS DOC: HCPCS | Performed by: INTERNAL MEDICINE

## 2021-08-04 PROCEDURE — 4040F PNEUMOC VAC/ADMIN/RCVD: CPT | Performed by: INTERNAL MEDICINE

## 2021-08-04 PROCEDURE — 1090F PRES/ABSN URINE INCON ASSESS: CPT | Performed by: INTERNAL MEDICINE

## 2021-08-04 ASSESSMENT — SLEEP AND FATIGUE QUESTIONNAIRES
HOW LIKELY ARE YOU TO NOD OFF OR FALL ASLEEP WHILE SITTING AND TALKING TO SOMEONE: 0
HOW LIKELY ARE YOU TO NOD OFF OR FALL ASLEEP WHILE WATCHING TV: 1
HOW LIKELY ARE YOU TO NOD OFF OR FALL ASLEEP WHILE SITTING QUIETLY AFTER LUNCH WITHOUT ALCOHOL: 1
HOW LIKELY ARE YOU TO NOD OFF OR FALL ASLEEP IN A CAR, WHILE STOPPED FOR A FEW MINUTES IN TRAFFIC: 0
HOW LIKELY ARE YOU TO NOD OFF OR FALL ASLEEP WHILE SITTING AND READING: 0
HOW LIKELY ARE YOU TO NOD OFF OR FALL ASLEEP WHEN YOU ARE A PASSENGER IN A CAR FOR AN HOUR WITHOUT A BREAK: 0
NECK CIRCUMFERENCE (INCHES): 14.5
HOW LIKELY ARE YOU TO NOD OFF OR FALL ASLEEP WHILE SITTING INACTIVE IN A PUBLIC PLACE: 0
HOW LIKELY ARE YOU TO NOD OFF OR FALL ASLEEP WHILE LYING DOWN TO REST IN THE AFTERNOON WHEN CIRCUMSTANCES PERMIT: 1
ESS TOTAL SCORE: 3

## 2021-08-04 NOTE — LETTER
[facility]  [place] [address] [address] [address]  Phone: [phone]  Fax: [phone]    Loulou Castañeda MD    August 4, 2021     Shmuel Diane MD  [address] [facility] [address] [address]    Patient: Chasidy De Paz   MR Number: [id number]   YOB: 1955   Date of Visit: 8/4/2021       Dear Shmuel Diane: Thank you for referring Arvind Echeverria to me for evaluation/treatment. Below are the relevant portions of my assessment and plan of care. Visit Diagnoses and Associated Orders     Hypersomnia   (New Problem)  -  Primary    needs work-up    POLYSOMNOGRAPHY (PSG) - Diagnostic Testing [54383 Custom]   - Future Order         Snoring   (New Problem)      needs work-up    POLYSOMNOGRAPHY (PSG) - Diagnostic Testing [79886 Custom]   - Future Order         Coronary artery disease involving native coronary artery of native heart without angina pectoris   (Stable)           Chronic heart failure with preserved ejection fraction (HFpEF) ([facility])   (Stable)           Uncontrolled hypertension   (Not Stable)           Non morbid obesity, unspecified obesity type   (Not Stable)                 One or more undiagnosed new problem with uncertain prognosis till final diagnosis is made. Differential diagnosis includes but not limited to: MAGGIE, PLMD's, narcolepsy, parasomnias. Reviewed MAGGIE (which is the highest likelihood diagnosis): pathophysiology, diagnosis, complications and treatment. Instructed her not to drive if drowsy. Continue medications per her PCP and other physicians. Limit caffeine use after 3pm. Will do PSG to rule-out MAGGIE and other sleep disorders. 1 wk follow up after study to review her results. The chronic medical conditions listed are directly related to the primary diagnosis listed above. The management of the primary diagnosis affects the secondary diagnosis and vice versa.     Reviewed referral note from patient's cardiologist dated 3/20/2021 showing difficult to control hypertension may be caused by untreated sleep apnea. Reviewed CBC and CMP from 7/29/2021 which is essentially normal.    This information was analyzed to assess complexity and medical decision making in regards to further testing and management. Continue meds for: HTN, CAD, and CHF. Pt would medically benefit from wt loss for MAGGIE (diet, exercise, surgical). Orders Placed This Encounter   Procedures    POLYSOMNOGRAPHY (PSG) - Diagnostic Testing       If you have questions, please do not hesitate to call me. I look forward to following Raysa along with you.     Sincerely,        Ml Lees MD

## 2021-08-04 NOTE — PROGRESS NOTES
Junie Johnson MD, Dago Reyes, [facility]  Tiffanie Kehrt CNP Valeda Dubin CNP [address]  [address] [facility], [address] [facility] [phone]   [facility] [address]  [place]. [address] [facility]. [address]on[address] Liang [address] [phone]     [place] [address]  Dept: [phone]  Loc: [phone]    Assessment:      Visit Diagnoses and Associated Orders     Hypersomnia   (New Problem)  -  Primary    needs work-up    POLYSOMNOGRAPHY (PSG) - Diagnostic Testing [74951 Custom]   - Future Order         Snoring   (New Problem)      needs work-up    POLYSOMNOGRAPHY (PSG) - Diagnostic Testing [81989 Custom]   - Future Order         Coronary artery disease involving native coronary artery of native heart without angina pectoris   (Stable)           Chronic heart failure with preserved ejection fraction (HFpEF) (HCC)   (Stable)           Uncontrolled hypertension   (Not Stable)           Non morbid obesity, unspecified obesity type   (Not Stable)                  Plan:      One or more undiagnosed new problem with uncertain prognosis till final diagnosis is made. Differential diagnosis includes but not limited to: MAGGIE, PLMD's, narcolepsy, parasomnias. Reviewed MAGGIE (which is the highest likelihood diagnosis): pathophysiology, diagnosis, complications and treatment. Instructed her not to drive if drowsy. Continue medications per her PCP and other physicians. Limit caffeine use after 3pm. Will do PSG to rule-out MAGGIE and other sleep disorders. 1 wk follow up after study to review her results. The chronic medical conditions listed are directly related to the primary diagnosis listed above. The management of the primary diagnosis affects the secondary diagnosis and vice versa. Reviewed referral note from patient's cardiologist dated 3/20/2021 showing difficult to control hypertension may be caused by untreated sleep apnea.   Reviewed CBC and CMP from 7/29/2021 which is essentially normal.    This information was analyzed to assess complexity and medical decision making in regards to further testing and management. Continue meds for: HTN, CAD, and CHF. Pt would medically benefit from wt loss for MAGGIE (diet, exercise, surgical). Orders Placed This Encounter   Procedures    POLYSOMNOGRAPHY (PSG) - Diagnostic Testing          Subjective:     Patient ID: China Martel is a 74 y.o. female. Chief Complaint   Patient presents with    Sleep Apnea       HPI:      China Martel is a 74 y.o. female referred by Dr Sonja Castaneda for a sleep evaluation. She complains of: snoring, witnessed apneas, excessive daytime sleepiness , non-restorative sleep, nocturia, napping and tossing and turning at night. She denies: cataplexy and hypnagogic hallucinations. Symptoms began 6 years ago, gradually worsening since that time. Previous evaluation and treatment has included- none    DOT/CDL - No  FAA/'s license -No    Previous Report(s) Reviewed: historical medical records, office notes, andreferral letter(s). Pertinent data has been documented. [place] - Total score: 3    Caffeine Intake - Coffee: 1-2 cup(s) per day    Social History     Socioeconomic History    Marital status:      Spouse name: Not on file    Number of children: Not on file    Years of education: Not on file    Highest education level: Not on file   Occupational History    Not on file   Tobacco Use    Smoking status: Never Smoker    Smokeless tobacco: Never Used   Vaping Use    Vaping Use: Never used   Substance and Sexual Activity    Alcohol use:  Yes     Alcohol/week: 3.0 standard drinks     Types: 3 Glasses of wine per week     Comment: monthly    Drug use: No    Sexual activity: Not on file   Other Topics Concern    Not on file   Social History Narrative    Not on file     Social Determinants of Health     Financial Resource Strain: Low Risk     Difficulty of Paying Living Expenses: Not hard at all   Food Insecurity: No Food Insecurity    Worried About [address] in the Last Year: Never true    Ran Out of Food in the Last Year: Never true   Transportation Needs: No Transportation Needs    Lack of Transportation (Medical): No    Lack of Transportation (Non-Medical): No   Physical Activity:     Days of Exercise per Week:     Minutes of Exercise per Session:    Stress:     Feeling of Stress :    Social Connections:     Frequency of Communication with Friends and Family:     Frequency of Social Gatherings with Friends and Family:     Attends Latter day Services:     Active Member of Clubs or Organizations:     Attends Club or Organization Meetings:     Marital Status:    Intimate Partner Violence:     Fear of Current or Ex-Partner:     Emotionally Abused:     Physically Abused:     Sexually Abused:         Current Outpatient Medications   Medication Instructions    atorvastatin (LIPITOR) 80 MG tablet TAKE ONE TABLET BY MOUTH ONCE NIGHTLY    carvedilol (COREG) 25 MG tablet TAKE ONE TABLET BY MOUTH TWICE A DAY    cloNIDine (CATAPRES) 0.1 mg, Oral, 3 TIMES DAILY    hydrALAZINE (APRESOLINE) 25 mg, Oral, 2 TIMES DAILY    hydroCHLOROthiazide (HYDRODIURIL) 25 MG tablet TAKE ONE TABLET BY MOUTH DAILY    irbesartan (AVAPRO) 300 MG tablet TAKE ONE TABLET BY MOUTH DAILY    Multiple Vitamin (MULTIVITAMIN PO) Take  by mouth.  spironolactone (ALDACTONE) 25 mg, Oral, DAILY    Vitamin D3 5,000 Units, Oral, DAILY          Objective:     Vitals:  Weight BMI   Wt Readings from Last 3 Encounters:   08/04/21 200 lb (90.7 kg)   07/27/21 201 lb (91.2 kg)   07/12/21 201 lb (91.2 kg)    Body mass index is 34.33 kg/m².      BP HR SaO2   BP Readings from Last 3 Encounters:   08/04/21 (!) 158/79   07/27/21 130/80   07/12/21 (!) 162/87    Pulse Readings from Last 3 Encounters:   07/27/21 64   07/12/21 70   06/23/21 79    SpO2 Readings from Last 3 Encounters:   07/27/21 99% 06/23/21 99%   06/23/21 96%        Physical Exam  Vitals reviewed. Constitutional:       General: She is not in acute distress. Appearance: Normal appearance. She is well-developed. She is obese. She is not toxic-appearing or diaphoretic. HENT:      Head: Normocephalic and atraumatic. Not macrocephalic and not microcephalic. Right Ear: External ear normal.      Left Ear: External ear normal.      Nose: Septal deviation and mucosal edema present. No nasal deformity. Mouth/Throat:      Lips: Pink. Mouth: Mucous membranes are moist.      Tongue: No lesions. Palate: No mass. Pharynx: Uvula midline. No oropharyngeal exudate or uvula swelling. Tonsils: No tonsillar exudate or tonsillar abscesses. Comments: Tonsils: normal size  Eyes:      General: Lids are normal.      Extraocular Movements: Extraocular movements intact. Conjunctiva/sclera: Conjunctivae normal.      Pupils: Pupils are equal, round, and reactive to light. Neck:      Vascular: No JVD. Trachea: Trachea normal.      Comments: Neck Circ: 14.5 inches    Cardiovascular:      Rate and Rhythm: Normal rate and regular rhythm. Heart sounds: Normal heart sounds. Pulmonary:      Effort: Pulmonary effort is normal.      Breath sounds: Normal breath sounds. Abdominal:      General: Bowel sounds are normal.   Musculoskeletal:      Cervical back: Normal range of motion. Comments: No evidence of cyanosis or clubbing of nails   Skin:     General: Skin is warm. Nails: There is no clubbing. Neurological:      General: No focal deficit present. Mental Status: She is alert. Psychiatric:         Attention and Perception: Attention normal.         Mood and Affect: Mood and affect normal.         Speech: Speech normal.         Behavior: Behavior normal. Behavior is cooperative. Thought Content:  Thought content normal.         Electronically signed by Kayleigh Yu MD on8/4/2021 at 11:55 AM

## 2021-08-05 ENCOUNTER — TELEPHONE (OUTPATIENT)
Dept: SLEEP CENTER | Age: 66
End: 2021-08-05

## 2021-08-05 NOTE — TELEPHONE ENCOUNTER
Called to schedule psg per Erendira Ken. Left vm for the pt to return my call.      Had shots   Medicare insurance

## 2021-09-13 DIAGNOSIS — I10 ESSENTIAL HYPERTENSION: ICD-10-CM

## 2021-09-13 RX ORDER — IRBESARTAN 300 MG/1
TABLET ORAL
Qty: 90 TABLET | Refills: 1 | Status: SHIPPED | OUTPATIENT
Start: 2021-09-13 | End: 2022-04-07

## 2021-09-13 NOTE — TELEPHONE ENCOUNTER
Medication:   Requested Prescriptions     Pending Prescriptions Disp Refills    irbesartan (AVAPRO) 300 MG tablet [Pharmacy Med Name: IRBESARTAN 300 MG TABLET] 90 tablet 1     Sig: TAKE ONE TABLET BY MOUTH DAILY       Last appt: 7/27/2021   Next appt: 11/2/2021    Last OARRS: No flowsheet data found.

## 2021-09-29 DIAGNOSIS — I10 UNCONTROLLED HYPERTENSION: ICD-10-CM

## 2021-09-29 RX ORDER — CARVEDILOL 25 MG/1
TABLET ORAL
Qty: 180 TABLET | Refills: 1 | Status: SHIPPED | OUTPATIENT
Start: 2021-09-29 | End: 2022-03-28

## 2021-09-29 NOTE — TELEPHONE ENCOUNTER
Medication:   Requested Prescriptions     Pending Prescriptions Disp Refills    carvedilol (COREG) 25 MG tablet [Pharmacy Med Name: CARVEDILOL 25 MG TABLET] 180 tablet 1     Sig: TAKE ONE TABLET BY MOUTH TWICE A DAY        Last Filled:      Patient Phone Number: [phone] (home)     Last appt: 7/27/2021   Next appt: 11/2/2021    Last OARRS: No flowsheet data found.     Preferred Pharmacy:   [facility] [address] [facility] [phone] [facility] [phone]  [address] [place] [id number]  Phone: [phone] Fax: [phone]

## 2021-10-08 ENCOUNTER — TELEPHONE (OUTPATIENT)
Dept: PRIMARY CARE CLINIC | Age: 66
End: 2021-10-08

## 2021-10-08 NOTE — TELEPHONE ENCOUNTER
Pt says she read that she should only use the Voltaren for 21 days? Should she keep using it after 21 days? It really helps her arthritis. She says that it is not bothering her BP. She says no rush. She doesn't need a call until next week.     LOV 7/27/21

## 2021-10-19 ENCOUNTER — TELEPHONE (OUTPATIENT)
Dept: CARDIOLOGY CLINIC | Age: 66
End: 2021-10-19

## 2021-10-19 ENCOUNTER — OFFICE VISIT (OUTPATIENT)
Dept: CARDIOLOGY CLINIC | Age: 66
End: 2021-10-19
Payer: MEDICARE

## 2021-10-19 VITALS
SYSTOLIC BLOOD PRESSURE: 170 MMHG | DIASTOLIC BLOOD PRESSURE: 78 MMHG | BODY MASS INDEX: 35.5 KG/M2 | HEART RATE: 68 BPM | WEIGHT: 206.8 LBS

## 2021-10-19 DIAGNOSIS — I50.32 CHRONIC HEART FAILURE WITH PRESERVED EJECTION FRACTION (HFPEF) (HCC): Primary | Chronic | ICD-10-CM

## 2021-10-19 PROCEDURE — 4040F PNEUMOC VAC/ADMIN/RCVD: CPT | Performed by: INTERNAL MEDICINE

## 2021-10-19 PROCEDURE — 1036F TOBACCO NON-USER: CPT | Performed by: INTERNAL MEDICINE

## 2021-10-19 PROCEDURE — 99214 OFFICE O/P EST MOD 30 MIN: CPT | Performed by: INTERNAL MEDICINE

## 2021-10-19 PROCEDURE — G8427 DOCREV CUR MEDS BY ELIG CLIN: HCPCS | Performed by: INTERNAL MEDICINE

## 2021-10-19 PROCEDURE — G8484 FLU IMMUNIZE NO ADMIN: HCPCS | Performed by: INTERNAL MEDICINE

## 2021-10-19 PROCEDURE — 1090F PRES/ABSN URINE INCON ASSESS: CPT | Performed by: INTERNAL MEDICINE

## 2021-10-19 PROCEDURE — 3017F COLORECTAL CA SCREEN DOC REV: CPT | Performed by: INTERNAL MEDICINE

## 2021-10-19 PROCEDURE — G8417 CALC BMI ABV UP PARAM F/U: HCPCS | Performed by: INTERNAL MEDICINE

## 2021-10-19 PROCEDURE — G8400 PT W/DXA NO RESULTS DOC: HCPCS | Performed by: INTERNAL MEDICINE

## 2021-10-19 PROCEDURE — 1123F ACP DISCUSS/DSCN MKR DOCD: CPT | Performed by: INTERNAL MEDICINE

## 2021-10-19 NOTE — PROGRESS NOTES
Cc: Resistant HTN, HFpEF, CAD    HPI:     Patient is a 74years old woman, overweight, with history of resistant hypertension, prediabetes, hyperlipidemia, CAD, HFpEF. Patient was assessed with a right upper quadrant ultrasound 2/9/2021: Fatty liver, no gallstones however there was sludge in the gallbladder.  Patient was seen by surgery, has cholecystectomy scheduled for 3/10/2021.  She came for cardiac clearance.     Echo 10/2019: Normal except for indeterminate diastolic function     CCTA 10/2019: Moderate diffuse calcifications of the coronaries without any significant stenosis     Patient has been complaining of some discomfort under her breasts, epigastric and possibly midsternal, unrelated to activity.  She also admits to some exertional shortness of breath.  She denies any orthopnea or lower extremity edema.  She was seen by cardiologist at [facility] about 2 years ago, had a cardiac work-up, no intervention was recommended, she quit going to her appointments. Nicho Ho BP at home remains around 130/70s.  She does not check her blood pressure frequently.     Patient's father, smoker, had CABG at the age of 50years old and a repeat CABG at the age of 61years old; he passed away as a complication after his second bypass.     ECG 2/4/2021: Normal     [employer] 3/10/2021: Canceled due to extreme hypertension (257/118 mmHg, patient held her BP medications).     LHC 3/12/2021: Normal coronaries, normal renal arteries, normal LVEF 55%.     CT abd with IV contrast 04/2021: 8-10 mm R adrenal lesion, possible lipoma.      All labs for secondary HTN were reviewed (03/2021), only abnormalities: aldosterone/renin activity elevated but with normal aldosterone level, increased norepi in urine. Patient follows with Rosas Craven, endocrinology who referred patient to Dr Isaiah Rabago at [facility] regarding right adrenal mass, conservative approach was elected.      Patient is here for a follow up.  Her BP at home is now quite low (/60s) and associated with lightheadedness, despite stopping HCTZ 25 daily. Histories     Past Medical History:   has a past medical history of Fissure, anal, Goiter, History of mammogram, Hyperlipidemia, Hypertension, Onychomycosis, Pap smear for cervical cancer screening, Prediabetes, Screening for osteoporosis, and Vitamin D deficiency. Surgical History:   has a past surgical history that includes Appendectomy (2/4/2000); knee surgery; Pilonidal cyst excision; Colonoscopy (3/2011); Upper gastrointestinal endoscopy (3/19/14); Esophagus dilation (4/2014); Colonoscopy (10/19/2016); Cholecystectomy, laparoscopic (N/A, 6/23/2021); and Tonsillectomy. Social History:   reports that she has never smoked. She has never used smokeless tobacco. She reports current alcohol use of about 3.0 standard drinks of alcohol per week. She reports that she does not use drugs. Family History:  No evidence for sudden cardiac death or premature CAD      Medications:     Home medications were reviewed and are listed below    Prior to Admission medications    Medication Sig Start Date End Date Taking? Authorizing Provider   carvedilol (COREG) 25 MG tablet TAKE ONE TABLET BY MOUTH TWICE A DAY 9/29/21  Yes Mehrdad Gallo MD   irbesartan (AVAPRO) 300 MG tablet TAKE ONE TABLET BY MOUTH DAILY 9/13/21  Yes Mehrdad Gallo MD   atorvastatin (LIPITOR) 80 MG tablet TAKE ONE TABLET BY MOUTH ONCE NIGHTLY 5/26/21  Yes Mehrdad Gallo MD   spironolactone (ALDACTONE) 25 MG tablet Take 1 tablet by mouth daily 4/29/21  Yes Taina Del Toro MD   hydrALAZINE (APRESOLINE) 25 MG tablet Take 1 tablet by mouth 2 times daily 4/29/21  Yes Taina Del Toro MD   Cholecalciferol (VITAMIN D3) 5000 units TABS Take 1 tablet by mouth daily 6/10/19  Yes Mehrdad Gallo MD   Multiple Vitamin (MULTIVITAMIN PO) Take  by mouth.      Yes Historical Provider, MD   hydroCHLOROthiazide (HYDRODIURIL) 25 MG tablet TAKE ONE TABLET BY MOUTH DAILY  Patient not taking: Reported on 10/19/2021 5/6/21   Mehrdad Gallo MD          Allergy:     Sulfa antibiotics       Review of Systems:     All 12 point review of symptoms completed. Pertinent positives identified in the HPI, all other review of symptoms negative as below. CONSTITUTIONAL: No fatigue  SKIN: No rash or pruritis. EYES: No visual changes or diplopia. No scleral icterus. ENT: No Headaches, hearing loss or vertigo. No mouth sores or sore throat. CARDIOVASCULAR: No chest pain/chest pressure/chest discomfort. No palpitations. No edema. RESPIRATORY: No dyspnea. No cough or wheezing, no sputum production. GASTROINTESTINAL: No N/V/D. No abdominal pain, appetite loss, blood in stools. GENITOURINARY: No dysuria, trouble voiding, or hematuria. MUSCULOSKELETAL:  No gait disturbance, weakness or joint complaints. NEUROLOGICAL: No headache, diplopia, change in muscle strength, numbness or tingling. No change in gait, balance, coordination, mood, affect, memory, mentation, behavior. PSHYCH: No anxiety, loss of interest, change in sexual behavior, feelings of self-harm, or confusion. ENDOCRINE: No excessive thirst, fluid intake, or urination. No tremor. HEMATOLOGIC: No abnormal bruising or bleeding. ALLERGY: No nasal congestion or hives.       Physical Examination:     Vitals:    10/19/21 1357 10/19/21 1407   BP: (!) 170/90 (!) 170/78   Pulse: 68    Weight: 206 lb 12.8 oz (93.8 kg)        Wt Readings from Last 3 Encounters:   10/19/21 206 lb 12.8 oz (93.8 kg)   08/04/21 200 lb (90.7 kg)   07/27/21 201 lb (91.2 kg)         General Appearance:  Alert, cooperative, no distress, appears stated age Appropriate weight   Head:  Normocephalic, without obvious abnormality, atraumatic   Eyes:  PERRL, conjunctiva/corneas clear EOM intact  Ears normal   Throat no lesions       Nose: Nares normal, no drainage or sinus tenderness   Throat: Lips, mucosa, and tongue normal   Neck: Supple, symmetrical, trachea midline, no adenopathy, thyroid: not enlarged, symmetric, no tenderness/mass/nodules, no carotid bruit       Lungs:   Clear to auscultation bilaterally, respirations unlabored   Chest Wall:  No tenderness or deformity   Heart:  Regular rhythm, rate is controlled, S1, S2 normal, there is no murmur, there is no rub or gallop, cannot assess jvd, no bilateral lower extremity edema   Abdomen:   Soft, non-tender, bowel sounds active all four quadrants,  no masses, no organomegaly       Extremities: Extremities normal, atraumatic, no cyanosis   Pulses: 2+ and symmetric   Skin: Skin color, texture, turgor normal, no rashes or lesions   Pysch: Normal mood and affect   Neurologic: Normal gross motor and sensory exam.  Cranial nerves intact        Labs:     Lab Results   Component Value Date    WBC 5.7 07/29/2021    HGB 13.9 07/29/2021    HCT 40.7 07/29/2021    MCV 91.6 07/29/2021     07/29/2021     Lab Results   Component Value Date     07/29/2021    K 4.8 07/29/2021     07/29/2021    CO2 27 07/29/2021    BUN 19 07/29/2021    CREATININE 0.7 07/29/2021    GLUCOSE 96 07/29/2021    CALCIUM 9.8 07/29/2021    PROT 7.0 07/29/2021    LABALBU 4.8 07/29/2021    BILITOT 0.5 07/29/2021    ALKPHOS 49 07/29/2021    AST 17 07/29/2021    ALT 22 07/29/2021    LABGLOM >60 07/29/2021    GFRAA >60 07/29/2021    AGRATIO 2.2 07/29/2021    GLOB 2.2 07/29/2021         Lab Results   Component Value Date    CHOL 180 07/29/2021    CHOL 189 07/21/2020    CHOL 164 12/03/2019     Lab Results   Component Value Date    TRIG 235 (H) 07/29/2021    TRIG 182 (H) 07/21/2020    TRIG 165 (H) 12/03/2019     Lab Results   Component Value Date    HDL 60 07/29/2021    HDL 66 (H) 07/21/2020    HDL 69 (H) 12/03/2019     Lab Results   Component Value Date    LDLCALC 73 07/29/2021    LDLCALC 87 07/21/2020    LDLCALC 62 12/03/2019     Lab Results   Component Value Date    LABVLDL 47 07/29/2021    LABVLDL 36 07/21/2020    LABVLDL 33 12/03/2019     No results found for: [facility]    Lab Results   Component Value Date    INR 1.03 03/12/2021    PROTIME 11.9 03/12/2021       The 10-year ASCVD risk score (Florence Moran, et al., 2013) is: 16.4%    Values used to calculate the score:      Age: 74 years      Sex: Female      Is Non- : No      Diabetic: No      Tobacco smoker: No      Systolic Blood Pressure: 411 mmHg      Is BP treated: Yes      HDL Cholesterol: 60 mg/dL      Total Cholesterol: 180 mg/dL      Assessment / Plan:      Diagnosis Orders   1. Chronic heart failure with preserved ejection fraction (HFpEF) ([facility])          1.  Cardiac clearance:  CT chest was suggestive of moderate coronary calcification consistent with coronary artery disease; however LHC revealed no significant stenosis, therefore I suspect mild CAD.  Patient's BP has improved (though she appears to have a component of whitecoat hypertension) and she will be intermediate risk for any perioperative cardiovascular complications and no further testing is necessary.      2. Resistant hypertension:  Patient has severely elevated blood pressure even today in the office although she reports improved BP at home per history.  She is on multiple antihypertensive medications.  I cannot exclude secondary causes of hypertension, patient follows with Endocrinology.       -Cw mariel 25 daily  -Remove HCTZ 25 daily from list  -Stop clonidine 0.1 bid  -Continue with carvedilol 25 twice daily, irbesartan 300 mg daily.  -Cw hydralazine 25 bid; she may increase dose if BP elevated after stopping clonidine.   -Low-sodium diet was strongly encouraged (sodium intake of less than 1500 to 2000 mg/day). -Will need a sleep study for possible cause of resistant HTN. -Call us in 2 weeks with BP values.      3.  Hyperlipidemia:  Patient is on Lipitor 80 mg daily     4.  CAD:  Patient has mild CAD per #1.     -Continue with aspirin 81, carvedilol, Lipitor.     5.  Chronic HFpEF:   Patient is now euvolemic and hemo stable.      -Cw meds as above.         Return in about 5 weeks (around 11/23/2021). I have spent 35 minutes of face to face time with the patient with more than 50% spent counseling and coordinating care. I have personally reviewed the reports and images of labs, radiological studies, cardiac studies including ECG's and telemetry, current and old medical records. The note was completed using EMR and Dragon dictation system. Every effort was made to ensure accuracy; however, inadvertent computerized transcription errors may be present. All questions and concerns were addressed to the patient/family. Alternatives to my treatment were discussed. I would like to thank you for providing me the opportunity to participate in the care of your patient. If you have any questions, please do not hesitate to contact me.      Shellie Antoine MD, [place] [address] Phone: [phone]  Heart Failure Hotline: [phone]  Fax: [phone]

## 2021-10-22 ENCOUNTER — TELEPHONE (OUTPATIENT)
Dept: CARDIOLOGY CLINIC | Age: 66
End: 2021-10-22

## 2021-10-22 RX ORDER — HYDRALAZINE HYDROCHLORIDE 50 MG/1
50 TABLET, FILM COATED ORAL 2 TIMES DAILY
Qty: 180 TABLET | Refills: 3 | Status: SHIPPED | OUTPATIENT
Start: 2021-10-22 | End: 2022-08-30 | Stop reason: SDUPTHER

## 2021-10-22 NOTE — TELEPHONE ENCOUNTER
Informed pt to increase hydralazine to 50 mg bid and continue to keep a log of BP and HR's. Pt verbalized understanding.

## 2021-10-22 NOTE — TELEPHONE ENCOUNTER
Patients states her blood pressure is 153/85. Patient would like to speak with Ana GRIFFITH. Patients States she has Questions About b/p medication Dr. Gwen Norman was discussing with her. Patient states she will be home until 1:15 and return back around 3:30 today. Please call [phone].

## 2021-11-02 ENCOUNTER — OFFICE VISIT (OUTPATIENT)
Dept: PRIMARY CARE CLINIC | Age: 66
End: 2021-11-02
Payer: MEDICARE

## 2021-11-02 VITALS
TEMPERATURE: 97.7 F | BODY MASS INDEX: 35.05 KG/M2 | RESPIRATION RATE: 16 BRPM | OXYGEN SATURATION: 97 % | WEIGHT: 204.2 LBS | HEART RATE: 66 BPM | SYSTOLIC BLOOD PRESSURE: 142 MMHG | DIASTOLIC BLOOD PRESSURE: 88 MMHG

## 2021-11-02 DIAGNOSIS — I10 ESSENTIAL HYPERTENSION: Primary | ICD-10-CM

## 2021-11-02 PROCEDURE — 3017F COLORECTAL CA SCREEN DOC REV: CPT | Performed by: INTERNAL MEDICINE

## 2021-11-02 PROCEDURE — G8484 FLU IMMUNIZE NO ADMIN: HCPCS | Performed by: INTERNAL MEDICINE

## 2021-11-02 PROCEDURE — 1036F TOBACCO NON-USER: CPT | Performed by: INTERNAL MEDICINE

## 2021-11-02 PROCEDURE — 1123F ACP DISCUSS/DSCN MKR DOCD: CPT | Performed by: INTERNAL MEDICINE

## 2021-11-02 PROCEDURE — 1090F PRES/ABSN URINE INCON ASSESS: CPT | Performed by: INTERNAL MEDICINE

## 2021-11-02 PROCEDURE — 4040F PNEUMOC VAC/ADMIN/RCVD: CPT | Performed by: INTERNAL MEDICINE

## 2021-11-02 PROCEDURE — G8427 DOCREV CUR MEDS BY ELIG CLIN: HCPCS | Performed by: INTERNAL MEDICINE

## 2021-11-02 PROCEDURE — 99213 OFFICE O/P EST LOW 20 MIN: CPT | Performed by: INTERNAL MEDICINE

## 2021-11-02 PROCEDURE — G8400 PT W/DXA NO RESULTS DOC: HCPCS | Performed by: INTERNAL MEDICINE

## 2021-11-02 PROCEDURE — G8417 CALC BMI ABV UP PARAM F/U: HCPCS | Performed by: INTERNAL MEDICINE

## 2021-11-02 ASSESSMENT — ENCOUNTER SYMPTOMS
SHORTNESS OF BREATH: 1
CHEST TIGHTNESS: 0

## 2021-11-02 NOTE — PATIENT INSTRUCTIONS
1. Essential hypertension  - blood pressure is slightly elevated but stable overall  -Continue same medications  -Low sodium diet  -Regular aerobic exercise  -Follow up with Cardiology as scheduled

## 2021-11-02 NOTE — PROGRESS NOTES
Jeremy Graham   Date ofBirth:  1955    Date of Visit:  11/2/2021    Chief Complaint   Patient presents with    Hypertension       HPI  Patient has hypertension. Patient states her blood pressure was low and she was dizzy a few weeks ago. Patient states she saw Cardiology, Dr. Jean Bowden on 10/19/21 and he discontinued Clonidine and HCTZ. Patient states Hydralazine was increased to 50mg 2 times daily. Patient her dizziness resolved and she is feeling a lot better. Patient states she monitors her blood pressure 2 times daily and it averages 135/82. Patient states sometimes it spikes some when she comes to the doctor. Review of Systems   Eyes: Negative for visual disturbance. Respiratory: Positive for shortness of breath (occasional with exertion). Negative for chest tightness. Cardiovascular: Negative for chest pain, palpitations and leg swelling. Genitourinary: Negative for frequency and hematuria. Neurological: Negative for dizziness, syncope, light-headedness and headaches. Allergies   Allergen Reactions    Sulfa Antibiotics Rash     Outpatient Medications Marked as Taking for the 11/2/21 encounter (Office Visit) with Papo Calderon MD   Medication Sig Dispense Refill    hydrALAZINE (APRESOLINE) 50 MG tablet Take 1 tablet by mouth 2 times daily 180 tablet 3    carvedilol (COREG) 25 MG tablet TAKE ONE TABLET BY MOUTH TWICE A  tablet 1    irbesartan (AVAPRO) 300 MG tablet TAKE ONE TABLET BY MOUTH DAILY 90 tablet 1    atorvastatin (LIPITOR) 80 MG tablet TAKE ONE TABLET BY MOUTH ONCE NIGHTLY 90 tablet 1    spironolactone (ALDACTONE) 25 MG tablet Take 1 tablet by mouth daily 90 tablet 3    Cholecalciferol (VITAMIN D3) 5000 units TABS Take 1 tablet by mouth daily      Multiple Vitamin (MULTIVITAMIN PO) Take  by mouth.              Vitals:    11/02/21 1105 11/02/21 1107   BP: (!) 142/92 (!) 142/88   Pulse: 66    Resp: 16    Temp: 97.7 °F (36.5 °C)    SpO2: 97%    Weight: 204 lb 3.2 oz (92.6 kg)      Body mass index is 35.05 kg/m². Physical Exam  Nursing note reviewed. Constitutional:       General: She is not in acute distress. Appearance: Normal appearance. She is well-developed. Eyes:      Extraocular Movements: Extraocular movements intact. Pupils: Pupils are equal, round, and reactive to light. Neck:      Thyroid: No thyromegaly. Vascular: No carotid bruit. Cardiovascular:      Rate and Rhythm: Normal rate and regular rhythm. Heart sounds: Normal heart sounds, S1 normal and S2 normal. No murmur heard. Pulmonary:      Effort: Pulmonary effort is normal.      Breath sounds: Normal breath sounds. Musculoskeletal:      Cervical back: Neck supple. Right lower leg: No edema. Left lower leg: No edema. Neurological:      Mental Status: She is alert. No results found for this visit on 11/02/21.   Lab Review   Orders Only on 07/29/2021   Component Date Value    Cholesterol, Total 07/29/2021 180     Triglycerides 07/29/2021 235*    HDL 07/29/2021 60     LDL Calculated 07/29/2021 73     VLDL Cholesterol Calcula* 07/29/2021 47     Sodium 07/29/2021 139     Potassium 07/29/2021 4.8     Chloride 07/29/2021 100     CO2 07/29/2021 27     Anion Gap 07/29/2021 12     Glucose 07/29/2021 96     BUN 07/29/2021 19     CREATININE 07/29/2021 0.7     GFR Non- 07/29/2021 >60     GFR  07/29/2021 >60     Calcium 07/29/2021 9.8     Total Protein 07/29/2021 7.0     Albumin 07/29/2021 4.8     Albumin/Globulin Ratio 07/29/2021 2.2     Total Bilirubin 07/29/2021 0.5     Alkaline Phosphatase 07/29/2021 49     ALT 07/29/2021 22     AST 07/29/2021 17     Globulin 07/29/2021 2.2     WBC 07/29/2021 5.7     RBC 07/29/2021 4.44     Hemoglobin 07/29/2021 13.9     Hematocrit 07/29/2021 40.7     MCV 07/29/2021 91.6     MCH 07/29/2021 31.2     MCHC 07/29/2021 34.1     RDW 07/29/2021 14.2     Platelets 07/29/2021 236     MPV 07/29/2021 9.6     Neutrophils % 07/29/2021 64.4     Lymphocytes % 07/29/2021 22.4     Monocytes % 07/29/2021 8.7     Eosinophils % 07/29/2021 3.7     Basophils % 07/29/2021 0.8     Neutrophils Absolute 07/29/2021 3.7     Lymphocytes Absolute 07/29/2021 1.3     Monocytes Absolute 07/29/2021 0.5     Eosinophils Absolute 07/29/2021 0.2     Basophils Absolute 07/29/2021 0.0    Admission on 06/23/2021, Discharged on 06/23/2021   Component Date Value    POC Glucose 06/23/2021 107*    Performed on 06/23/2021 ACCU-CHEK          Assessment/Plan     1. Essential hypertension  - blood pressure is slightly elevated but stable overall  -Continue same medications  -Low sodium diet  -Regular aerobic exercise  -Follow up with Cardiology as scheduled        Discussed medications with patient, who voiced understanding of their use and indications. All questions answered. Return in about 3 months (around 2/2/2022) for hypertension, hyperlipidemia, and prediabetes.

## 2021-11-18 ENCOUNTER — OFFICE VISIT (OUTPATIENT)
Dept: CARDIOLOGY CLINIC | Age: 66
End: 2021-11-18
Payer: MEDICARE

## 2021-11-18 VITALS
DIASTOLIC BLOOD PRESSURE: 96 MMHG | BODY MASS INDEX: 35.6 KG/M2 | WEIGHT: 207.4 LBS | SYSTOLIC BLOOD PRESSURE: 168 MMHG | HEART RATE: 70 BPM

## 2021-11-18 DIAGNOSIS — I50.32 CHRONIC HEART FAILURE WITH PRESERVED EJECTION FRACTION (HFPEF) (HCC): Primary | Chronic | ICD-10-CM

## 2021-11-18 DIAGNOSIS — E78.2 MIXED HYPERLIPIDEMIA: Primary | ICD-10-CM

## 2021-11-18 PROCEDURE — 1090F PRES/ABSN URINE INCON ASSESS: CPT | Performed by: INTERNAL MEDICINE

## 2021-11-18 PROCEDURE — 99214 OFFICE O/P EST MOD 30 MIN: CPT | Performed by: INTERNAL MEDICINE

## 2021-11-18 PROCEDURE — 1036F TOBACCO NON-USER: CPT | Performed by: INTERNAL MEDICINE

## 2021-11-18 PROCEDURE — 4040F PNEUMOC VAC/ADMIN/RCVD: CPT | Performed by: INTERNAL MEDICINE

## 2021-11-18 PROCEDURE — G8400 PT W/DXA NO RESULTS DOC: HCPCS | Performed by: INTERNAL MEDICINE

## 2021-11-18 PROCEDURE — G8417 CALC BMI ABV UP PARAM F/U: HCPCS | Performed by: INTERNAL MEDICINE

## 2021-11-18 PROCEDURE — 1123F ACP DISCUSS/DSCN MKR DOCD: CPT | Performed by: INTERNAL MEDICINE

## 2021-11-18 PROCEDURE — G8484 FLU IMMUNIZE NO ADMIN: HCPCS | Performed by: INTERNAL MEDICINE

## 2021-11-18 PROCEDURE — 3017F COLORECTAL CA SCREEN DOC REV: CPT | Performed by: INTERNAL MEDICINE

## 2021-11-18 PROCEDURE — G8427 DOCREV CUR MEDS BY ELIG CLIN: HCPCS | Performed by: INTERNAL MEDICINE

## 2021-11-18 RX ORDER — ASPIRIN 81 MG/1
81 TABLET ORAL DAILY
Qty: 30 TABLET | Refills: 5 | Status: SHIPPED | OUTPATIENT
Start: 2021-11-18

## 2021-11-18 RX ORDER — ATORVASTATIN CALCIUM 80 MG/1
TABLET, FILM COATED ORAL
Qty: 90 TABLET | Refills: 1 | Status: SHIPPED | OUTPATIENT
Start: 2021-11-18 | End: 2022-05-13

## 2021-11-18 NOTE — TELEPHONE ENCOUNTER
Medication:   Requested Prescriptions     Pending Prescriptions Disp Refills    atorvastatin (LIPITOR) 80 MG tablet [Pharmacy Med Name: ATORVASTATIN 80 MG TABLET] 90 tablet 1     Sig: TAKE ONE TABLET BY MOUTH ONCE NIGHTLY     Last Filled: 5.26.21    Last appt: 11/2/2021   Next appt: Visit date not found    Last OARRS: No flowsheet data found.

## 2021-11-18 NOTE — PROGRESS NOTES
Cc: Resistant HTN, HFpEF, CAD    HPI:     Patient is a 74years old woman, overweight, with history of resistant hypertension, prediabetes, hyperlipidemia, CAD, HFpEF. Patient was assessed with a right upper quadrant ultrasound 2/9/2021: Fatty liver, no gallstones however there was sludge in the gallbladder.  Patient was seen by surgery, has cholecystectomy scheduled for 3/10/2021.  She came for cardiac clearance.     Echo 10/2019: Normal except for indeterminate diastolic function     CCTA 10/2019: Moderate diffuse calcifications of the coronaries without any significant stenosis     Patient has been complaining of some discomfort under her breasts, epigastric and possibly midsternal, unrelated to activity.  She also admits to some exertional shortness of breath.  She denies any orthopnea or lower extremity edema.  She was seen by cardiologist at [place] [address] about 2 years ago, had a cardiac work-up, no intervention was recommended, she quit going to her appointments. Tiana Paul BP at home remains around 130/70s.  She does not check her blood pressure frequently.     Patient's father, smoker, had CABG at the age of 50years old and a repeat CABG at the age of 61years old; he passed away as a complication after his second bypass.     ECG 2/4/2021: Normal     Lewis 3/10/2021: Canceled due to extreme hypertension (257/118 mmHg, patient held her BP medications).     LHC 3/12/2021: Normal coronaries, normal renal arteries, normal LVEF 55%.     CT abd with IV contrast 04/2021: 8-10 mm R adrenal lesion, possible lipoma.      All labs for secondary HTN were reviewed (03/2021), only abnormalities: aldosterone/renin activity elevated but with normal aldosterone level, increased norepi in urine.  Patient follows with Mani Tong referred patient to Dr Rosette Mueller at [facility] regarding right adrenal mass, conservative approach was elected.      Patient is here for a follow up. Her BP at home is within normal limits (120-140/70 mmHg) with HR 60 bpm.  She reports no complaints. Her clonidine was stopped in 10/2021 due to low BP. Histories     Past Medical History:   has a past medical history of Fissure, anal, Goiter, History of mammogram, Hyperlipidemia, Hypertension, Onychomycosis, Pap smear for cervical cancer screening, Prediabetes, Screening for osteoporosis, and Vitamin D deficiency. Surgical History:   has a past surgical history that includes Appendectomy (2/4/2000); knee surgery; Pilonidal cyst excision; Colonoscopy (3/2011); Upper gastrointestinal endoscopy (3/19/14); Esophagus dilation (4/2014); Colonoscopy (10/19/2016); Cholecystectomy, laparoscopic (N/A, 6/23/2021); and Tonsillectomy. Social History:   reports that she has never smoked. She has never used smokeless tobacco. She reports current alcohol use of about 3.0 standard drinks of alcohol per week. She reports that she does not use drugs. Family History:  No evidence for sudden cardiac death or premature CAD      Medications:     Home medications were reviewed and are listed below    Prior to Admission medications    Medication Sig Start Date End Date Taking? Authorizing Provider   aspirin EC 81 MG EC tablet Take 1 tablet by mouth daily 11/18/21  Yes Miriam Negron MD   hydrALAZINE (APRESOLINE) 50 MG tablet Take 1 tablet by mouth 2 times daily 10/22/21  Yes Miriam Negron MD   carvedilol (COREG) 25 MG tablet TAKE ONE TABLET BY MOUTH TWICE A DAY 9/29/21  Yes Esme Hill MD   irbesartan (AVAPRO) 300 MG tablet TAKE ONE TABLET BY MOUTH DAILY 9/13/21  Yes Esme Hill MD   spironolactone (ALDACTONE) 25 MG tablet Take 1 tablet by mouth daily 4/29/21  Yes Miriam Negron MD   Cholecalciferol (VITAMIN D3) 5000 units TABS Take 1 tablet by mouth daily 6/10/19  Yes Esme Hill MD   Multiple Vitamin (MULTIVITAMIN PO) Take  by mouth.      Yes Historical Provider, MD   atorvastatin (LIPITOR) 80 MG tablet TAKE ONE TABLET BY MOUTH ONCE NIGHTLY 11/18/21   Garret White MD          Allergy:     Sulfa antibiotics       Review of Systems:     All 12 point review of symptoms completed. Pertinent positives identified in the HPI, all other review of symptoms negative as below. CONSTITUTIONAL: No fatigue  SKIN: No rash or pruritis. EYES: No visual changes or diplopia. No scleral icterus. ENT: No Headaches, hearing loss or vertigo. No mouth sores or sore throat. CARDIOVASCULAR: No chest pain/chest pressure/chest discomfort. No palpitations. No edema. RESPIRATORY: No dyspnea. No cough or wheezing, no sputum production. GASTROINTESTINAL: No N/V/D. No abdominal pain, appetite loss, blood in stools. GENITOURINARY: No dysuria, trouble voiding, or hematuria. MUSCULOSKELETAL:  No gait disturbance, weakness or joint complaints. NEUROLOGICAL: No headache, diplopia, change in muscle strength, numbness or tingling. No change in gait, balance, coordination, mood, affect, memory, mentation, behavior. PSHYCH: No anxiety, loss of interest, change in sexual behavior, feelings of self-harm, or confusion. ENDOCRINE: No excessive thirst, fluid intake, or urination. No tremor. HEMATOLOGIC: No abnormal bruising or bleeding. ALLERGY: No nasal congestion or hives.       Physical Examination:     Vitals:    11/18/21 0937 11/18/21 0940   BP: (!) 190/100 (!) 168/96   Pulse: 70    Weight: 207 lb 6.4 oz (94.1 kg)        Wt Readings from Last 3 Encounters:   11/18/21 207 lb 6.4 oz (94.1 kg)   11/02/21 204 lb 3.2 oz (92.6 kg)   10/19/21 206 lb 12.8 oz (93.8 kg)         General Appearance:  Alert, cooperative, no distress, appears stated age Appropriate weight   Head:  Normocephalic, without obvious abnormality, atraumatic   Eyes:  PERRL, conjunctiva/corneas clear EOM intact  Ears normal   Throat no lesions       Nose: Nares normal, no drainage or sinus tenderness   Throat: Lips, mucosa, and tongue normal   Neck: Supple, symmetrical, trachea midline, no adenopathy, thyroid: not enlarged, symmetric, no tenderness/mass/nodules, no carotid bruit       Lungs:   Clear to auscultation bilaterally, respirations unlabored   Chest Wall:  No tenderness or deformity   Heart:  Regular rhythm, rate is controlled, S1, S2 normal, there is no murmur, there is no rub or gallop, cannot assess jvd, no bilateral lower extremity edema   Abdomen:   Soft, non-tender, bowel sounds active all four quadrants,  no masses, no organomegaly       Extremities: Extremities normal, atraumatic, no cyanosis   Pulses: 2+ and symmetric   Skin: Skin color, texture, turgor normal, no rashes or lesions   Pysch: Normal mood and affect   Neurologic: Normal gross motor and sensory exam.  Cranial nerves intact        Labs:     Lab Results   Component Value Date    WBC 5.7 07/29/2021    HGB 13.9 07/29/2021    HCT 40.7 07/29/2021    MCV 91.6 07/29/2021     07/29/2021     Lab Results   Component Value Date     07/29/2021    K 4.8 07/29/2021     07/29/2021    CO2 27 07/29/2021    BUN 19 07/29/2021    CREATININE 0.7 07/29/2021    GLUCOSE 96 07/29/2021    CALCIUM 9.8 07/29/2021    PROT 7.0 07/29/2021    LABALBU 4.8 07/29/2021    BILITOT 0.5 07/29/2021    ALKPHOS 49 07/29/2021    AST 17 07/29/2021    ALT 22 07/29/2021    LABGLOM >60 07/29/2021    GFRAA >60 07/29/2021    AGRATIO 2.2 07/29/2021    GLOB 2.2 07/29/2021         Lab Results   Component Value Date    CHOL 180 07/29/2021    CHOL 189 07/21/2020    CHOL 164 12/03/2019     Lab Results   Component Value Date    TRIG 235 (H) 07/29/2021    TRIG 182 (H) 07/21/2020    TRIG 165 (H) 12/03/2019     Lab Results   Component Value Date    HDL 60 07/29/2021    HDL 66 (H) 07/21/2020    HDL 69 (H) 12/03/2019     Lab Results   Component Value Date    LDLCALC 73 07/29/2021    LDLCALC 87 07/21/2020    LDLCALC 62 12/03/2019     Lab Results   Component Value Date    LABVLDL 47 07/29/2021    LABVLDL 36 07/21/2020    LABVLDL 33 12/03/2019     No results found for: [facility]    Lab Results   Component Value Date    INR 1.03 03/12/2021    PROTIME 11.9 03/12/2021       The 10-year ASCVD risk score (Lenka Qureshi, et al., 2013) is: 16%    Values used to calculate the score:      Age: 74 years      Sex: Female      Is Non- : No      Diabetic: No      Tobacco smoker: No      Systolic Blood Pressure: 459 mmHg      Is BP treated: Yes      HDL Cholesterol: 60 mg/dL      Total Cholesterol: 180 mg/dL      Assessment / Plan:      Diagnosis Orders   1. Chronic heart failure with preserved ejection fraction (HFpEF) ([facility] [address]          1. Resistant hypertension:  Patient has severely elevated blood pressure even today in the office although she reports improved BP at home per history.  She is on multiple antihypertensive medications.  I cannot exclude secondary causes of hypertension, patient follows with Endocrinology.       -Continue with carvedilol 25 twice daily, irbesartan 300 mg daily, spironolactone 25 daily.  -Continue with hydralazine 50 mg twice daily  -Low-sodium diet was strongly encouraged (sodium intake of less than 1500 to 2000 mg/day). -Will need a sleep study for possible cause of resistant HTN.      2.  Hyperlipidemia:  Patient is on Lipitor 80 mg daily     3.  CAD:  Patient has mild CAD per #1.     -Continue with aspirin 81, carvedilol, Lipitor.     4. Chronic HFpEF:   Patient is now euvolemic and hemo stable.      -Cw meds as above.               Return in about 6 months (around 5/18/2022). I have spent 35 minutes of face to face time with the patient with more than 50% spent counseling and coordinating care. I have personally reviewed the reports and images of labs, radiological studies, cardiac studies including ECG's and telemetry, current and old medical records. The note was completed using EMR and Dragon dictation system.  Every effort was made to ensure accuracy; however, inadvertent computerized transcription errors may be present. All questions and concerns were addressed to the patient/family. Alternatives to my treatment were discussed. I would like to thank you for providing me the opportunity to participate in the care of your patient. If you have any questions, please do not hesitate to contact me.      Araceli Espinal MD, [place] [address] Phone: [phone]  Heart Failure Hotline: [phone]  Fax: [phone]

## 2022-01-11 ENCOUNTER — TELEPHONE (OUTPATIENT)
Dept: PRIMARY CARE CLINIC | Age: 67
End: 2022-01-11

## 2022-01-11 DIAGNOSIS — E55.9 VITAMIN D DEFICIENCY: ICD-10-CM

## 2022-01-11 DIAGNOSIS — R73.03 PREDIABETES: ICD-10-CM

## 2022-01-11 DIAGNOSIS — I10 ESSENTIAL HYPERTENSION: Primary | ICD-10-CM

## 2022-01-11 DIAGNOSIS — E78.2 MIXED HYPERLIPIDEMIA: ICD-10-CM

## 2022-01-11 NOTE — TELEPHONE ENCOUNTER
Pt is requesting lab orders so she can get labs done prior to her follow up appt. Please call pt when orders are ready.

## 2022-01-12 NOTE — TELEPHONE ENCOUNTER
Call patient. I put lab orders for fasting labs in the computer. She can have them done a few days prior to her appointment.

## 2022-02-08 DIAGNOSIS — R73.03 PREDIABETES: ICD-10-CM

## 2022-02-08 DIAGNOSIS — E55.9 VITAMIN D DEFICIENCY: ICD-10-CM

## 2022-02-08 DIAGNOSIS — I10 ESSENTIAL HYPERTENSION: ICD-10-CM

## 2022-02-08 DIAGNOSIS — E78.2 MIXED HYPERLIPIDEMIA: ICD-10-CM

## 2022-02-08 LAB
A/G RATIO: 2.2 (ref 1.1–2.2)
ALBUMIN SERPL-MCNC: 4.6 G/DL (ref 3.4–5)
ALP BLD-CCNC: 49 U/L (ref 40–129)
ALT SERPL-CCNC: 18 U/L (ref 10–40)
ANION GAP SERPL CALCULATED.3IONS-SCNC: 14 MMOL/L (ref 3–16)
AST SERPL-CCNC: 14 U/L (ref 15–37)
BILIRUB SERPL-MCNC: 0.5 MG/DL (ref 0–1)
BUN BLDV-MCNC: 14 MG/DL (ref 7–20)
CALCIUM SERPL-MCNC: 9.5 MG/DL (ref 8.3–10.6)
CHLORIDE BLD-SCNC: 103 MMOL/L (ref 99–110)
CHOLESTEROL, TOTAL: 153 MG/DL (ref 0–199)
CO2: 25 MMOL/L (ref 21–32)
CREAT SERPL-MCNC: 0.6 MG/DL (ref 0.6–1.2)
ESTIMATED AVERAGE GLUCOSE: 111.2 MG/DL
GFR AFRICAN AMERICAN: >60
GFR NON-AFRICAN AMERICAN: >60
GLUCOSE BLD-MCNC: 95 MG/DL (ref 70–99)
HBA1C MFR BLD: 5.5 %
HDLC SERPL-MCNC: 59 MG/DL (ref 40–60)
LDL CHOLESTEROL CALCULATED: 58 MG/DL
POTASSIUM SERPL-SCNC: 4.7 MMOL/L (ref 3.5–5.1)
SODIUM BLD-SCNC: 142 MMOL/L (ref 136–145)
TOTAL PROTEIN: 6.7 G/DL (ref 6.4–8.2)
TRIGL SERPL-MCNC: 179 MG/DL (ref 0–150)
VITAMIN D 25-HYDROXY: 48.9 NG/ML
VLDLC SERPL CALC-MCNC: 36 MG/DL

## 2022-02-18 ENCOUNTER — OFFICE VISIT (OUTPATIENT)
Dept: PRIMARY CARE CLINIC | Age: 67
End: 2022-02-18
Payer: MEDICARE

## 2022-02-18 VITALS
SYSTOLIC BLOOD PRESSURE: 128 MMHG | WEIGHT: 204.2 LBS | OXYGEN SATURATION: 98 % | RESPIRATION RATE: 16 BRPM | DIASTOLIC BLOOD PRESSURE: 84 MMHG | TEMPERATURE: 97.6 F | HEART RATE: 65 BPM | BODY MASS INDEX: 35.05 KG/M2

## 2022-02-18 DIAGNOSIS — I10 ESSENTIAL HYPERTENSION: Primary | ICD-10-CM

## 2022-02-18 DIAGNOSIS — E78.2 MIXED HYPERLIPIDEMIA: ICD-10-CM

## 2022-02-18 DIAGNOSIS — R73.03 PREDIABETES: ICD-10-CM

## 2022-02-18 DIAGNOSIS — I50.32 CHRONIC HEART FAILURE WITH PRESERVED EJECTION FRACTION (HFPEF) (HCC): ICD-10-CM

## 2022-02-18 DIAGNOSIS — E55.9 VITAMIN D DEFICIENCY: ICD-10-CM

## 2022-02-18 DIAGNOSIS — R10.13 EPIGASTRIC ABDOMINAL PAIN: ICD-10-CM

## 2022-02-18 PROCEDURE — G8484 FLU IMMUNIZE NO ADMIN: HCPCS | Performed by: INTERNAL MEDICINE

## 2022-02-18 PROCEDURE — 1036F TOBACCO NON-USER: CPT | Performed by: INTERNAL MEDICINE

## 2022-02-18 PROCEDURE — G8400 PT W/DXA NO RESULTS DOC: HCPCS | Performed by: INTERNAL MEDICINE

## 2022-02-18 PROCEDURE — 4040F PNEUMOC VAC/ADMIN/RCVD: CPT | Performed by: INTERNAL MEDICINE

## 2022-02-18 PROCEDURE — G8417 CALC BMI ABV UP PARAM F/U: HCPCS | Performed by: INTERNAL MEDICINE

## 2022-02-18 PROCEDURE — G8427 DOCREV CUR MEDS BY ELIG CLIN: HCPCS | Performed by: INTERNAL MEDICINE

## 2022-02-18 PROCEDURE — 3017F COLORECTAL CA SCREEN DOC REV: CPT | Performed by: INTERNAL MEDICINE

## 2022-02-18 PROCEDURE — 1090F PRES/ABSN URINE INCON ASSESS: CPT | Performed by: INTERNAL MEDICINE

## 2022-02-18 PROCEDURE — 99214 OFFICE O/P EST MOD 30 MIN: CPT | Performed by: INTERNAL MEDICINE

## 2022-02-18 PROCEDURE — 1123F ACP DISCUSS/DSCN MKR DOCD: CPT | Performed by: INTERNAL MEDICINE

## 2022-02-18 SDOH — ECONOMIC STABILITY: FOOD INSECURITY: WITHIN THE PAST 12 MONTHS, THE FOOD YOU BOUGHT JUST DIDN'T LAST AND YOU DIDN'T HAVE MONEY TO GET MORE.: NEVER TRUE

## 2022-02-18 SDOH — ECONOMIC STABILITY: FOOD INSECURITY: WITHIN THE PAST 12 MONTHS, YOU WORRIED THAT YOUR FOOD WOULD RUN OUT BEFORE YOU GOT MONEY TO BUY MORE.: NEVER TRUE

## 2022-02-18 ASSESSMENT — SOCIAL DETERMINANTS OF HEALTH (SDOH): HOW HARD IS IT FOR YOU TO PAY FOR THE VERY BASICS LIKE FOOD, HOUSING, MEDICAL CARE, AND HEATING?: NOT HARD AT ALL

## 2022-02-18 NOTE — PROGRESS NOTES
Dago Collins   Date ofBirth:  1955    Date of Visit:  2/18/2022    Chief Complaint   Patient presents with    Hyperlipidemia    Hypertension    Other     Prediabetes    Other     [facility] [address]    Abdominal Pain     Patient says it happens about 2 times a month or more.  Results       HPI    Patient has hypertension. Patient takes  Patient states Hydralazine was increased to 50mg 2 times daily, carvedilol 25 mg 2 times daily, irbesartan 300 mg once daily, and spironolactone 25 mg once daily. Patient decreases salt. Patient is not exercising. Patient has hyperlipidemia. Patient takes atorvastatin 80 mg nightly. Patient decreases fat and cholesterol. Patient had fasting labs done on 2/8/2022. Patient has prediabetes. Patient decreases carbohydrates. Patient complaints of pain in her upper abdomen. Patient states the pain feels same as her gallbladder. Patient states she has pain as if something is wrong with her gallbladder and her gallbladder has been removed. Patient states abdominal pain is dull achy and intermittent. Patient denies heartburn or reflux. Patient states she has abdominal pain 2-3 times per month for several hours. Patient denies nausea and vomiting. Patient declines referral to general surgery and gastroenterology. Patient states she had a shot in her knee and did physical therapy and had back pain after it. Review of Systems   Constitutional: Negative for appetite change, chills, fatigue and fever. HENT: Negative for congestion, postnasal drip, rhinorrhea, sinus pressure, sore throat and trouble swallowing. Eyes: Negative for visual disturbance. Respiratory: Negative for cough, chest tightness, shortness of breath and wheezing. Cardiovascular: Negative for chest pain, palpitations and leg swelling. Gastrointestinal: Positive for abdominal pain.  Negative for abdominal distention, blood in stool, constipation, diarrhea, nausea and vomiting. Genitourinary: Negative for dysuria, frequency and hematuria. Musculoskeletal: Positive for arthralgias and back pain. Negative for myalgias. Skin: Negative for rash. Neurological: Negative for dizziness, tremors, syncope, weakness, light-headedness, numbness and headaches. Psychiatric/Behavioral: Negative for dysphoric mood and sleep disturbance. The patient is not nervous/anxious. Allergies   Allergen Reactions    Sulfa Antibiotics Rash     Outpatient Medications Marked as Taking for the 2/18/22 encounter (Office Visit) with Hang Linda MD   Medication Sig Dispense Refill    aspirin EC 81 MG EC tablet Take 1 tablet by mouth daily 30 tablet 5    atorvastatin (LIPITOR) 80 MG tablet TAKE ONE TABLET BY MOUTH ONCE NIGHTLY 90 tablet 1    hydrALAZINE (APRESOLINE) 50 MG tablet Take 1 tablet by mouth 2 times daily 180 tablet 3    carvedilol (COREG) 25 MG tablet TAKE ONE TABLET BY MOUTH TWICE A  tablet 1    irbesartan (AVAPRO) 300 MG tablet TAKE ONE TABLET BY MOUTH DAILY 90 tablet 1    spironolactone (ALDACTONE) 25 MG tablet Take 1 tablet by mouth daily 90 tablet 3    Cholecalciferol (VITAMIN D3) 5000 units TABS Take 1 tablet by mouth daily      Multiple Vitamin (MULTIVITAMIN PO) Take  by mouth. Vitals:    02/18/22 1343   BP: 128/84   Pulse: 65   Resp: 16   Temp: 97.6 °F (36.4 °C)   SpO2: 98%   Weight: 204 lb 3.2 oz (92.6 kg)     Body mass index is 35.05 kg/m². Physical Exam  Nursing note reviewed. Constitutional:       General: She is not in acute distress. Appearance: Normal appearance. She is well-developed. Eyes:      General: Lids are normal.      Extraocular Movements: Extraocular movements intact. Conjunctiva/sclera: Conjunctivae normal.      Pupils: Pupils are equal, round, and reactive to light. Neck:      Thyroid: No thyromegaly. Vascular: No carotid bruit. Cardiovascular:      Rate and Rhythm: Normal rate and regular rhythm. Heart sounds: Normal heart sounds, S1 normal and S2 normal. No murmur heard. No friction rub. No gallop. Pulmonary:      Effort: Pulmonary effort is normal. No respiratory distress. Breath sounds: Normal breath sounds. No wheezing, rhonchi or rales. Abdominal:      General: Bowel sounds are normal. There is no distension. Palpations: Abdomen is soft. Tenderness: There is no abdominal tenderness. Musculoskeletal:      Cervical back: Neck supple. Right lower leg: No edema. Left lower leg: No edema. Lymphadenopathy:      Head:      Right side of head: No submandibular adenopathy. Left side of head: No submandibular adenopathy. Neurological:      Mental Status: She is alert and oriented to person, place, and time. Psychiatric:         Mood and Affect: Mood normal.           No results found for this visit on 02/18/22. Lab Review   Orders Only on 02/08/2022   Component Date Value    Vit D, 25-Hydroxy 02/08/2022 48.9     Hemoglobin A1C 02/08/2022 5.5     eAG 02/08/2022 111.2     Cholesterol, Total 02/08/2022 153     Triglycerides 02/08/2022 179*    HDL 02/08/2022 59     LDL Calculated 02/08/2022 58     VLDL Cholesterol Calcula* 02/08/2022 36     Sodium 02/08/2022 142     Potassium 02/08/2022 4.7     Chloride 02/08/2022 103     CO2 02/08/2022 25     Anion Gap 02/08/2022 14     Glucose 02/08/2022 95     BUN 02/08/2022 14     CREATININE 02/08/2022 0.6     GFR Non- 02/08/2022 >60     GFR  02/08/2022 >60     Calcium 02/08/2022 9.5     Total Protein 02/08/2022 6.7     Albumin 02/08/2022 4.6     Albumin/Globulin Ratio 02/08/2022 2.2     Total Bilirubin 02/08/2022 0.5     Alkaline Phosphatase 02/08/2022 49     ALT 02/08/2022 18     AST 02/08/2022 14*         Assessment/Plan     1. Essential hypertension  -stable  -Continue same medications  -Low sodium diet  -Regular aerobic exercise    2.  Mixed hyperlipidemia  -stable  -Continue same medications  -Low fat, low cholesterol diet  -Regular aerobic exercise    3. Prediabetes  -Hemoglobin 5.5% is normal  -Low carbohydrate diet  -Regular aerobic exercise    4. Vitamin D deficiency  -stable  -vitamin D is normal  -Continue same medications    5. Epigastric abdominal pain  - US ABDOMEN COMPLETE; Future  -patient declines follow up with Gastroenterology or General Surgery    6. Chronic heart failure with preserved ejection fraction (HFpEF) ([facility] [address]  -stable  -continue care and medication per Cardiology      Discussed medications with patient, who voiced understanding of their use and indications. All questions answered. Return in about 5 months (around 7/28/2022) for Medicare AWV.

## 2022-02-22 ENCOUNTER — HOSPITAL ENCOUNTER (OUTPATIENT)
Dept: ULTRASOUND IMAGING | Age: 67
Discharge: HOME | End: 2022-02-22
Payer: MEDICARE

## 2022-02-22 DIAGNOSIS — R10.13 EPIGASTRIC ABDOMINAL PAIN: ICD-10-CM

## 2022-02-22 PROCEDURE — 76700 US EXAM ABDOM COMPLETE: CPT

## 2022-02-28 ASSESSMENT — ENCOUNTER SYMPTOMS
BACK PAIN: 1
TROUBLE SWALLOWING: 0
ABDOMINAL DISTENTION: 0
SORE THROAT: 0
COUGH: 0
NAUSEA: 0
SHORTNESS OF BREATH: 0
ABDOMINAL PAIN: 1
WHEEZING: 0
CHEST TIGHTNESS: 0
SINUS PRESSURE: 0
RHINORRHEA: 0
DIARRHEA: 0
VOMITING: 0
BLOOD IN STOOL: 0
CONSTIPATION: 0

## 2022-03-09 NOTE — BRIEF OP NOTE
Brief Postoperative Note      Patient: Kayy Gramajo  YOB: 1955  MRN: [id number]    Date of Procedure: 6/23/2021    Pre-Op Diagnosis: Symptomatic Cholelithiasis    Post-Op Diagnosis: Same       Procedure(s):  LAPAROSCOPIC CHOLECYSTECTOMY WITH CHOLANGIOGRAM    Surgeon(s):  Tomi Brewster MD    Assistant:  Resident: Delmy Rodriguez DO    Anesthesia: General    Estimated Blood Loss (mL): 10    Complications: None    Specimens:   ID Type Source Tests Collected by Time Destination   A : A. GALLBLADDER Tissue Tissue SURGICAL PATHOLOGY Tomi Brewster MD 6/23/2021 1312        Implants:  * No implants in log *      Drains: * No LDAs found *    Findings: Cholangiogram without filling defects and free flow into the duodenum     Electronically signed by Delmy Rodriguez DO on 6/23/2021 at 1:33 PM neg 3/6/22/COVID-19

## 2022-03-28 DIAGNOSIS — I10 UNCONTROLLED HYPERTENSION: ICD-10-CM

## 2022-03-28 RX ORDER — CARVEDILOL 25 MG/1
TABLET ORAL
Qty: 180 TABLET | Refills: 1 | Status: SHIPPED | OUTPATIENT
Start: 2022-03-28 | End: 2022-08-30 | Stop reason: SDUPTHER

## 2022-03-28 NOTE — TELEPHONE ENCOUNTER
Medication:   Requested Prescriptions     Pending Prescriptions Disp Refills    carvedilol (COREG) 25 MG tablet [Pharmacy Med Name: CARVEDILOL 25 MG TABLET] 180 tablet 1     Sig: TAKE ONE TABLET BY MOUTH TWICE A DAY     Last Filled: 9.29.21    Last appt: 2/18/2022   Next appt: Visit date not found    Last OARRS: No flowsheet data found.

## 2022-04-01 LAB — MAMMOGRAPHY, EXTERNAL: NORMAL

## 2022-04-07 DIAGNOSIS — I10 ESSENTIAL HYPERTENSION: ICD-10-CM

## 2022-04-07 RX ORDER — IRBESARTAN 300 MG/1
TABLET ORAL
Qty: 90 TABLET | Refills: 1 | Status: SHIPPED | OUTPATIENT
Start: 2022-04-07 | End: 2022-08-30 | Stop reason: SDUPTHER

## 2022-04-07 RX ORDER — SPIRONOLACTONE 25 MG/1
TABLET ORAL
Qty: 90 TABLET | Refills: 3 | Status: SHIPPED | OUTPATIENT
Start: 2022-04-07

## 2022-04-07 NOTE — TELEPHONE ENCOUNTER
----- Message from Sofia Rider MD sent at 4/7/2022 12:29 PM CDT -----  Ovaries not well seen. Endometrium looks OK. Follow up with gyne, to discuss the episode of bleeding   Patient returned call to [address]. She will call back tomorrow she said.

## 2022-04-07 NOTE — TELEPHONE ENCOUNTER
Medication:   Requested Prescriptions     Pending Prescriptions Disp Refills    irbesartan (AVAPRO) 300 MG tablet [Pharmacy Med Name: IRBESARTAN 300 MG TABLET] 90 tablet 1     Sig: TAKE ONE TABLET BY MOUTH DAILY     Last Filled:  9/13/2021    Last appt: 2/18/2022   Next appt: Visit date not found    Last OARRS: No flowsheet data found.

## 2022-04-29 ENCOUNTER — HOSPITAL ENCOUNTER (OUTPATIENT)
Dept: CT IMAGING | Age: 67
Discharge: HOME | End: 2022-04-29
Payer: MEDICARE

## 2022-04-29 DIAGNOSIS — R10.9 ABDOMINAL PAIN, UNSPECIFIED ABDOMINAL LOCATION: ICD-10-CM

## 2022-04-29 LAB
BUN BLDV-MCNC: 14 MG/DL (ref 7–20)
CREAT SERPL-MCNC: 0.6 MG/DL (ref 0.6–1.2)
GFR AFRICAN AMERICAN: >60
GFR NON-AFRICAN AMERICAN: >60

## 2022-04-29 PROCEDURE — 82565 ASSAY OF CREATININE: CPT

## 2022-04-29 PROCEDURE — 74177 CT ABD & PELVIS W/CONTRAST: CPT

## 2022-04-29 PROCEDURE — 36415 COLL VENOUS BLD VENIPUNCTURE: CPT

## 2022-04-29 PROCEDURE — 84520 ASSAY OF UREA NITROGEN: CPT

## 2022-04-29 PROCEDURE — 6360000004 HC RX CONTRAST MEDICATION: Performed by: INTERNAL MEDICINE

## 2022-04-29 RX ADMIN — IOPAMIDOL 75 ML: 755 INJECTION, SOLUTION INTRAVENOUS at 09:27

## 2022-04-29 RX ADMIN — IOHEXOL 50 ML: 240 INJECTION, SOLUTION INTRATHECAL; INTRAVASCULAR; INTRAVENOUS; ORAL at 09:27

## 2022-05-12 DIAGNOSIS — E78.2 MIXED HYPERLIPIDEMIA: ICD-10-CM

## 2022-05-13 RX ORDER — ATORVASTATIN CALCIUM 80 MG/1
TABLET, FILM COATED ORAL
Qty: 90 TABLET | Refills: 1 | Status: SHIPPED | OUTPATIENT
Start: 2022-05-13 | End: 2022-08-30 | Stop reason: SDUPTHER

## 2022-05-13 NOTE — TELEPHONE ENCOUNTER
Medication:   Requested Prescriptions     Pending Prescriptions Disp Refills    atorvastatin (LIPITOR) 80 MG tablet [Pharmacy Med Name: ATORVASTATIN 80 MG TABLET] 90 tablet 1     Sig: TAKE ONE TABLET BY MOUTH ONCE NIGHTLY     Last Filled: 11.18.21    Last appt: 2/18/2022   Next appt: 6/29/2022    Last OARRS: No flowsheet data found.

## 2022-05-19 ENCOUNTER — OFFICE VISIT (OUTPATIENT)
Dept: CARDIOLOGY CLINIC | Age: 67
End: 2022-05-19
Payer: MEDICARE

## 2022-05-19 VITALS
HEART RATE: 67 BPM | HEIGHT: 64 IN | BODY MASS INDEX: 35.17 KG/M2 | SYSTOLIC BLOOD PRESSURE: 150 MMHG | OXYGEN SATURATION: 96 % | DIASTOLIC BLOOD PRESSURE: 88 MMHG | WEIGHT: 206 LBS

## 2022-05-19 DIAGNOSIS — I50.32 CHRONIC HEART FAILURE WITH PRESERVED EJECTION FRACTION (HFPEF) (HCC): Primary | Chronic | ICD-10-CM

## 2022-05-19 PROCEDURE — 99214 OFFICE O/P EST MOD 30 MIN: CPT | Performed by: INTERNAL MEDICINE

## 2022-05-19 PROCEDURE — 4040F PNEUMOC VAC/ADMIN/RCVD: CPT | Performed by: INTERNAL MEDICINE

## 2022-05-19 PROCEDURE — 1123F ACP DISCUSS/DSCN MKR DOCD: CPT | Performed by: INTERNAL MEDICINE

## 2022-05-19 PROCEDURE — G8400 PT W/DXA NO RESULTS DOC: HCPCS | Performed by: INTERNAL MEDICINE

## 2022-05-19 PROCEDURE — 3017F COLORECTAL CA SCREEN DOC REV: CPT | Performed by: INTERNAL MEDICINE

## 2022-05-19 PROCEDURE — 1090F PRES/ABSN URINE INCON ASSESS: CPT | Performed by: INTERNAL MEDICINE

## 2022-05-19 PROCEDURE — 1036F TOBACCO NON-USER: CPT | Performed by: INTERNAL MEDICINE

## 2022-05-19 PROCEDURE — G8427 DOCREV CUR MEDS BY ELIG CLIN: HCPCS | Performed by: INTERNAL MEDICINE

## 2022-05-19 PROCEDURE — G8417 CALC BMI ABV UP PARAM F/U: HCPCS | Performed by: INTERNAL MEDICINE

## 2022-05-19 NOTE — PROGRESS NOTES
Cc: Resistant HTN, HFpEF, CAD    HPI:     Patient is a 75 years old woman, overweight, with history of resistant hypertension, prediabetes, hyperlipidemia, CAD, HFpEF. Patient was assessed with a right upper quadrant ultrasound 2/9/2021: Fatty liver, no gallstones however there was sludge in the gallbladder. Desirae Emanuel was seen by surgery, was here for clearance, had cholecystectomy in 03/2022 completed without any complications. Echo 10/2019: Normal except for indeterminate diastolic function     CCTA 10/2019: Moderate diffuse calcifications of the coronaries without any significant stenosis     Patient has been complaining of some discomfort under her breasts, epigastric and possibly midsternal, unrelated to activity.  She also admits to some exertional shortness of breath.  She denies any orthopnea or lower extremity edema.  She was seen by cardiologist at [facility] about 2 years ago, had a cardiac work-up, no intervention was recommended, she quit going to her appointments. Dora Guerrero BP at home remains around 130/70s.  She does not check her blood pressure frequently.     Patient's father, smoker, had CABG at the age of 50years old and a repeat CABG at the age of 61years old; he passed away as a complication after his second bypass.     ECG 2/4/2021: Normal     Hugo Nealnet 3/10/2021: Canceled due to extreme hypertension (257/118 mmHg, patient held her BP medications).     LHC 3/12/2021: Normal coronaries, normal renal arteries, normal LVEF 55%.     CT abd with IV contrast 04/2021: 8-10 mm R adrenal lesion, possible lipoma.      All labs for secondary HTN were reviewed (03/2021), only abnormalities: aldosterone/renin activity elevated but with normal aldosterone level, increased norepi in urine.  Patient follows with Lacey Morales referred patient to Dr Arcenio Recinos at [facility] regarding right adrenal mass, conservative approach was elected.      Patient is here for a follow up. Her BP at home is within normal limits (130/70 mmHg). Patient continues to have epigastric discomfort that occurs every 3 weeks and last up to 6 hours, unrelated to exertion. She is scheduled to have an EGD with [address] at [facility] on 6/10/2022 by Dr. Magali Snow. She needs cardiac clearance. Histories     Past Medical History:   has a past medical history of Fissure, anal, Goiter, History of mammogram, Hyperlipidemia, Hypertension, Onychomycosis, Pap smear for cervical cancer screening, Prediabetes, Screening for osteoporosis, and Vitamin D deficiency. Surgical History:   has a past surgical history that includes Appendectomy (2/4/2000); knee surgery; Pilonidal cyst excision; Colonoscopy (3/2011); Upper gastrointestinal endoscopy (3/19/14); Esophagus dilation (4/2014); Colonoscopy (10/19/2016); Cholecystectomy, laparoscopic (N/A, 6/23/2021); and Tonsillectomy. Social History:   reports that she has never smoked. She has never used smokeless tobacco. She reports current alcohol use of about 3.0 standard drinks of alcohol per week. She reports that she does not use drugs. Family History:  No evidence for sudden cardiac death or premature CAD      Medications:     Home medications were reviewed and are listed below    Prior to Admission medications    Medication Sig Start Date End Date Taking?  Authorizing Provider   atorvastatin (LIPITOR) 80 MG tablet TAKE ONE TABLET BY MOUTH ONCE NIGHTLY 5/13/22   Jenny Brown MD   irbesartan (AVAPRO) 300 MG tablet TAKE ONE TABLET BY MOUTH DAILY 4/7/22   Jenny Brown MD   spironolactone (ALDACTONE) 25 MG tablet TAKE ONE TABLET BY MOUTH DAILY 4/7/22   Mita Latif MD   carvedilol (COREG) 25 MG tablet TAKE ONE TABLET BY MOUTH TWICE A DAY 3/28/22   Jenny Brown MD   aspirin EC 81 MG EC tablet Take 1 tablet by mouth daily 11/18/21   Mita Latif MD   hydrALAZINE (APRESOLINE) 50 MG tablet Take 1 tablet by mouth 2 times daily 10/22/21   Mita Latif MD   Cholecalciferol (VITAMIN D3) 5000 units TABS Take 1 tablet by mouth daily 6/10/19   Helen Kaye MD   Multiple Vitamin (MULTIVITAMIN PO) Take  by mouth. Historical Provider, MD          Allergy:     Sulfa antibiotics       Review of Systems:     All 12 point review of symptoms completed. Pertinent positives identified in the HPI, all other review of symptoms negative as below. CONSTITUTIONAL: No fatigue  SKIN: No rash or pruritis. EYES: No visual changes or diplopia. No scleral icterus. ENT: No Headaches, hearing loss or vertigo. No mouth sores or sore throat. CARDIOVASCULAR: No chest pain/chest pressure/chest discomfort. No palpitations. No edema. RESPIRATORY: No dyspnea. No cough or wheezing, no sputum production. GASTROINTESTINAL: No N/V/D. No abdominal pain, appetite loss, blood in stools. GENITOURINARY: No dysuria, trouble voiding, or hematuria. MUSCULOSKELETAL:  No gait disturbance, weakness or joint complaints. NEUROLOGICAL: No headache, diplopia, change in muscle strength, numbness or tingling. No change in gait, balance, coordination, mood, affect, memory, mentation, behavior. PSHYCH: No anxiety, loss of interest, change in sexual behavior, feelings of self-harm, or confusion. ENDOCRINE: No excessive thirst, fluid intake, or urination. No tremor. HEMATOLOGIC: No abnormal bruising or bleeding. ALLERGY: No nasal congestion or hives.       Physical Examination:     Vitals:    05/19/22 0850 05/19/22 0858   BP: (!) 170/90 (!) 150/88   Pulse: 67    SpO2: 96%    Weight: 206 lb (93.4 kg)    Height: 5' 4\" (1.626 m)        Wt Readings from Last 3 Encounters:   05/19/22 206 lb (93.4 kg)   02/18/22 204 lb 3.2 oz (92.6 kg)   11/18/21 207 lb 6.4 oz (94.1 kg)         General Appearance:  Alert, cooperative, no distress, appears stated age Appropriate weight   Head:  Normocephalic, without obvious abnormality, atraumatic   Eyes:  PERRL, conjunctiva/corneas clear EOM intact  Ears normal   Throat no lesions Nose: Nares normal, no drainage or sinus tenderness   Throat: Lips, mucosa, and tongue normal   Neck: Supple, symmetrical, trachea midline, no adenopathy, thyroid: not enlarged, symmetric, no tenderness/mass/nodules, no carotid bruit       Lungs:   Clear to auscultation bilaterally, respirations unlabored   Chest Wall:  No tenderness or deformity   Heart:  Regular rhythm, rate is controlled, S1, S2 normal, there is no murmur, there is no rub or gallop, cannot assess jvd, no bilateral lower extremity edema   Abdomen:   Soft, non-tender, bowel sounds active all four quadrants,  no masses, no organomegaly       Extremities: Extremities normal, atraumatic, no cyanosis   Pulses: 2+ and symmetric   Skin: Skin color, texture, turgor normal, no rashes or lesions   Pysch: Normal mood and affect   Neurologic: Normal gross motor and sensory exam.  Cranial nerves intact        Labs:     Lab Results   Component Value Date    WBC 5.7 07/29/2021    HGB 13.9 07/29/2021    HCT 40.7 07/29/2021    MCV 91.6 07/29/2021     07/29/2021     Lab Results   Component Value Date     02/08/2022    K 4.7 02/08/2022     02/08/2022    CO2 25 02/08/2022    BUN 14 04/29/2022    CREATININE 0.6 04/29/2022    GLUCOSE 95 02/08/2022    CALCIUM 9.5 02/08/2022    PROT 6.7 02/08/2022    LABALBU 4.6 02/08/2022    BILITOT 0.5 02/08/2022    ALKPHOS 49 02/08/2022    AST 14 (L) 02/08/2022    ALT 18 02/08/2022    LABGLOM >60 04/29/2022    GFRAA >60 04/29/2022    AGRATIO 2.2 02/08/2022    GLOB 2.2 07/29/2021         Lab Results   Component Value Date    CHOL 153 02/08/2022    CHOL 180 07/29/2021    CHOL 189 07/21/2020     Lab Results   Component Value Date    TRIG 179 (H) 02/08/2022    TRIG 235 (H) 07/29/2021    TRIG 182 (H) 07/21/2020     Lab Results   Component Value Date    HDL 59 02/08/2022    HDL 60 07/29/2021    HDL 66 (H) 07/21/2020     Lab Results   Component Value Date    LDLCALC 58 02/08/2022    LDLCALC 73 07/29/2021    LDLCALC 87 07/21/2020     Lab Results   Component Value Date    LABVLDL 36 02/08/2022    LABVLDL 47 07/29/2021    LABVLDL 36 07/21/2020     No results found for: [facility]    Lab Results   Component Value Date    INR 1.03 03/12/2021    PROTIME 11.9 03/12/2021       The 10-year ASCVD risk score (Kirk Oro, et al., 2013) is: 12.2%    Values used to calculate the score:      Age: 74 years      Sex: Female      Is Non- : No      Diabetic: No      Tobacco smoker: No      Systolic Blood Pressure: 123 mmHg      Is BP treated: Yes      HDL Cholesterol: 59 mg/dL      Total Cholesterol: 153 mg/dL      Assessment / Plan:      Diagnosis Orders   1. Chronic heart failure with preserved ejection fraction (HFpEF) ([facility] [address]          1. Resistant hypertension:  Patient has severely elevated blood pressure even today in the office although she reports improved BP at home per history.  She is on multiple antihypertensive medications.  I suspect whitecoat hypertension.     -Continue with carvedilol 25 twice daily, irbesartan 300 mg daily, spironolactone 25 daily.  -Continue with hydralazine 50 mg twice daily  -Low-sodium diet was strongly encouraged (sodium intake of less than 1500 to 2000 mg/day). -Will need a sleep study for possible cause of resistant HTN.       2.  Hyperlipidemia:  Patient is on Lipitor 80 mg daily     3.  CAD:  Patient has mild CAD per #1.     -Continue with aspirin 81, carvedilol, Lipitor.     4. Chronic HFpEF:   Patient is now euvolemic and hemo stable.      -Cw meds as above.     5.  Preoperative evaluation:  Patient is low risk for any perioperative cardiovascular complications. 6.  Epigastric pain:  I suspect GI source in agree she needs an EGD. I doubt cardiac cause in view of normal cardiac work-up recently and nature of pain.           Return in about 6 months (around 11/19/2022).       I have spent 35 minutes of face to face time with the patient with more than 50% spent counseling and coordinating care. I have personally reviewed the reports and images of labs, radiological studies, cardiac studies including ECG's and telemetry, current and old medical records. The note was completed using EMR and Dragon dictation system. Every effort was made to ensure accuracy; however, inadvertent computerized transcription errors may be present. All questions and concerns were addressed to the patient/family. Alternatives to my treatment were discussed. I would like to thank you for providing me the opportunity to participate in the care of your patient. If you have any questions, please do not hesitate to contact me.      Hung Chakraborty MD, [address] Office Phone: [phone]  Fax: [phone]

## 2022-06-01 NOTE — PROGRESS NOTES
Patient reached ____ yes  __X___ no   VM instructions left __X__ yes   phone number __[phone]______                                ____ no-office notified          Date __6/10/22_______  Time _1230______  Arrival _1100_____    Nothing to eat or drink after midnight-follow your doctors prep instructions-this may include taking a second dose of your prep after midnight  Responsible adult 25 or older to stay on site while you are here-drive you home-stay with you after  Follow any instructions your doctors office has given you  Bring a complete list of all your medications and supplements including name,dose,how often taken the day of your procedure  If you normally take the following medications in the morning please do so the AM of your procedure with a small sip of water       Heart,blood pressure,seizure,thyroid or breathing medications-use your inhalers       DO NOT take blood pressure medications ending in \"griffin\" or \"pril\" the AM of procedure or evening prior  Take half or your normal dose of any long acting insulins the night before your procedure-do not take any diabetic medications the AM of procedure  Follow your doctors instructions regarding stopping or taking  any blood thinners-if you do not have instructions-call them  Any questions call your doctor  Other ______________________________________________________________    [facility] POLICY(subject to change)             The current policy is 2 visitors per patient. There are no children allowed. Everyone must mask. Visiting hours are 8a-8p. Overnight visitors will be at the discretion of the nurse.

## 2022-06-10 ENCOUNTER — ANESTHESIA EVENT (OUTPATIENT)
Dept: ENDOSCOPY | Age: 67
End: 2022-06-10
Payer: MEDICARE

## 2022-06-10 ENCOUNTER — HOSPITAL ENCOUNTER (OUTPATIENT)
Age: 67
Setting detail: OUTPATIENT SURGERY
Discharge: HOME | End: 2022-06-10
Attending: INTERNAL MEDICINE | Admitting: INTERNAL MEDICINE
Payer: MEDICARE

## 2022-06-10 ENCOUNTER — ANESTHESIA (OUTPATIENT)
Dept: ENDOSCOPY | Age: 67
End: 2022-06-10
Payer: MEDICARE

## 2022-06-10 VITALS
HEIGHT: 64 IN | DIASTOLIC BLOOD PRESSURE: 75 MMHG | RESPIRATION RATE: 12 BRPM | HEART RATE: 80 BPM | OXYGEN SATURATION: 96 % | TEMPERATURE: 97.8 F | BODY MASS INDEX: 34.66 KG/M2 | SYSTOLIC BLOOD PRESSURE: 108 MMHG | WEIGHT: 203 LBS

## 2022-06-10 DIAGNOSIS — R10.9 ABDOMINAL PAIN, UNSPECIFIED ABDOMINAL LOCATION: ICD-10-CM

## 2022-06-10 PROCEDURE — C1726 CATH, BAL DIL, NON-VASCULAR: HCPCS | Performed by: INTERNAL MEDICINE

## 2022-06-10 PROCEDURE — 6360000002 HC RX W HCPCS: Performed by: NURSE ANESTHETIST, CERTIFIED REGISTERED

## 2022-06-10 PROCEDURE — 2500000003 HC RX 250 WO HCPCS: Performed by: NURSE ANESTHETIST, CERTIFIED REGISTERED

## 2022-06-10 PROCEDURE — 7100000001 HC PACU RECOVERY - ADDTL 15 MIN: Performed by: INTERNAL MEDICINE

## 2022-06-10 PROCEDURE — 3609012400 HC EGD TRANSORAL BIOPSY SINGLE/MULTIPLE: Performed by: INTERNAL MEDICINE

## 2022-06-10 PROCEDURE — 88305 TISSUE EXAM BY PATHOLOGIST: CPT

## 2022-06-10 PROCEDURE — 2709999900 HC NON-CHARGEABLE SUPPLY: Performed by: INTERNAL MEDICINE

## 2022-06-10 PROCEDURE — 3609017700 HC EGD DILATION GASTRIC/DUODENAL STRICTURE: Performed by: INTERNAL MEDICINE

## 2022-06-10 PROCEDURE — 7100000000 HC PACU RECOVERY - FIRST 15 MIN: Performed by: INTERNAL MEDICINE

## 2022-06-10 PROCEDURE — 3700000000 HC ANESTHESIA ATTENDED CARE: Performed by: INTERNAL MEDICINE

## 2022-06-10 PROCEDURE — 7100000010 HC PHASE II RECOVERY - FIRST 15 MIN: Performed by: INTERNAL MEDICINE

## 2022-06-10 PROCEDURE — 3700000001 HC ADD 15 MINUTES (ANESTHESIA): Performed by: INTERNAL MEDICINE

## 2022-06-10 PROCEDURE — 2580000003 HC RX 258: Performed by: INTERNAL MEDICINE

## 2022-06-10 RX ORDER — GLYCOPYRROLATE 0.2 MG/ML
INJECTION INTRAMUSCULAR; INTRAVENOUS PRN
Status: DISCONTINUED | OUTPATIENT
Start: 2022-06-10 | End: 2022-06-10 | Stop reason: SDUPTHER

## 2022-06-10 RX ORDER — PROPOFOL 10 MG/ML
INJECTION, EMULSION INTRAVENOUS PRN
Status: DISCONTINUED | OUTPATIENT
Start: 2022-06-10 | End: 2022-06-10 | Stop reason: SDUPTHER

## 2022-06-10 RX ORDER — PANTOPRAZOLE SODIUM 40 MG/1
40 TABLET, DELAYED RELEASE ORAL
Qty: 30 TABLET | Refills: 5 | Status: ON HOLD | OUTPATIENT
Start: 2022-06-10 | End: 2022-09-09 | Stop reason: SDUPTHER

## 2022-06-10 RX ORDER — LIDOCAINE HYDROCHLORIDE 10 MG/ML
INJECTION, SOLUTION INFILTRATION; PERINEURAL PRN
Status: DISCONTINUED | OUTPATIENT
Start: 2022-06-10 | End: 2022-06-10 | Stop reason: SDUPTHER

## 2022-06-10 RX ORDER — SODIUM CHLORIDE 9 MG/ML
INJECTION, SOLUTION INTRAVENOUS CONTINUOUS
Status: DISCONTINUED | OUTPATIENT
Start: 2022-06-10 | End: 2022-06-10 | Stop reason: HOSPADM

## 2022-06-10 RX ORDER — PROPOFOL 10 MG/ML
INJECTION, EMULSION INTRAVENOUS CONTINUOUS PRN
Status: DISCONTINUED | OUTPATIENT
Start: 2022-06-10 | End: 2022-06-10 | Stop reason: SDUPTHER

## 2022-06-10 RX ADMIN — GLYCOPYRROLATE 0.2 MG: 0.2 INJECTION, SOLUTION INTRAMUSCULAR; INTRAVENOUS at 12:44

## 2022-06-10 RX ADMIN — PROPOFOL 50 MG: 10 INJECTION, EMULSION INTRAVENOUS at 12:46

## 2022-06-10 RX ADMIN — SODIUM CHLORIDE: 9 INJECTION, SOLUTION INTRAVENOUS at 11:37

## 2022-06-10 RX ADMIN — PROPOFOL 140 MCG/KG/MIN: 10 INJECTION, EMULSION INTRAVENOUS at 12:46

## 2022-06-10 RX ADMIN — PROPOFOL 50 MG: 10 INJECTION, EMULSION INTRAVENOUS at 12:50

## 2022-06-10 RX ADMIN — LIDOCAINE HYDROCHLORIDE 100 MG: 10 INJECTION, SOLUTION INFILTRATION; PERINEURAL at 12:46

## 2022-06-10 ASSESSMENT — PAIN - FUNCTIONAL ASSESSMENT: PAIN_FUNCTIONAL_ASSESSMENT: 0-10

## 2022-06-10 ASSESSMENT — ENCOUNTER SYMPTOMS: SHORTNESS OF BREATH: 0

## 2022-06-10 NOTE — PROGRESS NOTES
Pt arrived from ENDO to PACU bay 8. Report received from ENDO staff. Pt arousable to voice. Pt on RA, NSR, and VSS. Will continue to monitor.

## 2022-06-10 NOTE — PROGRESS NOTES
Discharge instructions review with patient and Gene . All home medications have been reviewed, pt v/u. Discharge instructions signed. Pt discharged via wheelchair. Pt discharged with 1 RX and all belongings.  taking stable pt home.

## 2022-06-10 NOTE — ANESTHESIA PRE PROCEDURE
Department of Anesthesiology  Preprocedure Note       Name:  Chasidy De Paz   Age:  74 y.o.  :  1955                                          MRN:  [id number]         Date:  6/10/2022      Surgeon: Rich Chou):  Elvia Mercado MD    Procedure: Procedure(s):  EGD DIAGNOSTIC ONLY    Medications prior to admission:   Prior to Admission medications    Medication Sig Start Date End Date Taking? Authorizing Provider   atorvastatin (LIPITOR) 80 MG tablet TAKE ONE TABLET BY MOUTH ONCE NIGHTLY 22   Shmuel Diane MD   irbesartan (AVAPRO) 300 MG tablet TAKE ONE TABLET BY MOUTH DAILY 22   Shmuel Diane MD   spironolactone (ALDACTONE) 25 MG tablet TAKE ONE TABLET BY MOUTH DAILY 22   Augie Shown, MD   carvedilol (COREG) 25 MG tablet TAKE ONE TABLET BY MOUTH TWICE A DAY 3/28/22   Shmuel Diane MD   aspirin EC 81 MG EC tablet Take 1 tablet by mouth daily 21   Augie Shown, MD   hydrALAZINE (APRESOLINE) 50 MG tablet Take 1 tablet by mouth 2 times daily 10/22/21   Augie Shown, MD   Cholecalciferol (VITAMIN D3) 5000 units TABS Take 1 tablet by mouth daily 6/10/19   Shmuel Diane MD   Multiple Vitamin (MULTIVITAMIN PO) Take  by mouth. Historical Provider, MD       Current medications:    Current Facility-Administered Medications   Medication Dose Route Frequency Provider Last Rate Last Admin    0.9 % sodium chloride infusion   IntraVENous Continuous Elvia Mercado  mL/hr at 06/10/22 1137 New Bag at 06/10/22 1137       Allergies:     Allergies   Allergen Reactions    Sulfa Antibiotics Rash       Problem List:    Patient Active Problem List   Diagnosis Code    Goiter E04.9    Onychomycosis B35.1    Essential hypertension I10    Vitamin D deficiency E55.9    Microscopic hematuria W92.79    Diastolic dysfunction Y08.25    Family history of coronary artery disease Z82.49    Gastroesophageal reflux K21.9    Thyroid nodule E04.1    Hyperglycemia R73.9    Hyperlipidemia E78.5    Prediabetes R73.03    Primary insomnia F51.01    Coronary artery disease involving native coronary artery of native heart without angina pectoris F15.97    Biliary colic D31.94    H/O angiography Z92.89    Uncontrolled hypertension I10    Dizziness R42    Hypokalemia E87.6    Chronic heart failure with preserved ejection fraction (HFpEF) ([facility]) I50.32    H/O coronary angiogram Z98.890    Elevated urine levels of catecholamines R82.5    Hashimoto's thyroiditis E06.3    Symptomatic cholelithiasis K80.20    Neck pain M54.2       Past Medical History:        Diagnosis Date    pearl Apple MD    Goiter     Heart failure with preserved ejection fraction ([address]     History of mammogram 10/2010    [address] [place] [address] /[place] [facility]    Hyperlipidemia     Hypertension     Onychomycosis     Pap smear for cervical cancer screening 2/2010    Dr. Estefanía Acosta - wnl    Prediabetes 12/23/2019    Screening for osteoporosis     Dexa 12/2005 - wnl    Vitamin D deficiency 11/10/2011       Past Surgical History:        Procedure Laterality Date    APPENDECTOMY  2/4/2000    CHOLECYSTECTOMY, LAPAROSCOPIC N/A 6/23/2021    LAPAROSCOPIC CHOLECYSTECTOMY WITH CHOLANGIOGRAM performed by Bay Jett MD at [facility]  3/2011    Dr. Micky Alves - polyps - 2 years  f/u    COLONOSCOPY  10/19/2016    Jeannette Brennan MD    ESOPHAGEAL DILATATION  4/2014    Lynn Underwood MD     KNEE SURGERY      right    PILONIDAL CYST EXCISION      TONSILLECTOMY      UPPER GASTROINTESTINAL ENDOSCOPY  3/19/14    concentric rings in esophagus       Social History:    Social History     Tobacco Use    Smoking status: Never Smoker    Smokeless tobacco: Never Used   Substance Use Topics    Alcohol use:  Yes     Alcohol/week: 2.0 standard drinks     Types: 2 Glasses of wine per week     Comment: monthly                                Counseling given: Not Answered      Vital Signs (Current): Vitals:    06/10/22 1103   BP: (!) 179/89   Pulse: 66   Resp: 15   Temp: 97.9 °F (36.6 °C)   TempSrc: Temporal   SpO2: 98%   Weight: 203 lb (92.1 kg)   Height: 5' 4\" (1.626 m)                                              BP Readings from Last 3 Encounters:   06/10/22 (!) 179/89   05/19/22 (!) 150/88   02/18/22 128/84       NPO Status: Time of last liquid consumption: 0730                        Time of last solid consumption: 2030                        Date of last liquid consumption: 06/10/22                        Date of last solid food consumption: 06/09/22    BMI:   Wt Readings from Last 3 Encounters:   06/10/22 203 lb (92.1 kg)   05/19/22 206 lb (93.4 kg)   02/18/22 204 lb 3.2 oz (92.6 kg)     Body mass index is 34.84 kg/m². CBC:   Lab Results   Component Value Date    WBC 5.7 07/29/2021    RBC 4.44 07/29/2021    HGB 13.9 07/29/2021    HCT 40.7 07/29/2021    MCV 91.6 07/29/2021    RDW 14.2 07/29/2021     07/29/2021       CMP:   Lab Results   Component Value Date     02/08/2022    K 4.7 02/08/2022    K 3.3 03/13/2021     02/08/2022    CO2 25 02/08/2022    BUN 14 04/29/2022    CREATININE 0.6 04/29/2022    GFRAA >60 04/29/2022    GFRAA >60 09/19/2012    AGRATIO 2.2 02/08/2022    LABGLOM >60 04/29/2022    GLUCOSE 95 02/08/2022    PROT 6.7 02/08/2022    PROT 7.0 09/19/2012    CALCIUM 9.5 02/08/2022    BILITOT 0.5 02/08/2022    ALKPHOS 49 02/08/2022    AST 14 02/08/2022    ALT 18 02/08/2022       POC Tests: No results for input(s): POCGLU, POCNA, POCK, POCCL, POCBUN, POCHEMO, POCHCT in the last 72 hours.     Coags:   Lab Results   Component Value Date    PROTIME 11.9 03/12/2021    INR 1.03 03/12/2021       HCG (If Applicable): No results found for: PREGTESTUR, PREGSERUM, HCG, HCGQUANT     ABGs: No results found for: PHART, PO2ART, ZLF5ZQU, PQR2FCC, BEART, I0HQDNMC     Type & Screen (If Applicable):  No results found for: LABABO, LABRH    Drug/Infectious Status (If Applicable):  No results found for: HIV, HEPCAB    COVID-19 Screening (If Applicable): No results found for: COVID19        Anesthesia Evaluation  Patient summary reviewed and Nursing notes reviewed no history of anesthetic complications:   Airway: Mallampati: II  TM distance: >3 FB   Neck ROM: full  Mouth opening: > = 3 FB   Dental: normal exam         Pulmonary:       (-) asthma and shortness of breath                           Cardiovascular:    (+) hypertension:, CAD:, CHF:, hyperlipidemia    (-)  angina                Neuro/Psych:   (+) depression/anxiety    (-) CVA           GI/Hepatic/Renal:   (+) GERD:,      (-) liver disease       Endo/Other:        (-) diabetes mellitus, hypothyroidism               Abdominal:             Vascular:     - PVD. Other Findings:           Anesthesia Plan      MAC     ASA 3       Induction: intravenous. Anesthetic plan and risks discussed with patient. Plan discussed with CRNA.                     Raheel Tang MD   6/10/2022

## 2022-06-10 NOTE — BRIEF OP NOTE
Brief Postoperative Note - Full Note in Chart Review/Procedures tab       Patient: Thelma Juárez  YOB: 1955  MRN: [id number]    Date of Procedure: 6/10/2022    Pre-Op Diagnosis: ABDOMINAL PAIN R10.9    Post-Op Diagnosis: Same       Procedure(s):  EGD DILATION BALLOON  EGD BIOPSY    Surgeon(s):  Sadaf Chun MD    Assistant:  * No surgical staff found *    Anesthesia: Monitor Anesthesia Care    Estimated Blood Loss (mL): Minimal    Complications: None    Specimens:   ID Type Source Tests Collected by Time Destination   A : Distal esophagus ulcers Bx Tissue Esophagus SURGICAL PATHOLOGY Sadaf Chun MD 6/10/2022 1254        Implants:  * No implants in log *      Drains: * No LDAs found *    Findings:  LA class C erosive esophagitis - biopsied    GE junction stenosis - balloon dilated    Rec:  Antireflux diet  Pantoprazole 40 mg daily  Await biopsies  Follow up in office in 6 months  Follow up with primary care physician    Electronically signed by Sadaf Chun MD on 6/10/2022 at 1:02 PM

## 2022-06-10 NOTE — ANESTHESIA POSTPROCEDURE EVALUATION
Department of Anesthesiology  Postprocedure Note    Patient: Jenaro Herrera  MRN: [id number]  YOB: 1955  Date of evaluation: 6/10/2022  Time:  1:35 PM     Procedure Summary     Date: 06/10/22 Room / Location: [address] [place] [address]    Anesthesia Start: 1234 Anesthesia Stop: 0970    Procedures:       EGD DILATION BALLOON (N/A Abdomen)      EGD BIOPSY (N/A Abdomen) Diagnosis:       Abdominal pain, unspecified abdominal location      (ABDOMINAL PAIN R10.9)    Surgeons: Rin Cote MD Responsible Provider: Yolis Pederson MD    Anesthesia Type: MAC ASA Status: 3          Anesthesia Type: No value filed. Isabel Phase I: Isabel Score: 10    Isabel Phase II:      Last vitals: Reviewed and per EMR flowsheets. Anesthesia Post Evaluation    Patient location during evaluation: PACU  Level of consciousness: awake  Airway patency: patent  Complications: no  Cardiovascular status: hemodynamically stable  Respiratory status: acceptable  There was medical reason for not using a multimodal analgesia pain management approach.

## 2022-06-10 NOTE — H&P
[address] [place] [address]   Pre-operative History and Physical    Patient: Eric Dickens  : 1955  CSN:     History Obtained From: patient and/or guardian. HISTORY OF PRESENT ILLNESS:    The patient is a 74 y.o. female with epigastric abdominal pain here for EGD. Past Medical History:        Diagnosis Date    pearl Apple MD    [place]     Heart failure with preserved ejection fraction ([facility] [address]     History of mammogram 10/2010    [facility]    Hyperlipidemia     Hypertension     Onychomycosis     Pap smear for cervical cancer screening 2010    Dr. Chio Hagan - wnl    Prediabetes 2019    Screening for osteoporosis     Dexa 2005 - wnl    Vitamin D deficiency 11/10/2011     Past Surgical History:        Procedure Laterality Date    APPENDECTOMY  2000    CHOLECYSTECTOMY, LAPAROSCOPIC N/A 2021    LAPAROSCOPIC CHOLECYSTECTOMY WITH CHOLANGIOGRAM performed by Ernestina Altamirano MD at [address] [place] [address]  3/2011    Dr. Garcia File - 2 years  f/u    COLONOSCOPY  10/19/2016    Taurus Reed MD    ESOPHAGEAL DILATATION  2014    Jax Aguilera MD     KNEE SURGERY      right    PILONIDAL CYST EXCISION      TONSILLECTOMY      UPPER GASTROINTESTINAL ENDOSCOPY  3/19/14    concentric rings in esophagus     Medications Prior to Admission:   No current facility-administered medications on file prior to encounter.      Current Outpatient Medications on File Prior to Encounter   Medication Sig Dispense Refill    irbesartan (AVAPRO) 300 MG tablet TAKE ONE TABLET BY MOUTH DAILY 90 tablet 1    spironolactone (ALDACTONE) 25 MG tablet TAKE ONE TABLET BY MOUTH DAILY 90 tablet 3    carvedilol (COREG) 25 MG tablet TAKE ONE TABLET BY MOUTH TWICE A  tablet 1    aspirin EC 81 MG EC tablet Take 1 tablet by mouth daily 30 tablet 5    hydrALAZINE (APRESOLINE) 50 MG tablet Take 1 tablet by mouth 2 times daily 180 tablet 3    Cholecalciferol (VITAMIN D3) 5000 units TABS Take 1 tablet by mouth daily      Multiple Vitamin (MULTIVITAMIN PO) Take  by mouth. Allergies:  Sulfa antibiotics        Social History:   Social History     Tobacco Use    Smoking status: Never Smoker    Smokeless tobacco: Never Used   Substance Use Topics    Alcohol use: Yes     Alcohol/week: 2.0 standard drinks     Types: 2 Glasses of wine per week     Comment: monthly     Family History:   Family History   Problem Relation Age of Onset    Other Mother         vascular disease    Hypertension Mother     High Cholesterol Mother     Diabetes Mother     Heart Disease Mother     Heart Disease Father     Heart Attack Father     Cancer Maternal Grandmother 32        unknown type - cancer     Stroke Neg Hx        PHYSICAL EXAM:      BP (!) 179/89   Pulse 66   Temp 97.9 °F (36.6 °C) (Temporal)   Resp 15   Ht 5' 4\" (1.626 m)   Wt 203 lb (92.1 kg)   LMP 05/01/2008 (Approximate) Comment: 15 years ago  SpO2 98%   BMI 34.84 kg/m²  I        Heart:  RRR,  Normal S1S2    Lungs:  CTA Bilat, normal effort    Abdomen:  ND NT      ASA Grade:  ASA 3 - Patient with moderate systemic disease with functional limitations    Mallampati Class:  Class I: Soft palate, uvula, fauces, pillars visible  __________  Class II: Soft palate, uvula, fauces visible  ____X_____   Class III: Soft palate, base of uvula visible  __________  Class IV: Hard palate only visible   __________      ASSESSMENT AND PLAN:    1. Patient is a 74 y.o. female here for EGD with anesthesia  2. Procedure options, risks and benefits reviewed with patient. Patient expresses understanding.      Paulo Driscoll, [address] [place] [address]  6/10/2022

## 2022-06-29 ENCOUNTER — OFFICE VISIT (OUTPATIENT)
Dept: PRIMARY CARE CLINIC | Age: 67
End: 2022-06-29
Payer: MEDICARE

## 2022-06-29 VITALS
BODY MASS INDEX: 35.29 KG/M2 | OXYGEN SATURATION: 95 % | DIASTOLIC BLOOD PRESSURE: 86 MMHG | SYSTOLIC BLOOD PRESSURE: 126 MMHG | RESPIRATION RATE: 16 BRPM | HEART RATE: 70 BPM | WEIGHT: 205.6 LBS | TEMPERATURE: 97 F

## 2022-06-29 DIAGNOSIS — K21.00 GASTROESOPHAGEAL REFLUX DISEASE WITH ESOPHAGITIS WITHOUT HEMORRHAGE: ICD-10-CM

## 2022-06-29 DIAGNOSIS — I10 ESSENTIAL HYPERTENSION: Primary | ICD-10-CM

## 2022-06-29 DIAGNOSIS — K22.10 ULCER OF ESOPHAGUS WITHOUT BLEEDING: ICD-10-CM

## 2022-06-29 DIAGNOSIS — R73.03 PREDIABETES: ICD-10-CM

## 2022-06-29 DIAGNOSIS — E55.9 VITAMIN D DEFICIENCY: ICD-10-CM

## 2022-06-29 DIAGNOSIS — E66.01 SEVERE OBESITY (BMI 35.0-39.9) WITH COMORBIDITY (HCC): ICD-10-CM

## 2022-06-29 DIAGNOSIS — E78.2 MIXED HYPERLIPIDEMIA: ICD-10-CM

## 2022-06-29 PROCEDURE — 1090F PRES/ABSN URINE INCON ASSESS: CPT | Performed by: INTERNAL MEDICINE

## 2022-06-29 PROCEDURE — G8417 CALC BMI ABV UP PARAM F/U: HCPCS | Performed by: INTERNAL MEDICINE

## 2022-06-29 PROCEDURE — 1123F ACP DISCUSS/DSCN MKR DOCD: CPT | Performed by: INTERNAL MEDICINE

## 2022-06-29 PROCEDURE — 1036F TOBACCO NON-USER: CPT | Performed by: INTERNAL MEDICINE

## 2022-06-29 PROCEDURE — 3017F COLORECTAL CA SCREEN DOC REV: CPT | Performed by: INTERNAL MEDICINE

## 2022-06-29 PROCEDURE — G8427 DOCREV CUR MEDS BY ELIG CLIN: HCPCS | Performed by: INTERNAL MEDICINE

## 2022-06-29 PROCEDURE — G8400 PT W/DXA NO RESULTS DOC: HCPCS | Performed by: INTERNAL MEDICINE

## 2022-06-29 PROCEDURE — 99214 OFFICE O/P EST MOD 30 MIN: CPT | Performed by: INTERNAL MEDICINE

## 2022-06-29 ASSESSMENT — PATIENT HEALTH QUESTIONNAIRE - PHQ9
5. POOR APPETITE OR OVEREATING: 0
2. FEELING DOWN, DEPRESSED OR HOPELESS: 0
SUM OF ALL RESPONSES TO PHQ QUESTIONS 1-9: 0
7. TROUBLE CONCENTRATING ON THINGS, SUCH AS READING THE NEWSPAPER OR WATCHING TELEVISION: 0
4. FEELING TIRED OR HAVING LITTLE ENERGY: 0
8. MOVING OR SPEAKING SO SLOWLY THAT OTHER PEOPLE COULD HAVE NOTICED. OR THE OPPOSITE, BEING SO FIGETY OR RESTLESS THAT YOU HAVE BEEN MOVING AROUND A LOT MORE THAN USUAL: 0
SUM OF ALL RESPONSES TO PHQ QUESTIONS 1-9: 0
1. LITTLE INTEREST OR PLEASURE IN DOING THINGS: 0
SUM OF ALL RESPONSES TO PHQ9 QUESTIONS 1 & 2: 0
10. IF YOU CHECKED OFF ANY PROBLEMS, HOW DIFFICULT HAVE THESE PROBLEMS MADE IT FOR YOU TO DO YOUR WORK, TAKE CARE OF THINGS AT HOME, OR GET ALONG WITH OTHER PEOPLE: 0
3. TROUBLE FALLING OR STAYING ASLEEP: 0
6. FEELING BAD ABOUT YOURSELF - OR THAT YOU ARE A FAILURE OR HAVE LET YOURSELF OR YOUR FAMILY DOWN: 0
SUM OF ALL RESPONSES TO PHQ QUESTIONS 1-9: 0
9. THOUGHTS THAT YOU WOULD BE BETTER OFF DEAD, OR OF HURTING YOURSELF: 0
SUM OF ALL RESPONSES TO PHQ QUESTIONS 1-9: 0

## 2022-06-29 ASSESSMENT — ENCOUNTER SYMPTOMS: ABDOMINAL PAIN: 1

## 2022-06-29 NOTE — PROGRESS NOTES
Adalberto Navarrete   Date ofBirth:  1955    Date of Visit:  6/29/2022    Chief Complaint   Patient presents with    Hypertension    Cholesterol Problem    Other     prediabetes    Other     Vitamin D deficiency     Other     Patient had EGD done diagnosed with acrid reflux and ulcer. GI wanted you to be aware. Repeat in September HPI  Patient has hypertension. Patient states Hydralazine 50mg 2 times daily, carvedilol 25 mg 2 times daily, irbesartan 300 mg once daily, and spironolactone 25 mg once daily. Patient decreases salt. Patient is not exercising.     Patient has hyperlipidemia. Patient takes atorvastatin 80 mg nightly. Patient decreases fat and cholesterol.       Patient has prediabetes. Patient decreases carbohydrates. Patient has vitamin D deficiency. Patient takes vitamin D 5,000 IU once daily. Patient has GERD and an ulcer. Patient states she was started on Pantoprazole. Patient   has to have a follow up EGD on 9/9/22. Patient has epigastric abdominal pain that is dull and comes and goes. Review of Systems   Constitutional: Negative for chills, fatigue and fever. HENT: Negative for congestion, postnasal drip, rhinorrhea, sinus pressure, sore throat and trouble swallowing. Eyes: Negative for visual disturbance. Respiratory: Negative for cough, chest tightness, shortness of breath and wheezing. Cardiovascular: Negative for chest pain, palpitations and leg swelling. Gastrointestinal: Positive for abdominal pain. Negative for blood in stool, constipation, diarrhea, nausea and vomiting. Genitourinary: Negative for dysuria, frequency and hematuria. Musculoskeletal: Negative for arthralgias and myalgias. Skin: Negative for rash. Neurological: Negative for dizziness, tremors, syncope, weakness, light-headedness, numbness and headaches. Psychiatric/Behavioral: Negative for dysphoric mood and sleep disturbance. The patient is not nervous/anxious. Allergies   Allergen Reactions    Sulfa Antibiotics Rash     Outpatient Medications Marked as Taking for the 6/29/22 encounter (Office Visit) with Shanelle Valdovinos MD   Medication Sig Dispense Refill    pantoprazole (PROTONIX) 40 MG tablet Take 1 tablet by mouth every morning (before breakfast) 30 tablet 5    atorvastatin (LIPITOR) 80 MG tablet TAKE ONE TABLET BY MOUTH ONCE NIGHTLY 90 tablet 1    irbesartan (AVAPRO) 300 MG tablet TAKE ONE TABLET BY MOUTH DAILY 90 tablet 1    spironolactone (ALDACTONE) 25 MG tablet TAKE ONE TABLET BY MOUTH DAILY 90 tablet 3    carvedilol (COREG) 25 MG tablet TAKE ONE TABLET BY MOUTH TWICE A  tablet 1    aspirin EC 81 MG EC tablet Take 1 tablet by mouth daily 30 tablet 5    hydrALAZINE (APRESOLINE) 50 MG tablet Take 1 tablet by mouth 2 times daily 180 tablet 3    Cholecalciferol (VITAMIN D3) 5000 units TABS Take 1 tablet by mouth daily      Multiple Vitamin (MULTIVITAMIN PO) Take  by mouth. Vitals:    06/29/22 0957   BP: 126/86   Pulse: 70   Resp: 16   Temp: 97 °F (36.1 °C)   SpO2: 95%   Weight: 205 lb 9.6 oz (93.3 kg)     Body mass index is 35.29 kg/m². Physical Exam  Nursing note reviewed. Constitutional:       General: She is not in acute distress. Appearance: Normal appearance. She is well-developed. Eyes:      General: Lids are normal.      Extraocular Movements: Extraocular movements intact. Conjunctiva/sclera: Conjunctivae normal.      Pupils: Pupils are equal, round, and reactive to light. Neck:      Thyroid: No thyromegaly. Vascular: No carotid bruit. Cardiovascular:      Rate and Rhythm: Normal rate and regular rhythm. Heart sounds: Normal heart sounds, S1 normal and S2 normal. No murmur heard. No friction rub. No gallop. Pulmonary:      Effort: Pulmonary effort is normal. No respiratory distress. Breath sounds: Normal breath sounds. No wheezing, rhonchi or rales.    Abdominal:      General: Bowel exercise    2. Mixed hyperlipidemia  -stable  -Continue atorvastatin 80 mg nightly  -Low fat, low cholesterol diet  -Regular aerobic exercise    3. Prediabetes  -Stable  -Most recent hemoglobin A1c had improved to normal  -Low carbohydrate diet  -Regular aerobic exercise    4. Vitamin D deficiency  -stable  -Continue vitamin D 5,000 IU once daily    5. Severe obesity (BMI 35.0-39. 9) with comorbidity ([address]  -discussed  -Recommend a low carbohydrate diet and regular aerobic exercise for weight loss    6. Gastroesophageal reflux disease with esophagitis without hemorrhage  -Patient had a recent EEG that shows GERD  -Continue pantoprazole 40 mg once daily  -Decrease caffeine, avoid spicy foods, avoid tomato based foods  -Eat small meals instead of large meals  -Wait 2-3 hours after eating before lying down    7. Ulcer of esophagus without bleeding  -Patient had a recent EGDs that shows an ulcer of the esophagus  -Continue pantoprazole 40 mg once daily      Discussed medications with patient, who voiced understanding of their use and indications. All questions answered. Return in about 2 months (around 8/29/2022) for Medicare AWV. How Severe Is Your Cyst?: moderate Is This A New Presentation, Or A Follow-Up?: Cyst

## 2022-06-29 NOTE — PATIENT INSTRUCTIONS
-Continue same medications  -Low sodium diet  -low carbohydrate diet  -Low fat, low cholesterol diet  -Regular aerobic exercise

## 2022-06-30 PROBLEM — E66.01 SEVERE OBESITY (BMI 35.0-39.9) WITH COMORBIDITY (HCC): Status: ACTIVE | Noted: 2022-06-30

## 2022-06-30 PROBLEM — K22.10 ULCER OF ESOPHAGUS WITHOUT BLEEDING: Status: ACTIVE | Noted: 2022-06-30

## 2022-06-30 ASSESSMENT — ENCOUNTER SYMPTOMS
CHEST TIGHTNESS: 0
RHINORRHEA: 0
SORE THROAT: 0
SINUS PRESSURE: 0
WHEEZING: 0
NAUSEA: 0
SHORTNESS OF BREATH: 0
VOMITING: 0
BLOOD IN STOOL: 0
CONSTIPATION: 0
COUGH: 0
TROUBLE SWALLOWING: 0
DIARRHEA: 0

## 2022-08-23 SDOH — HEALTH STABILITY: PHYSICAL HEALTH: ON AVERAGE, HOW MANY DAYS PER WEEK DO YOU ENGAGE IN MODERATE TO STRENUOUS EXERCISE (LIKE A BRISK WALK)?: 0 DAYS

## 2022-08-23 ASSESSMENT — LIFESTYLE VARIABLES
HOW OFTEN DO YOU HAVE A DRINK CONTAINING ALCOHOL: NEVER
HOW OFTEN DO YOU HAVE A DRINK CONTAINING ALCOHOL: 1

## 2022-08-23 ASSESSMENT — PATIENT HEALTH QUESTIONNAIRE - PHQ9
2. FEELING DOWN, DEPRESSED OR HOPELESS: 0
SUM OF ALL RESPONSES TO PHQ QUESTIONS 1-9: 0
SUM OF ALL RESPONSES TO PHQ QUESTIONS 1-9: 0
SUM OF ALL RESPONSES TO PHQ9 QUESTIONS 1 & 2: 0
1. LITTLE INTEREST OR PLEASURE IN DOING THINGS: 0
SUM OF ALL RESPONSES TO PHQ QUESTIONS 1-9: 0
SUM OF ALL RESPONSES TO PHQ QUESTIONS 1-9: 0

## 2022-08-30 ENCOUNTER — OFFICE VISIT (OUTPATIENT)
Dept: PRIMARY CARE CLINIC | Age: 67
End: 2022-08-30
Payer: MEDICARE

## 2022-08-30 VITALS
HEART RATE: 69 BPM | SYSTOLIC BLOOD PRESSURE: 138 MMHG | WEIGHT: 206.4 LBS | TEMPERATURE: 97 F | HEIGHT: 64 IN | DIASTOLIC BLOOD PRESSURE: 92 MMHG | OXYGEN SATURATION: 96 % | BODY MASS INDEX: 35.24 KG/M2

## 2022-08-30 DIAGNOSIS — R73.03 PREDIABETES: ICD-10-CM

## 2022-08-30 DIAGNOSIS — E78.2 MIXED HYPERLIPIDEMIA: ICD-10-CM

## 2022-08-30 DIAGNOSIS — K22.10 ULCER OF ESOPHAGUS WITHOUT BLEEDING: ICD-10-CM

## 2022-08-30 DIAGNOSIS — K21.00 GASTROESOPHAGEAL REFLUX DISEASE WITH ESOPHAGITIS WITHOUT HEMORRHAGE: ICD-10-CM

## 2022-08-30 DIAGNOSIS — I10 ESSENTIAL HYPERTENSION: ICD-10-CM

## 2022-08-30 DIAGNOSIS — E55.9 VITAMIN D DEFICIENCY: ICD-10-CM

## 2022-08-30 DIAGNOSIS — Z00.00 MEDICARE ANNUAL WELLNESS VISIT, SUBSEQUENT: Primary | ICD-10-CM

## 2022-08-30 LAB
A/G RATIO: 2.2 (ref 1.1–2.2)
ALBUMIN SERPL-MCNC: 4.9 G/DL (ref 3.4–5)
ALP BLD-CCNC: 59 U/L (ref 40–129)
ALT SERPL-CCNC: 20 U/L (ref 10–40)
ANION GAP SERPL CALCULATED.3IONS-SCNC: 16 MMOL/L (ref 3–16)
AST SERPL-CCNC: 16 U/L (ref 15–37)
BILIRUB SERPL-MCNC: 0.5 MG/DL (ref 0–1)
BUN BLDV-MCNC: 13 MG/DL (ref 7–20)
CALCIUM SERPL-MCNC: 9.8 MG/DL (ref 8.3–10.6)
CHLORIDE BLD-SCNC: 105 MMOL/L (ref 99–110)
CHOLESTEROL, TOTAL: 177 MG/DL (ref 0–199)
CO2: 25 MMOL/L (ref 21–32)
CREAT SERPL-MCNC: 0.6 MG/DL (ref 0.6–1.2)
GFR AFRICAN AMERICAN: >60
GFR NON-AFRICAN AMERICAN: >60
GLUCOSE BLD-MCNC: 106 MG/DL (ref 70–99)
HCT VFR BLD CALC: 43 % (ref 36–48)
HDLC SERPL-MCNC: 62 MG/DL (ref 40–60)
HEMOGLOBIN: 14.5 G/DL (ref 12–16)
LDL CHOLESTEROL CALCULATED: 69 MG/DL
MCH RBC QN AUTO: 31.3 PG (ref 26–34)
MCHC RBC AUTO-ENTMCNC: 33.6 G/DL (ref 31–36)
MCV RBC AUTO: 93 FL (ref 80–100)
PDW BLD-RTO: 13.7 % (ref 12.4–15.4)
PLATELET # BLD: 255 K/UL (ref 135–450)
PMV BLD AUTO: 9.4 FL (ref 5–10.5)
POTASSIUM SERPL-SCNC: 4.6 MMOL/L (ref 3.5–5.1)
RBC # BLD: 4.62 M/UL (ref 4–5.2)
SODIUM BLD-SCNC: 146 MMOL/L (ref 136–145)
TOTAL PROTEIN: 7.1 G/DL (ref 6.4–8.2)
TRIGL SERPL-MCNC: 230 MG/DL (ref 0–150)
VITAMIN D 25-HYDROXY: 57.3 NG/ML
VLDLC SERPL CALC-MCNC: 46 MG/DL
WBC # BLD: 7 K/UL (ref 4–11)

## 2022-08-30 PROCEDURE — 3017F COLORECTAL CA SCREEN DOC REV: CPT | Performed by: INTERNAL MEDICINE

## 2022-08-30 PROCEDURE — 1123F ACP DISCUSS/DSCN MKR DOCD: CPT | Performed by: INTERNAL MEDICINE

## 2022-08-30 PROCEDURE — G0439 PPPS, SUBSEQ VISIT: HCPCS | Performed by: INTERNAL MEDICINE

## 2022-08-30 RX ORDER — ATORVASTATIN CALCIUM 80 MG/1
TABLET, FILM COATED ORAL
Qty: 90 TABLET | Refills: 1 | Status: SHIPPED | OUTPATIENT
Start: 2022-08-30

## 2022-08-30 RX ORDER — HYDRALAZINE HYDROCHLORIDE 50 MG/1
50 TABLET, FILM COATED ORAL 2 TIMES DAILY
Qty: 180 TABLET | Refills: 1 | Status: SHIPPED | OUTPATIENT
Start: 2022-08-30

## 2022-08-30 RX ORDER — CARVEDILOL 25 MG/1
TABLET ORAL
Qty: 180 TABLET | Refills: 1 | Status: SHIPPED | OUTPATIENT
Start: 2022-08-30

## 2022-08-30 RX ORDER — IRBESARTAN 300 MG/1
TABLET ORAL
Qty: 90 TABLET | Refills: 1 | Status: SHIPPED | OUTPATIENT
Start: 2022-08-30

## 2022-08-30 ASSESSMENT — PATIENT HEALTH QUESTIONNAIRE - PHQ9
SUM OF ALL RESPONSES TO PHQ QUESTIONS 1-9: 0
SUM OF ALL RESPONSES TO PHQ QUESTIONS 1-9: 0
1. LITTLE INTEREST OR PLEASURE IN DOING THINGS: 0
SUM OF ALL RESPONSES TO PHQ QUESTIONS 1-9: 0
SUM OF ALL RESPONSES TO PHQ9 QUESTIONS 1 & 2: 0
SUM OF ALL RESPONSES TO PHQ9 QUESTIONS 1 & 2: 0
SUM OF ALL RESPONSES TO PHQ QUESTIONS 1-9: 0
2. FEELING DOWN, DEPRESSED OR HOPELESS: 0
1. LITTLE INTEREST OR PLEASURE IN DOING THINGS: 0
SUM OF ALL RESPONSES TO PHQ QUESTIONS 1-9: 0
2. FEELING DOWN, DEPRESSED OR HOPELESS: 0
SUM OF ALL RESPONSES TO PHQ QUESTIONS 1-9: 0

## 2022-08-30 ASSESSMENT — LIFESTYLE VARIABLES
HOW MANY STANDARD DRINKS CONTAINING ALCOHOL DO YOU HAVE ON A TYPICAL DAY: PATIENT DOES NOT DRINK
HOW OFTEN DO YOU HAVE A DRINK CONTAINING ALCOHOL: NEVER

## 2022-08-30 NOTE — PATIENT INSTRUCTIONS
Learning About [facility] [place]  What is a living will? A living will, also called a declaration, is a legal form. It tells your family and your doctor your wishes when you can't speak for yourself. It's used by the health professionals who will treat you as you near the end of your life or ifyou get seriously hurt or ill. If you put your wishes in writing, your loved ones and others will know what kind of care you want. They won't need to guess. This can ease your mind and behelpful to others. And you can change or cancel your living will at any time. A living will is not the same as an estate or property will. An estate willexplains what you want to happen with your money and property after you die. How do you use it? Keep these facts in mind about how a living will is used. Your living will is used only if you can't speak or make decisions for yourself. Most often, one or more doctors must certify that you can't speak or decide for yourself before your living will takes effect. If you get better and can speak for yourself again, you can accept or refuse any treatment. It doesn't matter what you said in your living will. Some states may limit your right to refuse treatment in certain cases. For example, you may need to clearly state in your living will that you don't want artificial hydration and nutrition, such as being fed through a tube. Is a living will a legal document? A living will is a legal document. Each state has its own laws about livingwills. And a living will may be called something else in your state. Here are some things to know about living [place]. You don't need an  to complete a living will. But legal advice can be helpful if your state's laws are unclear. It can also help if your health history is complicated or your family can't agree on what should be in your living will. You can change your living will at any time.  Some people find that their wishes about end-of-life care change as their health changes. If you make big changes to your living will, complete a new form. If you move to another state, make sure that your living will is legal in the state where you now live. In most cases, doctors will respect your wishes even if you have a form from a different state. You might use a universal form that has been approved by many states. This kind of form can sometimes be filled out and stored online. Your digital copy will then be available wherever you have a connection to the internet. The doctors and nurses who need to treat you can find it right away. Your state may offer an online registry. This is another place where you can store your living will online. It's a good idea to get your living will notarized. This means using a person called a Transfer To to watch two people sign, or witness, your living will. What should you know when you create a living will? Here are some questions to ask yourself as you make your living will. Do you know enough about life support methods that might be used? If not, talk to your doctor so you know what might be done if you can't breathe on your own, your heart stops, or you can't swallow. What things would you still want to be able to do after you receive life-support methods? Would you want to be able to walk? To speak? To eat on your own? To live without the help of machines? Do you want certain [employer] practices performed if you become very ill? If you have a choice, where do you want to be cared for? In your home? At a hospital or nursing home? If you have a choice at the end of your life, where would you prefer to die? At home? In a hospital or nursing home? Somewhere else? Would you prefer to be buried or cremated? Do you want your organs to be donated after you die? What should you do with your living will?   Make sure that your family members and your health care agent have copies of your living will (also called a declaration). Give your doctor a copy of your living will. Ask to have it kept as part of your medical record. If you have more than one doctor, make sure that each one has a copy. Put a copy of your living will where it can be easily found. For example, some people may put a copy on their refrigerator door. If you are using a digital copy, be sure your doctor, family members, and health care agent know how to find and access it. Where can you learn more? Go to [email or web address] and sign in to your [employer] account. Enter Q618 in the DDN box to learn more about \"Learning About Living [place]. \"     If you do not have an account, please click on the \"Sign Up Now\" link. Current as of: October 18, 2021               Content Version: 13.3  © 0713-3792 Healthwise, [employer]. Care instructions adapted under license by [facility] ([facility] If you have questions about a medical condition or this instruction, always ask your healthcare professional. [place] [address] any warranty or liability for your use of this information. Personalized Preventive Plan for Charlotte Stephenson - 8/30/2022  Medicare offers a range of preventive health benefits. Some of the tests and screenings are paid in full while other may be subject to a deductible, co-insurance, and/or copay. Some of these benefits include a comprehensive review of your medical history including lifestyle, illnesses that may run in your family, and various assessments and screenings as appropriate. After reviewing your medical record and screening and assessments performed today your provider may have ordered immunizations, labs, imaging, and/or referrals for you. A list of these orders (if applicable) as well as your Preventive Care list are included within your After Visit Summary for your review.     Other Preventive Recommendations:    A preventive eye exam performed by an eye specialist is recommended every 1-2 years to screen for glaucoma; cataracts, macular degeneration, and other eye disorders. A preventive dental visit is recommended every 6 months. Try to get at least 150 minutes of exercise per week or 10,000 steps per day on a pedometer . Order or download the FREE \"Exercise & Physical Activity: Your Everyday Guide\" from [employer]. Call 4-315.394.8517 or search [employer] online. You need 8719-8127 mg of calcium and 5991-6622 IU of vitamin D per day. It is possible to meet your calcium requirement with diet alone, but a vitamin D supplement is usually necessary to meet this goal.  When exposed to the sun, use a sunscreen that protects against both UVA and UVB radiation with an SPF of 30 or greater. Reapply every 2 to 3 hours or after sweating, drying off with a towel, or swimming. Always wear a seat belt when traveling in a car. Always wear a helmet when riding a bicycle or motorcycle.

## 2022-08-30 NOTE — PROGRESS NOTES
Medicare Annual Wellness Visit    [address] is here for Medicare AWV    Assessment & Plan   1. Medicare annual wellness visit, subsequent  -Medicare AWV done    2. Essential hypertension  - blood pressure is a little elevated but normal at home  -Continue Hydralazine 50mg 2 times daily  -Continue carvedilol 25 mg 2 times daily  -Continue irbesartan (AVAPRO) 300 MG tablet; TAKE ONE TABLET BY MOUTH DAILY  Dispense: 90 tablet; Refill: 1  -Continue spironolactone 25 mg once daily  -Low sodium diet  -Regular aerobic exercise  - Comprehensive Metabolic Panel; Future    3. Mixed hyperlipidemia  -Stable  -Continue atorvastatin (LIPITOR) 80 MG tablet; TAKE ONE TABLET BY MOUTH ONCE NIGHTLY  Dispense: 90 tablet; Refill: 1  -Low fat, low cholesterol diet  -Regular aerobic exercise  - Comprehensive Metabolic Panel; Future  - Lipid Panel; Future    4. Prediabetes  -Stable  -Most recent hemoglobin A1c improved to normal  -Low carbohydrate diet  -Regular aerobic exercise  - Hemoglobin A1C; Future    5. Vitamin D deficiency  -stable  -Continue vitamin D 5,000 IU once daily  - Vitamin D 25 Hydroxy; Future    6. Gastroesophageal reflux disease with esophagitis without hemorrhage  -Continue pantoprazole 40 mg once daily  -Decrease caffeine, avoid spicy foods, avoid tomato based foods  -Eat small meals instead of large meals  -Wait 2-3 hours after eating before lying down    7. Ulcer of esophagus without bleeding  -Continue pantoprazole 40 mg once daily  -Avoid NSAIDs  -EGD is scheduled for 9/9/2022  - CBC; Future      Recommendations for Preventive Services Due: see orders and patient instructions/AVS.  Recommended screening schedule for the next 5-10 years is provided to the patient in written form: see Patient Instructions/AVS.       Return in 6 months (on 2/28/2023) for hypertension, hyperlipidemia, prediabetes, vitamin D deficiency, and GERD.          Subjective   The following acute and/or chronic problems were also addressed today:    Patient has hypertension. Patient states Hydralazine 50mg 2 times daily, carvedilol 25 mg 2 times daily, irbesartan 300 mg once daily, and spironolactone 25 mg once daily. Patient decreases salt. Patient is not exercising. Patient states her blood pressure is up once in awhile at [facility] visits. Patient states it was a lot of traffic and school buses on her way to the visit. Patient states her BP is usually 135/70 at home and was 126/70 this week. Patient has hyperlipidemia. Patient takes atorvastatin 80 mg nightly. Patient decreases fat and cholesterol. Patient has prediabetes. Patient decreases carbohydrates. Patient has vitamin D deficiency. Patient takes vitamin D 5,000 IU once daily. Patient has GERD and an ulcer. Patient takes Pantoprazole 40mg once daily. Patient states she has epigastric abdominal pain that is dull and comes and goes but hasn't had it in the past 2 weeks. Patient denies heartburn and reflux. Patient states she is scheduled for an EGD on 9/9/22. Patient's complete Health Risk Assessment and screening values have been reviewed and are found in Flowsheets. The following problems were reviewed today and where indicated follow up appointments were made and/or referrals ordered.     Positive Risk Factor Screenings with Interventions:             General Health and ACP:  General  In general, how would you say your health is?: Good  In the past 7 days, have you experienced any of the following: New or Increased Pain, New or Increased Fatigue, Loneliness, Social Isolation, Stress or Anger?: No  Do you get the social and emotional support that you need?: Yes  Do you have a Living Will?: (!) No    Advance Directives       Power of  Living Will ACP-Advance Directive ACP-Power of     Not on File Filed on 12/09/16 Filed Not on File        General Health Risk Interventions:  No Living Will: [address] addressed with patient today    Health Habits/Nutrition:  Physical Activity: Inactive    Days of Exercise per Week: 0 days    Minutes of Exercise per Session: 0 min     Have you lost any weight without trying in the past 3 months?: No  Body mass index: (!) 35.42  Have you seen the dentist within the past year?: Yes  Health Habits/Nutrition Interventions:  Inadequate physical activity:  patient is not ready to increase his/her physical activity level at this time  Nutritional issues:  patient is not ready to address his/her nutritional/weight issues at this time    Hearing/Vision:  Do you or your family notice any trouble with your hearing that hasn't been managed with hearing aids?: No  Do you have difficulty driving, watching TV, or doing any of your daily activities because of your eyesight?: No  Have you had an eye exam within the past year?: (!) No  No results found. Hearing/Vision Interventions:  Vision concerns:  patient encouraged to make appointment with his/her eye specialist    Safety:  Do you have working smoke detectors?: Yes  Do you have any tripping hazards - loose or unsecured carpets or rugs?: No  Do you have any tripping hazards - clutter in doorways, halls, or stairs?: No  Do you have either shower bars, grab bars, non-slip mats or non-slip surfaces in your shower or bathtub?: (!) No  Do all of your stairways have a railing or banister?: Yes  Do you always fasten your seatbelt when you are in a car?: Yes  Safety Interventions:  Patient declines any further evaluation/treatment for this issue           Objective   Vitals:    08/30/22 0807 08/30/22 0817 08/30/22 0853   BP: (!) 140/98 (!) 138/98 (!) 138/92   Site: Right Upper Arm Left Upper Arm Right Upper Arm   Position: Sitting Sitting Sitting   Cuff Size: Large Adult Large Adult Large Adult   Pulse: 69     Temp: 97 °F (36.1 °C)     TempSrc: Temporal     SpO2: 96%     Weight: 206 lb 6.4 oz (93.6 kg)     Height: 5' 4\" (1.626 m)        Body mass index is 35.43 kg/m². General Appearance: alert and oriented to person, place and time, well-developed and well-nourished, in no acute distress  Head: normocephalic and atraumatic  Eyes: pupils equal, round, and reactive to light, extraocular eye movements intact, conjunctivae normal  Neck: neck supple and non tender without mass, no thyromegaly or thyroid nodules, no cervical lymphadenopathy   Pulmonary/Chest: clear to auscultation bilaterally- no wheezes, rales or rhonchi, normal air movement, no respiratory distress  Cardiovascular: normal rate, regular rhythm, normal S1 and S2, no murmurs, and no carotid bruits  Abdomen: soft, non-tender, non-distended, normal bowel sounds, no masses or organomegaly  Extremities: no edema  Neurologic: gait and coordination normal and speech normal       Allergies   Allergen Reactions    Sulfa Antibiotics Rash     Prior to Visit Medications    Medication Sig Taking? Authorizing Provider   VITAMIN D PO Take 6,000 Units by mouth daily Yes Historical Provider, MD   atorvastatin (LIPITOR) 80 MG tablet TAKE ONE TABLET BY MOUTH ONCE NIGHTLY Yes Papo Calderon MD   irbesartan (AVAPRO) 300 MG tablet TAKE ONE TABLET BY MOUTH DAILY Yes Papo Calderon MD   carvedilol (COREG) 25 MG tablet TAKE ONE TABLET BY MOUTH TWICE A DAY Yes Papo Calderon MD   hydrALAZINE (APRESOLINE) 50 MG tablet Take 1 tablet by mouth 2 times daily Yes Papo Calderon MD   pantoprazole (PROTONIX) 40 MG tablet Take 1 tablet by mouth every morning (before breakfast) Yes Kylie Stroud MD   spironolactone (ALDACTONE) 25 MG tablet TAKE ONE TABLET BY MOUTH DAILY Yes Awais Lou MD   aspirin EC 81 MG EC tablet Take 1 tablet by mouth daily Yes Awais Lou MD   Cholecalciferol (VITAMIN D3) 5000 units TABS Take 1 tablet by mouth daily Yes Papo Calderon MD   Multiple Vitamin (MULTIVITAMIN PO) Take  by mouth.    Yes Historical Provider, MD Angelo (Including outside providers/suppliers regularly involved in providing care):   Patient Care Team:  Khanh Andres MD as PCP - General (Internal Medicine)  Khanh Andres MD as PCP - [facility] Empaneled Provider  Dolly Ulrich MD as Obstetrician (Obstetrics & Gynecology)  Gene Gamble MD as Surgeon (Colon and Rectal Surgery)     Reviewed and updated this visit:  Tobacco  Allergies  Meds  Med Hx  Surg Hx  Soc Hx  Fam Hx

## 2022-08-31 LAB
ESTIMATED AVERAGE GLUCOSE: 108.3 MG/DL
HBA1C MFR BLD: 5.4 %

## 2022-09-09 ENCOUNTER — ANESTHESIA (OUTPATIENT)
Dept: ENDOSCOPY | Age: 67
End: 2022-09-09
Payer: MEDICARE

## 2022-09-09 ENCOUNTER — HOSPITAL ENCOUNTER (OUTPATIENT)
Age: 67
Setting detail: OUTPATIENT SURGERY
Discharge: HOME | End: 2022-09-09
Attending: INTERNAL MEDICINE | Admitting: INTERNAL MEDICINE
Payer: MEDICARE

## 2022-09-09 ENCOUNTER — ANESTHESIA EVENT (OUTPATIENT)
Dept: ENDOSCOPY | Age: 67
End: 2022-09-09
Payer: MEDICARE

## 2022-09-09 VITALS
TEMPERATURE: 97.9 F | SYSTOLIC BLOOD PRESSURE: 141 MMHG | DIASTOLIC BLOOD PRESSURE: 77 MMHG | OXYGEN SATURATION: 96 % | HEIGHT: 64 IN | RESPIRATION RATE: 16 BRPM | HEART RATE: 65 BPM | BODY MASS INDEX: 34.83 KG/M2 | WEIGHT: 204 LBS

## 2022-09-09 PROCEDURE — 7100000010 HC PHASE II RECOVERY - FIRST 15 MIN: Performed by: INTERNAL MEDICINE

## 2022-09-09 PROCEDURE — 2500000003 HC RX 250 WO HCPCS: Performed by: NURSE ANESTHETIST, CERTIFIED REGISTERED

## 2022-09-09 PROCEDURE — 2709999900 HC NON-CHARGEABLE SUPPLY: Performed by: INTERNAL MEDICINE

## 2022-09-09 PROCEDURE — 3609017100 HC EGD: Performed by: INTERNAL MEDICINE

## 2022-09-09 PROCEDURE — 6360000002 HC RX W HCPCS: Performed by: NURSE ANESTHETIST, CERTIFIED REGISTERED

## 2022-09-09 PROCEDURE — 3700000000 HC ANESTHESIA ATTENDED CARE: Performed by: INTERNAL MEDICINE

## 2022-09-09 PROCEDURE — 2580000003 HC RX 258: Performed by: ANESTHESIOLOGY

## 2022-09-09 PROCEDURE — 3700000001 HC ADD 15 MINUTES (ANESTHESIA): Performed by: INTERNAL MEDICINE

## 2022-09-09 PROCEDURE — 7100000011 HC PHASE II RECOVERY - ADDTL 15 MIN: Performed by: INTERNAL MEDICINE

## 2022-09-09 PROCEDURE — 6370000000 HC RX 637 (ALT 250 FOR IP): Performed by: INTERNAL MEDICINE

## 2022-09-09 RX ORDER — SODIUM CHLORIDE 9 MG/ML
INJECTION, SOLUTION INTRAVENOUS CONTINUOUS
Status: DISCONTINUED | OUTPATIENT
Start: 2022-09-09 | End: 2022-09-09 | Stop reason: HOSPADM

## 2022-09-09 RX ORDER — PROPOFOL 10 MG/ML
INJECTION, EMULSION INTRAVENOUS PRN
Status: DISCONTINUED | OUTPATIENT
Start: 2022-09-09 | End: 2022-09-09 | Stop reason: SDUPTHER

## 2022-09-09 RX ORDER — PANTOPRAZOLE SODIUM 40 MG/1
40 TABLET, DELAYED RELEASE ORAL DAILY
Qty: 30 TABLET | Refills: 5 | Status: SHIPPED | OUTPATIENT
Start: 2022-09-09

## 2022-09-09 RX ORDER — LIDOCAINE HYDROCHLORIDE 20 MG/ML
INJECTION, SOLUTION EPIDURAL; INFILTRATION; INTRACAUDAL; PERINEURAL PRN
Status: DISCONTINUED | OUTPATIENT
Start: 2022-09-09 | End: 2022-09-09 | Stop reason: SDUPTHER

## 2022-09-09 RX ADMIN — PROPOFOL 30 MG: 10 INJECTION, EMULSION INTRAVENOUS at 08:25

## 2022-09-09 RX ADMIN — PROPOFOL 50 MG: 10 INJECTION, EMULSION INTRAVENOUS at 08:27

## 2022-09-09 RX ADMIN — LIDOCAINE HYDROCHLORIDE 100 MG: 20 INJECTION, SOLUTION EPIDURAL; INFILTRATION; INTRACAUDAL; PERINEURAL at 08:24

## 2022-09-09 RX ADMIN — SODIUM CHLORIDE: 9 INJECTION, SOLUTION INTRAVENOUS at 07:32

## 2022-09-09 RX ADMIN — PROPOFOL 50 MG: 10 INJECTION, EMULSION INTRAVENOUS at 08:29

## 2022-09-09 RX ADMIN — PROPOFOL 70 MG: 10 INJECTION, EMULSION INTRAVENOUS at 08:24

## 2022-09-09 ASSESSMENT — PAIN - FUNCTIONAL ASSESSMENT: PAIN_FUNCTIONAL_ASSESSMENT: NONE - DENIES PAIN

## 2022-09-09 NOTE — ANESTHESIA PRE PROCEDURE
Department of Anesthesiology  Preprocedure Note       Name:  Jase Méndez   Age:  69 y.o.  :  1955                                          MRN:  [id number]         Date:  2022      Surgeon: Romeo Castillo):  Aramis Redding MD    Procedure: Procedure(s):  EGD DIAGNOSTIC ONLY    Medications prior to admission:   Prior to Admission medications    Medication Sig Start Date End Date Taking? Authorizing Provider   atorvastatin (LIPITOR) 80 MG tablet TAKE ONE TABLET BY MOUTH ONCE NIGHTLY 22   Khanh Andres MD   irbesartan (AVAPRO) 300 MG tablet TAKE ONE TABLET BY MOUTH DAILY  Patient taking differently: at bedtime 22   Khanh Andres MD   carvedilol (COREG) 25 MG tablet TAKE ONE TABLET BY MOUTH TWICE A DAY 22   Khanh Andres MD   hydrALAZINE (APRESOLINE) 50 MG tablet Take 1 tablet by mouth 2 times daily 22   Khanh Andres MD   pantoprazole (PROTONIX) 40 MG tablet Take 1 tablet by mouth every morning (before breakfast)  Patient taking differently: Take 40 mg by mouth in the morning and at bedtime 6/10/22   Aramis Redding MD   spironolactone (ALDACTONE) 25 MG tablet TAKE ONE TABLET BY MOUTH DAILY 22   Elaine Casey MD   aspirin EC 81 MG EC tablet Take 1 tablet by mouth daily 21   Elaine Casey MD   Cholecalciferol (VITAMIN D3) 5000 units TABS Take 1 tablet by mouth daily 6/10/19   Khanh Andres MD   Multiple Vitamin (MULTIVITAMIN PO) Take  by mouth. Historical Provider, MD       Current medications:    No current facility-administered medications for this encounter. Allergies:     Allergies   Allergen Reactions    Sulfa Antibiotics Rash       Problem List:    Patient Active Problem List   Diagnosis Code    Goiter E04.9    Onychomycosis B35.1    Essential hypertension I10    Vitamin D deficiency E55.9    Microscopic hematuria V48.39    Diastolic dysfunction W06.89    Family history of coronary artery disease Z82.49    Gastroesophageal reflux K21.9    Thyroid nodule E04.1    Hyperglycemia R73.9    Hyperlipidemia E78.5    Prediabetes R73.03    Primary insomnia F51.01    Coronary artery disease involving native coronary artery of native heart without angina pectoris H17.43    Biliary colic B14.93    H/O angiography Z92.89    Uncontrolled hypertension I10    Dizziness R42    Hypokalemia E87.6    Chronic heart failure with preserved ejection fraction (HFpEF) (HCC) I50.32    H/O coronary angiogram Z98.890    Elevated urine levels of catecholamines R82.5    Hashimoto's thyroiditis E06.3    Symptomatic cholelithiasis K80.20    Neck pain M54.2    Severe obesity (BMI 35.0-39. 9) with comorbidity ([address] E66.01    Ulcer of esophagus without bleeding K22.10    Gastroesophageal reflux disease with esophagitis without hemorrhage K21.00    Mixed hyperlipidemia E78.2       Past Medical History:        Diagnosis Date    pearl Apple MD    Goiter     Heart failure with preserved ejection fraction ([address]     History of mammogram 10/2010    [facility]    Hyperlipidemia     Hypertension     Onychomycosis     Pap smear for cervical cancer screening 2/2010    Dr. Abelardo Lordl - [facility]    Prediabetes 12/23/2019    Screening for osteoporosis     Dexa 12/2005 - [facility]    Vitamin D deficiency 11/10/2011       Past Surgical History:        Procedure Laterality Date    APPENDECTOMY  2/4/2000    CHOLECYSTECTOMY, LAPAROSCOPIC N/A 6/23/2021    LAPAROSCOPIC CHOLECYSTECTOMY WITH CHOLANGIOGRAM performed by Mushtaq Mcgee MD at [facility]  3/2011    Dr. Mora Confer - 2 years  f/u    COLONOSCOPY  10/19/2016    Soni Baldwin MD    ESOPHAGEAL DILATATION  4/2014    Washington Ricks MD     KNEE SURGERY      right    PILONIDAL CYST EXCISION      TONSILLECTOMY      UPPER GASTROINTESTINAL ENDOSCOPY  3/19/14    concentric rings in esophagus    UPPER GASTROINTESTINAL ENDOSCOPY N/A 6/10/2022    EGD DILATION BALLOON performed by Jason Mendez MD at [facility] N/A 6/10/2022    EGD BIOPSY performed by Jason Mendez MD at [address] History:    Social History     Tobacco Use    Smoking status: Never    Smokeless tobacco: Never   Substance Use Topics    Alcohol use: Yes     Alcohol/week: 2.0 standard drinks     Types: 2 Glasses of wine per week     Comment: monthly                                Counseling given: Not Answered      Vital Signs (Current):   Vitals:    08/30/22 1430   Weight: 203 lb (92.1 kg)   Height: 5' 4\" (1.626 m)                                              BP Readings from Last 3 Encounters:   08/30/22 (!) 138/92   06/29/22 126/86   06/10/22 108/75       NPO Status:                                                                                 BMI:   Wt Readings from Last 3 Encounters:   08/30/22 203 lb (92.1 kg)   08/30/22 206 lb 6.4 oz (93.6 kg)   06/29/22 205 lb 9.6 oz (93.3 kg)     Body mass index is 34.84 kg/m².     CBC:   Lab Results   Component Value Date/Time    WBC 7.0 08/30/2022 09:07 AM    RBC 4.62 08/30/2022 09:07 AM    HGB 14.5 08/30/2022 09:07 AM    HCT 43.0 08/30/2022 09:07 AM    MCV 93.0 08/30/2022 09:07 AM    RDW 13.7 08/30/2022 09:07 AM     08/30/2022 09:07 AM       CMP:   Lab Results   Component Value Date/Time     08/30/2022 09:07 AM    K 4.6 08/30/2022 09:07 AM    K 3.3 03/13/2021 06:27 AM     08/30/2022 09:07 AM    CO2 25 08/30/2022 09:07 AM    BUN 13 08/30/2022 09:07 AM    CREATININE 0.6 08/30/2022 09:07 AM    GFRAA >60 08/30/2022 09:07 AM    GFRAA >60 09/19/2012 08:32 AM    AGRATIO 2.2 08/30/2022 09:07 AM    LABGLOM >60 08/30/2022 09:07 AM    GLUCOSE 106 08/30/2022 09:07 AM    PROT 7.1 08/30/2022 09:07 AM    PROT 7.0 09/19/2012 08:32 AM    CALCIUM 9.8 08/30/2022 09:07 AM    BILITOT 0.5 08/30/2022 09:07 AM    ALKPHOS 59 08/30/2022 09:07 AM    AST 16 08/30/2022 09:07 AM    ALT 20 08/30/2022 09:07 AM       POC Tests: No results for input(s): POCGLU, POCNA, POCK, POCCL, POCBUN, POCHEMO, POCHCT in the last 72 hours. Coags:   Lab Results   Component Value Date/Time    PROTIME 11.9 03/12/2021 10:42 AM    INR 1.03 03/12/2021 10:42 AM       HCG (If Applicable): No results found for: PREGTESTUR, PREGSERUM, HCG, HCGQUANT     ABGs: No results found for: PHART, PO2ART, FHF0AYL, OWW4VPW, BEART, J6LXPILE     Type & Screen (If Applicable):  No results found for: LABABO, LABRH    Drug/Infectious Status (If Applicable):  No results found for: HIV, HEPCAB    COVID-19 Screening (If Applicable): No results found for: COVID19        Anesthesia Evaluation  Patient summary reviewed and Nursing notes reviewed no history of anesthetic complications:   Airway: Mallampati: II  TM distance: >3 FB   Neck ROM: full  Mouth opening: > = 3 FB   Dental: normal exam         Pulmonary:       (-) COPD and asthma                           Cardiovascular:    (+) hypertension:, CAD:, CHF:, hyperlipidemia                  Neuro/Psych:      (-) seizures, TIA and CVA           GI/Hepatic/Renal:   (+) GERD:, PUD,          ROS comment: Patient denies gerd sx or n/v today. Endo/Other:                      ROS comment: H/o Hashimoto's Abdominal:             Vascular:     - DVT and PE. Other Findings:           Anesthesia Plan      MAC     ASA 3       Induction: intravenous. Anesthetic plan and risks discussed with patient. Plan discussed with CRNA.                     Divya Taylor MD   9/9/2022

## 2022-09-09 NOTE — BRIEF OP NOTE
Brief Postoperative Note - Full Note in Chart Review/Procedures tab       Patient: Dianelys Michaud  YOB: 1955  MRN: [id number]    Date of Procedure: 9/9/2022    Pre-Op Diagnosis: Ulcer of esophagus without bleeding [K22.10]    Post-Op Diagnosis: Same       Procedure(s):  EGD DIAGNOSTIC ONLY    Surgeon(s):  Virgen Harkins MD    Assistant:  * No surgical staff found *    Anesthesia: Monitor Anesthesia Care    Estimated Blood Loss (mL): Minimal    Complications: None    Specimens:   * No specimens in log *    Implants:  * No implants in log *      Drains: * No LDAs found *    Findings:   Normal Esophagogastroduodenoscopy   Previous esophagitis has healed    Rec:  Antireflux diet  Pantoprazole 40 mg daily  MRI abd/MRCP to rule out retained common bile duct stone  Follow up in office in 3 months  Follow up with primary care physician    Electronically signed by Virgen Harkins MD on 9/9/2022 at 8:35 AM

## 2022-09-09 NOTE — H&P
[address] [place] [address]   Pre-operative History and Physical    Patient: Amber Acosta  : 1955  CSN:     History Obtained From: patient and/or guardian. HISTORY OF PRESENT ILLNESS:    The patient is a 69 y.o. female with esophagitis/ulcer on previous EGD in 2022 here for EGD to check for healing. Past Medical History:        Diagnosis Date    pearl Apple MD    GERD (gastroesophageal reflux disease)     Goiter     Heart failure with preserved ejection fraction [facility])     History of mammogram 10/2010    [facility]    Hyperlipidemia     Hypertension     Onychomycosis     Pap smear for cervical cancer screening 2010    Dr. Abelardo Lordl - [facility]    Prediabetes 2019    Screening for osteoporosis     Dexa 2005 - [facility]    Stomach ulcer     Vitamin D deficiency 11/10/2011     Past Surgical History:        Procedure Laterality Date    APPENDECTOMY  2000    CHOLECYSTECTOMY, LAPAROSCOPIC N/A 2021    LAPAROSCOPIC CHOLECYSTECTOMY WITH CHOLANGIOGRAM performed by Mushtaq Mcgee MD at [address] [place] [address]  3/2011    Dr. Mora Confer - 2 years  f/u    COLONOSCOPY  10/19/2016    Soni Baldwin MD    ESOPHAGEAL DILATATION  2014    Washington Ricks MD     KNEE SURGERY      right    PILONIDAL CYST EXCISION      TONSILLECTOMY      UPPER GASTROINTESTINAL ENDOSCOPY  3/19/14    concentric rings in esophagus    UPPER GASTROINTESTINAL ENDOSCOPY N/A 6/10/2022    EGD DILATION BALLOON performed by Caren Keys MD at [facility] N/A 6/10/2022    EGD BIOPSY performed by Caren Keys MD at [address] [place] [address]     Medications Prior to Admission:   No current facility-administered medications on file prior to encounter.      Current Outpatient Medications on File Prior to Encounter   Medication Sig Dispense Refill    pantoprazole (PROTONIX) 40 MG tablet Take 1 tablet by mouth every morning (before breakfast) (Patient taking differently: Take 40 mg by mouth in the morning and at bedtime) 30 tablet 5    spironolactone (ALDACTONE) 25 MG tablet TAKE ONE TABLET BY MOUTH DAILY 90 tablet 3    aspirin EC 81 MG EC tablet Take 1 tablet by mouth daily 30 tablet 5    Cholecalciferol (VITAMIN D3) 5000 units TABS Take 1 tablet by mouth daily      Multiple Vitamin (MULTIVITAMIN PO) Take  by mouth. Allergies:  Sulfa antibiotics        Social History:   Social History     Tobacco Use    Smoking status: Never    Smokeless tobacco: Never   Substance Use Topics    Alcohol use: Not Currently     Family History:   Family History   Problem Relation Age of Onset    Other Mother         vascular disease    Hypertension Mother     High Cholesterol Mother     Diabetes Mother     Heart Disease Mother     Heart Disease Father     Heart Attack Father     Cancer Maternal Grandmother 32        unknown type - cancer     Stroke Neg Hx        PHYSICAL EXAM:      BP (!) 174/89   Pulse 76   Temp 97.3 °F (36.3 °C) (Temporal)   Resp 21   Ht 5' 4\" (1.626 m)   Wt 204 lb (92.5 kg)   LMP 05/01/2008 (Approximate) Comment: 15 years ago  SpO2 98%   BMI 35.02 kg/m²  I        Heart:  RRR,  Normal S1S2    Lungs:  CTA Bilat, normal effort    Abdomen:  ND NT      ASA Grade:  ASA 3 - Patient with moderate systemic disease with functional limitations    Mallampati Class:  Class I: Soft palate, uvula, fauces, pillars visible  __________  Class II: Soft palate, uvula, fauces visible  ____X_____   Class III: Soft palate, base of uvula visible  __________  Class IV: Hard palate only visible   __________      ASSESSMENT AND PLAN:    1. Patient is a 69 y.o. female here for EGD with anesthesia  2. Procedure options, risks and benefits reviewed with patient. Patient expresses understanding.      Hi Washburn MD  [address]  9/9/2022

## 2022-09-09 NOTE — ANESTHESIA POSTPROCEDURE EVALUATION
Department of Anesthesiology  Postprocedure Note    Patient: Georgean Severance  MRN: [id number]  YOB: 1955  Date of evaluation: 9/9/2022      Procedure Summary     Date: 09/09/22 Room / Location: [address]    Anesthesia Start: 3902 Anesthesia Stop: 9204    Procedure: EGD DIAGNOSTIC ONLY (Abdomen) Diagnosis:       Ulcer of esophagus without bleeding      (Ulcer of esophagus without bleeding [K22.10])    Surgeons: Esmer Sparks MD Responsible Provider: Pattie Powers MD    Anesthesia Type: MAC ASA Status: 3          Anesthesia Type: No value filed. Isabel Phase I: Isabel Score: 10    Isabel Phase II: Isabel Score: 10      Anesthesia Post Evaluation    Patient location during evaluation: PACU  Patient participation: complete - patient participated  Level of consciousness: awake  Airway patency: patent  Nausea & Vomiting: no vomiting  Complications: no  Cardiovascular status: hemodynamically stable  Respiratory status: acceptable  Hydration status: euvolemic  There was medical reason for not using a multimodal analgesia pain management approach.

## 2022-09-09 NOTE — DISCHARGE INSTRUCTIONS
EGD DISCHARGE INSTRUCTIONS    Use salt water gargle, lozenges, or Chloraseptic spray as needed for sore throat. If you have fever, chills, excessive bleeding, severe chest pain, or abdominal pain, or any other problems, contact your physician's office immediately at [phone]. If you had an esophageal dilatation, and experience fever, chills, excessive bleeding, shortness of breath, chest or abdominal pain, or any other unusual symptom, call the office immediately at [phone]. Continue home medications as directed. Call physician's office in five to seven business days for biopsy results or further instructions. Call your physician at [phone] for a follow up appointment in ______________    You need another EGD in _______________________________    See your physician's report for procedure details and recommendations. ANESTHESIA DISCHARGE INSTRUCTIONS    Wear your seatbelt home. A responsible adult needs to be with you for 24 hours  You are under the influence of drugs-do not drink alcohol, drive ,operate machinery,or make any important decisions or sign any legal documentsfor 24 hours. You may resume normal activities tomorrow. You may experience lightheadedness,dizziness,or sleepiness following surgery. Rest at home today - increase activity as tolerated. It is recommended to take a four hour nap after procedure. Progress slowly to a regular diet unless your physician has instructed you otherwise. Avoid spicy and greasy food on first meal.  Drink plenty of water. If nausea becomes a problem call your physician. Call your doctor if concerns arise.

## 2022-09-26 ENCOUNTER — HOSPITAL ENCOUNTER (OUTPATIENT)
Age: 67
Discharge: HOME | End: 2022-09-26
Payer: MEDICARE

## 2022-09-26 LAB
A/G RATIO: 2.2 (ref 1.1–2.2)
ALBUMIN SERPL-MCNC: 4.9 G/DL (ref 3.4–5)
ALP BLD-CCNC: 55 U/L (ref 40–129)
ALT SERPL-CCNC: 31 U/L (ref 10–40)
ANION GAP SERPL CALCULATED.3IONS-SCNC: 10 MMOL/L (ref 3–16)
AST SERPL-CCNC: 20 U/L (ref 15–37)
BILIRUB SERPL-MCNC: 0.5 MG/DL (ref 0–1)
BUN BLDV-MCNC: 11 MG/DL (ref 7–20)
CALCIUM SERPL-MCNC: 9.4 MG/DL (ref 8.3–10.6)
CHLORIDE BLD-SCNC: 103 MMOL/L (ref 99–110)
CO2: 29 MMOL/L (ref 21–32)
CREAT SERPL-MCNC: 0.6 MG/DL (ref 0.6–1.2)
GFR AFRICAN AMERICAN: >60
GFR NON-AFRICAN AMERICAN: >60
GLUCOSE BLD-MCNC: 97 MG/DL (ref 70–99)
HCT VFR BLD CALC: 41.4 % (ref 36–48)
HEMOGLOBIN: 14 G/DL (ref 12–16)
LIPASE: 24 U/L (ref 13–60)
MCH RBC QN AUTO: 31.4 PG (ref 26–34)
MCHC RBC AUTO-ENTMCNC: 33.8 G/DL (ref 31–36)
MCV RBC AUTO: 92.9 FL (ref 80–100)
PDW BLD-RTO: 13.4 % (ref 12.4–15.4)
PLATELET # BLD: 252 K/UL (ref 135–450)
PMV BLD AUTO: 9.9 FL (ref 5–10.5)
POTASSIUM SERPL-SCNC: 4.7 MMOL/L (ref 3.5–5.1)
RBC # BLD: 4.46 M/UL (ref 4–5.2)
SODIUM BLD-SCNC: 142 MMOL/L (ref 136–145)
TOTAL PROTEIN: 7.1 G/DL (ref 6.4–8.2)
WBC # BLD: 6.1 K/UL (ref 4–11)

## 2022-09-26 PROCEDURE — 85027 COMPLETE CBC AUTOMATED: CPT

## 2022-09-26 PROCEDURE — 80053 COMPREHEN METABOLIC PANEL: CPT

## 2022-09-26 PROCEDURE — 83690 ASSAY OF LIPASE: CPT

## 2022-09-26 PROCEDURE — 36415 COLL VENOUS BLD VENIPUNCTURE: CPT

## 2022-09-29 ENCOUNTER — HOSPITAL ENCOUNTER (OUTPATIENT)
Dept: MRI IMAGING | Age: 67
Discharge: HOME | End: 2022-09-29
Payer: MEDICARE

## 2022-09-29 DIAGNOSIS — R10.9 ABDOMINAL PAIN, UNSPECIFIED ABDOMINAL LOCATION: ICD-10-CM

## 2022-09-29 PROCEDURE — 2580000003 HC RX 258: Performed by: INTERNAL MEDICINE

## 2022-09-29 PROCEDURE — A9577 INJ MULTIHANCE: HCPCS | Performed by: INTERNAL MEDICINE

## 2022-09-29 PROCEDURE — 6360000004 HC RX CONTRAST MEDICATION: Performed by: INTERNAL MEDICINE

## 2022-09-29 PROCEDURE — 74183 MRI ABD W/O CNTR FLWD CNTR: CPT

## 2022-09-29 RX ORDER — SODIUM CHLORIDE 9 MG/ML
INJECTION, SOLUTION INTRAVENOUS CONTINUOUS
Status: DISCONTINUED | OUTPATIENT
Start: 2022-09-29 | End: 2022-09-30 | Stop reason: HOSPADM

## 2022-09-29 RX ORDER — SODIUM CHLORIDE 9 MG/ML
INJECTION, SOLUTION INTRAVENOUS ONCE
Status: DISCONTINUED | OUTPATIENT
Start: 2022-09-29 | End: 2022-09-30 | Stop reason: HOSPADM

## 2022-09-29 RX ADMIN — SODIUM CHLORIDE: 9 INJECTION, SOLUTION INTRAVENOUS at 11:58

## 2022-09-29 RX ADMIN — GADOBENATE DIMEGLUMINE 17 ML: 529 INJECTION, SOLUTION INTRAVENOUS at 11:58

## 2022-11-17 ENCOUNTER — OFFICE VISIT (OUTPATIENT)
Dept: CARDIOLOGY CLINIC | Age: 67
End: 2022-11-17
Payer: MEDICARE

## 2022-11-17 VITALS — SYSTOLIC BLOOD PRESSURE: 170 MMHG | DIASTOLIC BLOOD PRESSURE: 90 MMHG | HEART RATE: 72 BPM

## 2022-11-17 DIAGNOSIS — I50.32 CHRONIC HEART FAILURE WITH PRESERVED EJECTION FRACTION (HFPEF) (HCC): Primary | Chronic | ICD-10-CM

## 2022-11-17 PROCEDURE — 1036F TOBACCO NON-USER: CPT | Performed by: INTERNAL MEDICINE

## 2022-11-17 PROCEDURE — 3074F SYST BP LT 130 MM HG: CPT | Performed by: INTERNAL MEDICINE

## 2022-11-17 PROCEDURE — G8427 DOCREV CUR MEDS BY ELIG CLIN: HCPCS | Performed by: INTERNAL MEDICINE

## 2022-11-17 PROCEDURE — 1090F PRES/ABSN URINE INCON ASSESS: CPT | Performed by: INTERNAL MEDICINE

## 2022-11-17 PROCEDURE — 1123F ACP DISCUSS/DSCN MKR DOCD: CPT | Performed by: INTERNAL MEDICINE

## 2022-11-17 PROCEDURE — 99214 OFFICE O/P EST MOD 30 MIN: CPT | Performed by: INTERNAL MEDICINE

## 2022-11-17 PROCEDURE — G8484 FLU IMMUNIZE NO ADMIN: HCPCS | Performed by: INTERNAL MEDICINE

## 2022-11-17 PROCEDURE — 3078F DIAST BP <80 MM HG: CPT | Performed by: INTERNAL MEDICINE

## 2022-11-17 PROCEDURE — G8400 PT W/DXA NO RESULTS DOC: HCPCS | Performed by: INTERNAL MEDICINE

## 2022-11-17 PROCEDURE — 3017F COLORECTAL CA SCREEN DOC REV: CPT | Performed by: INTERNAL MEDICINE

## 2022-11-17 PROCEDURE — G8417 CALC BMI ABV UP PARAM F/U: HCPCS | Performed by: INTERNAL MEDICINE

## 2022-11-18 NOTE — PROGRESS NOTES
Cc: Resistant HTN, HFpEF, CAD    HPI:     Patient is a 69years old woman, overweight, with history of resistant hypertension, prediabetes, hyperlipidemia, CAD, HFpEF. Patient was assessed with a right upper quadrant ultrasound 2/9/2021: Fatty liver, no gallstones however there was sludge in the gallbladder. Patient was seen by surgery, was here for clearance, had cholecystectomy in 03/2022 completed without any complications. Echo 10/2019: Normal except for indeterminate diastolic function     CCTA 10/2019: Moderate diffuse calcifications of the coronaries without any significant stenosis     Patient has been complaining of some discomfort under her breasts, epigastric and possibly midsternal, unrelated to activity. She also admits to some exertional shortness of breath. She denies any orthopnea or lower extremity edema. She was seen by cardiologist at [facility] about 2 years ago, had a cardiac work-up, no intervention was recommended, she quit going to her appointments. Her BP at home remains around 130/70s. She does not check her blood pressure frequently. Patient's father, smoker, had CABG at the age of 50years old and a repeat CABG at the age of 61years old; he passed away as a complication after his second bypass. ECG 2/4/2021: Normal     Lexiscan 3/10/2021: Canceled due to extreme hypertension (257/118 mmHg, patient held her BP medications). LHC 3/12/2021: Normal coronaries, normal renal arteries, normal LVEF 55%. CT abd with IV contrast 04/2021: 8-10 mm R adrenal lesion, possible lipoma. All labs for secondary HTN were reviewed (03/2021), only abnormalities: aldosterone/renin activity elevated but with normal aldosterone level, increased norepi in urine. Patient follows with Erma Valles, endocrinology who referred patient to Dr Teresa Cummings at [facility] regarding right adrenal mass, conservative approach was elected. Patient is here for a follow up.  Her BP at home is within normal limits (130/70 mmHg). She had an EGD recently and was found to have erosive esophagitis, currently on PPI; resolved per EGD on 9/9/22. She now has no symptoms. Histories     Past Medical History:   has a past medical history of Fissure, anal, GERD (gastroesophageal reflux disease), Goiter, Heart failure with preserved ejection fraction ([address] History of mammogram, Hyperlipidemia, Hypertension, Onychomycosis, Pap smear for cervical cancer screening, Prediabetes, Screening for osteoporosis, Stomach ulcer, and Vitamin D deficiency. Surgical History:   has a past surgical history that includes Appendectomy (2/4/2000); knee surgery; Pilonidal cyst excision; Colonoscopy (3/2011); Upper gastrointestinal endoscopy (3/19/14); Esophagus dilation (4/2014); Colonoscopy (10/19/2016); Cholecystectomy, laparoscopic (N/A, 6/23/2021); Tonsillectomy; Upper gastrointestinal endoscopy (N/A, 6/10/2022); Upper gastrointestinal endoscopy (N/A, 6/10/2022); and Upper gastrointestinal endoscopy (N/A, 9/9/2022). Social History:   reports that she has never smoked. She has never used smokeless tobacco. She reports that she does not currently use alcohol. She reports that she does not use drugs. Family History:  No evidence for sudden cardiac death or premature CAD      Medications:     Home medications were reviewed and are listed below    Prior to Admission medications    Medication Sig Start Date End Date Taking?  Authorizing Provider   pantoprazole (PROTONIX) 40 MG tablet Take 1 tablet by mouth daily 9/9/22  Yes Gail Enriquez MD   atorvastatin (LIPITOR) 80 MG tablet TAKE ONE TABLET BY MOUTH ONCE NIGHTLY 8/30/22  Yes Shea Manuel MD   irbesartan (AVAPRO) 300 MG tablet TAKE ONE TABLET BY MOUTH DAILY  Patient taking differently: at bedtime 8/30/22  Yes Shea Manuel MD   carvedilol (COREG) 25 MG tablet TAKE ONE TABLET BY MOUTH TWICE A DAY 8/30/22  Yes Shea Manuel MD   hydrALAZINE (APRESOLINE) 50 MG tablet Take 1 tablet by mouth 2 times daily 8/30/22  Yes Chasidy Boyd MD   spironolactone (ALDACTONE) 25 MG tablet TAKE ONE TABLET BY MOUTH DAILY 4/7/22  Yes Kaitlynn Byrd MD   Cholecalciferol (VITAMIN D3) 5000 units TABS Take 1 tablet by mouth daily 6/10/19  Yes Chasidy Boyd MD   Multiple Vitamin (MULTIVITAMIN PO) Take  by mouth. Yes Historical Provider, MD          Allergy:     Sulfa antibiotics       Review of Systems:     All 12 point review of symptoms completed. Pertinent positives identified in the HPI, all other review of symptoms negative as below. CONSTITUTIONAL: No fatigue  SKIN: No rash or pruritis. EYES: No visual changes or diplopia. No scleral icterus. ENT: No Headaches, hearing loss or vertigo. No mouth sores or sore throat. CARDIOVASCULAR: No chest pain/chest pressure/chest discomfort. No palpitations. No edema. RESPIRATORY: No dyspnea. No cough or wheezing, no sputum production. GASTROINTESTINAL: No N/V/D. No abdominal pain, appetite loss, blood in stools. GENITOURINARY: No dysuria, trouble voiding, or hematuria. MUSCULOSKELETAL:  No gait disturbance, weakness or joint complaints. NEUROLOGICAL: No headache, diplopia, change in muscle strength, numbness or tingling. No change in gait, balance, coordination, mood, affect, memory, mentation, behavior. PSHYCH: No anxiety, loss of interest, change in sexual behavior, feelings of self-harm, or confusion. ENDOCRINE: No excessive thirst, fluid intake, or urination. No tremor. HEMATOLOGIC: No abnormal bruising or bleeding. ALLERGY: No nasal congestion or hives.       Physical Examination:     Vitals:    11/17/22 1343 11/17/22 1347   BP: (!) 190/90 (!) 170/90   Pulse: 72        Wt Readings from Last 3 Encounters:   09/09/22 204 lb (92.5 kg)   08/30/22 206 lb 6.4 oz (93.6 kg)   06/29/22 205 lb 9.6 oz (93.3 kg)         General Appearance:  Alert, cooperative, no distress, appears stated age Appropriate weight   Head:  Normocephalic, without obvious abnormality, atraumatic   Eyes:  PERRL, conjunctiva/corneas clear EOM intact  Ears normal   Throat no lesions       Nose: Nares normal, no drainage or sinus tenderness   Throat: Lips, mucosa, and tongue normal   Neck: Supple, symmetrical, trachea midline, no adenopathy, thyroid: not enlarged, symmetric, no tenderness/mass/nodules, no carotid bruit       Lungs:   Clear to auscultation bilaterally, respirations unlabored   Chest Wall:  No tenderness or deformity   Heart:  Regular rhythm, rate is controlled, S1, S2 normal, there is no murmur, there is no rub or gallop, cannot assess jvd, no bilateral lower extremity edema   Abdomen:   Soft, non-tender, bowel sounds active all four quadrants,  no masses, no organomegaly       Extremities: Extremities normal, atraumatic, no cyanosis   Pulses: 2+ and symmetric   Skin: Skin color, texture, turgor normal, no rashes or lesions   Pysch: Normal mood and affect   Neurologic: Normal gross motor and sensory exam.  Cranial nerves intact        Labs:     Lab Results   Component Value Date    WBC 6.1 09/26/2022    HGB 14.0 09/26/2022    HCT 41.4 09/26/2022    MCV 92.9 09/26/2022     09/26/2022     Lab Results   Component Value Date     09/26/2022    K 4.7 09/26/2022     09/26/2022    CO2 29 09/26/2022    BUN 11 09/26/2022    CREATININE 0.6 09/26/2022    GLUCOSE 97 09/26/2022    CALCIUM 9.4 09/26/2022    PROT 7.1 09/26/2022    LABALBU 4.9 09/26/2022    BILITOT 0.5 09/26/2022    ALKPHOS 55 09/26/2022    AST 20 09/26/2022    ALT 31 09/26/2022    LABGLOM >60 09/26/2022    GFRAA >60 09/26/2022    AGRATIO 2.2 09/26/2022    GLOB 2.2 07/29/2021         Lab Results   Component Value Date    CHOL 177 08/30/2022    CHOL 153 02/08/2022    CHOL 180 07/29/2021     Lab Results   Component Value Date    TRIG 230 (H) 08/30/2022    TRIG 179 (H) 02/08/2022    TRIG 235 (H) 07/29/2021     Lab Results   Component Value Date    HDL 62 (H) 08/30/2022    HDL 59 02/08/2022    HDL 60 07/29/2021     Lab Results   Component Value Date    LDLCALC 69 08/30/2022    LDLCALC 58 02/08/2022    LDLCALC 73 07/29/2021     Lab Results   Component Value Date    LABVLDL 46 08/30/2022    LABVLDL 36 02/08/2022    LABVLDL 47 07/29/2021     No results found for: [facility]    Lab Results   Component Value Date    INR 1.03 03/12/2021    PROTIME 11.9 03/12/2021       The 10-year ASCVD risk score (Lamin RAY, et al., 2019) is: 18%    Values used to calculate the score:      Age: 69 years      Sex: Female      Is Non- : No      Diabetic: No      Tobacco smoker: No      Systolic Blood Pressure: 842 mmHg      Is BP treated: Yes      HDL Cholesterol: 62 mg/dL      Total Cholesterol: 177 mg/dL      Assessment / Plan:      Diagnosis Orders   1. Chronic heart failure with preserved ejection fraction (HFpEF) ([facility])             1. Resistant hypertension:  Patient has severely elevated blood pressure even today in the office although she reports improved BP at home per history. She is on multiple antihypertensive medications. I suspect whitecoat hypertension.     -Continue with carvedilol 25 twice daily, irbesartan 300 mg daily, spironolactone 25 daily.  -Continue with hydralazine 50 mg twice daily  -Low-sodium diet was strongly encouraged (sodium intake of less than 1500 to 2000 mg/day). -Will need a sleep study for possible cause of resistant HTN. 2.  Hyperlipidemia:  Patient is on Lipitor 80 mg daily     3. CAD:  No evidence of CAD. -Will stop asa. 4. Chronic HFpEF:   Patient is now euvolemic and hemo stable. -Cw meds as above. 5.  Epigastric pain:  It has resolved with PPI. We will schedule a follow up visit in 6 months      I have spent 35 minutes of face to face time with the patient with more than 50% spent counseling and coordinating care.        I have personally reviewed the reports and images of labs, radiological studies, cardiac studies including ECG's and telemetry, current and old medical records. The note was completed using EMR and Dragon dictation system. Every effort was made to ensure accuracy; however, inadvertent computerized transcription errors may be present. All questions and concerns were addressed to the patient/family. Alternatives to my treatment were discussed. I would like to thank you for providing me the opportunity to participate in the care of your patient. If you have any questions, please do not hesitate to contact me.      Boyd Pappas MD, [address] Office Phone: [phone]  Fax: [phone]

## 2022-12-09 ENCOUNTER — OFFICE VISIT (OUTPATIENT)
Dept: SURGERY | Age: 67
End: 2022-12-09
Payer: MEDICARE

## 2022-12-09 VITALS
WEIGHT: 212 LBS | BODY MASS INDEX: 36.19 KG/M2 | HEART RATE: 67 BPM | HEIGHT: 64 IN | DIASTOLIC BLOOD PRESSURE: 94 MMHG | SYSTOLIC BLOOD PRESSURE: 181 MMHG

## 2022-12-09 DIAGNOSIS — Z09 POSTOP CHECK: Primary | ICD-10-CM

## 2022-12-09 PROCEDURE — 1123F ACP DISCUSS/DSCN MKR DOCD: CPT | Performed by: SURGERY

## 2022-12-09 PROCEDURE — G8427 DOCREV CUR MEDS BY ELIG CLIN: HCPCS | Performed by: SURGERY

## 2022-12-09 PROCEDURE — 3074F SYST BP LT 130 MM HG: CPT | Performed by: SURGERY

## 2022-12-09 PROCEDURE — 1090F PRES/ABSN URINE INCON ASSESS: CPT | Performed by: SURGERY

## 2022-12-09 PROCEDURE — G8417 CALC BMI ABV UP PARAM F/U: HCPCS | Performed by: SURGERY

## 2022-12-09 PROCEDURE — 99214 OFFICE O/P EST MOD 30 MIN: CPT | Performed by: SURGERY

## 2022-12-09 PROCEDURE — 1036F TOBACCO NON-USER: CPT | Performed by: SURGERY

## 2022-12-09 PROCEDURE — G8484 FLU IMMUNIZE NO ADMIN: HCPCS | Performed by: SURGERY

## 2022-12-09 PROCEDURE — 3078F DIAST BP <80 MM HG: CPT | Performed by: SURGERY

## 2022-12-09 PROCEDURE — G8400 PT W/DXA NO RESULTS DOC: HCPCS | Performed by: SURGERY

## 2022-12-09 PROCEDURE — 3017F COLORECTAL CA SCREEN DOC REV: CPT | Performed by: SURGERY

## 2022-12-12 NOTE — PROGRESS NOTES
PATIENT NAME: Bri Messina OF BIRTH: 1955     TODAY'S DATE: 12/12/2022    Reason for Visit:  Epigastric pain    Requesting Physician:  Dr. Lisa Caputo:              The patient is a 69 y.o. female with a PMHx as delineated below who presents with complaints of episodic epigastric pain. The pain is sometimes associated with meals but not always. The patient has also had heartburn. The patient is s/p lap kathrine in 6/21. Symptoms persisted after surgery. EGD done revealed gastric ulcers and esophagitis that later healed with PPI treatment. She states her symptoms have improved. Chief Complaint   Patient presents with    Follow-up     Abd.  Pain after gallbladder surgery 6/21       REVIEW OF SYSTEMS:  CONSTITUTIONAL:  negative  HEENT:  negative  RESPIRATORY:  negative  CARDIOVASCULAR:  negative  GASTROINTESTINAL:  negative except for abdominal pain and reflux  GENITOURINARY:  negative  HEMATOLOGIC/LYMPHATIC:  negative  MUSCULOSKELETAL: negative  NEUROLOGICAL:  negative    PMH  Past Medical History:   Diagnosis Date    pearl Apple MD    GERD (gastroesophageal reflux disease)     Goiter     Heart failure with preserved ejection fraction [facility])     History of mammogram 10/2010    [address]    Hyperlipidemia     Hypertension     Onychomycosis     Pap smear for cervical cancer screening 02/2010    Dr. Saleem Atkins - wnl    Prediabetes 12/23/2019    Screening for osteoporosis     Dexa 12/2005 - wnl    Stomach ulcer     Vitamin D deficiency 11/10/2011       PSH  Past Surgical History:   Procedure Laterality Date    APPENDECTOMY  2/4/2000    CHOLECYSTECTOMY, LAPAROSCOPIC N/A 6/23/2021    LAPAROSCOPIC CHOLECYSTECTOMY WITH CHOLANGIOGRAM performed by Jessica Stearns MD at [address]  3/2011    Dr. Roberta Smith - 2 years  f/u    COLONOSCOPY  10/19/2016    Kirby Jacques MD    ESOPHAGEAL DILATATION  4/2014    Ang Ness MD KNEE SURGERY      right    PILONIDAL CYST EXCISION      TONSILLECTOMY      UPPER GASTROINTESTINAL ENDOSCOPY  3/19/14    concentric rings in esophagus    UPPER GASTROINTESTINAL ENDOSCOPY N/A 6/10/2022    EGD DILATION BALLOON performed by Emeterio Muniz MD at [facility] N/A 6/10/2022    EGD BIOPSY performed by Emeterio Muniz MD at [address] [place] [address] ENDOSCOPY N/A 9/9/2022    EGD DIAGNOSTIC ONLY performed by Emeterio Muniz MD at [address] History     Socioeconomic History    Marital status:      Spouse name: Not on file    Number of children: Not on file    Years of education: Not on file    Highest education level: Not on file   Occupational History    Not on file   Tobacco Use    Smoking status: Never    Smokeless tobacco: Never   Vaping Use    Vaping Use: Never used   Substance and Sexual Activity    Alcohol use: Not Currently    Drug use: No    Sexual activity: Not on file   Other Topics Concern    Not on file   Social History Narrative    Not on file     Social Determinants of Health     Financial Resource Strain: Low Risk     Difficulty of Paying Living Expenses: Not hard at all   Food Insecurity: No Food Insecurity    Worried About Running Out of Food in the Last Year: Never true    Ran Out of Food in the Last Year: Never true   Transportation Needs: Not on file   Physical Activity: Inactive    Days of Exercise per Week: 0 days    Minutes of Exercise per Session: 0 min   Stress: Not on file   Social Connections: Not on file   Intimate Partner Violence: Not on file   Housing Stability: Not on file       Family History:       Problem Relation Age of Onset    Other Mother         vascular disease    Hypertension Mother     High Cholesterol Mother     Diabetes Mother     Heart Disease Mother     Heart Disease Father     Heart Attack Father     Cancer Maternal Grandmother 32        unknown type - cancer     Stroke Neg Hx        Allergy:   Allergies   Allergen Reactions    Sulfa Antibiotics Rash       PHYSICAL EXAM:  VITALS:  BP (!) 181/94   Pulse 67   Ht 5' 4\" (1.626 m)   Wt 212 lb (96.2 kg)   LMP 05/01/2008 (Approximate) Comment: 15 years ago  BMI 36.39 kg/m²     CONSTITUTIONAL:  alert, no apparent distress and moderately obese  EYES:  sclera clear  ENT:  normocepalic, without obvious abnormality  NECK:  supple, symmetrical, trachea midline and no carotid bruits  LUNGS:  clear to auscultation  CARDIOVASCULAR:  regular rate and rhythm and no murmur noted  ABDOMEN:  scars noted, normal bowel sounds, soft, non-distended, non-tender, voluntary guarding absent, no masses palpated and hernia absent  MUSCULOSKELETAL:  0+ pitting edema lower extremities  NEUROLOGIC:  Mental Status Exam:  Level of Alertness:   awake  Orientation:   person, place, time  SKIN:  no bruising or bleeding and normal skin color, texture, turgor    DATA:    Radiology Review:    CT scan 4/22  Lower Chest: There is no consolidation or effusion. Organs: The liver, pancreas, spleen, kidneys and adrenals are unremarkable   aside from an 11 mm right adrenal myelolipoma. GI/Bowel: There is no bowel dilatation, wall thickening or obstruction. Pelvis: The uterus is mildly enlarged and lobular in contour consistent with   underlying fibroid disease. The bladder is decompressed and thus not   evaluated. Peritoneum/Retroperitoneum: There is no free air, free fluid or   intraperitoneal inflammatory change. There is no adenopathy. Bones/Soft Tissues: There is no acute fracture or aggressive osseous lesion. MRI 9/22  Liver: Mild-to-moderate diffuse hepatic steatosis noted in the liver. No   focal liver lesions identified. Biliary system: The gallbladder is not visualized likely surgically absent. Normal common bile duct. No CBD stone noted. No intrahepatic bile duct   dilatation identified. Pancreas: Normal pancreas and pancreatic duct. Normal spleen. Adrenals: Stable 1 cm right adrenal fat containing nodule noted representing   a myelolipoma. Normal left adrenal.       Kidneys: The simple cortical and parapelvic cyst in both kidneys measuring   2-3 cm. There is no hydronephrosis. Retroperitoneum: Abdominal aorta is normal in caliber with no evidence of   aneurysmal dilatation or dissection. No retroperitoneal adenopathy or bleed. [facility] GI: No acute abnormality in the GI tract in the upper abdomen. EGD  Findings:   Normal Esophagogastroduodenoscopy   Previous esophagitis has healed     Rec:  Antireflux diet  Pantoprazole 40 mg daily  MRI abd/MRCP to rule out retained common bile duct stone  Follow up in office in 3 months  Follow up with primary care physician    IMPRESSION/RECOMMENDATIONS:    The patient has epigastric pain that is episodic. The patient had known esophagitis that alhaji healed with PPI therapy. She does not an improvement in her symptoms but not entirely. I suspect a her symptoms are related to an acid related disorder and she needs to continue with PPI and GI follow up.       Faraz Heath MD

## 2022-12-19 ENCOUNTER — TELEPHONE (OUTPATIENT)
Dept: CARDIOLOGY CLINIC | Age: 67
End: 2022-12-19

## 2022-12-19 NOTE — TELEPHONE ENCOUNTER
Raysa Browne, 1955    Cardiac Risk Assessment    What type of procedure are you having?: Right Knee Replacement    When is your procedure scheduled for?: 01/26/22    What physician is performing your procedure?: Dr. Eagle Canela     Phone Number: [phone]    Fax number to send the letter: [phone]    Cardiologist: Dr. Tello Arroyo    Last Appointment: 11/17/22    Next Appointment: 05/18/22    Are you on any blood thinners?: No    History of Coronary Artery Disease:    Last stress test: 03/10/21    Last echo:     Device Type and :

## 2022-12-21 NOTE — TELEPHONE ENCOUNTER
Informed pt Cardiac Clearance was faxed to Dr. Leighann Sargent office [phone] (other fax # [phone] was the wrong fax #). Pt verbalized understanding.

## 2023-01-05 ENCOUNTER — TELEPHONE (OUTPATIENT)
Dept: PRIMARY CARE CLINIC | Age: 68
End: 2023-01-05

## 2023-01-05 NOTE — TELEPHONE ENCOUNTER
Michele Pierre from [facility] is requesting a ekg tracing to be signed and faxed on the day of patients appointment    Fax number [phone] Attn- pre admission testing    Call back [phone]

## 2023-01-06 NOTE — PROGRESS NOTES
Preoperative Consultation    Name: Sheri Sampson  YOB: 1955    This patient presents to the office today for a preoperative consultation at the request of surgeon, Bin Hernandez, who plans on performing right knee replacement on January 26 at [facility].      Planned anesthesia: General   Known anesthesia problems: None   Bleeding risk: No recent or remote history of abnormal bleeding  Personal or FH ofDVT/PE: No      ORTHO:  Having surgery at [facility]. Will likely stay couple of days. Will be discharged to a SNF (hopefully [facility]) because she has 15 steps up to her apartment. Planning to stay at SNF a couple of weeks. CARDIO:  H/o CHF (HFpEF)  Follows with Dr Alex Hutchins clearance letter faxed 12/21/22  Had heart cath 3/2021 and was normal.   Denies any chest pains, edema, shortness of breath. HYPERTENSION:  H/o resistant hypertension   On coreg 25mg bid, irbesartan 300mg daily, spironolactone 25mg daily, hydralazine 50mg BID  BP stable at home. Usually high at the dr office. CHOL:  On lipitor 80mg daily    GERD:  H/o ulcer  On PPI  EGD 9/9/22    Patient Active Problem List   Diagnosis    Goiter    Onychomycosis    Essential hypertension    Vitamin D deficiency    Microscopic hematuria    Diastolic dysfunction    Family history of coronary artery disease    Gastroesophageal reflux    Thyroid nodule    Hyperglycemia    Hyperlipidemia    Prediabetes    Primary insomnia    Coronary artery disease involving native coronary artery of native heart without angina pectoris    Biliary colic    H/O angiography    Uncontrolled hypertension    Dizziness    Hypokalemia    Chronic heart failure with preserved ejection fraction (HFpEF) ([facility])    H/O coronary angiogram    Elevated urine levels of catecholamines    Hashimoto's thyroiditis    Symptomatic cholelithiasis    Neck pain    Severe obesity (BMI 35.0-39. 9) with comorbidity ([address]    Ulcer of esophagus without bleeding Gastroesophageal reflux disease with esophagitis without hemorrhage    Mixed hyperlipidemia     Past Surgical History:   Procedure Laterality Date    APPENDECTOMY  2/4/2000    CHOLECYSTECTOMY, LAPAROSCOPIC N/A 6/23/2021    LAPAROSCOPIC CHOLECYSTECTOMY WITH CHOLANGIOGRAM performed by Edel García MD at [address]  3/2011    Dr. Nina Ybarra - polyps - 2 years  f/u    COLONOSCOPY  10/19/2016    Carlton Mitchell MD    ESOPHAGEAL DILATATION  4/2014    Casper Smith MD     KNEE SURGERY      right    PILONIDAL CYST EXCISION      TONSILLECTOMY      UPPER GASTROINTESTINAL ENDOSCOPY  3/19/14    concentric rings in esophagus    UPPER GASTROINTESTINAL ENDOSCOPY N/A 6/10/2022    EGD DILATION BALLOON performed by Chata Garcia MD at [facility] N/A 6/10/2022    EGD BIOPSY performed by Chata Garcia MD at [facility] N/A 9/9/2022    EGD DIAGNOSTIC ONLY performed by Chata Garcia MD at [address]   Allergen Reactions    Sulfa Antibiotics Rash     Outpatient Medications Marked as Taking for the 1/10/23 encounter (Office Visit) with Sheila Leventhal, APRN - CNP   Medication Sig Dispense Refill    pantoprazole (PROTONIX) 40 MG tablet Take 1 tablet by mouth daily 30 tablet 5    atorvastatin (LIPITOR) 80 MG tablet TAKE ONE TABLET BY MOUTH ONCE NIGHTLY 90 tablet 1    irbesartan (AVAPRO) 300 MG tablet TAKE ONE TABLET BY MOUTH DAILY (Patient taking differently: at bedtime) 90 tablet 1    carvedilol (COREG) 25 MG tablet TAKE ONE TABLET BY MOUTH TWICE A  tablet 1    hydrALAZINE (APRESOLINE) 50 MG tablet Take 1 tablet by mouth 2 times daily 180 tablet 1    spironolactone (ALDACTONE) 25 MG tablet TAKE ONE TABLET BY MOUTH DAILY 90 tablet 3    Cholecalciferol (VITAMIN D3) 5000 units TABS Take 1 tablet by mouth daily      Multiple Vitamin (MULTIVITAMIN PO) Take  by mouth.            Social History Tobacco Use    Smoking status: Never    Smokeless tobacco: Never   Substance Use Topics    Alcohol use: Not Currently     Family History   Problem Relation Age of Onset    Other Mother         vascular disease    Hypertension Mother     High Cholesterol Mother     Diabetes Mother     Heart Disease Mother     Heart Disease Father     Heart Attack Father     Cancer Maternal Grandmother 32        unknown type - cancer     Stroke Neg Hx        Review of Systems  Review of Systems   Constitutional:  Negative for activity change, appetite change, chills, fatigue and unexpected weight change. HENT:  Negative for congestion, ear pain, hearing loss, nosebleeds, postnasal drip, sneezing and sore throat. Respiratory:  Negative for cough, chest tightness, shortness of breath and wheezing. Cardiovascular:  Negative for chest pain, palpitations and leg swelling. Gastrointestinal:  Negative for abdominal pain, blood in stool, constipation, diarrhea, nausea and vomiting. Genitourinary:  Negative for difficulty urinating and dysuria. Musculoskeletal:  Negative for arthralgias, back pain and neck pain. Skin:  Negative for color change, pallor and rash. Neurological:  Negative for dizziness, tremors, numbness and headaches. Hematological:  Does not bruise/bleed easily. Psychiatric/Behavioral:  Negative for agitation and sleep disturbance. The patient is not nervous/anxious.        RECENT LABS:  CBC:   Lab Results   Component Value Date/Time    WBC 6.8 01/10/2023 11:26 AM    HGB 14.1 01/10/2023 11:26 AM    HCT 42.4 01/10/2023 11:26 AM    MCH 31.1 01/10/2023 11:26 AM    MCHC 33.2 01/10/2023 11:26 AM    RDW 13.8 01/10/2023 11:26 AM     01/10/2023 11:26 AM    MPV 9.7 01/10/2023 11:26 AM     CMP:   Lab Results   Component Value Date/Time     01/10/2023 11:26 AM    K 4.6 01/10/2023 11:26 AM    K 3.3 03/13/2021 06:27 AM     01/10/2023 11:26 AM    CO2 23 01/10/2023 11:26 AM    ANIONGAP 15 01/10/2023 11:26 AM    GLUCOSE 96 01/10/2023 11:26 AM    BUN 17 01/10/2023 11:26 AM    CREATININE 0.6 01/10/2023 11:26 AM    GFRAA >60 09/26/2022 09:05 AM    GFRAA >60 09/19/2012 08:32 AM    CALCIUM 9.5 01/10/2023 11:26 AM    PROT 7.1 09/26/2022 09:05 AM    PROT 7.0 09/19/2012 08:32 AM    LABALBU 4.9 09/26/2022 09:05 AM    AGRATIO 2.2 09/26/2022 09:05 AM    BILITOT 0.5 09/26/2022 09:05 AM    ALKPHOS 55 09/26/2022 09:05 AM    ALT 31 09/26/2022 09:05 AM    AST 20 09/26/2022 09:05 AM    GLOB 2.2 07/29/2021 08:36 AM       HBA1C:   Lab Results   Component Value Date/Time    LABA1C 5.4 08/30/2022 09:07 AM    .3 08/30/2022 09:07 AM       OBJECTIVE:   /86   Pulse 82   Temp 98.1 °F (36.7 °C) (Oral)   Ht 5' 4\" (1.626 m)   Wt 214 lb (97.1 kg)   LMP 05/01/2008 (Approximate) Comment: 15 years ago  SpO2 97%   BMI 36.73 kg/m²  Weight: 214 lb (97.1 kg)       Physical Exam  Vitals and nursing note reviewed. Constitutional:       General: She is not in acute distress. Appearance: Normal appearance. She is well-developed and normal weight. She is not diaphoretic. Neck:      Thyroid: No thyromegaly. Vascular: No carotid bruit or JVD. Trachea: No tracheal deviation. Cardiovascular:      Rate and Rhythm: Normal rate and regular rhythm. Heart sounds: Normal heart sounds. No murmur heard. No friction rub. Pulmonary:      Effort: Pulmonary effort is normal. No respiratory distress. Breath sounds: Normal breath sounds. No stridor. No wheezing, rhonchi or rales. Abdominal:      General: Abdomen is flat. There is no distension. Palpations: Abdomen is soft. Musculoskeletal:      Right lower leg: No edema. Left lower leg: No edema. Lymphadenopathy:      Cervical: No cervical adenopathy. Skin:     General: Skin is warm and dry. Findings: No rash. Neurological:      General: No focal deficit present. Mental Status: She is alert and oriented to person, place, and time. Gait: Gait abnormal (limping gait). Psychiatric:         Mood and Affect: Mood normal.         Behavior: Behavior normal.         Thought Content: Thought content normal.         Judgment: Judgment normal.     EKG Interpretation: normal EKG, normal sinus rhythm, LBBB, which is a new finding from previous EKGs. EKG sent to Dr Jose Martin to review. Lab Review: Yes    ASSESSMENT:      1. Arthritis of right knee  Continue per ortho    2. Pre-op exam  EKG completed. Shows new LBBB. EKG sent to cardiology to review. Cardiac clearance letter in chart. Faxed to [facility] 12/21/22. Check labs today. - EKG 12 lead  - CBC; Future  - Basic Metabolic Panel; Future    3. Essential hypertension  Stable   - CBC; Future  - Basic Metabolic Panel; Future    4. Chronic heart failure with preserved ejection fraction (HFpEF) (HCC)  Stable  Continue per cardiology  Cardiac clearance letter sent per Dr Jose Martin    5. Severe obesity (BMI 35.0-39. 9) with comorbidity ([address]    6. Gastroesophageal reflux disease with esophagitis without hemorrhage  stable    7. Mixed hyperlipidemia       69 y.o. patient approved for Surgery if cleared per Cardiology. PLAN:  1. Preoperative workup as follows: ECG, hemoglobin, hematocrit, electrolytes, creatinine, glucose  2. Change in medication regimen before surgery:Take BP meds on morning of surgery with sip of water, and hold all other medications until after surgery. 3. No contraindications to planned surgery if cleared per cardiology.      Electronically signed by TIFFANIE Robert CNP on 1/11/2023 at 10:15 AM

## 2023-01-10 ENCOUNTER — OFFICE VISIT (OUTPATIENT)
Dept: PRIMARY CARE CLINIC | Age: 68
End: 2023-01-10
Payer: MEDICARE

## 2023-01-10 VITALS
DIASTOLIC BLOOD PRESSURE: 86 MMHG | BODY MASS INDEX: 36.54 KG/M2 | WEIGHT: 214 LBS | HEART RATE: 82 BPM | SYSTOLIC BLOOD PRESSURE: 136 MMHG | TEMPERATURE: 98.1 F | HEIGHT: 64 IN | OXYGEN SATURATION: 97 %

## 2023-01-10 DIAGNOSIS — M17.11 ARTHRITIS OF RIGHT KNEE: Primary | ICD-10-CM

## 2023-01-10 DIAGNOSIS — Z01.818 PRE-OP EXAM: ICD-10-CM

## 2023-01-10 DIAGNOSIS — I10 ESSENTIAL HYPERTENSION: ICD-10-CM

## 2023-01-10 DIAGNOSIS — E78.2 MIXED HYPERLIPIDEMIA: ICD-10-CM

## 2023-01-10 DIAGNOSIS — E66.01 SEVERE OBESITY (BMI 35.0-39.9) WITH COMORBIDITY (HCC): ICD-10-CM

## 2023-01-10 DIAGNOSIS — I50.32 CHRONIC HEART FAILURE WITH PRESERVED EJECTION FRACTION (HFPEF) (HCC): ICD-10-CM

## 2023-01-10 DIAGNOSIS — K21.00 GASTROESOPHAGEAL REFLUX DISEASE WITH ESOPHAGITIS WITHOUT HEMORRHAGE: ICD-10-CM

## 2023-01-10 LAB
ANION GAP SERPL CALCULATED.3IONS-SCNC: 15 MMOL/L (ref 3–16)
BUN BLDV-MCNC: 17 MG/DL (ref 7–20)
CALCIUM SERPL-MCNC: 9.5 MG/DL (ref 8.3–10.6)
CHLORIDE BLD-SCNC: 102 MMOL/L (ref 99–110)
CO2: 23 MMOL/L (ref 21–32)
CREAT SERPL-MCNC: 0.6 MG/DL (ref 0.6–1.2)
GFR SERPL CREATININE-BSD FRML MDRD: >60 ML/MIN/{1.73_M2}
GLUCOSE BLD-MCNC: 96 MG/DL (ref 70–99)
HCT VFR BLD CALC: 42.4 % (ref 36–48)
HEMOGLOBIN: 14.1 G/DL (ref 12–16)
MCH RBC QN AUTO: 31.1 PG (ref 26–34)
MCHC RBC AUTO-ENTMCNC: 33.2 G/DL (ref 31–36)
MCV RBC AUTO: 93.5 FL (ref 80–100)
PDW BLD-RTO: 13.8 % (ref 12.4–15.4)
PLATELET # BLD: 242 K/UL (ref 135–450)
PMV BLD AUTO: 9.7 FL (ref 5–10.5)
POTASSIUM SERPL-SCNC: 4.6 MMOL/L (ref 3.5–5.1)
RBC # BLD: 4.53 M/UL (ref 4–5.2)
SODIUM BLD-SCNC: 140 MMOL/L (ref 136–145)
WBC # BLD: 6.8 K/UL (ref 4–11)

## 2023-01-10 PROCEDURE — 3079F DIAST BP 80-89 MM HG: CPT | Performed by: NURSE PRACTITIONER

## 2023-01-10 PROCEDURE — G8417 CALC BMI ABV UP PARAM F/U: HCPCS | Performed by: NURSE PRACTITIONER

## 2023-01-10 PROCEDURE — 99214 OFFICE O/P EST MOD 30 MIN: CPT | Performed by: NURSE PRACTITIONER

## 2023-01-10 PROCEDURE — 1036F TOBACCO NON-USER: CPT | Performed by: NURSE PRACTITIONER

## 2023-01-10 PROCEDURE — G8427 DOCREV CUR MEDS BY ELIG CLIN: HCPCS | Performed by: NURSE PRACTITIONER

## 2023-01-10 PROCEDURE — 1123F ACP DISCUSS/DSCN MKR DOCD: CPT | Performed by: NURSE PRACTITIONER

## 2023-01-10 PROCEDURE — 3017F COLORECTAL CA SCREEN DOC REV: CPT | Performed by: NURSE PRACTITIONER

## 2023-01-10 PROCEDURE — 3075F SYST BP GE 130 - 139MM HG: CPT | Performed by: NURSE PRACTITIONER

## 2023-01-10 PROCEDURE — G8400 PT W/DXA NO RESULTS DOC: HCPCS | Performed by: NURSE PRACTITIONER

## 2023-01-10 PROCEDURE — 93000 ELECTROCARDIOGRAM COMPLETE: CPT | Performed by: NURSE PRACTITIONER

## 2023-01-10 PROCEDURE — 1090F PRES/ABSN URINE INCON ASSESS: CPT | Performed by: NURSE PRACTITIONER

## 2023-01-10 PROCEDURE — G8484 FLU IMMUNIZE NO ADMIN: HCPCS | Performed by: NURSE PRACTITIONER

## 2023-01-10 ASSESSMENT — ANXIETY QUESTIONNAIRES
5. BEING SO RESTLESS THAT IT IS HARD TO SIT STILL: 0
6. BECOMING EASILY ANNOYED OR IRRITABLE: 0
GAD7 TOTAL SCORE: 0
4. TROUBLE RELAXING: 0
2. NOT BEING ABLE TO STOP OR CONTROL WORRYING: 0
IF YOU CHECKED OFF ANY PROBLEMS ON THIS QUESTIONNAIRE, HOW DIFFICULT HAVE THESE PROBLEMS MADE IT FOR YOU TO DO YOUR WORK, TAKE CARE OF THINGS AT HOME, OR GET ALONG WITH OTHER PEOPLE: NOT DIFFICULT AT ALL
3. WORRYING TOO MUCH ABOUT DIFFERENT THINGS: 0
1. FEELING NERVOUS, ANXIOUS, OR ON EDGE: 0
7. FEELING AFRAID AS IF SOMETHING AWFUL MIGHT HAPPEN: 0

## 2023-01-10 ASSESSMENT — ENCOUNTER SYMPTOMS
WHEEZING: 0
CONSTIPATION: 0
BLOOD IN STOOL: 0
CHEST TIGHTNESS: 0
BACK PAIN: 0
DIARRHEA: 0
ABDOMINAL PAIN: 0
COLOR CHANGE: 0
SORE THROAT: 0
NAUSEA: 0
COUGH: 0
VOMITING: 0
SHORTNESS OF BREATH: 0

## 2023-01-10 ASSESSMENT — PATIENT HEALTH QUESTIONNAIRE - PHQ9
4. FEELING TIRED OR HAVING LITTLE ENERGY: 0
SUM OF ALL RESPONSES TO PHQ9 QUESTIONS 1 & 2: 0
SUM OF ALL RESPONSES TO PHQ QUESTIONS 1-9: 0
8. MOVING OR SPEAKING SO SLOWLY THAT OTHER PEOPLE COULD HAVE NOTICED. OR THE OPPOSITE, BEING SO FIGETY OR RESTLESS THAT YOU HAVE BEEN MOVING AROUND A LOT MORE THAN USUAL: 0
3. TROUBLE FALLING OR STAYING ASLEEP: 0
SUM OF ALL RESPONSES TO PHQ QUESTIONS 1-9: 0
9. THOUGHTS THAT YOU WOULD BE BETTER OFF DEAD, OR OF HURTING YOURSELF: 0
SUM OF ALL RESPONSES TO PHQ QUESTIONS 1-9: 0
10. IF YOU CHECKED OFF ANY PROBLEMS, HOW DIFFICULT HAVE THESE PROBLEMS MADE IT FOR YOU TO DO YOUR WORK, TAKE CARE OF THINGS AT HOME, OR GET ALONG WITH OTHER PEOPLE: 0
7. TROUBLE CONCENTRATING ON THINGS, SUCH AS READING THE NEWSPAPER OR WATCHING TELEVISION: 0
2. FEELING DOWN, DEPRESSED OR HOPELESS: 0
5. POOR APPETITE OR OVEREATING: 0
1. LITTLE INTEREST OR PLEASURE IN DOING THINGS: 0
6. FEELING BAD ABOUT YOURSELF - OR THAT YOU ARE A FAILURE OR HAVE LET YOURSELF OR YOUR FAMILY DOWN: 0
SUM OF ALL RESPONSES TO PHQ QUESTIONS 1-9: 0

## 2023-01-10 NOTE — RESULT ENCOUNTER NOTE
EKG now shows LBBB, which has changed since the last EKG we have in her chart. Will you please review? Patient scheduled for right knee replacement 1/26/23. Thanks.

## 2023-01-12 ENCOUNTER — TELEPHONE (OUTPATIENT)
Dept: CARDIOLOGY CLINIC | Age: 68
End: 2023-01-12

## 2023-01-12 DIAGNOSIS — I50.32 CHRONIC HEART FAILURE WITH PRESERVED EJECTION FRACTION (HFPEF) (HCC): Primary | ICD-10-CM

## 2023-01-12 NOTE — TELEPHONE ENCOUNTER
Patient called stated that she's due to have surgery on 1/26/23 with [facility] but now she's going to attend [facility] for the procedure. Patient stated that there was some new development and they (want to run it past Dr. Edouard Silverio. A situation was found in her recent EKG which is making them concerned to do the procedure now. Just wondering if they need to cancel the surgery or not. Dr. Maylin Vallecillo from [facility] is doing the procedure. This is the number to [place]'s office [phone]. Patient wants a call back to discuss the findings as well.  Patient's number is [phone]

## 2023-01-13 NOTE — TELEPHONE ENCOUNTER
Informed pt GEB would like pt to have an Echo before he give Clearance for upcoming procedure. Pt scheduled scheduled for echo on 1/16/23 at the [facility]. Pt  verbalized understanding.

## 2023-01-16 ENCOUNTER — PROCEDURE VISIT (OUTPATIENT)
Dept: CARDIOLOGY CLINIC | Age: 68
End: 2023-01-16
Payer: MEDICARE

## 2023-01-16 DIAGNOSIS — I50.32 CHRONIC HEART FAILURE WITH PRESERVED EJECTION FRACTION (HFPEF) (HCC): ICD-10-CM

## 2023-01-16 LAB
LV EF: 50 %
LVEF MODALITY: NORMAL

## 2023-01-16 PROCEDURE — 93306 TTE W/DOPPLER COMPLETE: CPT | Performed by: INTERNAL MEDICINE

## 2023-01-17 ENCOUNTER — TELEPHONE (OUTPATIENT)
Dept: CARDIOLOGY CLINIC | Age: 68
End: 2023-01-17

## 2023-01-17 NOTE — TELEPHONE ENCOUNTER
Informed pt , Juan Diego Lopez at [facility] pre-surgery [phone], and Kristel Todd at Dr. Elkin Keys office pt is an intermediate risk for surgery. Cardiac clearance letter faxed to [facility] pre-surgical department fax # [phone] and faxed to Dr. Elkin Keys office fax # [phone]. Pt, Juan Diego Lopez, and Kristel Todd verbalized understanding.

## 2023-01-19 NOTE — RESULT ENCOUNTER NOTE
Please let patient know that her echocardiogram is overall within normal limits except for mild to moderate mitral regurgitation which we can follow with echocardiograms approximately every 3 to 5 years.

## 2023-03-29 ENCOUNTER — TELEPHONE (OUTPATIENT)
Dept: PRIMARY CARE CLINIC | Age: 68
End: 2023-03-29

## 2023-04-06 DIAGNOSIS — I10 ESSENTIAL HYPERTENSION: ICD-10-CM

## 2023-04-06 RX ORDER — IRBESARTAN 300 MG/1
TABLET ORAL
Qty: 90 TABLET | Refills: 1 | Status: SHIPPED | OUTPATIENT
Start: 2023-04-06

## 2023-04-06 NOTE — TELEPHONE ENCOUNTER
11:26 AM    GFR >90 05/13/2017 08:24 AM    GFR >90 05/13/2017 08:24 AM    GFRAA >60 09/26/2022 09:05 AM    GFRAA >60 09/19/2012 08:32 AM       Last OARRS: No flowsheet data found.     Preferred Pharmacy:   [facility] [id number] [address] [phone] [facility] Earnest [phone]  [facility] Eduardo Lopez  Phone: [phone] Fax: [phone]  Medication:   Requested Prescriptions     Pending Prescriptions Disp Refills    irbesartan (AVAPRO) 300 MG tablet [Pharmacy Med Name: IRBESARTAN 300 MG TABLET] 90 tablet 1     Sig: TAKE ONE TABLET BY MOUTH DAILY       Last Filled:      Patient Phone Number: [phone] (home)     Last appt: 1/10/2023   Next appt: 4/18/2023    Last BMP:   Lab Results   Component Value Date/Time     01/10/2023 11:26 AM    K 4.6 01/10/2023 11:26 AM    K 3.3 03/13/2021 06:27 AM     01/10/2023 11:26 AM    CO2 23 01/10/2023 11:26 AM    ANIONGAP 15 01/10/2023 11:26 AM    GLUCOSE 96 01/10/2023 11:26 AM    BUN 17 01/10/2023 11:26 AM    CREATININE 0.6 01/10/2023 11:26 AM    LABGLOM >60 01/10/2023 11:26 AM    GFRAA >60 09/26/2022 09:05 AM    GFRAA >60 09/19/2012 08:32 AM    CALCIUM 9.5 01/10/2023 11:26 AM      Last CMP:   Lab Results   Component Value Date/Time     01/10/2023 11:26 AM    K 4.6 01/10/2023 11:26 AM    K 3.3 03/13/2021 06:27 AM     01/10/2023 11:26 AM    CO2 23 01/10/2023 11:26 AM    ANIONGAP 15 01/10/2023 11:26 AM    GLUCOSE 96 01/10/2023 11:26 AM    BUN 17 01/10/2023 11:26 AM    CREATININE 0.6 01/10/2023 11:26 AM    LABGLOM >60 01/10/2023 11:26 AM    GFRAA >60 09/26/2022 09:05 AM    GFRAA >60 09/19/2012 08:32 AM    PROT 7.1 09/26/2022 09:05 AM    PROT 7.0 09/19/2012 08:32 AM    LABALBU 4.9 09/26/2022 09:05 AM    AGRATIO 2.2 09/26/2022 09:05 AM    BILITOT 0.5 09/26/2022 09:05 AM    ALKPHOS 55 09/26/2022 09:05 AM    ALT 31 09/26/2022 09:05 AM    AST 20 09/26/2022 09:05 AM    GLOB 2.2 07/29/2021 08:36 AM     Last Renal Function:

## 2023-04-07 RX ORDER — SPIRONOLACTONE 25 MG/1
TABLET ORAL
Qty: 90 TABLET | Refills: 3 | Status: SHIPPED | OUTPATIENT
Start: 2023-04-07

## 2023-04-18 ENCOUNTER — OFFICE VISIT (OUTPATIENT)
Dept: PRIMARY CARE CLINIC | Age: 68
End: 2023-04-18

## 2023-04-18 VITALS
SYSTOLIC BLOOD PRESSURE: 138 MMHG | HEART RATE: 81 BPM | BODY MASS INDEX: 36.22 KG/M2 | OXYGEN SATURATION: 97 % | WEIGHT: 211 LBS | DIASTOLIC BLOOD PRESSURE: 88 MMHG

## 2023-04-18 DIAGNOSIS — K59.00 CONSTIPATION, UNSPECIFIED CONSTIPATION TYPE: ICD-10-CM

## 2023-04-18 DIAGNOSIS — I10 ESSENTIAL HYPERTENSION: Primary | ICD-10-CM

## 2023-04-18 DIAGNOSIS — E78.2 MIXED HYPERLIPIDEMIA: ICD-10-CM

## 2023-04-18 DIAGNOSIS — K21.00 GASTROESOPHAGEAL REFLUX DISEASE WITH ESOPHAGITIS WITHOUT HEMORRHAGE: ICD-10-CM

## 2023-04-18 DIAGNOSIS — R73.03 PREDIABETES: ICD-10-CM

## 2023-04-18 DIAGNOSIS — R10.13 EPIGASTRIC ABDOMINAL PAIN: ICD-10-CM

## 2023-04-18 DIAGNOSIS — E55.9 VITAMIN D DEFICIENCY: ICD-10-CM

## 2023-04-18 RX ORDER — CARVEDILOL 25 MG/1
TABLET ORAL
Qty: 180 TABLET | Refills: 1 | Status: SHIPPED | OUTPATIENT
Start: 2023-04-18

## 2023-04-18 RX ORDER — AMOXICILLIN 250 MG
1 CAPSULE ORAL NIGHTLY
COMMUNITY
Start: 2023-04-12

## 2023-04-18 RX ORDER — POLYETHYLENE GLYCOL 3350 17 G/17G
17 POWDER, FOR SOLUTION ORAL DAILY
COMMUNITY
Start: 2023-04-12

## 2023-04-18 SDOH — ECONOMIC STABILITY: FOOD INSECURITY: WITHIN THE PAST 12 MONTHS, YOU WORRIED THAT YOUR FOOD WOULD RUN OUT BEFORE YOU GOT MONEY TO BUY MORE.: NEVER TRUE

## 2023-04-18 SDOH — ECONOMIC STABILITY: HOUSING INSECURITY
IN THE LAST 12 MONTHS, WAS THERE A TIME WHEN YOU DID NOT HAVE A STEADY PLACE TO SLEEP OR SLEPT IN A SHELTER (INCLUDING NOW)?: NO

## 2023-04-18 SDOH — ECONOMIC STABILITY: INCOME INSECURITY: HOW HARD IS IT FOR YOU TO PAY FOR THE VERY BASICS LIKE FOOD, HOUSING, MEDICAL CARE, AND HEATING?: NOT HARD AT ALL

## 2023-04-18 SDOH — ECONOMIC STABILITY: FOOD INSECURITY: WITHIN THE PAST 12 MONTHS, THE FOOD YOU BOUGHT JUST DIDN'T LAST AND YOU DIDN'T HAVE MONEY TO GET MORE.: NEVER TRUE

## 2023-04-18 ASSESSMENT — ENCOUNTER SYMPTOMS
CONSTIPATION: 1
WHEEZING: 0
ABDOMINAL DISTENTION: 0
COUGH: 0
CHEST TIGHTNESS: 0
VOMITING: 0
RHINORRHEA: 0
NAUSEA: 0
SHORTNESS OF BREATH: 0
ABDOMINAL PAIN: 1
SINUS PRESSURE: 0
DIARRHEA: 0
BACK PAIN: 0
SORE THROAT: 0
TROUBLE SWALLOWING: 0
BLOOD IN STOOL: 0

## 2023-04-18 NOTE — PATIENT INSTRUCTIONS
-Fast 8-10 hours for labs  -Low sodium diet  -Low fat, low cholesterol diet  -low carbohydrate diet  -Decrease caffeine, avoid spicy foods, avoid tomato based foods  -Eat small meals instead of large meals  -Wait 2-3 hours after eating before lying down

## 2023-04-18 NOTE — TELEPHONE ENCOUNTER
Medication:   Requested Prescriptions     Pending Prescriptions Disp Refills    carvedilol (COREG) 25 MG tablet [Pharmacy Med Name: CARVEDILOL 25 MG TABLET] 180 tablet 1     Sig: TAKE ONE TABLET BY MOUTH TWICE A DAY     Last Filled:  8.30.22    Last appt: 1/10/2023   Next appt: 4/18/2023    Last OARRS: No flowsheet data found.

## 2023-04-18 NOTE — PROGRESS NOTES
Date of Visit: 2023    Jerica Olivares (:  1955) is a 69 y.o. female,  Established patient here for evaluation of the following chief complaint(s):  Hypertension, Cholesterol Problem, Other (Prediabetes, vitamin d deficiency ), Gastroesophageal Reflux, and Constipation      ASSESSMENT/PLAN:    1. Essential hypertension  -stable  -Continue Hydralazine 50mg 2 times daily  -Continue carvedilol 25 mg 2 times daily  -Continue irbesartan (AVAPRO) 300 MG tablet; Take one tablet by mouth once daily  -Continue spironolactone 25 mg once daily  -Low sodium diet  -Regular aerobic exercise  - Comprehensive Metabolic Panel; Future    2. Mixed hyperlipidemia  -Stable  -Continue atorvastatin (LIPITOR) 80 MG tablet; Take one tablet by mouth once nightly  -Low fat, low cholesterol diet  -Regular aerobic exercise  -Lipid Panel; Future  -Comprehensive Metabolic Panel; Future    3. Prediabetes  -Stable  -Most recent Hemoglobin A1c is normal  -Low carbohydrate diet  -Regular aerobic exercise  -Hemoglobin A1C; Future    4. Vitamin D deficiency  -stable  -Continue vitamin D 5,000 IU once daily  -Vitamin D 25 Hydroxy; Future    5. Gastroesophageal reflux disease with esophagitis without hemorrhage  -stable  -Continue pantoprazole 40 mg once daily  -Decrease caffeine, avoid spicy foods, avoid tomato based foods  -Eat small meals instead of large meals  -Wait 2-3 hours after eating before lying down    6. Epigastric abdominal pain  -chronic and same  -Continue pantoprazole 40 mg once daily    7. Constipation, unspecified constipation type  -better in the past few days  -continue Senna daily  -Decrease Miralax to once daily as needed  -high fiber diet        Return in about 4 months (around 2023) for Medicare AW. SUBJECTIVE:    Patient has hypertension. Patient states Hydralazine 50mg 2 times daily, carvedilol 25 mg 2 times daily, irbesartan 300 mg once daily, and spironolactone 25 mg once daily.  Patient

## 2023-04-24 RX ORDER — HYDRALAZINE HYDROCHLORIDE 50 MG/1
TABLET, FILM COATED ORAL
Qty: 180 TABLET | Refills: 1 | Status: SHIPPED | OUTPATIENT
Start: 2023-04-24

## 2023-04-24 NOTE — TELEPHONE ENCOUNTER
Medication:   Requested Prescriptions     Pending Prescriptions Disp Refills    hydrALAZINE (APRESOLINE) 50 MG tablet [Pharmacy Med Name: hydrALAZINE 50 MG TABLET] 180 tablet 1     Sig: TAKE ONE TABLET BY MOUTH TWICE A DAY     Last Filled:  08/30/2022    Last appt: 4/18/2023   Next appt: Visit date not found    Last OARRS: No flowsheet data found.

## 2023-04-27 ENCOUNTER — TELEPHONE (OUTPATIENT)
Dept: CARDIOLOGY CLINIC | Age: 68
End: 2023-04-27

## 2023-04-27 NOTE — TELEPHONE ENCOUNTER
Patient is calling to see if she can get a prescription for a disability placard, hers  has .  [phone]

## 2023-04-28 ENCOUNTER — TELEPHONE (OUTPATIENT)
Dept: PRIMARY CARE CLINIC | Age: 68
End: 2023-04-28

## 2023-04-28 NOTE — TELEPHONE ENCOUNTER
Informed pt GEB reiview pts information and suggests the pt to call the ortho doctor for the handicap placard since she needs due to her knees. Pt verbalized understanding.

## 2023-05-02 NOTE — TELEPHONE ENCOUNTER
Call patient. She needs to call or schedule appointment with her Orthopedic Surgeon to renew her temporary handicap parking placard since it related to her knee replacement and impaired ambulation post surgery.

## 2023-05-11 DIAGNOSIS — E78.2 MIXED HYPERLIPIDEMIA: ICD-10-CM

## 2023-05-11 RX ORDER — ATORVASTATIN CALCIUM 80 MG/1
TABLET, FILM COATED ORAL
Qty: 90 TABLET | Refills: 1 | Status: SHIPPED | OUTPATIENT
Start: 2023-05-11

## 2023-05-11 NOTE — TELEPHONE ENCOUNTER
Medication:   Requested Prescriptions     Pending Prescriptions Disp Refills    atorvastatin (LIPITOR) 80 MG tablet [Pharmacy Med Name: ATORVASTATIN 80 MG TABLET] 90 tablet 1     Sig: TAKE ONE TABLET BY MOUTH ONCE NIGHTLY     Last Filled:  08/30/2022    Last appt: 4/18/2023   Next appt: 9/8/2023    Last OARRS: No flowsheet data found.

## 2023-05-18 ENCOUNTER — OFFICE VISIT (OUTPATIENT)
Dept: CARDIOLOGY CLINIC | Age: 68
End: 2023-05-18
Payer: MEDICARE

## 2023-05-18 VITALS
SYSTOLIC BLOOD PRESSURE: 146 MMHG | DIASTOLIC BLOOD PRESSURE: 98 MMHG | BODY MASS INDEX: 36.56 KG/M2 | WEIGHT: 213 LBS | HEART RATE: 64 BPM

## 2023-05-18 DIAGNOSIS — I10 ESSENTIAL HYPERTENSION: Primary | ICD-10-CM

## 2023-05-18 PROCEDURE — G8417 CALC BMI ABV UP PARAM F/U: HCPCS | Performed by: INTERNAL MEDICINE

## 2023-05-18 PROCEDURE — 1036F TOBACCO NON-USER: CPT | Performed by: INTERNAL MEDICINE

## 2023-05-18 PROCEDURE — G8428 CUR MEDS NOT DOCUMENT: HCPCS | Performed by: INTERNAL MEDICINE

## 2023-05-18 PROCEDURE — 1123F ACP DISCUSS/DSCN MKR DOCD: CPT | Performed by: INTERNAL MEDICINE

## 2023-05-18 PROCEDURE — 3017F COLORECTAL CA SCREEN DOC REV: CPT | Performed by: INTERNAL MEDICINE

## 2023-05-18 PROCEDURE — 3077F SYST BP >= 140 MM HG: CPT | Performed by: INTERNAL MEDICINE

## 2023-05-18 PROCEDURE — G8400 PT W/DXA NO RESULTS DOC: HCPCS | Performed by: INTERNAL MEDICINE

## 2023-05-18 PROCEDURE — 99214 OFFICE O/P EST MOD 30 MIN: CPT | Performed by: INTERNAL MEDICINE

## 2023-05-18 PROCEDURE — 3080F DIAST BP >= 90 MM HG: CPT | Performed by: INTERNAL MEDICINE

## 2023-05-18 PROCEDURE — 1090F PRES/ABSN URINE INCON ASSESS: CPT | Performed by: INTERNAL MEDICINE

## 2023-05-18 NOTE — PROGRESS NOTES
Cc: Resistant HTN, HFpEF, CAD    HPI:     Patient is a 69years old woman, overweight, with history of resistant hypertension, prediabetes, hyperlipidemia, CAD, HFpEF. Patient was assessed with a right upper quadrant ultrasound 2/9/2021: Fatty liver, no gallstones however there was sludge in the gallbladder. Patient was seen by surgery, was here for clearance, had cholecystectomy in 03/2022 completed without any complications. Echo 10/2019: Normal except for indeterminate diastolic function     CCTA 10/2019: Moderate diffuse calcifications of the coronaries without any significant stenosis     Patient has been complaining of some discomfort under her breasts, epigastric and possibly midsternal, unrelated to activity. She also admits to some exertional shortness of breath. She denies any orthopnea or lower extremity edema. She was seen by cardiologist at [facility] about 2 years ago, had a cardiac work-up, no intervention was recommended, she quit going to her appointments. Her BP at home remains around 130/70s. She does not check her blood pressure frequently. Patient's father, smoker, had CABG at the age of 50years old and a repeat CABG at the age of 61years old; he passed away as a complication after his second bypass. ECG 2/4/2021: Normal     Lexiscan 3/10/2021: Canceled due to extreme hypertension (257/118 mmHg, patient held her BP medications). LHC 3/12/2021: Normal coronaries, normal renal arteries, normal LVEF 55%. CT abd with IV contrast 04/2021: 8-10 mm R adrenal lesion, possible lipoma. All labs for secondary HTN were reviewed (03/2021), only abnormalities: aldosterone/renin activity elevated but with normal aldosterone level, increased norepi in urine. Patient follows with Shravan Trevino, endocrinology who referred patient to Dr Cas Ellis at [facility] regarding right adrenal mass, conservative approach was elected. Patient is here for a follow up.  Her BP at home is within normal limits

## 2023-06-29 DIAGNOSIS — E78.2 MIXED HYPERLIPIDEMIA: ICD-10-CM

## 2023-06-29 DIAGNOSIS — E55.9 VITAMIN D DEFICIENCY: ICD-10-CM

## 2023-06-29 DIAGNOSIS — I10 ESSENTIAL HYPERTENSION: ICD-10-CM

## 2023-06-29 DIAGNOSIS — R73.03 PREDIABETES: ICD-10-CM

## 2023-06-29 LAB
25(OH)D3 SERPL-MCNC: 50.3 NG/ML
ALBUMIN SERPL-MCNC: 4.8 G/DL (ref 3.4–5)
ALBUMIN/GLOB SERPL: 2.1 {RATIO} (ref 1.1–2.2)
ALP SERPL-CCNC: 55 U/L (ref 40–129)
ALT SERPL-CCNC: 23 U/L (ref 10–40)
ANION GAP SERPL CALCULATED.3IONS-SCNC: 11 MMOL/L (ref 3–16)
AST SERPL-CCNC: 19 U/L (ref 15–37)
BILIRUB SERPL-MCNC: 0.5 MG/DL (ref 0–1)
BUN SERPL-MCNC: 17 MG/DL (ref 7–20)
CALCIUM SERPL-MCNC: 10.1 MG/DL (ref 8.3–10.6)
CHLORIDE SERPL-SCNC: 101 MMOL/L (ref 99–110)
CHOLEST SERPL-MCNC: 186 MG/DL (ref 0–199)
CO2 SERPL-SCNC: 28 MMOL/L (ref 21–32)
CREAT SERPL-MCNC: 0.8 MG/DL (ref 0.6–1.2)
EST. AVERAGE GLUCOSE BLD GHB EST-MCNC: 108.3 MG/DL
GFR SERPLBLD CREATININE-BSD FMLA CKD-EPI: >60 ML/MIN/{1.73_M2}
GLUCOSE SERPL-MCNC: 103 MG/DL (ref 70–99)
HBA1C MFR BLD: 5.4 %
HDLC SERPL-MCNC: 61 MG/DL (ref 40–60)
LDLC SERPL CALC-MCNC: 79 MG/DL
POTASSIUM SERPL-SCNC: 4.6 MMOL/L (ref 3.5–5.1)
PROT SERPL-MCNC: 7.1 G/DL (ref 6.4–8.2)
SODIUM SERPL-SCNC: 140 MMOL/L (ref 136–145)
TRIGL SERPL-MCNC: 232 MG/DL (ref 0–150)
VLDLC SERPL CALC-MCNC: 46 MG/DL

## 2023-09-05 SDOH — HEALTH STABILITY: PHYSICAL HEALTH: ON AVERAGE, HOW MANY DAYS PER WEEK DO YOU ENGAGE IN MODERATE TO STRENUOUS EXERCISE (LIKE A BRISK WALK)?: 0 DAYS

## 2023-09-05 ASSESSMENT — LIFESTYLE VARIABLES
HOW OFTEN DO YOU HAVE SIX OR MORE DRINKS ON ONE OCCASION: 1
HOW MANY STANDARD DRINKS CONTAINING ALCOHOL DO YOU HAVE ON A TYPICAL DAY: 1
HOW OFTEN DO YOU HAVE A DRINK CONTAINING ALCOHOL: MONTHLY OR LESS
HOW OFTEN DO YOU HAVE A DRINK CONTAINING ALCOHOL: 2
HOW MANY STANDARD DRINKS CONTAINING ALCOHOL DO YOU HAVE ON A TYPICAL DAY: 1 OR 2

## 2023-09-08 ENCOUNTER — OFFICE VISIT (OUTPATIENT)
Dept: PRIMARY CARE CLINIC | Age: 68
End: 2023-09-08
Payer: MEDICARE

## 2023-09-08 VITALS
HEART RATE: 75 BPM | RESPIRATION RATE: 13 BRPM | WEIGHT: 212 LBS | TEMPERATURE: 97.3 F | BODY MASS INDEX: 36.19 KG/M2 | SYSTOLIC BLOOD PRESSURE: 136 MMHG | DIASTOLIC BLOOD PRESSURE: 86 MMHG | OXYGEN SATURATION: 98 % | HEIGHT: 64 IN

## 2023-09-08 DIAGNOSIS — Z23 NEEDS FLU SHOT: ICD-10-CM

## 2023-09-08 DIAGNOSIS — R73.03 PREDIABETES: ICD-10-CM

## 2023-09-08 DIAGNOSIS — K21.00 GASTROESOPHAGEAL REFLUX DISEASE WITH ESOPHAGITIS WITHOUT HEMORRHAGE: ICD-10-CM

## 2023-09-08 DIAGNOSIS — K59.00 CONSTIPATION, UNSPECIFIED CONSTIPATION TYPE: ICD-10-CM

## 2023-09-08 DIAGNOSIS — I10 ESSENTIAL HYPERTENSION: ICD-10-CM

## 2023-09-08 DIAGNOSIS — E78.2 MIXED HYPERLIPIDEMIA: ICD-10-CM

## 2023-09-08 DIAGNOSIS — R14.3 EXCESSIVE GAS: ICD-10-CM

## 2023-09-08 DIAGNOSIS — Z00.00 MEDICARE ANNUAL WELLNESS VISIT, SUBSEQUENT: Primary | ICD-10-CM

## 2023-09-08 DIAGNOSIS — E55.9 VITAMIN D DEFICIENCY: ICD-10-CM

## 2023-09-08 PROCEDURE — 3078F DIAST BP <80 MM HG: CPT | Performed by: INTERNAL MEDICINE

## 2023-09-08 PROCEDURE — 3017F COLORECTAL CA SCREEN DOC REV: CPT | Performed by: INTERNAL MEDICINE

## 2023-09-08 PROCEDURE — G0008 ADMIN INFLUENZA VIRUS VAC: HCPCS | Performed by: INTERNAL MEDICINE

## 2023-09-08 PROCEDURE — G0439 PPPS, SUBSEQ VISIT: HCPCS | Performed by: INTERNAL MEDICINE

## 2023-09-08 PROCEDURE — 1123F ACP DISCUSS/DSCN MKR DOCD: CPT | Performed by: INTERNAL MEDICINE

## 2023-09-08 PROCEDURE — 3074F SYST BP LT 130 MM HG: CPT | Performed by: INTERNAL MEDICINE

## 2023-09-08 PROCEDURE — 90694 VACC AIIV4 NO PRSRV 0.5ML IM: CPT | Performed by: INTERNAL MEDICINE

## 2023-09-08 NOTE — PATIENT INSTRUCTIONS
lifestyle, illnesses that may run in your family, and various assessments and screenings as appropriate. After reviewing your medical record and screening and assessments performed today your provider may have ordered immunizations, labs, imaging, and/or referrals for you. A list of these orders (if applicable) as well as your Preventive Care list are included within your After Visit Summary for your review. Other Preventive Recommendations:    A preventive eye exam performed by an eye specialist is recommended every 1-2 years to screen for glaucoma; cataracts, macular degeneration, and other eye disorders. A preventive dental visit is recommended every 6 months. Try to get at least 150 minutes of exercise per week or 10,000 steps per day on a pedometer . Order or download the FREE \"Exercise & Physical Activity: Your Everyday Guide\" from [employer]. Call 9-265.567.2157 or search [employer] online. You need 2327-7205 mg of calcium and 8010-4174 IU of vitamin D per day. It is possible to meet your calcium requirement with diet alone, but a vitamin D supplement is usually necessary to meet this goal.  When exposed to the sun, use a sunscreen that protects against both UVA and UVB radiation with an SPF of 30 or greater. Reapply every 2 to 3 hours or after sweating, drying off with a towel, or swimming. Always wear a seat belt when traveling in a car. Always wear a helmet when riding a bicycle or motorcycle.

## 2023-09-22 PROBLEM — R14.3 EXCESSIVE GAS: Status: ACTIVE | Noted: 2023-09-22

## 2023-10-04 DIAGNOSIS — I10 ESSENTIAL HYPERTENSION: ICD-10-CM

## 2023-10-04 RX ORDER — IRBESARTAN 300 MG/1
TABLET ORAL
Qty: 90 TABLET | Refills: 1 | Status: SHIPPED | OUTPATIENT
Start: 2023-10-04

## 2023-10-04 NOTE — TELEPHONE ENCOUNTER
Medication:   Requested Prescriptions     Pending Prescriptions Disp Refills    irbesartan (AVAPRO) 300 MG tablet [Pharmacy Med Name: IRBESARTAN 300 MG TABLET] 90 tablet 1     Sig: TAKE ONE TABLET BY MOUTH DAILY     Last Filled:  4/6/2023    Last appt: 9/8/2023   Next appt: Visit date not found    Last OARRS:        No data to display

## 2023-10-13 RX ORDER — CARVEDILOL 25 MG/1
TABLET ORAL
Qty: 180 TABLET | Refills: 0 | Status: SHIPPED | OUTPATIENT
Start: 2023-10-13

## 2023-10-13 NOTE — TELEPHONE ENCOUNTER
Medication:   Requested Prescriptions     Pending Prescriptions Disp Refills    carvedilol (COREG) 25 MG tablet [Pharmacy Med Name: CARVEDILOL 25 MG TABLET] 180 tablet 1     Sig: TAKE ONE TABLET BY MOUTH TWICE A DAY     Last filled: 4.18.23  Last appt: 9/8/2023   Next appt: Visit date not found    Last OARRS:        No data to display

## 2023-10-18 ENCOUNTER — TELEPHONE (OUTPATIENT)
Dept: PRIMARY CARE CLINIC | Age: 68
End: 2023-10-18

## 2023-10-18 NOTE — TELEPHONE ENCOUNTER
Call patient. She can get the Covid booster vaccine 90 days after having Covid-19. Per her chart she was diagnosed with Covid on 9/26/23 so she can get the booster around 12/26/23.

## 2023-10-18 NOTE — TELEPHONE ENCOUNTER
Patient was DX with covid about a month ago and is asking when she can get her COVID booster ? Please advise on the time line as to when . Scotty Francisco   OK to leave VM with that information

## 2023-10-24 RX ORDER — HYDRALAZINE HYDROCHLORIDE 50 MG/1
TABLET, FILM COATED ORAL
Qty: 180 TABLET | Refills: 1 | Status: SHIPPED | OUTPATIENT
Start: 2023-10-24

## 2023-10-24 NOTE — TELEPHONE ENCOUNTER
Medication:   Requested Prescriptions     Pending Prescriptions Disp Refills    hydrALAZINE (APRESOLINE) 50 MG tablet [Pharmacy Med Name: hydrALAZINE 50 MG TABLET] 180 tablet 1     Sig: TAKE ONE TABLET BY MOUTH TWICE A DAY     Last Filled:  4.24.23    Last appt: 9/8/2023   Next appt: Visit date not found    Last OARRS:        No data to display

## 2023-11-10 DIAGNOSIS — E78.2 MIXED HYPERLIPIDEMIA: ICD-10-CM

## 2023-11-10 RX ORDER — ATORVASTATIN CALCIUM 80 MG/1
TABLET, FILM COATED ORAL
Qty: 30 TABLET | Refills: 0 | Status: SHIPPED | OUTPATIENT
Start: 2023-11-10

## 2023-11-10 NOTE — TELEPHONE ENCOUNTER
Medication:   Requested Prescriptions     Pending Prescriptions Disp Refills    atorvastatin (LIPITOR) 80 MG tablet [Pharmacy Med Name: ATORVASTATIN 80 MG TABLET] 30 tablet 0     Sig: TAKE ONE TABLET BY MOUTH ONCE NIGHTLY     Last Filled:  5/11/2023    Last appt: 9/8/2023   Next appt: Visit date not found    Last Lipid:   Lab Results   Component Value Date/Time    CHOL 186 06/29/2023 08:31 AM    TRIG 232 06/29/2023 08:31 AM    HDL 61 06/29/2023 08:31 AM    LDLCALC 79 06/29/2023 08:31 AM

## 2023-11-16 ENCOUNTER — OFFICE VISIT (OUTPATIENT)
Dept: CARDIOLOGY CLINIC | Age: 68
End: 2023-11-16
Payer: MEDICARE

## 2023-11-16 VITALS
WEIGHT: 212.4 LBS | DIASTOLIC BLOOD PRESSURE: 94 MMHG | HEART RATE: 70 BPM | SYSTOLIC BLOOD PRESSURE: 160 MMHG | BODY MASS INDEX: 36.46 KG/M2

## 2023-11-16 DIAGNOSIS — I50.32 CHRONIC HEART FAILURE WITH PRESERVED EJECTION FRACTION (HFPEF) (HCC): Primary | Chronic | ICD-10-CM

## 2023-11-16 PROCEDURE — 1090F PRES/ABSN URINE INCON ASSESS: CPT | Performed by: INTERNAL MEDICINE

## 2023-11-16 PROCEDURE — 3078F DIAST BP <80 MM HG: CPT | Performed by: INTERNAL MEDICINE

## 2023-11-16 PROCEDURE — 99214 OFFICE O/P EST MOD 30 MIN: CPT | Performed by: INTERNAL MEDICINE

## 2023-11-16 PROCEDURE — 3017F COLORECTAL CA SCREEN DOC REV: CPT | Performed by: INTERNAL MEDICINE

## 2023-11-16 PROCEDURE — G8427 DOCREV CUR MEDS BY ELIG CLIN: HCPCS | Performed by: INTERNAL MEDICINE

## 2023-11-16 PROCEDURE — G8400 PT W/DXA NO RESULTS DOC: HCPCS | Performed by: INTERNAL MEDICINE

## 2023-11-16 PROCEDURE — G8484 FLU IMMUNIZE NO ADMIN: HCPCS | Performed by: INTERNAL MEDICINE

## 2023-11-16 PROCEDURE — 3074F SYST BP LT 130 MM HG: CPT | Performed by: INTERNAL MEDICINE

## 2023-11-16 PROCEDURE — 1123F ACP DISCUSS/DSCN MKR DOCD: CPT | Performed by: INTERNAL MEDICINE

## 2023-11-16 PROCEDURE — G8417 CALC BMI ABV UP PARAM F/U: HCPCS | Performed by: INTERNAL MEDICINE

## 2023-11-16 PROCEDURE — 1036F TOBACCO NON-USER: CPT | Performed by: INTERNAL MEDICINE

## 2023-11-17 NOTE — PROGRESS NOTES
assess jvd, non edema.    Abdomen:   Soft, non-tender, bowel sounds active all four quadrants,  no masses, no organomegaly       Extremities: Extremities normal, atraumatic, no cyanosis   Pulses: 2+ and symmetric   Skin: Skin color, texture, turgor normal, no rashes or lesions   Pysch: Normal mood and affect   Neurologic: Normal gross motor and sensory exam.  Cranial nerves intact        Labs:     Lab Results   Component Value Date    WBC 6.8 01/10/2023    HGB 14.1 01/10/2023    HCT 42.4 01/10/2023    MCV 93.5 01/10/2023     01/10/2023     Lab Results   Component Value Date     06/29/2023    K 4.6 06/29/2023     06/29/2023    CO2 28 06/29/2023    BUN 17 06/29/2023    CREATININE 0.8 06/29/2023    GLUCOSE 103 (H) 06/29/2023    CALCIUM 10.1 06/29/2023    PROT 7.1 06/29/2023    LABALBU 4.8 06/29/2023    BILITOT 0.5 06/29/2023    ALKPHOS 55 06/29/2023    AST 19 06/29/2023    ALT 23 06/29/2023    LABGLOM >60 06/29/2023    GFRAA >60 09/26/2022    AGRATIO 2.1 06/29/2023    GLOB 2.2 07/29/2021         Lab Results   Component Value Date    CHOL 186 06/29/2023    CHOL 177 08/30/2022    CHOL 153 02/08/2022     Lab Results   Component Value Date    TRIG 232 (H) 06/29/2023    TRIG 230 (H) 08/30/2022    TRIG 179 (H) 02/08/2022     Lab Results   Component Value Date    HDL 61 (H) 06/29/2023    HDL 62 (H) 08/30/2022    HDL 59 02/08/2022     Lab Results   Component Value Date    LDLCALC 79 06/29/2023    LDLCALC 69 08/30/2022    LDLCALC 58 02/08/2022     Lab Results   Component Value Date    LABVLDL 46 06/29/2023    LABVLDL 46 08/30/2022    LABVLDL 36 02/08/2022     No results found for: \"CHOLHDLRATIO\"    Lab Results   Component Value Date    INR 1.03 03/12/2021    PROTIME 11.9 03/12/2021       The 10-year ASCVD risk score (Lamin RAY, et al., 2019) is: 18.3%    Values used to calculate the score:      Age: 77 years      Sex: Female      Is Non- : No      Diabetic: No      Tobacco smoker: No

## 2023-12-15 DIAGNOSIS — E78.2 MIXED HYPERLIPIDEMIA: ICD-10-CM

## 2023-12-15 RX ORDER — ATORVASTATIN CALCIUM 80 MG/1
80 TABLET, FILM COATED ORAL NIGHTLY
Qty: 30 TABLET | Refills: 0 | Status: SHIPPED | OUTPATIENT
Start: 2023-12-15

## 2023-12-15 NOTE — TELEPHONE ENCOUNTER
Medication:   Requested Prescriptions     Pending Prescriptions Disp Refills    atorvastatin (LIPITOR) 80 MG tablet 30 tablet 0     Sig: Take 1 tablet by mouth nightly     Last Filled:  11.10.23    Last appt: 9/8/2023   Next appt: Visit date not found    Last Lipid:   Lab Results   Component Value Date/Time    CHOL 186 06/29/2023 08:31 AM    TRIG 232 06/29/2023 08:31 AM    HDL 61 06/29/2023 08:31 AM    LDLCALC 79 06/29/2023 08:31 AM

## 2023-12-15 NOTE — TELEPHONE ENCOUNTER
Pt called in for meds refill. Please contact pt when completed.    atorvastatin (LIPITOR) 80 MG tablet [[id number]]   [facility] [id number] Hieu Esparza, [address] [facility] [phone] Adron Number [phone]  [facility], [address] [place] [address]  Phone: [phone]  Fax: [phone]

## 2024-01-10 DIAGNOSIS — E78.2 MIXED HYPERLIPIDEMIA: ICD-10-CM

## 2024-01-10 RX ORDER — ATORVASTATIN CALCIUM 80 MG/1
80 TABLET, FILM COATED ORAL NIGHTLY
Qty: 90 TABLET | Refills: 0 | Status: SHIPPED | OUTPATIENT
Start: 2024-01-10

## 2024-01-10 NOTE — TELEPHONE ENCOUNTER
Medication:   Requested Prescriptions     Pending Prescriptions Disp Refills    atorvastatin (LIPITOR) 80 MG tablet [Pharmacy Med Name: ATORVASTATIN 80 MG TABLET] 30 tablet 0     Sig: TAKE ONE TABLET BY MOUTH ONCE NIGHTLY     Last Filled:  12/15/2023    Last appt: 9/8/2023   Next appt: 2/6/2024    Last Lipid:   Lab Results   Component Value Date/Time    CHOL 186 06/29/2023 08:31 AM    TRIG 232 06/29/2023 08:31 AM    HDL 61 06/29/2023 08:31 AM    LDLCALC 79 06/29/2023 08:31 AM

## 2024-01-19 RX ORDER — CARVEDILOL 25 MG/1
25 TABLET ORAL 2 TIMES DAILY
Qty: 60 TABLET | Refills: 0 | Status: SHIPPED | OUTPATIENT
Start: 2024-01-19

## 2024-01-19 NOTE — TELEPHONE ENCOUNTER
Pt called for meds refill. Please contact pt when completed.  carvedilol (COREG) 25 MG tablet [[id number]   [facility] [id number] - [place], [place] - [address] - P [phone] - F [phone]  [address] [place] [address] [place] [address]  Phone: [phone]  Fax: [phone]

## 2024-01-19 NOTE — TELEPHONE ENCOUNTER
Medication:   Requested Prescriptions     Pending Prescriptions Disp Refills    carvedilol (COREG) 25 MG tablet 60 tablet 0     Sig: Take 1 tablet by mouth 2 times daily     Last Filled:  10/13/2023    Last appt: 9/8/2023   Next appt: 2/6/2024    Last OARRS:        No data to display

## 2024-02-06 ENCOUNTER — OFFICE VISIT (OUTPATIENT)
Dept: PRIMARY CARE CLINIC | Age: 69
End: 2024-02-06
Payer: MEDICARE

## 2024-02-06 VITALS
HEART RATE: 80 BPM | BODY MASS INDEX: 36.01 KG/M2 | SYSTOLIC BLOOD PRESSURE: 138 MMHG | OXYGEN SATURATION: 96 % | DIASTOLIC BLOOD PRESSURE: 86 MMHG | WEIGHT: 209.8 LBS

## 2024-02-06 DIAGNOSIS — K59.00 CONSTIPATION, UNSPECIFIED CONSTIPATION TYPE: ICD-10-CM

## 2024-02-06 DIAGNOSIS — E55.9 VITAMIN D DEFICIENCY: ICD-10-CM

## 2024-02-06 DIAGNOSIS — K21.00 GASTROESOPHAGEAL REFLUX DISEASE WITH ESOPHAGITIS WITHOUT HEMORRHAGE: ICD-10-CM

## 2024-02-06 DIAGNOSIS — I10 ESSENTIAL HYPERTENSION: Primary | ICD-10-CM

## 2024-02-06 DIAGNOSIS — E78.2 MIXED HYPERLIPIDEMIA: ICD-10-CM

## 2024-02-06 DIAGNOSIS — R73.03 PREDIABETES: ICD-10-CM

## 2024-02-06 DIAGNOSIS — I50.32 CHRONIC HEART FAILURE WITH PRESERVED EJECTION FRACTION (HFPEF) (HCC): ICD-10-CM

## 2024-02-06 PROCEDURE — G8484 FLU IMMUNIZE NO ADMIN: HCPCS | Performed by: INTERNAL MEDICINE

## 2024-02-06 PROCEDURE — 1123F ACP DISCUSS/DSCN MKR DOCD: CPT | Performed by: INTERNAL MEDICINE

## 2024-02-06 PROCEDURE — G8400 PT W/DXA NO RESULTS DOC: HCPCS | Performed by: INTERNAL MEDICINE

## 2024-02-06 PROCEDURE — 3079F DIAST BP 80-89 MM HG: CPT | Performed by: INTERNAL MEDICINE

## 2024-02-06 PROCEDURE — G8427 DOCREV CUR MEDS BY ELIG CLIN: HCPCS | Performed by: INTERNAL MEDICINE

## 2024-02-06 PROCEDURE — G8417 CALC BMI ABV UP PARAM F/U: HCPCS | Performed by: INTERNAL MEDICINE

## 2024-02-06 PROCEDURE — 3017F COLORECTAL CA SCREEN DOC REV: CPT | Performed by: INTERNAL MEDICINE

## 2024-02-06 PROCEDURE — 1036F TOBACCO NON-USER: CPT | Performed by: INTERNAL MEDICINE

## 2024-02-06 PROCEDURE — 99214 OFFICE O/P EST MOD 30 MIN: CPT | Performed by: INTERNAL MEDICINE

## 2024-02-06 PROCEDURE — 3075F SYST BP GE 130 - 139MM HG: CPT | Performed by: INTERNAL MEDICINE

## 2024-02-06 PROCEDURE — 1090F PRES/ABSN URINE INCON ASSESS: CPT | Performed by: INTERNAL MEDICINE

## 2024-02-06 RX ORDER — LUBIPROSTONE 24 UG/1
CAPSULE ORAL
COMMUNITY
Start: 2023-12-27 | End: 2024-02-06 | Stop reason: SINTOL

## 2024-02-06 RX ORDER — PANTOPRAZOLE SODIUM 40 MG/1
40 TABLET, DELAYED RELEASE ORAL DAILY
COMMUNITY
End: 2024-02-16 | Stop reason: SDUPTHER

## 2024-02-06 RX ORDER — CARVEDILOL 25 MG/1
25 TABLET ORAL 2 TIMES DAILY
Qty: 180 TABLET | Refills: 1 | Status: SHIPPED | OUTPATIENT
Start: 2024-02-06

## 2024-02-06 ASSESSMENT — PATIENT HEALTH QUESTIONNAIRE - PHQ9
SUM OF ALL RESPONSES TO PHQ QUESTIONS 1-9: 0
1. LITTLE INTEREST OR PLEASURE IN DOING THINGS: 0
SUM OF ALL RESPONSES TO PHQ9 QUESTIONS 1 & 2: 0
SUM OF ALL RESPONSES TO PHQ QUESTIONS 1-9: 0
2. FEELING DOWN, DEPRESSED OR HOPELESS: 0

## 2024-02-06 NOTE — PATIENT INSTRUCTIONS
-Low sodium diet  -Low fat, low cholesterol diet  -low carbohydrate diet  -Regular aerobic exercise    -Fast 8-10 hours for labs

## 2024-02-06 NOTE — PROGRESS NOTES
Date of Visit: 2024    Raysa Browne (:  1955) is a 68 y.o. female,  Established patient here for evaluation of the following chief complaint(s):  Hypertension, Cholesterol Problem, Prediabetes, Vitamin D, and Gastroesophageal Reflux      ASSESSMENT/PLAN:    1. Essential hypertension  - stable  - Continue carvedilol (COREG) 25 MG tablet; Take 1 tablet by mouth 2 times daily  Dispense: 180 tablet; Refill: 1  -Continue Hydralazine 50mg 2 times daily  -Continue irbesartan (AVAPRO) 300 MG tablet; Take one tablet by mouth once daily  -Continue spironolactone 25 mg once daily  -Low sodium diet  -Regular aerobic exercise    2. Mixed hyperlipidemia  -Stable  -Continue atorvastatin (LIPITOR) 80 MG tablet; Take one tablet by mouth once nightly  -Low fat, low cholesterol diet  -Regular aerobic exercise  -Lipid Panel; Future    3. Prediabetes  -Stable  -Last hemoglobin A1c is normal  -Low carbohydrate diet  -Regular aerobic exercise  -Hemoglobin A1C; Future    4. Vitamin D deficiency  -Stable  -Continue vitamin D 5,000 IU once daily   - Vitamin D 25 Hydroxy; Future    5. Gastroesophageal reflux disease with esophagitis without hemorrhage  -stable  -Patient has been off pantoprazole for 10 days  -Resume pantoprazole 40 mg once daily if needed  -Decrease caffeine, avoid spicy foods, avoid tomato based foods  -Eat small meals instead of large meals  -Wait 2-3 hours after eating before lying down    6. Constipation, unspecified constipation type  -Not controlled  -Patient had side effects with Linzess and it is too costly  -Continue MiraLAX once daily  -Stool softener  -high fiber diet  -Follow-up with GI    7. Chronic heart failure with preserved ejection fraction (HFpEF) ([facility])  -Stable  -Continue carvedilol (COREG) 25 MG tablet; Take 1 tablet by mouth 2 times daily  Dispense: 180 tablet; Refill: 1  -Continue Hydralazine 50mg 2 times daily  -Continue irbesartan (AVAPRO) 300 MG tablet; Take one tablet by

## 2024-02-07 DIAGNOSIS — E78.2 MIXED HYPERLIPIDEMIA: ICD-10-CM

## 2024-02-07 DIAGNOSIS — R73.03 PREDIABETES: ICD-10-CM

## 2024-02-07 DIAGNOSIS — E55.9 VITAMIN D DEFICIENCY: ICD-10-CM

## 2024-02-07 LAB
25(OH)D3 SERPL-MCNC: 48 NG/ML
CHOLEST SERPL-MCNC: 153 MG/DL (ref 0–199)
HDLC SERPL-MCNC: 53 MG/DL (ref 40–60)
LDLC SERPL CALC-MCNC: 58 MG/DL
TRIGL SERPL-MCNC: 208 MG/DL (ref 0–150)
VLDLC SERPL CALC-MCNC: 42 MG/DL

## 2024-02-08 LAB
EST. AVERAGE GLUCOSE BLD GHB EST-MCNC: 102.5 MG/DL
HBA1C MFR BLD: 5.2 %

## 2024-02-16 NOTE — TELEPHONE ENCOUNTER
Pt called and stated she tried to get into the doctor that prescribed her pantoprazole (PROTONIX) 40 MG tablet but he is booked out for 4 months she wants to know if Dr. Ramires can fill it and send it to Alexy Carrasco

## 2024-02-17 RX ORDER — PANTOPRAZOLE SODIUM 40 MG/1
40 TABLET, DELAYED RELEASE ORAL DAILY
Qty: 90 TABLET | Refills: 0 | Status: SHIPPED | OUTPATIENT
Start: 2024-02-17

## 2024-02-20 ASSESSMENT — ENCOUNTER SYMPTOMS
COUGH: 0
VOMITING: 0
CONSTIPATION: 1
SHORTNESS OF BREATH: 0
ABDOMINAL DISTENTION: 0
WHEEZING: 0
SINUS PRESSURE: 0
ABDOMINAL PAIN: 1
TROUBLE SWALLOWING: 0
SORE THROAT: 0
RHINORRHEA: 0
CHEST TIGHTNESS: 0
BACK PAIN: 0
BLOOD IN STOOL: 0
DIARRHEA: 0
NAUSEA: 0

## 2024-02-27 ENCOUNTER — OFFICE VISIT (OUTPATIENT)
Dept: ENDOCRINOLOGY | Age: 69
End: 2024-02-27
Payer: MEDICARE

## 2024-02-27 VITALS
HEART RATE: 67 BPM | DIASTOLIC BLOOD PRESSURE: 94 MMHG | WEIGHT: 213 LBS | RESPIRATION RATE: 14 BRPM | BODY MASS INDEX: 36.37 KG/M2 | SYSTOLIC BLOOD PRESSURE: 186 MMHG | HEIGHT: 64 IN | OXYGEN SATURATION: 98 % | TEMPERATURE: 98 F

## 2024-02-27 DIAGNOSIS — E04.1 THYROID NODULE: Primary | ICD-10-CM

## 2024-02-27 DIAGNOSIS — I10 ESSENTIAL HYPERTENSION: ICD-10-CM

## 2024-02-27 DIAGNOSIS — E06.3 HASHIMOTO'S THYROIDITIS: Chronic | ICD-10-CM

## 2024-02-27 DIAGNOSIS — E55.9 VITAMIN D DEFICIENCY: ICD-10-CM

## 2024-02-27 DIAGNOSIS — R73.03 PREDIABETES: Chronic | ICD-10-CM

## 2024-02-27 DIAGNOSIS — E27.8 ADRENAL NODULE (HCC): ICD-10-CM

## 2024-02-27 PROBLEM — E27.9 ADRENAL NODULE (HCC): Status: ACTIVE | Noted: 2024-02-27

## 2024-02-27 PROCEDURE — 1036F TOBACCO NON-USER: CPT | Performed by: INTERNAL MEDICINE

## 2024-02-27 PROCEDURE — G8427 DOCREV CUR MEDS BY ELIG CLIN: HCPCS | Performed by: INTERNAL MEDICINE

## 2024-02-27 PROCEDURE — 3017F COLORECTAL CA SCREEN DOC REV: CPT | Performed by: INTERNAL MEDICINE

## 2024-02-27 PROCEDURE — 1123F ACP DISCUSS/DSCN MKR DOCD: CPT | Performed by: INTERNAL MEDICINE

## 2024-02-27 PROCEDURE — 3080F DIAST BP >= 90 MM HG: CPT | Performed by: INTERNAL MEDICINE

## 2024-02-27 PROCEDURE — G8417 CALC BMI ABV UP PARAM F/U: HCPCS | Performed by: INTERNAL MEDICINE

## 2024-02-27 PROCEDURE — 99215 OFFICE O/P EST HI 40 MIN: CPT | Performed by: INTERNAL MEDICINE

## 2024-02-27 PROCEDURE — G8400 PT W/DXA NO RESULTS DOC: HCPCS | Performed by: INTERNAL MEDICINE

## 2024-02-27 PROCEDURE — G8484 FLU IMMUNIZE NO ADMIN: HCPCS | Performed by: INTERNAL MEDICINE

## 2024-02-27 PROCEDURE — 3077F SYST BP >= 140 MM HG: CPT | Performed by: INTERNAL MEDICINE

## 2024-02-27 PROCEDURE — 1090F PRES/ABSN URINE INCON ASSESS: CPT | Performed by: INTERNAL MEDICINE

## 2024-02-27 NOTE — PROGRESS NOTES
Wt Readings from Last 3 Encounters:   02/27/24 96.6 kg (213 lb)   02/06/24 95.2 kg (209 lb 12.8 oz)   11/16/23 96.3 kg (212 lb 6.4 oz)     Body mass index is 36.54 kg/m².    OBJECTIVE:  Constitutional: no acute distress, well appearing and well nourished  Psychiatric: oriented to person, place and time, judgement and insight and normal, recent and remote memory and intact and mood and affect are normal  Skin: skin and subcutaneous tissue is normal without mass, normal turgor  Head and Face: examination of head and face revealed no abnormalities  Eyes: no lid or conjunctival swelling, erythema or discharge, pupils are normal, equal, round, reactive to light  Ears/Nose: external inspection of ears and nose revealed no abnormalities, hearing is grossly normal  Oropharynx/Mouth/Face: lips, tongue and gums are normal with no lesions, the voice quality was normal  Neck: neck is supple and symmetric, with midline trachea and no masses, thyroid is enlarged on the right, normal on the left  Lymphatics: normal cervical lymph nodes, normal supraclavicular nodes  Pulmonary: no increased work of breathing or signs of respiratory distress, lungs are clear to auscultation  Cardiovascular: normal heart rate and rhythm, normal S1 and S2, no murmurs and pedal pulses and 2+ bilaterally, No edema  Abdomen: abdomen is soft, non-tender with no masses  Musculoskeletal: normal gait and station and exam of the digits and nails are normal  Neurological: normal coordination and normal general cortical function      Lab Review:    Lab Results   Component Value Date/Time    WBC 6.8 01/10/2023 11:26 AM    HGB 14.1 01/10/2023 11:26 AM    HCT 42.4 01/10/2023 11:26 AM    MCV 93.5 01/10/2023 11:26 AM     01/10/2023 11:26 AM     Lab Results   Component Value Date/Time     06/29/2023 08:31 AM    K 4.6 06/29/2023 08:31 AM    K 3.3 03/13/2021 06:27 AM     06/29/2023 08:31 AM    CO2 28 06/29/2023 08:31 AM    BUN 17 06/29/2023 08:31

## 2024-03-01 ENCOUNTER — HOSPITAL ENCOUNTER (OUTPATIENT)
Dept: ULTRASOUND IMAGING | Age: 69
Discharge: HOME | End: 2024-03-01
Payer: MEDICARE

## 2024-03-01 DIAGNOSIS — I10 ESSENTIAL HYPERTENSION: ICD-10-CM

## 2024-03-01 DIAGNOSIS — R73.03 PREDIABETES: Chronic | ICD-10-CM

## 2024-03-01 DIAGNOSIS — E55.9 VITAMIN D DEFICIENCY: ICD-10-CM

## 2024-03-01 DIAGNOSIS — E27.8 ADRENAL NODULE (HCC): ICD-10-CM

## 2024-03-01 DIAGNOSIS — E04.1 THYROID NODULE: ICD-10-CM

## 2024-03-01 DIAGNOSIS — E06.3 HASHIMOTO'S THYROIDITIS: Chronic | ICD-10-CM

## 2024-03-01 LAB
ALBUMIN SERPL-MCNC: 4.4 G/DL (ref 3.4–5)
ALBUMIN/GLOB SERPL: 1.9 {RATIO} (ref 1.1–2.2)
ALP SERPL-CCNC: 61 U/L (ref 40–129)
ALT SERPL-CCNC: 19 U/L (ref 10–40)
ANION GAP SERPL CALCULATED.3IONS-SCNC: 15 MMOL/L (ref 3–16)
ANTI-THYROGLOB ABS: 35 IU/ML
AST SERPL-CCNC: 18 U/L (ref 15–37)
BILIRUB SERPL-MCNC: 0.4 MG/DL (ref 0–1)
BUN SERPL-MCNC: 11 MG/DL (ref 7–20)
CALCIUM SERPL-MCNC: 9.4 MG/DL (ref 8.3–10.6)
CHLORIDE SERPL-SCNC: 102 MMOL/L (ref 99–110)
CO2 SERPL-SCNC: 24 MMOL/L (ref 21–32)
CORTIS AM PEAK SERPL-MCNC: 11.6 UG/DL (ref 4.3–22.4)
CREAT SERPL-MCNC: 0.7 MG/DL (ref 0.6–1.2)
GFR SERPLBLD CREATININE-BSD FMLA CKD-EPI: >60 ML/MIN/{1.73_M2}
GLUCOSE SERPL-MCNC: 95 MG/DL (ref 70–99)
POTASSIUM SERPL-SCNC: 4.5 MMOL/L (ref 3.5–5.1)
PROT SERPL-MCNC: 6.7 G/DL (ref 6.4–8.2)
SODIUM SERPL-SCNC: 141 MMOL/L (ref 136–145)
T3FREE SERPL-MCNC: 3 PG/ML (ref 2.3–4.2)
T4 FREE SERPL-MCNC: 1.1 NG/DL (ref 0.9–1.8)
THYROPEROXIDASE AB SERPL IA-ACNC: 61 IU/ML
TSH SERPL DL<=0.005 MIU/L-ACNC: 0.79 UIU/ML (ref 0.27–4.2)

## 2024-03-01 PROCEDURE — 76536 US EXAM OF HEAD AND NECK: CPT

## 2024-03-02 LAB — ALDOST SERPL-MCNC: 11.9 NG/DL

## 2024-03-03 LAB — RENIN PLAS-CCNC: 0.9 NG/ML/HR

## 2024-03-04 LAB
ANNOTATION COMMENT IMP: NORMAL
METANEPHS SERPL-SCNC: 0.13 NMOL/L (ref 0–0.49)
NORMETANEPHRINE SERPL-SCNC: 0.46 NMOL/L (ref 0–0.89)

## 2024-03-06 LAB
ACTH PLAS-MCNC: 12 PG/ML (ref 7–63)
DHEA-S SERPL-MCNC: 54 UG/DL (ref 9.4–246)

## 2024-04-02 DIAGNOSIS — I10 ESSENTIAL HYPERTENSION: ICD-10-CM

## 2024-04-02 RX ORDER — IRBESARTAN 300 MG/1
TABLET ORAL
Qty: 90 TABLET | Refills: 1 | Status: SHIPPED | OUTPATIENT
Start: 2024-04-02

## 2024-04-02 NOTE — TELEPHONE ENCOUNTER
Medication:   Requested Prescriptions     Pending Prescriptions Disp Refills    irbesartan (AVAPRO) 300 MG tablet [Pharmacy Med Name: IRBESARTAN 300 MG TABLET] 90 tablet 1     Sig: TAKE 1 TABLET BY MOUTH DAILY     Last Filled:  10.4.23    Last appt: 2/6/2024   Next appt: Visit date not found    Last OARRS:        No data to display

## 2024-04-04 RX ORDER — SPIRONOLACTONE 25 MG/1
TABLET ORAL
Qty: 90 TABLET | Refills: 3 | Status: SHIPPED | OUTPATIENT
Start: 2024-04-04

## 2024-04-11 DIAGNOSIS — E78.2 MIXED HYPERLIPIDEMIA: ICD-10-CM

## 2024-04-11 NOTE — TELEPHONE ENCOUNTER
Medication:   Requested Prescriptions     Pending Prescriptions Disp Refills    atorvastatin (LIPITOR) 80 MG tablet 90 tablet 0     Sig: Take 1 tablet by mouth nightly     Last Filled:  01/10/2024    Last appt: 2/6/2024   Next appt: Visit date not found    Last Lipid:   Lab Results   Component Value Date/Time    CHOL 153 02/07/2024 09:23 AM    TRIG 208 02/07/2024 09:23 AM    HDL 53 02/07/2024 09:23 AM    LDLCALC 58 02/07/2024 09:23 AM

## 2024-04-12 RX ORDER — ATORVASTATIN CALCIUM 80 MG/1
80 TABLET, FILM COATED ORAL NIGHTLY
Qty: 90 TABLET | Refills: 1 | Status: SHIPPED | OUTPATIENT
Start: 2024-04-12

## 2024-04-22 RX ORDER — HYDRALAZINE HYDROCHLORIDE 50 MG/1
50 TABLET, FILM COATED ORAL 2 TIMES DAILY
Qty: 180 TABLET | Refills: 1 | Status: SHIPPED | OUTPATIENT
Start: 2024-04-22

## 2024-04-22 NOTE — TELEPHONE ENCOUNTER
Medication:   Requested Prescriptions     Pending Prescriptions Disp Refills    hydrALAZINE (APRESOLINE) 50 MG tablet [Pharmacy Med Name: hydrALAZINE 50 MG TABLET] 180 tablet 1     Sig: TAKE 1 TABLET BY MOUTH TWICE A DAY     Last Filled:  10.24.23    Last appt: 2/6/2024   Next appt: Visit date not found    Last OARRS:        No data to display

## 2024-05-01 ENCOUNTER — TELEPHONE (OUTPATIENT)
Dept: PRIMARY CARE CLINIC | Age: 69
End: 2024-05-01

## 2024-05-01 NOTE — TELEPHONE ENCOUNTER
----- Message from Hillary Love sent at 5/1/2024  2:38 PM EDT -----  Regarding: ECC Appointment Request  ECC Appointment Request    Patient needs appointment for ECC Appointment Type: Pre-Op Visit.    Reason for Appointment Request: No appointments available during search  --------------------------------------------------------------------------------------------------------------------------    Relationship to Patient: Self     Call Back Information: OK to leave message on voicemail  Preferred Call Back Number: Phone [phone]

## 2024-05-02 NOTE — PROGRESS NOTES
Preoperative Consultation    Name: Raysa Browne  YOB: 1955    This patient presents to the office today for a preoperative consultation at the request of surgeon, Dr.G. Mejia, who plans on performing TRANSANAL RECTOCELE REPAIR  on May 29, 2024 at [facility].      Planned anesthesia: General   Known anesthesia problems: None   Bleeding risk: No recent or remote history of abnormal bleeding  Personal or FH ofDVT/PE: No      CARDIO  History of CHF  Follows with cardiology  Echo completed 1/16/2023  Apt with Dr Bowden 5/17/24    HYPERTENSION  History of resistant hypertension. Usually elevated at Dr pena.   On carvedilol 25 mg twice daily, hydralazine 50 mg twice daily, irbesartan 300 mg daily, spironolactone 25 mg daily    PREDIABETES  Last A1c 5.2    ORTHO  On celebrex for knee pain  S/p knee replacement    ENDO  History of thyroid nodule, Hashimoto's thyroiditis and adrenal nodule  Follows with Endo    GERD  On pantoprazole 40 mg daily  stable    Patient Active Problem List   Diagnosis    Goiter    Onychomycosis    Essential hypertension    Vitamin D deficiency    Microscopic hematuria    Diastolic dysfunction    Family history of coronary artery disease    Thyroid nodule    Hyperglycemia    Hyperlipidemia    Prediabetes    Primary insomnia    Coronary artery disease involving native coronary artery of native heart without angina pectoris    Biliary colic    H/O angiography    Uncontrolled hypertension    Dizziness    Hypokalemia    Chronic heart failure with preserved ejection fraction (HFpEF) ([facility])    H/O coronary angiogram    Elevated urine levels of catecholamines    Hashimoto's thyroiditis    Symptomatic cholelithiasis    Neck pain    Severe obesity (BMI 35.0-39.9) with comorbidity (HCC)    Ulcer of esophagus without bleeding    Gastroesophageal reflux disease with esophagitis without hemorrhage    Mixed hyperlipidemia    Constipation    Epigastric abdominal pain    Excessive 
MATILDE

## 2024-05-06 ENCOUNTER — OFFICE VISIT (OUTPATIENT)
Dept: PRIMARY CARE CLINIC | Age: 69
End: 2024-05-06
Payer: MEDICARE

## 2024-05-06 VITALS
WEIGHT: 211 LBS | RESPIRATION RATE: 16 BRPM | OXYGEN SATURATION: 96 % | TEMPERATURE: 97.9 F | DIASTOLIC BLOOD PRESSURE: 86 MMHG | BODY MASS INDEX: 36.02 KG/M2 | HEART RATE: 67 BPM | HEIGHT: 64 IN | SYSTOLIC BLOOD PRESSURE: 144 MMHG

## 2024-05-06 DIAGNOSIS — Z01.818 PRE-OP EXAM: ICD-10-CM

## 2024-05-06 DIAGNOSIS — K21.00 GASTROESOPHAGEAL REFLUX DISEASE WITH ESOPHAGITIS WITHOUT HEMORRHAGE: ICD-10-CM

## 2024-05-06 DIAGNOSIS — N81.6 RECTOCELE: Primary | ICD-10-CM

## 2024-05-06 DIAGNOSIS — E06.3 HASHIMOTO'S THYROIDITIS: Chronic | ICD-10-CM

## 2024-05-06 DIAGNOSIS — I10 ESSENTIAL HYPERTENSION: ICD-10-CM

## 2024-05-06 DIAGNOSIS — G89.29 CHRONIC KNEE PAIN, UNSPECIFIED LATERALITY: ICD-10-CM

## 2024-05-06 DIAGNOSIS — R73.03 PREDIABETES: Chronic | ICD-10-CM

## 2024-05-06 DIAGNOSIS — E66.01 SEVERE OBESITY (BMI 35.0-39.9) WITH COMORBIDITY (HCC): ICD-10-CM

## 2024-05-06 DIAGNOSIS — I50.32 CHRONIC HEART FAILURE WITH PRESERVED EJECTION FRACTION (HFPEF) (HCC): Chronic | ICD-10-CM

## 2024-05-06 DIAGNOSIS — M25.569 CHRONIC KNEE PAIN, UNSPECIFIED LATERALITY: ICD-10-CM

## 2024-05-06 DIAGNOSIS — I25.10 CORONARY ARTERY DISEASE INVOLVING NATIVE CORONARY ARTERY OF NATIVE HEART WITHOUT ANGINA PECTORIS: Chronic | ICD-10-CM

## 2024-05-06 LAB
ANION GAP SERPL CALCULATED.3IONS-SCNC: 14 MMOL/L (ref 3–16)
BUN SERPL-MCNC: 23 MG/DL (ref 7–20)
CALCIUM SERPL-MCNC: 9.4 MG/DL (ref 8.3–10.6)
CHLORIDE SERPL-SCNC: 102 MMOL/L (ref 99–110)
CO2 SERPL-SCNC: 23 MMOL/L (ref 21–32)
CREAT SERPL-MCNC: 0.7 MG/DL (ref 0.6–1.2)
DEPRECATED RDW RBC AUTO: 14.3 % (ref 12.4–15.4)
GFR SERPLBLD CREATININE-BSD FMLA CKD-EPI: >90 ML/MIN/{1.73_M2}
GLUCOSE SERPL-MCNC: 90 MG/DL (ref 70–99)
HCT VFR BLD AUTO: 42.7 % (ref 36–48)
HGB BLD-MCNC: 13.9 G/DL (ref 12–16)
MCH RBC QN AUTO: 30.1 PG (ref 26–34)
MCHC RBC AUTO-ENTMCNC: 32.7 G/DL (ref 31–36)
MCV RBC AUTO: 92.2 FL (ref 80–100)
PLATELET # BLD AUTO: 311 K/UL (ref 135–450)
PLATELET BLD QL SMEAR: ABNORMAL
PMV BLD AUTO: 9 FL (ref 5–10.5)
POTASSIUM SERPL-SCNC: 5.1 MMOL/L (ref 3.5–5.1)
RBC # BLD AUTO: 4.63 M/UL (ref 4–5.2)
SLIDE REVIEW: ABNORMAL
SODIUM SERPL-SCNC: 139 MMOL/L (ref 136–145)
WBC # BLD AUTO: 12.3 K/UL (ref 4–11)

## 2024-05-06 PROCEDURE — 1090F PRES/ABSN URINE INCON ASSESS: CPT | Performed by: NURSE PRACTITIONER

## 2024-05-06 PROCEDURE — 1123F ACP DISCUSS/DSCN MKR DOCD: CPT | Performed by: NURSE PRACTITIONER

## 2024-05-06 PROCEDURE — G8427 DOCREV CUR MEDS BY ELIG CLIN: HCPCS | Performed by: NURSE PRACTITIONER

## 2024-05-06 PROCEDURE — G8400 PT W/DXA NO RESULTS DOC: HCPCS | Performed by: NURSE PRACTITIONER

## 2024-05-06 PROCEDURE — G8417 CALC BMI ABV UP PARAM F/U: HCPCS | Performed by: NURSE PRACTITIONER

## 2024-05-06 PROCEDURE — 99214 OFFICE O/P EST MOD 30 MIN: CPT | Performed by: NURSE PRACTITIONER

## 2024-05-06 PROCEDURE — 3079F DIAST BP 80-89 MM HG: CPT | Performed by: NURSE PRACTITIONER

## 2024-05-06 PROCEDURE — 93000 ELECTROCARDIOGRAM COMPLETE: CPT | Performed by: NURSE PRACTITIONER

## 2024-05-06 PROCEDURE — 1036F TOBACCO NON-USER: CPT | Performed by: NURSE PRACTITIONER

## 2024-05-06 PROCEDURE — 3017F COLORECTAL CA SCREEN DOC REV: CPT | Performed by: NURSE PRACTITIONER

## 2024-05-06 PROCEDURE — 3077F SYST BP >= 140 MM HG: CPT | Performed by: NURSE PRACTITIONER

## 2024-05-06 SDOH — ECONOMIC STABILITY: FOOD INSECURITY: WITHIN THE PAST 12 MONTHS, YOU WORRIED THAT YOUR FOOD WOULD RUN OUT BEFORE YOU GOT MONEY TO BUY MORE.: NEVER TRUE

## 2024-05-06 SDOH — ECONOMIC STABILITY: FOOD INSECURITY: WITHIN THE PAST 12 MONTHS, THE FOOD YOU BOUGHT JUST DIDN'T LAST AND YOU DIDN'T HAVE MONEY TO GET MORE.: NEVER TRUE

## 2024-05-06 SDOH — ECONOMIC STABILITY: INCOME INSECURITY: HOW HARD IS IT FOR YOU TO PAY FOR THE VERY BASICS LIKE FOOD, HOUSING, MEDICAL CARE, AND HEATING?: NOT HARD AT ALL

## 2024-05-06 ASSESSMENT — ENCOUNTER SYMPTOMS
CHEST TIGHTNESS: 0
NAUSEA: 0
ABDOMINAL PAIN: 0
COUGH: 0
BLOOD IN STOOL: 0
SORE THROAT: 0
DIARRHEA: 0
VOMITING: 0
WHEEZING: 0
SHORTNESS OF BREATH: 0
CONSTIPATION: 0
BACK PAIN: 0

## 2024-05-06 NOTE — PATIENT INSTRUCTIONS
STOP ALL NSAIDS (ASPIRIN, IBUPROFEN, MOTRIN, ALEVE, ETC) 7 DAYS PRIOR TO SURGERY.   OK TO TAKE TYLENOL

## 2024-05-09 RX ORDER — PANTOPRAZOLE SODIUM 40 MG/1
40 TABLET, DELAYED RELEASE ORAL DAILY
Qty: 90 TABLET | Refills: 1 | Status: SHIPPED | OUTPATIENT
Start: 2024-05-09

## 2024-05-09 NOTE — TELEPHONE ENCOUNTER
Medication:   Requested Prescriptions     Pending Prescriptions Disp Refills    pantoprazole (PROTONIX) 40 MG tablet [Pharmacy Med Name: PANTOPRAZOLE SOD DR 40 MG TAB] 90 tablet 0     Sig: TAKE 1 TABLET BY MOUTH DAILY     Last Filled:  2.17.24    Last appt: 5/6/2024   Next appt: Visit date not found    Last OARRS:        No data to display

## 2024-05-17 ENCOUNTER — OFFICE VISIT (OUTPATIENT)
Dept: CARDIOLOGY CLINIC | Age: 69
End: 2024-05-17
Payer: MEDICARE

## 2024-05-17 VITALS
HEART RATE: 63 BPM | WEIGHT: 212.8 LBS | DIASTOLIC BLOOD PRESSURE: 94 MMHG | SYSTOLIC BLOOD PRESSURE: 178 MMHG | BODY MASS INDEX: 36.5 KG/M2

## 2024-05-17 DIAGNOSIS — R94.31 ABNORMAL ELECTROCARDIOGRAPHY: Primary | ICD-10-CM

## 2024-05-17 DIAGNOSIS — I50.32 CHRONIC HEART FAILURE WITH PRESERVED EJECTION FRACTION (HFPEF) (HCC): Chronic | ICD-10-CM

## 2024-05-17 DIAGNOSIS — I44.7 LBBB (LEFT BUNDLE BRANCH BLOCK): ICD-10-CM

## 2024-05-17 DIAGNOSIS — Z01.810 PRE-OPERATIVE CARDIOVASCULAR EXAMINATION: ICD-10-CM

## 2024-05-17 PROCEDURE — 1123F ACP DISCUSS/DSCN MKR DOCD: CPT | Performed by: INTERNAL MEDICINE

## 2024-05-17 PROCEDURE — G8417 CALC BMI ABV UP PARAM F/U: HCPCS | Performed by: INTERNAL MEDICINE

## 2024-05-17 PROCEDURE — G8400 PT W/DXA NO RESULTS DOC: HCPCS | Performed by: INTERNAL MEDICINE

## 2024-05-17 PROCEDURE — 3077F SYST BP >= 140 MM HG: CPT | Performed by: INTERNAL MEDICINE

## 2024-05-17 PROCEDURE — 3017F COLORECTAL CA SCREEN DOC REV: CPT | Performed by: INTERNAL MEDICINE

## 2024-05-17 PROCEDURE — G8428 CUR MEDS NOT DOCUMENT: HCPCS | Performed by: INTERNAL MEDICINE

## 2024-05-17 PROCEDURE — 1036F TOBACCO NON-USER: CPT | Performed by: INTERNAL MEDICINE

## 2024-05-17 PROCEDURE — 99215 OFFICE O/P EST HI 40 MIN: CPT | Performed by: INTERNAL MEDICINE

## 2024-05-17 PROCEDURE — 1090F PRES/ABSN URINE INCON ASSESS: CPT | Performed by: INTERNAL MEDICINE

## 2024-05-17 PROCEDURE — 3080F DIAST BP >= 90 MM HG: CPT | Performed by: INTERNAL MEDICINE

## 2024-05-17 NOTE — PROGRESS NOTES
face to face time with the patient with more than 50% spent counseling and coordinating care.       I have personally reviewed the reports and images of labs, radiological studies, cardiac studies including ECG's and telemetry, current and old medical records. The note was completed using EMR and Dragon dictation system. Every effort was made to ensure accuracy; however, inadvertent computerized transcription errors may be present.    All questions and concerns were addressed to the patient/family. Alternatives to my treatment were discussed.     I would like to thank you for providing me the opportunity to participate in the care of your patient. If you have any questions, please do not hesitate to contact me.     Jean Bowden MD, FAC, [facility]  [facility] [place] [address]  Main Office Phone: [phone]  Fax: [phone]

## 2024-05-20 ENCOUNTER — HOSPITAL ENCOUNTER (OUTPATIENT)
Age: 69
Discharge: HOME | End: 2024-05-22
Attending: INTERNAL MEDICINE
Payer: MEDICARE

## 2024-05-20 ENCOUNTER — HOSPITAL ENCOUNTER (EMERGENCY)
Age: 69
Discharge: HOME | End: 2024-05-20
Attending: EMERGENCY MEDICINE
Payer: MEDICARE

## 2024-05-20 ENCOUNTER — HOSPITAL ENCOUNTER (OUTPATIENT)
Age: 69
Discharge: HOME | End: 2024-05-20
Attending: INTERNAL MEDICINE
Payer: MEDICARE

## 2024-05-20 VITALS
TEMPERATURE: 99.1 F | SYSTOLIC BLOOD PRESSURE: 178 MMHG | RESPIRATION RATE: 18 BRPM | HEIGHT: 64 IN | WEIGHT: 213 LBS | HEART RATE: 68 BPM | BODY MASS INDEX: 36.37 KG/M2 | OXYGEN SATURATION: 98 % | DIASTOLIC BLOOD PRESSURE: 81 MMHG

## 2024-05-20 DIAGNOSIS — I10 ASYMPTOMATIC HYPERTENSION: Primary | ICD-10-CM

## 2024-05-20 DIAGNOSIS — R94.31 ABNORMAL ELECTROCARDIOGRAPHY: ICD-10-CM

## 2024-05-20 DIAGNOSIS — Z01.810 PRE-OPERATIVE CARDIOVASCULAR EXAMINATION: ICD-10-CM

## 2024-05-20 LAB
ECHO BSA: 2.09 M2
NUC STRESS EJECTION FRACTION: 75 %
NUC STRESS LV EDV: 107 ML (ref 56–104)
NUC STRESS LV ESV: 27 ML (ref 19–49)
NUC STRESS LV MASS: 132 G
NUC STRESS LV STROKE VOLUME: 80 ML
STRESS BASELINE DIAS BP: 85 MMHG
STRESS BASELINE HR: 65 BPM
STRESS BASELINE SYS BP: 205 MMHG
STRESS ESTIMATED WORKLOAD: 1 METS
STRESS PEAK DIAS BP: 90 MMHG
STRESS PEAK SYS BP: 208 MMHG
STRESS PERCENT HR ACHIEVED: 69 %
STRESS POST PEAK HR: 103 BPM
STRESS RATE PRESSURE PRODUCT: ABNORMAL BPM*MMHG
STRESS ST DEPRESSION: 0 MM
STRESS TARGET HR: 150 BPM

## 2024-05-20 PROCEDURE — 78452 HT MUSCLE IMAGE SPECT MULT: CPT

## 2024-05-20 PROCEDURE — 6360000002 HC RX W HCPCS: Performed by: INTERNAL MEDICINE

## 2024-05-20 PROCEDURE — 99282 EMERGENCY DEPT VISIT SF MDM: CPT

## 2024-05-20 PROCEDURE — 3430000000 HC RX DIAGNOSTIC RADIOPHARMACEUTICAL: Performed by: INTERNAL MEDICINE

## 2024-05-20 PROCEDURE — 93017 CV STRESS TEST TRACING ONLY: CPT

## 2024-05-20 PROCEDURE — A9502 TC99M TETROFOSMIN: HCPCS | Performed by: INTERNAL MEDICINE

## 2024-05-20 PROCEDURE — 93018 CV STRESS TEST I&R ONLY: CPT | Performed by: INTERNAL MEDICINE

## 2024-05-20 PROCEDURE — 93016 CV STRESS TEST SUPVJ ONLY: CPT | Performed by: INTERNAL MEDICINE

## 2024-05-20 PROCEDURE — 78452 HT MUSCLE IMAGE SPECT MULT: CPT | Performed by: INTERNAL MEDICINE

## 2024-05-20 RX ORDER — REGADENOSON 0.08 MG/ML
0.4 INJECTION, SOLUTION INTRAVENOUS
Status: COMPLETED | OUTPATIENT
Start: 2024-05-20 | End: 2024-05-20

## 2024-05-20 RX ADMIN — REGADENOSON 0.4 MG: 0.08 INJECTION, SOLUTION INTRAVENOUS at 11:50

## 2024-05-20 RX ADMIN — TETROFOSMIN 34.3 MILLICURIE: 1.38 INJECTION, POWDER, LYOPHILIZED, FOR SOLUTION INTRAVENOUS at 11:50

## 2024-05-20 RX ADMIN — TETROFOSMIN 9.93 MILLICURIE: 1.38 INJECTION, POWDER, LYOPHILIZED, FOR SOLUTION INTRAVENOUS at 10:15

## 2024-05-20 ASSESSMENT — PAIN - FUNCTIONAL ASSESSMENT: PAIN_FUNCTIONAL_ASSESSMENT: NONE - DENIES PAIN

## 2024-05-20 NOTE — ED NOTES
Held Carvedilol last night and this AM due to instructions from stress test to hold. Normal dose is 25 mgs twice daily.

## 2024-05-20 NOTE — NURSING NOTE
Patient BP elevated Dr. Lloyd Nurse Charissa spoke with patients heart failure doctor (Dr. Bowden) and he informed VALENTINA Carrington that it was okay to perform the Stress Test.

## 2024-05-20 NOTE — ED PROVIDER NOTES
Food Insecurity (5/6/2024)    Hunger Vital Sign     Worried About Running Out of Food in the Last Year: Never true     Ran Out of Food in the Last Year: Never true   Transportation Needs: Unknown (5/6/2024)    PRAPARE - Transportation     Lack of Transportation (Medical): Not on file     Lack of Transportation (Non-Medical): No   Physical Activity: Unknown (9/5/2023)    Exercise Vital Sign     Days of Exercise per Week: 0 days     Minutes of Exercise per Session: Not on file   Stress: Not on file   Social Connections: Not on file   Intimate Partner Violence: Not on file   Housing Stability: Unknown (5/6/2024)    Housing Stability Vital Sign     Unable to Pay for Housing in the Last Year: Not on file     Number of Places Lived in the Last Year: Not on file     Unstable Housing in the Last Year: No       CURRENT MEDICATIONS  No current facility-administered medications for this encounter.     Current Outpatient Medications   Medication Sig Dispense Refill    pantoprazole (PROTONIX) 40 MG tablet TAKE 1 TABLET BY MOUTH DAILY 90 tablet 1    hydrALAZINE (APRESOLINE) 50 MG tablet TAKE 1 TABLET BY MOUTH TWICE A  tablet 1    atorvastatin (LIPITOR) 80 MG tablet Take 1 tablet by mouth nightly 90 tablet 1    spironolactone (ALDACTONE) 25 MG tablet TAKE ONE TABLET BY MOUTH DAILY 90 tablet 3    irbesartan (AVAPRO) 300 MG tablet TAKE 1 TABLET BY MOUTH DAILY 90 tablet 1    carvedilol (COREG) 25 MG tablet Take 1 tablet by mouth 2 times daily 180 tablet 1    polyethylene glycol (GLYCOLAX) 17 GM/SCOOP powder Take 17 g by mouth daily      Cholecalciferol (VITAMIN D3) 5000 units TABS Take 1 tablet by mouth daily      Multiple Vitamin (MULTIVITAMIN PO) Take  by mouth.           ALLERGIES  Allergies   Allergen Reactions    Sulfa Antibiotics Rash       PHYSICAL EXAM  VITAL SIGNS:    ED Triage Vitals [05/20/24 0912]   Enc Vitals Group      BP (!) 206/90      Pulse 71      Respirations 18      Temp 99.1 °F (37.3 °C)      Temp Source  Oral      SpO2 97 %      Weight - Scale 96.6 kg (213 lb)      Height 1.626 m (5' 4\")      Head Circumference       Peak Flow       Pain Score       Pain Loc       Pain Edu?       Excl. in GC?       Physical Exam  Vitals and nursing note reviewed.   Constitutional:       General: She is not in acute distress.     Appearance: She is not ill-appearing or toxic-appearing.   HENT:      Head: Normocephalic and atraumatic.      Mouth/Throat:      Mouth: Mucous membranes are moist.   Cardiovascular:      Rate and Rhythm: Normal rate and regular rhythm.   Pulmonary:      Effort: Pulmonary effort is normal. No respiratory distress.   Musculoskeletal:         General: Normal range of motion.   Skin:     General: Skin is warm and dry.      Findings: No rash.   Neurological:      General: No focal deficit present.      Mental Status: She is alert and oriented to person, place, and time.   Psychiatric:      Comments: Anxious        INDEPENDENT EKG INTERPRETATION:  None    LABS  I have personally reviewed all labs for this visit.   No results found for this visit on 05/20/24.    RADIOLOGY  Any applicable radiology studies including x-ray, CT, MRI, and/or ultrasound, were reviewed independently by me in addition to the radiologist.  I reviewed all radiology images and reports as well from this evaluation.  No orders to display         ED COURSE/MDM  Old records reviewed. Labs and imaging reviewed.  Patient seen and evaluated by myself.  Patient presents for evaluation of high blood pressure.  Patient thinks is just because she is anxious.  Her initial blood pressure is 206 systolic over 90s.  Repeat here without intervention is 178/81.  Patient does not wish for any further treatment and medical screening exam was performed.  She is cleared to go to stress test as scheduled.  She is to follow with her doctor and return for any concern.    Consults:  None    History obtained from:   Patient    Pertinent social determinants of

## 2024-05-20 NOTE — NURSING NOTE
Pt completed stress portion of cardiac stress test. Patient complained of SOB, facial flushing, and dizziness. Patient symptoms resolve with rest. Pt is discharged to nuclear department for final scan. Nuclear tech will remove PIV. Discharge instructions given to pt. Pt verbalizes understanding to discharge instructions.

## 2024-05-20 NOTE — ED NOTES
Spoke with Alberta in stress testing and let her know patient is here and scheduled with stress testing at 1000 but patient concerned about high BP. Alberta stated 220 is cut off, but would like systolic below 200 before moving forward with stress testing. Patient sitting in room with decreased stimulation and lights dimmed and will check BP again and figure out plan from there. Dr. Villar aware of the above.

## 2024-05-22 ENCOUNTER — TELEPHONE (OUTPATIENT)
Dept: CARDIOLOGY CLINIC | Age: 69
End: 2024-05-22

## 2024-05-22 ENCOUNTER — CARE COORDINATION (OUTPATIENT)
Dept: CARE COORDINATION | Age: 69
End: 2024-05-22

## 2024-05-22 NOTE — TELEPHONE ENCOUNTER
CARDIAC CLEARANCE     What type of procedure are you having?  Rectocele surgery    Which physician is performing your procedure?  Dr. Mejia     When is your procedure scheduled for?  5/29/24    Where are you having this procedure?  [facility]     Are you taking Blood Thinners?   If so what? (Name/dose/frequesncy)  Unknown    Does the surgeon want you to stop your blood thinner?  If so for how long?  Unknown     Phone Number and Contact Name for Physicians office:  [phone]    Fax number to send information:  [phone]

## 2024-05-22 NOTE — CARE COORDINATION
Ambulatory Care Coordination  ED Follow up Call    Reason for ED visit:  Hypertensive episode   Status:     improved    Did you call your PCP prior to going to the ED?  No      Did you receive a discharge instructions from the Emergency Room? Yes  Review of Instructions:     Understands what to report/when to return?:  Yes   Understands discharge instructions?:  Yes   Following discharge instructions?:  Yes   If not why? N/a    Are there any new complaints of pain? No  New Pain Meds? N/A    Constipation prophylaxis needed?  N/A    If you have a wound is the dressing clean, dry, and intact? N/A  Understands wound care regimen? N/A    Are there any other complaints/concerns that you wish to tell your provider?   Pt has tried to reach out to cardiology to find out if she is able to precede with her surgery next week. She hasn't heard back at the time of this f/u call, however she will call again tomorrow if she doesn't hear back today. She called her PCP office today to ask about anxiety medication and was told she would need to schedule an appointment for that. Pt is also waiting for someone to review her recent cardiology test results with her. ERINM was able to read the results to pt, but not interpret them for her. Encouraged pt to call with any further questions or concerns and will send a message to pt PCP regarding her question if she is to proceed with her planned surgery next week. Pt agreed with this plan.     FU appts/Provider:    Future Appointments   Date Time Provider Department Center   11/15/2024  2:15 PM Jean Bowden MD [facility]           New Medications?:   No      Medication Reconciliation by phone - No  Understands Medications?  N/a  Taking Medications? N/a  Can you swallow your pills?  Not Applicable    Any further needs in the home i.e. Equipment?  No    Link to services in community?:  No   Which services:  n/a

## 2024-05-23 ENCOUNTER — TELEPHONE (OUTPATIENT)
Dept: CARDIOLOGY CLINIC | Age: 69
End: 2024-05-23

## 2024-07-01 ENCOUNTER — TELEPHONE (OUTPATIENT)
Dept: CARDIOLOGY CLINIC | Age: 69
End: 2024-07-01

## 2024-07-01 NOTE — TELEPHONE ENCOUNTER
Pt called stating she has been experiencing swelling in her feet for 3 weeks and now legs for 5 days. She says last week she was also experiencing some SOB.     [phone]        CHF--SOB--EDEMA    1) What type of symptoms are you having? Swelling in feet for about 3 weeks, has progressed to legs in past 5 days  How long have you been having these symptoms?    2) Any swelling? Yes   If so, where? Feet and legs    3) Any weight gain? Pt not sure    What is your current weight? Pt has not weighed herself    What is your normal (dry) weight?    4) Are you taking a diuretic (water pill)?     Medication Name:   Dosage:  How often:

## 2024-07-01 NOTE — TELEPHONE ENCOUNTER
Spoke with pt. She has not been weighing herself. Asked her to weigh while on the phone, weight was 214 lb, has had foot swelling that improved over night, now has leg swelling that is persistent. Has been eating salty food. BP has been elevated 160s/90s. Has not missed any doses of her medications. Denies SOB. Educated her on importance of daily weight, low sodium diet and notifying us early on with swelling as well as ongoing elevated blood pressures. She verbalized understanding. Will reach out to Dr. Bowden for recommendations.

## 2024-07-11 RX ORDER — FUROSEMIDE 20 MG/1
20 TABLET ORAL DAILY PRN
Qty: 30 TABLET | Refills: 3 | Status: SHIPPED | OUTPATIENT
Start: 2024-07-11

## 2024-07-11 RX ORDER — POTASSIUM CHLORIDE 20 MEQ/1
20 TABLET, EXTENDED RELEASE ORAL DAILY PRN
Qty: 30 TABLET | Refills: 3 | Status: SHIPPED | OUTPATIENT
Start: 2024-07-11

## 2024-08-13 ENCOUNTER — TELEPHONE (OUTPATIENT)
Dept: CARDIOLOGY CLINIC | Age: 69
End: 2024-08-13

## 2024-08-13 NOTE — TELEPHONE ENCOUNTER
Raysa Browne, 1955    Cardiac Risk Assessment    What type of procedure are you having?: flexible sigmoidoscopy    When is your procedure scheduled for?: 08/15/24    What physician is performing your procedure?:      Phone Number: [phone]    Fax number to send the letter: [phone]    Cardiologist: Dr. Bowden     Last Appointment: 05/17/24    Next Appointment:11/15/24    Are you on any blood thinners?: No    History of Coronary Artery Disease:    Last stress test: 05/20/24    Last echo: 01/16/23    Device Type and :

## 2024-08-19 DIAGNOSIS — I10 ESSENTIAL HYPERTENSION: ICD-10-CM

## 2024-08-19 NOTE — TELEPHONE ENCOUNTER
Medication:   Requested Prescriptions     Pending Prescriptions Disp Refills    carvedilol (COREG) 25 MG tablet [Pharmacy Med Name: CARVEDILOL 25 MG TABLET] 180 tablet 1     Sig: TAKE 1 TABLET BY MOUTH TWICE A DAY     Last Filled:  02/06/24    Last appt: 5/6/2024   Next appt: 9/3/2024    Last OARRS:        No data to display

## 2024-08-20 RX ORDER — CARVEDILOL 25 MG/1
25 TABLET ORAL 2 TIMES DAILY
Qty: 180 TABLET | Refills: 1 | Status: SHIPPED | OUTPATIENT
Start: 2024-08-20

## 2024-08-29 ENCOUNTER — TELEPHONE (OUTPATIENT)
Dept: PRIMARY CARE CLINIC | Age: 69
End: 2024-08-29

## 2024-08-29 NOTE — TELEPHONE ENCOUNTER
Patient is scheduled with PCP on 9/3/24, in appointment notes it stated she wanted her AWV completed but she is only scheduled for an OV. I left a VM notifying patient to keep her 9/3/24 appointment to discuss her medications and follow up. However, I explained that she cannot do her AWV until 9/8/24 or after due to her traditional Medicare coverage.     Recommended that she gives us a call back to schedule with me for a virtual AWV.

## 2024-09-20 SDOH — HEALTH STABILITY: PHYSICAL HEALTH: ON AVERAGE, HOW MANY DAYS PER WEEK DO YOU ENGAGE IN MODERATE TO STRENUOUS EXERCISE (LIKE A BRISK WALK)?: 0 DAYS

## 2024-09-20 ASSESSMENT — LIFESTYLE VARIABLES
HOW MANY STANDARD DRINKS CONTAINING ALCOHOL DO YOU HAVE ON A TYPICAL DAY: 1
HOW MANY STANDARD DRINKS CONTAINING ALCOHOL DO YOU HAVE ON A TYPICAL DAY: 1 OR 2
HOW OFTEN DO YOU HAVE A DRINK CONTAINING ALCOHOL: 2
HOW OFTEN DO YOU HAVE A DRINK CONTAINING ALCOHOL: MONTHLY OR LESS
HOW OFTEN DO YOU HAVE SIX OR MORE DRINKS ON ONE OCCASION: 1

## 2024-09-20 ASSESSMENT — PATIENT HEALTH QUESTIONNAIRE - PHQ9
2. FEELING DOWN, DEPRESSED OR HOPELESS: NOT AT ALL
SUM OF ALL RESPONSES TO PHQ QUESTIONS 1-9: 0
SUM OF ALL RESPONSES TO PHQ QUESTIONS 1-9: 0
1. LITTLE INTEREST OR PLEASURE IN DOING THINGS: NOT AT ALL
SUM OF ALL RESPONSES TO PHQ QUESTIONS 1-9: 0
SUM OF ALL RESPONSES TO PHQ9 QUESTIONS 1 & 2: 0
SUM OF ALL RESPONSES TO PHQ QUESTIONS 1-9: 0

## 2024-09-23 ENCOUNTER — OFFICE VISIT (OUTPATIENT)
Dept: PRIMARY CARE CLINIC | Age: 69
End: 2024-09-23

## 2024-09-23 VITALS
WEIGHT: 221 LBS | TEMPERATURE: 97.5 F | DIASTOLIC BLOOD PRESSURE: 92 MMHG | OXYGEN SATURATION: 98 % | SYSTOLIC BLOOD PRESSURE: 142 MMHG | HEIGHT: 64 IN | RESPIRATION RATE: 16 BRPM | HEART RATE: 74 BPM | BODY MASS INDEX: 37.73 KG/M2

## 2024-09-23 DIAGNOSIS — R73.03 PREDIABETES: ICD-10-CM

## 2024-09-23 DIAGNOSIS — E78.2 MIXED HYPERLIPIDEMIA: ICD-10-CM

## 2024-09-23 DIAGNOSIS — Z23 NEED FOR INFLUENZA VACCINATION: ICD-10-CM

## 2024-09-23 DIAGNOSIS — E06.3 HASHIMOTO'S THYROIDITIS: ICD-10-CM

## 2024-09-23 DIAGNOSIS — F41.9 ANXIETY: ICD-10-CM

## 2024-09-23 DIAGNOSIS — I25.10 CORONARY ARTERY DISEASE INVOLVING NATIVE CORONARY ARTERY OF NATIVE HEART WITHOUT ANGINA PECTORIS: ICD-10-CM

## 2024-09-23 DIAGNOSIS — N81.6 RECTOCELE: ICD-10-CM

## 2024-09-23 DIAGNOSIS — Z00.00 MEDICARE ANNUAL WELLNESS VISIT, SUBSEQUENT: Primary | ICD-10-CM

## 2024-09-23 DIAGNOSIS — I50.32 CHRONIC HEART FAILURE WITH PRESERVED EJECTION FRACTION (HFPEF) (HCC): ICD-10-CM

## 2024-09-23 DIAGNOSIS — K21.00 GASTROESOPHAGEAL REFLUX DISEASE WITH ESOPHAGITIS WITHOUT HEMORRHAGE: ICD-10-CM

## 2024-09-23 RX ORDER — BUSPIRONE HYDROCHLORIDE 5 MG/1
5 TABLET ORAL 2 TIMES DAILY
Qty: 60 TABLET | Refills: 0 | Status: SHIPPED | OUTPATIENT
Start: 2024-09-23 | End: 2024-10-23

## 2024-09-23 ASSESSMENT — ENCOUNTER SYMPTOMS
SORE THROAT: 0
NAUSEA: 0
ABDOMINAL PAIN: 0
COUGH: 0
CHEST TIGHTNESS: 0
DIARRHEA: 0
BLOOD IN STOOL: 0
BACK PAIN: 0
WHEEZING: 0
CONSTIPATION: 0
VOMITING: 0
SHORTNESS OF BREATH: 0

## 2024-10-01 DIAGNOSIS — E78.2 MIXED HYPERLIPIDEMIA: ICD-10-CM

## 2024-10-01 DIAGNOSIS — I10 ESSENTIAL HYPERTENSION: ICD-10-CM

## 2024-10-01 DIAGNOSIS — R73.03 PREDIABETES: ICD-10-CM

## 2024-10-01 LAB
ALBUMIN SERPL-MCNC: 4.4 G/DL (ref 3.4–5)
ALBUMIN/GLOB SERPL: 2.1 {RATIO} (ref 1.1–2.2)
ALP SERPL-CCNC: 50 U/L (ref 40–129)
ALT SERPL-CCNC: 25 U/L (ref 10–40)
ANION GAP SERPL CALCULATED.3IONS-SCNC: 13 MMOL/L (ref 3–16)
AST SERPL-CCNC: 22 U/L (ref 15–37)
BILIRUB SERPL-MCNC: 0.3 MG/DL (ref 0–1)
BUN SERPL-MCNC: 16 MG/DL (ref 7–20)
CALCIUM SERPL-MCNC: 9.7 MG/DL (ref 8.3–10.6)
CHLORIDE SERPL-SCNC: 104 MMOL/L (ref 99–110)
CHOLEST SERPL-MCNC: 189 MG/DL (ref 0–199)
CO2 SERPL-SCNC: 26 MMOL/L (ref 21–32)
CREAT SERPL-MCNC: 0.8 MG/DL (ref 0.6–1.2)
DEPRECATED RDW RBC AUTO: 14.1 % (ref 12.4–15.4)
GFR SERPLBLD CREATININE-BSD FMLA CKD-EPI: 79 ML/MIN/{1.73_M2}
GLUCOSE SERPL-MCNC: 93 MG/DL (ref 70–99)
HCT VFR BLD AUTO: 41.4 % (ref 36–48)
HDLC SERPL-MCNC: 62 MG/DL (ref 40–60)
HGB BLD-MCNC: 14.3 G/DL (ref 12–16)
LDLC SERPL CALC-MCNC: 82 MG/DL
MCH RBC QN AUTO: 32.3 PG (ref 26–34)
MCHC RBC AUTO-ENTMCNC: 34.6 G/DL (ref 31–36)
MCV RBC AUTO: 93.4 FL (ref 80–100)
PLATELET # BLD AUTO: 254 K/UL (ref 135–450)
PMV BLD AUTO: 9.4 FL (ref 5–10.5)
POTASSIUM SERPL-SCNC: 4.8 MMOL/L (ref 3.5–5.1)
PROT SERPL-MCNC: 6.5 G/DL (ref 6.4–8.2)
RBC # BLD AUTO: 4.44 M/UL (ref 4–5.2)
SODIUM SERPL-SCNC: 143 MMOL/L (ref 136–145)
TRIGL SERPL-MCNC: 224 MG/DL (ref 0–150)
VLDLC SERPL CALC-MCNC: 45 MG/DL
WBC # BLD AUTO: 5.2 K/UL (ref 4–11)

## 2024-10-01 RX ORDER — ATORVASTATIN CALCIUM 80 MG/1
80 TABLET, FILM COATED ORAL NIGHTLY
Qty: 90 TABLET | Refills: 0 | Status: SHIPPED | OUTPATIENT
Start: 2024-10-01

## 2024-10-01 RX ORDER — IRBESARTAN 300 MG/1
TABLET ORAL
Qty: 90 TABLET | Refills: 0 | Status: SHIPPED | OUTPATIENT
Start: 2024-10-01

## 2024-10-01 NOTE — TELEPHONE ENCOUNTER
Medication:   Requested Prescriptions     Pending Prescriptions Disp Refills    irbesartan (AVAPRO) 300 MG tablet [Pharmacy Med Name: IRBESARTAN 300 MG TABLET] 90 tablet 1     Sig: TAKE 1 TABLET BY MOUTH DAILY    atorvastatin (LIPITOR) 80 MG tablet [Pharmacy Med Name: ATORVASTATIN 80 MG TABLET] 90 tablet 1     Sig: TAKE ONE TABLET BY MOUTH ONCE NIGHTLY     Last Filled:  atorvastatin - 4/12/2024  Irbesaratan - 4/2/2024    Last appt: 9/23/2024   Next appt: 1/23/2025    Last Lipid:   Lab Results   Component Value Date/Time    CHOL 153 02/07/2024 09:23 AM    TRIG 208 02/07/2024 09:23 AM    HDL 53 02/07/2024 09:23 AM

## 2024-10-02 LAB
EST. AVERAGE GLUCOSE BLD GHB EST-MCNC: 111.2 MG/DL
HBA1C MFR BLD: 5.5 %

## 2024-10-10 RX ORDER — HYDRALAZINE HYDROCHLORIDE 50 MG/1
50 TABLET, FILM COATED ORAL 2 TIMES DAILY
Qty: 180 TABLET | Refills: 1 | Status: SHIPPED | OUTPATIENT
Start: 2024-10-10

## 2024-10-10 NOTE — TELEPHONE ENCOUNTER
Medication:   Requested Prescriptions     Pending Prescriptions Disp Refills    hydrALAZINE (APRESOLINE) 50 MG tablet [Pharmacy Med Name: HYDRALAZINE 50 MG TABLET] 180 tablet 1     Sig: TAKE 1 TABLET BY MOUTH 2 TIMES A DAY     Last Filled:  04/22/2024  Last appt: 9/23/2024   Next appt: 1/23/2025    Last OARRS:        No data to display

## 2024-10-20 ENCOUNTER — HOSPITAL ENCOUNTER (EMERGENCY)
Age: 69
Discharge: HOME | End: 2024-10-20
Attending: INTERNAL MEDICINE
Payer: MEDICARE

## 2024-10-20 VITALS
HEIGHT: 64 IN | RESPIRATION RATE: 16 BRPM | BODY MASS INDEX: 36.37 KG/M2 | TEMPERATURE: 98.7 F | HEART RATE: 79 BPM | WEIGHT: 213 LBS | DIASTOLIC BLOOD PRESSURE: 72 MMHG | OXYGEN SATURATION: 99 % | SYSTOLIC BLOOD PRESSURE: 124 MMHG

## 2024-10-20 DIAGNOSIS — L03.317 CELLULITIS, GLUTEAL, LEFT: Primary | ICD-10-CM

## 2024-10-20 LAB
ALBUMIN SERPL-MCNC: 4 G/DL (ref 3.4–5)
ALBUMIN/GLOB SERPL: 1.3 {RATIO} (ref 1.1–2.2)
ALP SERPL-CCNC: 47 U/L (ref 40–129)
ALT SERPL-CCNC: 19 U/L (ref 10–40)
ANION GAP SERPL CALCULATED.3IONS-SCNC: 12 MMOL/L (ref 3–16)
AST SERPL-CCNC: 25 U/L (ref 15–37)
BASOPHILS # BLD: 0 K/UL (ref 0–0.2)
BASOPHILS NFR BLD: 0.5 %
BILIRUB SERPL-MCNC: 0.5 MG/DL (ref 0–1)
BUN SERPL-MCNC: 13 MG/DL (ref 7–20)
CALCIUM SERPL-MCNC: 9.5 MG/DL (ref 8.3–10.6)
CHLORIDE SERPL-SCNC: 101 MMOL/L (ref 99–110)
CO2 SERPL-SCNC: 26 MMOL/L (ref 21–32)
CREAT SERPL-MCNC: 0.8 MG/DL (ref 0.6–1.2)
DEPRECATED RDW RBC AUTO: 13.6 % (ref 12.4–15.4)
EOSINOPHIL # BLD: 0.1 K/UL (ref 0–0.6)
EOSINOPHIL NFR BLD: 1.3 %
GFR SERPLBLD CREATININE-BSD FMLA CKD-EPI: 79 ML/MIN/{1.73_M2}
GLUCOSE SERPL-MCNC: 117 MG/DL (ref 70–99)
HCT VFR BLD AUTO: 40.7 % (ref 36–48)
HGB BLD-MCNC: 13.8 G/DL (ref 12–16)
LACTATE BLDV-SCNC: 0.9 MMOL/L (ref 0.4–2)
LYMPHOCYTES # BLD: 1 K/UL (ref 1–5.1)
LYMPHOCYTES NFR BLD: 16.1 %
MCH RBC QN AUTO: 31 PG (ref 26–34)
MCHC RBC AUTO-ENTMCNC: 33.8 G/DL (ref 31–36)
MCV RBC AUTO: 91.7 FL (ref 80–100)
MONOCYTES # BLD: 0.6 K/UL (ref 0–1.3)
MONOCYTES NFR BLD: 10.4 %
NEUTROPHILS # BLD: 4.3 K/UL (ref 1.7–7.7)
NEUTROPHILS NFR BLD: 71.7 %
PLATELET # BLD AUTO: 231 K/UL (ref 135–450)
PMV BLD AUTO: 8.2 FL (ref 5–10.5)
POTASSIUM SERPL-SCNC: 4.4 MMOL/L (ref 3.5–5.1)
PROT SERPL-MCNC: 7.2 G/DL (ref 6.4–8.2)
RBC # BLD AUTO: 4.44 M/UL (ref 4–5.2)
SODIUM SERPL-SCNC: 139 MMOL/L (ref 136–145)
WBC # BLD AUTO: 6 K/UL (ref 4–11)

## 2024-10-20 PROCEDURE — 6370000000 HC RX 637 (ALT 250 FOR IP): Performed by: PHYSICIAN ASSISTANT

## 2024-10-20 PROCEDURE — 85025 COMPLETE CBC W/AUTO DIFF WBC: CPT

## 2024-10-20 PROCEDURE — 36415 COLL VENOUS BLD VENIPUNCTURE: CPT

## 2024-10-20 PROCEDURE — 99283 EMERGENCY DEPT VISIT LOW MDM: CPT

## 2024-10-20 PROCEDURE — 83605 ASSAY OF LACTIC ACID: CPT

## 2024-10-20 PROCEDURE — 80053 COMPREHEN METABOLIC PANEL: CPT

## 2024-10-20 RX ORDER — HYDROCODONE BITARTRATE AND ACETAMINOPHEN 5; 325 MG/1; MG/1
1 TABLET ORAL ONCE
Status: COMPLETED | OUTPATIENT
Start: 2024-10-20 | End: 2024-10-20

## 2024-10-20 RX ORDER — HYDROCODONE BITARTRATE AND ACETAMINOPHEN 5; 325 MG/1; MG/1
1 TABLET ORAL EVERY 6 HOURS PRN
Qty: 12 TABLET | Refills: 0 | Status: SHIPPED | OUTPATIENT
Start: 2024-10-20 | End: 2024-10-23

## 2024-10-20 RX ORDER — DOXYCYCLINE HYCLATE 100 MG
100 TABLET ORAL ONCE
Status: COMPLETED | OUTPATIENT
Start: 2024-10-20 | End: 2024-10-20

## 2024-10-20 RX ORDER — ONDANSETRON 4 MG/1
4 TABLET, ORALLY DISINTEGRATING ORAL 3 TIMES DAILY PRN
Qty: 21 TABLET | Refills: 0 | Status: SHIPPED | OUTPATIENT
Start: 2024-10-20

## 2024-10-20 RX ORDER — DOXYCYCLINE HYCLATE 100 MG
100 TABLET ORAL 2 TIMES DAILY
Qty: 20 TABLET | Refills: 0 | Status: SHIPPED | OUTPATIENT
Start: 2024-10-20 | End: 2024-10-30

## 2024-10-20 RX ADMIN — HYDROCODONE BITARTRATE AND ACETAMINOPHEN 1 TABLET: 5; 325 TABLET ORAL at 09:44

## 2024-10-20 RX ADMIN — DOXYCYCLINE HYCLATE 100 MG: 100 TABLET, COATED ORAL at 09:44

## 2024-10-20 ASSESSMENT — LIFESTYLE VARIABLES
HOW OFTEN DO YOU HAVE A DRINK CONTAINING ALCOHOL: NEVER
HOW MANY STANDARD DRINKS CONTAINING ALCOHOL DO YOU HAVE ON A TYPICAL DAY: PATIENT DOES NOT DRINK

## 2024-10-20 NOTE — ED PROVIDER NOTES
In addition to the advanced practice provider, I personally saw Raysa HUDSON Richynafisa and performed a substantive portion of the visit including all aspects of the medical decision making.    Medical Decision Making  69-year-old female comes in today with swelling of her left buttock.  She states she was seen in the [facility] system on Friday and had cellulitis of her left groin.  She was started on Keflex.  She has taken 5 doses.  She is worried that the infection is spreading to her left buttock.  Patient states the pain is at a level of 8/10.  Sitting on the area makes it worse.  She denies any fall or trauma.  Patient denies fever and chills.  She does not have abdominal pain, nausea and vomiting.  Heart is a regular rhythm and rate.  Lungs are clear bilaterally.  Abdomen is soft and nondistended.  I do not see evidence of cellulitis in the left groin.  Patient does have a large area of cellulitis of the left buttock.  I do not appreciate any fluctuance.  The area is warm and erythematous.  This does not involve the perianal area.  This does not involve the perineal area.  I encouraged the patient to continue her Keflex and she will be sent home with doxycycline.  Patient will go home with Norco.  I agree with the assessment and plan.  Patient is stable for discharge.            SEP-1  Is this patient to be included in the SEP-1 Core Measure due to severe sepsis or septic shock?   No    Screenings                   Patient Referrals:  Tiffany Ramires MD  [address] [address]  [phone]    Schedule an appointment as soon as possible for a visit   For a Re-check in  3-5    days.    [facility] Emergency Department  [address] [address]  [phone]  Go to   As needed, If symptoms worsen      Discharge Medications:  Discharge Medication List as of 10/20/2024 10:28 AM        START taking these medications    Details   doxycycline hyclate (VIBRA-TABS) 100 MG 
Oral 79 16 99 %      Height Weight - Scale         1.626 m (5' 4\") 96.6 kg (213 lb)             Physical Exam  Constitutional:       General: She is not in acute distress.     Appearance: Normal appearance. She is well-developed. She is not ill-appearing, toxic-appearing or diaphoretic.   HENT:      Head: Normocephalic and atraumatic.      Right Ear: External ear normal.      Left Ear: External ear normal.   Eyes:      General:         Right eye: No discharge.         Left eye: No discharge.      Conjunctiva/sclera: Conjunctivae normal.   Cardiovascular:      Rate and Rhythm: Normal rate and regular rhythm.      Pulses: Normal pulses.      Heart sounds: Normal heart sounds. No murmur heard.     No friction rub. No gallop.   Pulmonary:      Effort: Pulmonary effort is normal. No respiratory distress.      Breath sounds: Normal breath sounds. No stridor. No wheezing, rhonchi or rales.   Chest:      Chest wall: No tenderness.   Abdominal:      General: Abdomen is flat. Bowel sounds are normal. There is no distension.      Palpations: Abdomen is soft. There is no mass.      Tenderness: There is no abdominal tenderness. There is no right CVA tenderness, left CVA tenderness, guarding or rebound.      Hernia: No hernia is present.   Musculoskeletal:         General: Normal range of motion.      Cervical back: Normal range of motion and neck supple.   Skin:     General: Skin is warm and dry.      Coloration: Skin is not pale.      Findings: No erythema or rash.      Comments: Left gluteal cheek with erythema, warmth and some induration.  No fluctuance.  No vesicular rash.  Does involve a little bit of the right gluteal cheek as well.  Does not extend into the rectum.  There is no left or right inguinal crease irritation.  Perineum appears to be uninvolved.  No crepitus.   Neurological:      Mental Status: She is alert and oriented to person, place, and time.   Psychiatric:         Behavior: Behavior normal.

## 2024-10-21 ENCOUNTER — CARE COORDINATION (OUTPATIENT)
Dept: CARE COORDINATION | Age: 69
End: 2024-10-21

## 2024-10-21 NOTE — ED NOTES
10/21/24  Pt contacted re: ED visit 10/20/24; \"a little better\", advised to return if any problems/concerns.

## 2024-10-21 NOTE — CARE COORDINATION
Ambulatory Care Coordination Note     10/21/2024 10:20 AM     Patient Current Location:  Home: [address] [place] [address]     This patient was received as a referral from [facility] health report .    ACM contacted the patient by telephone. Verified name and  with patient as identifiers. Provided introduction to self, and explanation of the ACM role.   Patient declined care management services at this time.          ACM: Angelita River RN     Challenges to be reviewed by the provider   Additional needs identified to be addressed with provider No  none               Method of communication with provider: none.    Has the patient been seen in the ED since your last call? Yes,   Discharge Date: 10/20/24   Discharge Facility: [facility]  Reason for ED Visit: worsening cellulitis  Visit Diagnosis: cellulitic, gluteal left    Number of ED visits in the last 6 months: 2      Do you have any ongoing symptoms? Yes, there has been no change in my symptoms.   Current symptoms: Skin rash to left groin & buttock.    Did you call your PCP prior to going to the ED? No, did not call the PCP office.     Review of Discharge Instructions:   [x] AVS discharge instructions  [x] Right Care, Right Place, Right Time document  [x] Medication changes  [x] Follow up appointments  [x] Referral follow up          Care Summary Note: ACM made Outreach to patient who was pleasant and engaged.  Pt explained that she was at [facility] on 10/18 and given Keflex for her groin cellulitis.  The area continued to worsen and included her left buttock therefore she returned to the [facility] ED. ACM reviewed DC medications with patient. Patient reported that she has picked up the new prescriptions (Doxycycline, Norco and Zofran) but has not been able to open the bottle for the Norco.  Pt reported that she planned yto return to the pharmacy today to see if they can open it for her.  Pt reported she had

## 2024-10-30 NOTE — PROGRESS NOTES
MD   Cholecalciferol (VITAMIN D3) 5000 units TABS Take 1 tablet by mouth daily Yes Savita Mckeon MD   Multiple Vitamin (MULTIVITAMIN PO) Take  by mouth. Yes Historical Provider, MD   senna-docusate (Michaela Nathalia) 8.6-50 MG per tablet Take 1 tablet by mouth nightly  Historical Provider, MD   polyethylene glycol (GLYCOLAX) 17 GM/SCOOP powder Take 17 g by mouth daily  Historical Provider, MD         Lab Review   Orders Only on 06/29/2023   Component Date Value    Vit D, 25-Hydroxy 06/29/2023 50.3     Sodium 06/29/2023 140     Potassium 06/29/2023 4.6     Chloride 06/29/2023 101     CO2 06/29/2023 28     Anion Gap 06/29/2023 11     Glucose 06/29/2023 103 (H)     BUN 06/29/2023 17     Creatinine 06/29/2023 0.8     Est, Glom Filt Rate 06/29/2023 >60     Calcium 06/29/2023 10.1     Total Protein 06/29/2023 7.1     Albumin 06/29/2023 4.8     Albumin/Globulin Ratio 06/29/2023 2.1     Total Bilirubin 06/29/2023 0.5     Alkaline Phosphatase 06/29/2023 55     ALT 06/29/2023 23     AST 06/29/2023 19     Hemoglobin A1C 06/29/2023 5.4     eAG 06/29/2023 108. 3     Cholesterol, Total 06/29/2023 186     Triglycerides 06/29/2023 232 (H)     HDL 06/29/2023 61 (H)     LDL Calculated 06/29/2023 79     VLDL Cholesterol Calcula* 06/29/2023 46        CareTeam (Including outside providers/suppliers regularly involved in providing care):   Patient Care Team:  Savita Mckeon MD as PCP - General (Internal Medicine)  Savita Mckeon MD as PCP - Empaneled Provider  Tana Fair MD as Obstetrician (Obstetrics & Gynecology)  Mahesh Pollard MD as Surgeon (Colon and Rectal Surgery)     Reviewed and updated this visit:  Tobacco  Allergies  Meds  Med Hx  Surg Hx  Soc Hx  Fam Hx
(2) more than 100 beats/min

## 2024-11-06 NOTE — TELEPHONE ENCOUNTER
Medication:   Requested Prescriptions     Pending Prescriptions Disp Refills    pantoprazole (PROTONIX) 40 MG tablet [Pharmacy Med Name: PANTOPRAZOLE SOD DR 40 MG TAB] 90 tablet 1     Sig: TAKE 1 TABLET BY MOUTH DAILY     Last filled: 5/9/24  Last appt: 9/23/2024   Next appt: 1/23/2025    Last OARRS:        No data to display

## 2024-11-07 ENCOUNTER — OFFICE VISIT (OUTPATIENT)
Dept: CARDIOLOGY CLINIC | Age: 69
End: 2024-11-07

## 2024-11-07 VITALS
SYSTOLIC BLOOD PRESSURE: 160 MMHG | HEART RATE: 69 BPM | BODY MASS INDEX: 37.28 KG/M2 | WEIGHT: 217.2 LBS | DIASTOLIC BLOOD PRESSURE: 84 MMHG

## 2024-11-07 DIAGNOSIS — I10 ESSENTIAL HYPERTENSION: ICD-10-CM

## 2024-11-07 DIAGNOSIS — I50.32 CHRONIC HEART FAILURE WITH PRESERVED EJECTION FRACTION (HFPEF) (HCC): Primary | Chronic | ICD-10-CM

## 2024-11-07 RX ORDER — PANTOPRAZOLE SODIUM 40 MG/1
40 TABLET, DELAYED RELEASE ORAL DAILY
Qty: 90 TABLET | Refills: 1 | Status: SHIPPED | OUTPATIENT
Start: 2024-11-07

## 2024-11-07 RX ORDER — BUSPIRONE HYDROCHLORIDE 5 MG/1
5 TABLET ORAL DAILY
COMMUNITY

## 2024-11-07 NOTE — PROGRESS NOTES
euvolemic and hemo stable.      -Cw meds as above.    5.  LBBB.  New diagnosis as of 01/2023.  It is of unknown etiology at this time.  Lexiscan nuclear stress test was negative for ischemia or previous MI, echo is negative for LV dilatation.  I suspect it is due to degeneration of the conduction system.  At this time he has no clinical significance.    - Will monitor.                Return in about 6 months (around 5/7/2025).    I have spent 42 minutes of reviewing records and face to face time with the patient with more than 50% spent counseling and coordinating care.       I have personally reviewed the reports and images of labs, radiological studies, cardiac studies including ECG's and telemetry, current and old medical records. The note was completed using EMR and Dragon dictation system. Every effort was made to ensure accuracy; however, inadvertent computerized transcription errors may be present.    All questions and concerns were addressed to the patient/family. Alternatives to my treatment were discussed.     I would like to thank you for providing me the opportunity to participate in the care of your patient. If you have any questions, please do not hesitate to contact me.     Jean Bowden MD, FACC, [facility]  [facility] [place] [address] [id number]  Main Office Phone: [phone]  Fax: [phone]

## 2024-11-08 ENCOUNTER — TELEPHONE (OUTPATIENT)
Dept: PRIMARY CARE CLINIC | Age: 69
End: 2024-11-08

## 2024-11-08 NOTE — TELEPHONE ENCOUNTER
Pt called in asking for a recommendation on a new rectal surgeon. Pt wants to get a second opinion because she was not satisfied with the surgery she had May 2024 . Please Advise

## 2024-12-18 ENCOUNTER — OFFICE VISIT (OUTPATIENT)
Dept: SURGERY | Age: 69
End: 2024-12-18
Payer: MEDICARE

## 2024-12-18 VITALS
SYSTOLIC BLOOD PRESSURE: 183 MMHG | TEMPERATURE: 97.6 F | DIASTOLIC BLOOD PRESSURE: 90 MMHG | OXYGEN SATURATION: 98 % | HEART RATE: 68 BPM | WEIGHT: 218.6 LBS | BODY MASS INDEX: 37.52 KG/M2 | RESPIRATION RATE: 16 BRPM

## 2024-12-18 DIAGNOSIS — M62.89 PELVIC FLOOR DYSFUNCTION IN FEMALE: Primary | ICD-10-CM

## 2024-12-18 PROCEDURE — G8482 FLU IMMUNIZE ORDER/ADMIN: HCPCS | Performed by: SURGERY

## 2024-12-18 PROCEDURE — 1036F TOBACCO NON-USER: CPT | Performed by: SURGERY

## 2024-12-18 PROCEDURE — 3080F DIAST BP >= 90 MM HG: CPT | Performed by: SURGERY

## 2024-12-18 PROCEDURE — 1159F MED LIST DOCD IN RCRD: CPT | Performed by: SURGERY

## 2024-12-18 PROCEDURE — 1090F PRES/ABSN URINE INCON ASSESS: CPT | Performed by: SURGERY

## 2024-12-18 PROCEDURE — 1123F ACP DISCUSS/DSCN MKR DOCD: CPT | Performed by: SURGERY

## 2024-12-18 PROCEDURE — G8400 PT W/DXA NO RESULTS DOC: HCPCS | Performed by: SURGERY

## 2024-12-18 PROCEDURE — 99204 OFFICE O/P NEW MOD 45 MIN: CPT | Performed by: SURGERY

## 2024-12-18 PROCEDURE — G8427 DOCREV CUR MEDS BY ELIG CLIN: HCPCS | Performed by: SURGERY

## 2024-12-18 PROCEDURE — 3017F COLORECTAL CA SCREEN DOC REV: CPT | Performed by: SURGERY

## 2024-12-18 PROCEDURE — 3077F SYST BP >= 140 MM HG: CPT | Performed by: SURGERY

## 2024-12-18 PROCEDURE — G8417 CALC BMI ABV UP PARAM F/U: HCPCS | Performed by: SURGERY

## 2024-12-18 NOTE — PROGRESS NOTES
rectocele repair  Established problem(s): Obesity  Additional workup/treatment planned: Referral to urogynecology, consider pelvic floor physical therapy  Risk of complications/morbidity: Moderate  Risk factors: Obesity, previous transanal rectocele repair    On exam I do not palpate a significant rectocele, so I do think that her repair is intact.  I discussed that this is likely more related to a functional disorder of her pelvic floor.  I will refer her to Dr. Miguel Kidd of urogynecology for more definitive opinion regarding recurrent rectocele repair options, if any.  I also discussed pelvic floor physical therapy.    Continue with current medications      DISPOSITION: Referral to Dr. Kidd    My findings will be relayed to consulting practitioner or PCP via Epic    Note completed using dictation software, please excuse any errors.    Electronically signed by Anastacio Rogers MD on 12/18/2024 at 1:39 PM

## 2025-01-01 DIAGNOSIS — I10 ESSENTIAL HYPERTENSION: ICD-10-CM

## 2025-01-01 DIAGNOSIS — E78.2 MIXED HYPERLIPIDEMIA: ICD-10-CM

## 2025-01-02 RX ORDER — ATORVASTATIN CALCIUM 80 MG/1
80 TABLET, FILM COATED ORAL NIGHTLY
Qty: 90 TABLET | Refills: 0 | Status: SHIPPED | OUTPATIENT
Start: 2025-01-02

## 2025-01-02 RX ORDER — IRBESARTAN 300 MG/1
TABLET ORAL
Qty: 90 TABLET | Refills: 0 | Status: SHIPPED | OUTPATIENT
Start: 2025-01-02

## 2025-01-02 NOTE — TELEPHONE ENCOUNTER
Medication:   Requested Prescriptions     Pending Prescriptions Disp Refills    irbesartan (AVAPRO) 300 MG tablet [Pharmacy Med Name: IRBESARTAN 300 MG TABLET] 90 tablet 0     Sig: TAKE 1 TABLET BY MOUTH DAILY    atorvastatin (LIPITOR) 80 MG tablet [Pharmacy Med Name: ATORVASTATIN 80 MG TABLET] 90 tablet 0     Sig: TAKE ONE TABLET BY MOUTH ONCE NIGHTLY     Last Filled:  10/01/2024    Last appt: 9/23/2024   Next appt: 1/23/2025    Last OARRS:        No data to display

## 2025-01-20 SDOH — ECONOMIC STABILITY: TRANSPORTATION INSECURITY
IN THE PAST 12 MONTHS, HAS THE LACK OF TRANSPORTATION KEPT YOU FROM MEDICAL APPOINTMENTS OR FROM GETTING MEDICATIONS?: NO

## 2025-01-20 SDOH — ECONOMIC STABILITY: FOOD INSECURITY: WITHIN THE PAST 12 MONTHS, YOU WORRIED THAT YOUR FOOD WOULD RUN OUT BEFORE YOU GOT MONEY TO BUY MORE.: NEVER TRUE

## 2025-01-20 SDOH — ECONOMIC STABILITY: INCOME INSECURITY: IN THE LAST 12 MONTHS, WAS THERE A TIME WHEN YOU WERE NOT ABLE TO PAY THE MORTGAGE OR RENT ON TIME?: NO

## 2025-01-20 SDOH — ECONOMIC STABILITY: FOOD INSECURITY: WITHIN THE PAST 12 MONTHS, THE FOOD YOU BOUGHT JUST DIDN'T LAST AND YOU DIDN'T HAVE MONEY TO GET MORE.: NEVER TRUE

## 2025-01-20 ASSESSMENT — PATIENT HEALTH QUESTIONNAIRE - PHQ9
SUM OF ALL RESPONSES TO PHQ QUESTIONS 1-9: 1
2. FEELING DOWN, DEPRESSED OR HOPELESS: NOT AT ALL
1. LITTLE INTEREST OR PLEASURE IN DOING THINGS: SEVERAL DAYS
SUM OF ALL RESPONSES TO PHQ QUESTIONS 1-9: 1
SUM OF ALL RESPONSES TO PHQ9 QUESTIONS 1 & 2: 1
SUM OF ALL RESPONSES TO PHQ QUESTIONS 1-9: 1
1. LITTLE INTEREST OR PLEASURE IN DOING THINGS: SEVERAL DAYS
SUM OF ALL RESPONSES TO PHQ QUESTIONS 1-9: 1
SUM OF ALL RESPONSES TO PHQ9 QUESTIONS 1 & 2: 1
2. FEELING DOWN, DEPRESSED OR HOPELESS: NOT AT ALL

## 2025-01-23 ENCOUNTER — OFFICE VISIT (OUTPATIENT)
Dept: PRIMARY CARE CLINIC | Age: 70
End: 2025-01-23
Payer: MEDICARE

## 2025-01-23 VITALS
OXYGEN SATURATION: 96 % | TEMPERATURE: 96.8 F | DIASTOLIC BLOOD PRESSURE: 82 MMHG | RESPIRATION RATE: 16 BRPM | SYSTOLIC BLOOD PRESSURE: 140 MMHG | HEIGHT: 64 IN | HEART RATE: 77 BPM | BODY MASS INDEX: 36.88 KG/M2 | WEIGHT: 216 LBS

## 2025-01-23 DIAGNOSIS — F41.9 ANXIETY: ICD-10-CM

## 2025-01-23 DIAGNOSIS — M25.511 RIGHT SHOULDER PAIN, UNSPECIFIED CHRONICITY: ICD-10-CM

## 2025-01-23 DIAGNOSIS — I10 ESSENTIAL HYPERTENSION: Primary | ICD-10-CM

## 2025-01-23 DIAGNOSIS — R73.03 PREDIABETES: ICD-10-CM

## 2025-01-23 DIAGNOSIS — K21.9 GASTROESOPHAGEAL REFLUX DISEASE, UNSPECIFIED WHETHER ESOPHAGITIS PRESENT: ICD-10-CM

## 2025-01-23 DIAGNOSIS — E78.2 MIXED HYPERLIPIDEMIA: ICD-10-CM

## 2025-01-23 DIAGNOSIS — I50.32 CHRONIC HEART FAILURE WITH PRESERVED EJECTION FRACTION (HFPEF) (HCC): ICD-10-CM

## 2025-01-23 DIAGNOSIS — E55.9 VITAMIN D DEFICIENCY: ICD-10-CM

## 2025-01-23 PROCEDURE — 3079F DIAST BP 80-89 MM HG: CPT | Performed by: INTERNAL MEDICINE

## 2025-01-23 PROCEDURE — 1090F PRES/ABSN URINE INCON ASSESS: CPT | Performed by: INTERNAL MEDICINE

## 2025-01-23 PROCEDURE — G8427 DOCREV CUR MEDS BY ELIG CLIN: HCPCS | Performed by: INTERNAL MEDICINE

## 2025-01-23 PROCEDURE — 1160F RVW MEDS BY RX/DR IN RCRD: CPT | Performed by: INTERNAL MEDICINE

## 2025-01-23 PROCEDURE — G8417 CALC BMI ABV UP PARAM F/U: HCPCS | Performed by: INTERNAL MEDICINE

## 2025-01-23 PROCEDURE — G2211 COMPLEX E/M VISIT ADD ON: HCPCS | Performed by: INTERNAL MEDICINE

## 2025-01-23 PROCEDURE — 1159F MED LIST DOCD IN RCRD: CPT | Performed by: INTERNAL MEDICINE

## 2025-01-23 PROCEDURE — 1123F ACP DISCUSS/DSCN MKR DOCD: CPT | Performed by: INTERNAL MEDICINE

## 2025-01-23 PROCEDURE — 3017F COLORECTAL CA SCREEN DOC REV: CPT | Performed by: INTERNAL MEDICINE

## 2025-01-23 PROCEDURE — G8400 PT W/DXA NO RESULTS DOC: HCPCS | Performed by: INTERNAL MEDICINE

## 2025-01-23 PROCEDURE — 99214 OFFICE O/P EST MOD 30 MIN: CPT | Performed by: INTERNAL MEDICINE

## 2025-01-23 PROCEDURE — 3077F SYST BP >= 140 MM HG: CPT | Performed by: INTERNAL MEDICINE

## 2025-01-23 PROCEDURE — 1036F TOBACCO NON-USER: CPT | Performed by: INTERNAL MEDICINE

## 2025-01-23 RX ORDER — BUSPIRONE HYDROCHLORIDE 5 MG/1
5 TABLET ORAL DAILY PRN
Qty: 90 TABLET | Refills: 0 | Status: SHIPPED | OUTPATIENT
Start: 2025-01-23

## 2025-01-23 NOTE — PROGRESS NOTES
Date of Visit: 2025    Raysa Browne (:  1955) is a 69 y.o. female,  Established patient here for evaluation of the following chief complaint(s):  Hypertension, Hyperlipidemia, and prediabetes      ASSESSMENT/PLAN:    1. Essential hypertension  Assessment & Plan:  -stable  -continue hydralazine 50 mg 2 times daily  -Continue carvedilol 25 mg 2 times daily  -Continue irbesartan 300 mg once daily  -Continue spironolactone 25 mg once daily  -low sodium diet  -regular aerobic exercise  2. Mixed hyperlipidemia  Assessment & Plan:  -Stable  -Continue atorvastatin 80 mg nightly  -Low fat, low cholesterol diet  -Regular aerobic exercise  3. Gastroesophageal reflux disease, unspecified whether esophagitis present  Assessment & Plan:  -stable  -Continue pantoprazole 40 mg every morning  -Decrease caffeine, avoid spicy foods, avoid tomato based foods  -Eat small meals instead of large meals  -Wait 2-3 hours after eating before lying down   4. Vitamin D deficiency  Assessment & Plan:  -Stable  -Continue vitamin D 5000 IU every 2-3 days  5. Prediabetes  Assessment & Plan:  -Stable  -Low carbohydrate diet  -Regular aerobic exercise  6. Anxiety  Assessment & Plan:  -Stable  -Continue buspirone 5 mg once daily as needed  Orders:  -     busPIRone (BUSPAR) 5 MG tablet; Take 1 tablet by mouth daily as needed (anxiety), Disp-90 tablet, R-0Normal  7. Right shoulder pain, unspecified chronicity  Assessment & Plan:  -Patient declines x-ray  -Patient declines referral to Orthopedics  -Continue Tylenol as needed  8. Chronic heart failure with preserved ejection fraction (HFpEF) ([facility])  Assessment & Plan:  -Stable  -Continue furosemide 20 mg once daily  -Continue spironolactone 25 mg once daily  -Low sodium diet  -Most recent Cardiology visit note reviewed  -Continue care per Cardiology        Return in about 3 months (around 2025) for hypertension, hyperlipidemia, GERD, and anxiety.    SUBJECTIVE:    Patient has

## 2025-02-06 PROBLEM — M25.511 RIGHT SHOULDER PAIN: Status: ACTIVE | Noted: 2025-02-06

## 2025-02-07 NOTE — ASSESSMENT & PLAN NOTE
-Stable  -Continue furosemide 20 mg once daily  -Continue spironolactone 25 mg once daily  -Low sodium diet  -Most recent Cardiology visit note reviewed  -Continue care per Cardiology

## 2025-02-07 NOTE — ASSESSMENT & PLAN NOTE
-stable  -continue hydralazine 50 mg 2 times daily  -Continue carvedilol 25 mg 2 times daily  -Continue irbesartan 300 mg once daily  -Continue spironolactone 25 mg once daily  -low sodium diet  -regular aerobic exercise

## 2025-02-07 NOTE — ASSESSMENT & PLAN NOTE
-stable  -Continue pantoprazole 40 mg every morning  -Decrease caffeine, avoid spicy foods, avoid tomato based foods  -Eat small meals instead of large meals  -Wait 2-3 hours after eating before lying down

## 2025-02-07 NOTE — ASSESSMENT & PLAN NOTE
-Stable  -Continue atorvastatin 80 mg nightly  -Low fat, low cholesterol diet  -Regular aerobic exercise

## 2025-02-14 ENCOUNTER — OFFICE VISIT (OUTPATIENT)
Dept: UROGYNECOLOGY | Age: 70
End: 2025-02-14

## 2025-02-14 VITALS
HEART RATE: 88 BPM | TEMPERATURE: 97.8 F | DIASTOLIC BLOOD PRESSURE: 100 MMHG | SYSTOLIC BLOOD PRESSURE: 180 MMHG | OXYGEN SATURATION: 97 % | RESPIRATION RATE: 16 BRPM

## 2025-02-14 DIAGNOSIS — K59.00 CONSTIPATION, UNSPECIFIED CONSTIPATION TYPE: ICD-10-CM

## 2025-02-14 DIAGNOSIS — R14.0 BLOATING: Primary | ICD-10-CM

## 2025-02-14 NOTE — PROGRESS NOTES
2/14/2025      HPI:     Name: Raysa Browne  YOB: 1955    CC: Patient is a 69 y.o. female who is seen in consultation from Anastacio Rogers MD   for evaluation of states has a Rectocele which was repaired in May 2024.  Continues to be incontinent.     HPI:  Bladder control problem: No  Bladder emptying problems:  No  Prolapse/Vaginal Support problems: No   Bowel problem(s): Yes  How long have you had bowel symptoms? 1.5 Years   Do you have accidental loss of solid stool? No  Do you have accidental loss of liquid stool? No  Do you have accidental loss of gas? Yes  How many episodes per week? Every day  Do you have constipation? Yes Do you have diarrhea? Yes Problems with bloating? No  Do you have frequent desire to have a bowel movement? Yes  Do you feel that your bowels are never completely empty? Yes  Do you ever place your fingers in your vagina or between the vagina and rectum to help with a bowel movement? No  Have you seen a physician for bowel symptoms? Yes If yes, Dr Lorenzo Nagy, Dr Stoll    Sexual History:  reports that she is not currently sexually active and has had partner(s) who are male.  Pelvic Pain:  Yes.  Where is your pain? Abdomen  How long have you had pelvic pain?Years  Is your pain relieved by bladder emptying? No  Do you have pain with urination? No  Does anything relieve the pain? No         Ob/Gyn History:    OB History   No obstetric history on file.     Past Medical History:   Past Medical History:   Diagnosis Date    pearl Apple MD    GERD (gastroesophageal reflux disease)     Goiter     Heart failure with preserved ejection fraction (HCC)     History of mammogram 10/2010    [facility]    Hyperlipidemia     Hypertension     Obesity     Onychomycosis     Pap smear for cervical cancer screening 02/2010    Dr. Virgen - [facility]    Prediabetes 12/23/2019    Screening for osteoporosis     Dexa 12/2005 - [facility]    Stomach ulcer     Vitamin D

## 2025-02-17 RX ORDER — PANTOPRAZOLE SODIUM 40 MG/1
40 TABLET, DELAYED RELEASE ORAL DAILY
Qty: 90 TABLET | Refills: 1 | Status: SHIPPED | OUTPATIENT
Start: 2025-02-17

## 2025-02-17 NOTE — TELEPHONE ENCOUNTER
Medication:   Requested Prescriptions     Pending Prescriptions Disp Refills    pantoprazole (PROTONIX) 40 MG tablet [Pharmacy Med Name: PANTOPRAZOLE SOD DR 40 MG TAB] 90 tablet 1     Sig: TAKE 1 TABLET BY MOUTH DAILY     Last Filled:  11.7.24    Last appt: 1/23/2025   Next appt: Visit date not found    Last OARRS:        No data to display

## 2025-03-12 RX ORDER — SPIRONOLACTONE 25 MG/1
25 TABLET ORAL DAILY
Qty: 90 TABLET | Refills: 3 | Status: SHIPPED | OUTPATIENT
Start: 2025-03-12

## 2025-03-31 DIAGNOSIS — E78.2 MIXED HYPERLIPIDEMIA: ICD-10-CM

## 2025-03-31 DIAGNOSIS — I10 ESSENTIAL HYPERTENSION: ICD-10-CM

## 2025-04-01 NOTE — TELEPHONE ENCOUNTER
Medication:   Requested Prescriptions     Pending Prescriptions Disp Refills    irbesartan (AVAPRO) 300 MG tablet [Pharmacy Med Name: IRBESARTAN 300 MG TABLET] 90 tablet 0     Sig: TAKE 1 TABLET BY MOUTH DAILY    atorvastatin (LIPITOR) 80 MG tablet [Pharmacy Med Name: ATORVASTATIN 80 MG TABLET] 90 tablet 0     Sig: TAKE ONE TABLET BY MOUTH ONCE NIGHTLY     Last Filled:  1.2.25    Last appt: 1/23/2025   Next appt: 4/24/2025    Last Lipid:   Lab Results   Component Value Date/Time    CHOL 189 10/01/2024 09:54 AM    TRIG 224 10/01/2024 09:54 AM    HDL 62 10/01/2024 09:54 AM

## 2025-04-02 RX ORDER — IRBESARTAN 300 MG/1
300 TABLET ORAL DAILY
Qty: 90 TABLET | Refills: 0 | Status: SHIPPED | OUTPATIENT
Start: 2025-04-02

## 2025-04-02 RX ORDER — ATORVASTATIN CALCIUM 80 MG/1
80 TABLET, FILM COATED ORAL NIGHTLY
Qty: 90 TABLET | Refills: 0 | Status: SHIPPED | OUTPATIENT
Start: 2025-04-02

## 2025-04-02 RX ORDER — HYDRALAZINE HYDROCHLORIDE 50 MG/1
50 TABLET, FILM COATED ORAL 2 TIMES DAILY
Qty: 180 TABLET | Refills: 1 | Status: SHIPPED | OUTPATIENT
Start: 2025-04-02

## 2025-04-02 NOTE — TELEPHONE ENCOUNTER
Medication:   Requested Prescriptions     Pending Prescriptions Disp Refills    hydrALAZINE (APRESOLINE) 50 MG tablet [Pharmacy Med Name: HYDRALAZINE 50 MG TABLET] 180 tablet 1     Sig: TAKE 1 TABLET BY MOUTH 2 TIMES A DAY     Last Filled:  10/10/2024    Last appt: 1/23/2025   Next appt: 4/24/2025    Last OARRS:        No data to display

## 2025-04-07 ENCOUNTER — APPOINTMENT (OUTPATIENT)
Age: 70
End: 2025-04-07
Payer: MEDICARE

## 2025-04-07 ENCOUNTER — HOSPITAL ENCOUNTER (EMERGENCY)
Age: 70
Discharge: HOME | End: 2025-04-07
Attending: EMERGENCY MEDICINE
Payer: MEDICARE

## 2025-04-07 VITALS
WEIGHT: 212.7 LBS | TEMPERATURE: 98.5 F | HEART RATE: 65 BPM | DIASTOLIC BLOOD PRESSURE: 78 MMHG | OXYGEN SATURATION: 95 % | SYSTOLIC BLOOD PRESSURE: 184 MMHG | HEIGHT: 63 IN | BODY MASS INDEX: 37.69 KG/M2 | RESPIRATION RATE: 18 BRPM

## 2025-04-07 DIAGNOSIS — I10 ASYMPTOMATIC HYPERTENSION: Primary | ICD-10-CM

## 2025-04-07 LAB
ALBUMIN SERPL-MCNC: 4.4 G/DL (ref 3.4–5)
ALBUMIN/GLOB SERPL: 1.6 {RATIO}
ALP SERPL-CCNC: 54 U/L (ref 40–129)
ALT SERPL-CCNC: 20 U/L (ref 10–40)
ANION GAP SERPL CALCULATED.3IONS-SCNC: 13 MMOL/L (ref 3–16)
AST SERPL-CCNC: 20 U/L (ref 15–37)
BASOPHILS # BLD: 0.02 K/UL (ref 0–0.2)
BASOPHILS NFR BLD: 0 %
BILIRUB SERPL-MCNC: 0.4 MG/DL (ref 0–1)
BILIRUB UR QL STRIP: NEGATIVE
BNP SERPL-MCNC: 167 PG/ML (ref 0–124)
BUN SERPL-MCNC: 13 MG/DL (ref 7–20)
CALCIUM SERPL-MCNC: 9.3 MG/DL (ref 8.3–10.6)
CHLORIDE SERPL-SCNC: 102 MMOL/L (ref 99–110)
CLARITY UR: CLEAR
CO2 SERPL-SCNC: 28 MMOL/L (ref 21–32)
COLOR UR: YELLOW
COMMENT: NORMAL
CREAT SERPL-MCNC: 0.8 MG/DL (ref 0.6–1.2)
EOSINOPHIL # BLD: 0.15 K/UL (ref 0–0.6)
EOSINOPHILS RELATIVE PERCENT: 3 %
ERYTHROCYTE [DISTWIDTH] IN BLOOD BY AUTOMATED COUNT: 12.7 % (ref 12.4–15.4)
GFR, ESTIMATED: 83 ML/MIN/1.73M2
GLUCOSE SERPL-MCNC: 98 MG/DL (ref 70–99)
GLUCOSE UR STRIP-MCNC: NEGATIVE MG/DL
HCT VFR BLD AUTO: 42.9 % (ref 36–48)
HGB BLD-MCNC: 14.4 G/DL (ref 12–16)
HGB UR QL STRIP.AUTO: NEGATIVE
IMM GRANULOCYTES # BLD AUTO: 0.01 K/UL (ref 0–0.5)
IMM GRANULOCYTES NFR BLD: 0 %
KETONES UR STRIP-MCNC: NEGATIVE MG/DL
LEUKOCYTE ESTERASE UR QL STRIP: NEGATIVE
LYMPHOCYTES NFR BLD: 1.35 K/UL (ref 1–5.1)
LYMPHOCYTES RELATIVE PERCENT: 28 %
MCH RBC QN AUTO: 30.1 PG (ref 26–34)
MCHC RBC AUTO-ENTMCNC: 33.6 G/DL (ref 31–36)
MCV RBC AUTO: 89.7 FL (ref 80–100)
MONOCYTES NFR BLD: 0.44 K/UL (ref 0–1.3)
MONOCYTES NFR BLD: 9 %
NEUTROPHILS NFR BLD: 60 %
NEUTS SEG NFR BLD: 2.93 K/UL (ref 1.7–7.7)
NITRITE UR QL STRIP: NEGATIVE
PH UR STRIP: 7 [PH] (ref 5–8)
PLATELET # BLD AUTO: 235 K/UL (ref 135–450)
PMV BLD AUTO: 9.6 FL
POTASSIUM SERPL-SCNC: 4.1 MMOL/L (ref 3.5–5.1)
PROT SERPL-MCNC: 7.1 G/DL (ref 6.4–8.2)
PROT UR STRIP-MCNC: NEGATIVE MG/DL
RBC # BLD AUTO: 4.78 M/UL (ref 4–5.2)
SODIUM SERPL-SCNC: 143 MMOL/L (ref 136–145)
SP GR UR STRIP: 1.01 (ref 1–1.03)
TROPONIN I SERPL HS-MCNC: <6 NG/L (ref 0–14)
TROPONIN I SERPL HS-MCNC: <6 NG/L (ref 0–14)
UROBILINOGEN UR STRIP-ACNC: 0.2 EU/DL (ref 0–1)
WBC OTHER # BLD: 4.9 K/UL (ref 4–11)

## 2025-04-07 PROCEDURE — 83880 ASSAY OF NATRIURETIC PEPTIDE: CPT

## 2025-04-07 PROCEDURE — 93005 ELECTROCARDIOGRAM TRACING: CPT

## 2025-04-07 PROCEDURE — 99285 EMERGENCY DEPT VISIT HI MDM: CPT

## 2025-04-07 PROCEDURE — 71046 X-RAY EXAM CHEST 2 VIEWS: CPT

## 2025-04-07 PROCEDURE — 84484 ASSAY OF TROPONIN QUANT: CPT

## 2025-04-07 PROCEDURE — 85025 COMPLETE CBC W/AUTO DIFF WBC: CPT

## 2025-04-07 PROCEDURE — 81003 URINALYSIS AUTO W/O SCOPE: CPT

## 2025-04-07 PROCEDURE — 80053 COMPREHEN METABOLIC PANEL: CPT

## 2025-04-07 RX ORDER — HYDRALAZINE HYDROCHLORIDE 50 MG/1
100 TABLET, FILM COATED ORAL 2 TIMES DAILY
Qty: 180 TABLET | Refills: 1 | Status: SHIPPED | OUTPATIENT
Start: 2025-04-07

## 2025-04-07 ASSESSMENT — ENCOUNTER SYMPTOMS
ABDOMINAL DISTENTION: 0
CONSTIPATION: 0
COUGH: 0
DIARRHEA: 0
CHEST TIGHTNESS: 1
VOMITING: 0
SHORTNESS OF BREATH: 0
ABDOMINAL PAIN: 0
COLOR CHANGE: 0
NAUSEA: 0
PHOTOPHOBIA: 0
BACK PAIN: 0

## 2025-04-07 ASSESSMENT — LIFESTYLE VARIABLES
HOW MANY STANDARD DRINKS CONTAINING ALCOHOL DO YOU HAVE ON A TYPICAL DAY: 1 OR 2
HOW OFTEN DO YOU HAVE A DRINK CONTAINING ALCOHOL: MONTHLY OR LESS

## 2025-04-07 ASSESSMENT — PAIN - FUNCTIONAL ASSESSMENT: PAIN_FUNCTIONAL_ASSESSMENT: NONE - DENIES PAIN

## 2025-04-07 NOTE — ED PROVIDER NOTES
[facility] EMERGENCY DEPARTMENT  EMERGENCY DEPARTMENT ENCOUNTER        Pt Name: Raysa Browne  MRN: [id number]  Birthdate 1955  Date of evaluation: 4/7/2025  Provider: MELINDA Reeves  PCP: Tiffany Ramires MD  Note Started: 1:46 PM EDT 4/7/25       I have seen and evaluated this patient with my supervising physician Kate Degroot MD.      CHIEF COMPLAINT       Chief Complaint   Patient presents with    Hypertension     High blood pressure over past couple of days       HISTORY OF PRESENT ILLNESS: 1 or more Elements     History From: Patient  Limitations to history : None    Raysa Browne is a 69 y.o. female with past medical history of GERD, hyperlipidemia, CHF, CAD, hypertension who presents ED by private transport for further evaluation of elevated blood pressure.  Patient reports she has longstanding history of hypertension.  She is on hydralazine 50 mg twice daily, carvedilol 25 mg twice daily, irbesartan 300 mg daily and spironolactone 25 mg daily.  She reports has been taking all her medication as prescribed.  Denies any missed or skipped dosages.  Denies any changes in her dosing regimen.  Patient reports anytime she goes to the doctor she always has an elevated blood pressure.  Reports her cardiologist told her that she could have whitecoat syndrome.  She states she does check her blood pressure regularly at home.  Normally her blood pressure at home is good.  She reports over the past couple days her blood pressure at home on her home blood pressure cuff has been significant elevated around 200 systolic.  Patient became concerned because her blood pressures never been this high on her home blood pressure cuff so came to the ED for further evaluation and treatment.  She denies any headache, lightheadedness or dizziness.  Denies any syncope or near syncope.  She did have some chest tightness yesterday but denies any chest pain or chest tightness currently.  She

## 2025-04-07 NOTE — ED PROVIDER NOTES
ED Attending Attestation Note    This patient was seen by the advanced practice provider.     I personally saw the patient. Management plan and care decisions have been discussed with me and I made/approved the management plan and take responsibility for patient management.     Briefly, 69 y.o. female presents with elevated blood pressure.  States her blood pressure readings have been high lately.  Notes that she has been under a lot of stress with managing caring for an elderly family member and her  not had heart surgery.  Mentions that she is suspected to have whitecoat syndrome.  States she takes her medication as prescribed.  She is not currently having any symptoms.  Denies current chest pain, reports intermittent chronic dyspnea that has been unchanged, focal weakness, sensory deficit.    Focused exam:   Gen: 69 y.o. female, NAD, nontoxic appearing  HEENT: NCAT. MMM, PERRL. EOMI.   CV: RRR w/o MRG  Vascular: intact and symmetric radial and DP pulses bilaterally  Lungs: CTAB. No incr WOB.   Abdomen: Soft, nontender, nondistended. No rebound/guarding.   Neuro: awake and alert, speech clear w/o aphasia; intact and symmetric strength and sensation in all 4 extremities, CN 2-12 intact bilaterally    MDM:   This is a 69-year-old female presenting with elevated blood pressure readings.  No evidence of endorgan damage at this time or on evaluation in the emergency department.   We will uptitrate her home blood pressure regimen and recommend close follow-up with PCP.     Nursing notes, vital signs reviewed.       EKG interpretation by emergency physician: Normal sinus rhythm, left axis deviation, left bundle branch block, no ST features concerning for acute ischemia, rate 67, comparison to prior EKG from 5/6/2024 there is no acute change and left bundle branch block is chronic      For further details of the patient's emergency department visit, please see the advanced practice provider's documentation.    Kate

## 2025-04-08 LAB
EKG ATRIAL RATE: 67 BPM
EKG DIAGNOSIS: NORMAL
EKG P AXIS: 54 DEGREES
EKG P-R INTERVAL: 172 MS
EKG Q-T INTERVAL: 474 MS
EKG QRS DURATION: 138 MS
EKG QTC CALCULATION (BAZETT): 500 MS
EKG R AXIS: -80 DEGREES
EKG T AXIS: 95 DEGREES
EKG VENTRICULAR RATE: 67 BPM

## 2025-04-10 ENCOUNTER — OFFICE VISIT (OUTPATIENT)
Dept: CARDIOLOGY CLINIC | Age: 70
End: 2025-04-10
Payer: MEDICARE

## 2025-04-10 VITALS
WEIGHT: 211.2 LBS | SYSTOLIC BLOOD PRESSURE: 180 MMHG | HEART RATE: 69 BPM | BODY MASS INDEX: 37.41 KG/M2 | DIASTOLIC BLOOD PRESSURE: 110 MMHG

## 2025-04-10 DIAGNOSIS — I10 UNCONTROLLED HYPERTENSION: Chronic | ICD-10-CM

## 2025-04-10 DIAGNOSIS — F41.9 ANXIETY: ICD-10-CM

## 2025-04-10 DIAGNOSIS — I51.89 DIASTOLIC DYSFUNCTION: Primary | Chronic | ICD-10-CM

## 2025-04-10 PROCEDURE — G8400 PT W/DXA NO RESULTS DOC: HCPCS | Performed by: INTERNAL MEDICINE

## 2025-04-10 PROCEDURE — 1123F ACP DISCUSS/DSCN MKR DOCD: CPT | Performed by: INTERNAL MEDICINE

## 2025-04-10 PROCEDURE — 3080F DIAST BP >= 90 MM HG: CPT | Performed by: INTERNAL MEDICINE

## 2025-04-10 PROCEDURE — G2211 COMPLEX E/M VISIT ADD ON: HCPCS | Performed by: INTERNAL MEDICINE

## 2025-04-10 PROCEDURE — G8417 CALC BMI ABV UP PARAM F/U: HCPCS | Performed by: INTERNAL MEDICINE

## 2025-04-10 PROCEDURE — 99215 OFFICE O/P EST HI 40 MIN: CPT | Performed by: INTERNAL MEDICINE

## 2025-04-10 PROCEDURE — 1036F TOBACCO NON-USER: CPT | Performed by: INTERNAL MEDICINE

## 2025-04-10 PROCEDURE — 3077F SYST BP >= 140 MM HG: CPT | Performed by: INTERNAL MEDICINE

## 2025-04-10 PROCEDURE — 1090F PRES/ABSN URINE INCON ASSESS: CPT | Performed by: INTERNAL MEDICINE

## 2025-04-10 PROCEDURE — G8428 CUR MEDS NOT DOCUMENT: HCPCS | Performed by: INTERNAL MEDICINE

## 2025-04-10 PROCEDURE — 3017F COLORECTAL CA SCREEN DOC REV: CPT | Performed by: INTERNAL MEDICINE

## 2025-04-10 RX ORDER — BUSPIRONE HYDROCHLORIDE 5 MG/1
5 TABLET ORAL DAILY PRN
COMMUNITY
Start: 2025-04-10

## 2025-04-10 NOTE — PROGRESS NOTES
CHOL 186 06/29/2023     Lab Results   Component Value Date    TRIG 224 (H) 10/01/2024    TRIG 208 (H) 02/07/2024    TRIG 232 (H) 06/29/2023     Lab Results   Component Value Date    HDL 62 (H) 10/01/2024    HDL 53 02/07/2024    HDL 61 (H) 06/29/2023     No components found for: \"LDLCHOLESTEROL\", \"LDLCALC\"    Lab Results   Component Value Date    VLDL 45 10/01/2024    VLDL 42 02/07/2024    VLDL 46 06/29/2023     No results found for: \"CHOLHDLRATIO\"    Lab Results   Component Value Date    INR 1.03 03/12/2021    PROTIME 11.9 03/12/2021       The 10-year ASCVD risk score (Lamin RAY, et al., 2019) is: 25.2%    Values used to calculate the score:      Age: 69 years      Sex: Female      Is Non- : No      Diabetic: No      Tobacco smoker: No      Systolic Blood Pressure: 180 mmHg      Is BP treated: Yes      HDL Cholesterol: 62 mg/dL      Total Cholesterol: 189 mg/dL      Assessment / Plan:      Diagnosis Orders   1. Diastolic dysfunction  sacubitril-valsartan (ENTRESTO) 24-26 MG per tablet      2. Uncontrolled hypertension  sacubitril-valsartan (ENTRESTO) 24-26 MG per tablet      3. Anxiety  busPIRone (BUSPAR) 5 MG tablet          1.  Resistant hypertension.  I suspect a component of whitecoat hypertension though BP appears to be elevated even at home.  Secondary hypertension has been excluded per history.  2.  Hyperlipidemia.  3.  CAD.  4.  Chronic HFpEF.  5.  LBBB. New diagnosis as of 01/2023.  It is of unknown etiology at this time.  Lexiscan nuclear stress test was negative for ischemia or previous MI, echo is negative for LV dilatation.  I suspect it is due to degeneration of the conduction system.  At this time he has no clinical significance.        - Will change irbesartan 300 mg p.o. daily to Entresto 24-26 mg p.o. twice daily and uptitrate as necessary for adequate BP control.  - Continue Lipitor 80 daily, Coreg 25 twice daily, Lasix 20 daily as needed, hydralazine 100 mg twice daily,

## 2025-04-11 ENCOUNTER — TELEPHONE (OUTPATIENT)
Dept: ADMINISTRATIVE | Age: 70
End: 2025-04-11

## 2025-04-11 NOTE — TELEPHONE ENCOUNTER
Submitted PA for Entresto 24-26MG tablets   Via [facility]: [id number] STATUS: Prior Authorization Not Required     Follow up done daily; if no decision with in three days we will refax.  If another three days goes by with no decision will call the insurance for status.

## 2025-04-24 ENCOUNTER — OFFICE VISIT (OUTPATIENT)
Dept: PRIMARY CARE CLINIC | Age: 70
End: 2025-04-24

## 2025-04-24 VITALS
SYSTOLIC BLOOD PRESSURE: 182 MMHG | OXYGEN SATURATION: 96 % | TEMPERATURE: 96.9 F | BODY MASS INDEX: 37.39 KG/M2 | HEART RATE: 76 BPM | RESPIRATION RATE: 14 BRPM | WEIGHT: 211 LBS | DIASTOLIC BLOOD PRESSURE: 102 MMHG | HEIGHT: 63 IN

## 2025-04-24 DIAGNOSIS — K21.9 GASTROESOPHAGEAL REFLUX DISEASE, UNSPECIFIED WHETHER ESOPHAGITIS PRESENT: ICD-10-CM

## 2025-04-24 DIAGNOSIS — E55.9 VITAMIN D DEFICIENCY: ICD-10-CM

## 2025-04-24 DIAGNOSIS — I10 ESSENTIAL HYPERTENSION: Primary | ICD-10-CM

## 2025-04-24 DIAGNOSIS — E78.2 MIXED HYPERLIPIDEMIA: ICD-10-CM

## 2025-04-24 DIAGNOSIS — F41.9 ANXIETY: ICD-10-CM

## 2025-04-24 DIAGNOSIS — R73.03 PREDIABETES: ICD-10-CM

## 2025-04-24 RX ORDER — HYDRALAZINE HYDROCHLORIDE 100 MG/1
100 TABLET, FILM COATED ORAL 3 TIMES DAILY
Qty: 90 TABLET | Refills: 1 | Status: SHIPPED | OUTPATIENT
Start: 2025-04-24

## 2025-04-24 RX ORDER — WHEAT DEXTRIN 3 G/3.8 G
4 POWDER (GRAM) ORAL
COMMUNITY

## 2025-04-24 SDOH — ECONOMIC STABILITY: FOOD INSECURITY: WITHIN THE PAST 12 MONTHS, YOU WORRIED THAT YOUR FOOD WOULD RUN OUT BEFORE YOU GOT MONEY TO BUY MORE.: NEVER TRUE

## 2025-04-24 SDOH — ECONOMIC STABILITY: FOOD INSECURITY: WITHIN THE PAST 12 MONTHS, THE FOOD YOU BOUGHT JUST DIDN'T LAST AND YOU DIDN'T HAVE MONEY TO GET MORE.: NEVER TRUE

## 2025-04-24 ASSESSMENT — PATIENT HEALTH QUESTIONNAIRE - PHQ9
1. LITTLE INTEREST OR PLEASURE IN DOING THINGS: NOT AT ALL
SUM OF ALL RESPONSES TO PHQ QUESTIONS 1-9: 0
2. FEELING DOWN, DEPRESSED OR HOPELESS: NOT AT ALL
SUM OF ALL RESPONSES TO PHQ QUESTIONS 1-9: 0

## 2025-04-24 NOTE — PROGRESS NOTES
Date of Visit: 2025    Raysa Browne (:  1955) is a 69 y.o. female,  Established patient here for evaluation of the following chief complaint(s):  Hypertension, Hyperlipidemia, Gastroesophageal Reflux, and Anxiety      ASSESSMENT/PLAN:    Assessment & Plan  1. Essential Hypertension  - not controlled  - Increase hydralazine dosage to 100 mg 3 times daily  - Continue carvedilol 25 mg twice daily  - Continue entrestro 24-26 mg 2 times daily  - Continue spironolactone 25 mg once daily  - low sodium diet  - regular aerobic exercise    2. Mixed Hyperlipidemia  - stable  - Continue atorvastatin 80 mg nightly  - low fat, low cholesterol diet  - regular aerobic exercise  - Lipid panel    3. Gastroesophageal reflux disease   - stable  -  Continue pantoprazole 40 mg every morning  -  Decrease caffeine intake   -  avoid spicy foods and tomato based food   -  Eat small meals instead of large meals  -  wait 2-3 hours after eating before lying down    4. Anxiety  - not controlled  - Reports increased stress due to family issues  - Continue buspirone 5 mg daily  - Declined counseling at this time    5. Vitamin D deficiency  - stable  - Continue vitamin D 5000 IU once daily  - vitamin D 2    6. Prediabetes  - stable  - low carbohydrate diet  - regular aerobic exercise            Return in about 4 weeks (around 2025) for hypertension.    SUBJECTIVE:    History of Present Illness  The patient presents for follow-up of hypertension, hyperlipidemia, GERD, and anxiety.    Blood pressure has been generally well-controlled at home, but a significant elevation occurred approximately 2 weeks ago, necessitating a hospital visit. During the hospital visit, hydralazine dosage was increased to 50 mg 4 times daily. Blood pressure readings have been inconsistent, with a reading of 160/85 yesterday morning that later decreased to 123/73. Extensive testing and blood work at the hospital revealed no significant findings.

## 2025-04-25 DIAGNOSIS — I10 ESSENTIAL HYPERTENSION: ICD-10-CM

## 2025-04-25 NOTE — TELEPHONE ENCOUNTER
Medication:   Requested Prescriptions     Pending Prescriptions Disp Refills    carvedilol (COREG) 25 MG tablet [Pharmacy Med Name: CARVEDILOL 25 MG TABLET] 180 tablet 1     Sig: TAKE 1 TABLET BY MOUTH 2 TIMES A DAY     Last Filled:  8.20.24    Last appt: 4/24/2025   Next appt: 6/4/2025    Last OARRS:        No data to display

## 2025-04-26 RX ORDER — CARVEDILOL 25 MG/1
25 TABLET ORAL 2 TIMES DAILY
Qty: 180 TABLET | Refills: 1 | Status: SHIPPED | OUTPATIENT
Start: 2025-04-26

## 2025-05-01 DIAGNOSIS — E55.9 VITAMIN D DEFICIENCY: ICD-10-CM

## 2025-05-01 DIAGNOSIS — E78.2 MIXED HYPERLIPIDEMIA: ICD-10-CM

## 2025-05-02 LAB
25(OH)D3 SERPL-MCNC: 39.6 NG/ML
CHOLEST SERPL-MCNC: 177 MG/DL (ref 0–199)
HDLC SERPL-MCNC: 60 MG/DL (ref 40–60)
LDLC SERPL CALC-MCNC: 81 MG/DL
TRIGL SERPL-MCNC: 180 MG/DL (ref 0–150)
VLDLC SERPL CALC-MCNC: 36 MG/DL

## 2025-05-23 DIAGNOSIS — F41.9 ANXIETY: ICD-10-CM

## 2025-05-23 RX ORDER — BUSPIRONE HYDROCHLORIDE 5 MG/1
5 TABLET ORAL DAILY PRN
Qty: 90 TABLET | Refills: 0 | Status: SHIPPED | OUTPATIENT
Start: 2025-05-23

## 2025-05-23 NOTE — TELEPHONE ENCOUNTER
Medication:   Requested Prescriptions     Pending Prescriptions Disp Refills    busPIRone (BUSPAR) 5 MG tablet       Sig: Take 1 tablet by mouth daily as needed (anxiety)     Last Filled:  04/10/25  Pt is aware Dr Olson is out of the office.  Ok to fill next week when PCP return     Last appt: 4/24/2025   Next appt: 6/4/2025    Last OARRS:        No data to display

## 2025-06-10 ENCOUNTER — OFFICE VISIT (OUTPATIENT)
Dept: PRIMARY CARE CLINIC | Age: 70
End: 2025-06-10

## 2025-06-10 VITALS
BODY MASS INDEX: 37.92 KG/M2 | HEART RATE: 76 BPM | RESPIRATION RATE: 16 BRPM | HEIGHT: 63 IN | DIASTOLIC BLOOD PRESSURE: 90 MMHG | OXYGEN SATURATION: 95 % | WEIGHT: 214 LBS | SYSTOLIC BLOOD PRESSURE: 146 MMHG

## 2025-06-10 DIAGNOSIS — I10 ESSENTIAL HYPERTENSION: Primary | ICD-10-CM

## 2025-06-10 SDOH — ECONOMIC STABILITY: FOOD INSECURITY: WITHIN THE PAST 12 MONTHS, YOU WORRIED THAT YOUR FOOD WOULD RUN OUT BEFORE YOU GOT MONEY TO BUY MORE.: NEVER TRUE

## 2025-06-10 SDOH — ECONOMIC STABILITY: FOOD INSECURITY: WITHIN THE PAST 12 MONTHS, THE FOOD YOU BOUGHT JUST DIDN'T LAST AND YOU DIDN'T HAVE MONEY TO GET MORE.: NEVER TRUE

## 2025-06-10 ASSESSMENT — PATIENT HEALTH QUESTIONNAIRE - PHQ9
SUM OF ALL RESPONSES TO PHQ QUESTIONS 1-9: 0
SUM OF ALL RESPONSES TO PHQ QUESTIONS 1-9: 0
1. LITTLE INTEREST OR PLEASURE IN DOING THINGS: NOT AT ALL
SUM OF ALL RESPONSES TO PHQ QUESTIONS 1-9: 0
2. FEELING DOWN, DEPRESSED OR HOPELESS: NOT AT ALL
SUM OF ALL RESPONSES TO PHQ QUESTIONS 1-9: 0

## 2025-06-10 NOTE — PATIENT INSTRUCTIONS
-Continue same medications  -Low sodium diet  -Regular aerobic exercise  -bring blood pressure log to visits

## 2025-06-10 NOTE — PROGRESS NOTES
Date of Visit: 6/10/2025    Raysa Browne (:  1955) is a 69 y.o. female,  Established patient here for evaluation of the following chief complaint(s):  Hypertension      ASSESSMENT/PLAN:    Assessment & Plan  1. Essential Hypertension  - not controlled but has shown significant improvement  - Continue hydralazine 100 mg 3 times daily  - Continbue carvedilol 25 mg twice daily  - Continue spironolactone 25 mg once daily  - Continue Entresto 24-26 mg twice daily  - Advised to continue monitoring blood pressure at home  - low sodium diet   - regular physical activity  - Upcoming cardiology appointment in the next 3 weeks, where Entresto dosage may be adjusted      Return in about 3 months (around 2025) for Medicare AW.    SUBJECTIVE:    History of Present Illness  The patient is a 69-year-old female who presents for a follow-up of hypertension.    At the last visit, hydralazine was increased to 100 mg three times daily. She continues carvedilol 25 mg twice daily, spironolactone 25 mg once daily, and Entresto 24-26 mg twice daily for heart failure, which also helps control her blood pressure. She reports that her blood pressure has significantly improved at home, with a reading of 134/79 this morning. She is mindful of her salt intake. Although she acknowledges the need for physical activity, she has not yet incorporated exercise into her routine. She has an upcoming appointment with cardiology in the next three weeks, during which there may be an increase in her Entresto dosage.    Review of Systems   Eyes:  Negative for visual disturbance.   Respiratory:  Negative for chest tightness and shortness of breath.    Cardiovascular:  Negative for chest pain, palpitations and leg swelling.   Genitourinary:  Negative for frequency and hematuria.   Neurological:  Negative for dizziness, syncope, light-headedness and headaches.       Allergies   Allergen Reactions    Sulfa Antibiotics Rash       Prior to

## 2025-06-21 ASSESSMENT — ENCOUNTER SYMPTOMS
SHORTNESS OF BREATH: 0
CHEST TIGHTNESS: 0

## 2025-07-08 DIAGNOSIS — E78.2 MIXED HYPERLIPIDEMIA: ICD-10-CM

## 2025-07-09 RX ORDER — ATORVASTATIN CALCIUM 80 MG/1
TABLET, FILM COATED ORAL
Qty: 90 TABLET | Refills: 0 | Status: SHIPPED | OUTPATIENT
Start: 2025-07-09

## 2025-07-09 NOTE — TELEPHONE ENCOUNTER
Medication:   Requested Prescriptions     Pending Prescriptions Disp Refills    atorvastatin (LIPITOR) 80 MG tablet [Pharmacy Med Name: ATORVASTATIN 80 MG TABLET] 90 tablet 0     Sig: TAKE 1 TABLET BY MOUTH ONCE NIGHTLY     Last Filled:  4.2.25    Last appt: 6/10/2025   Next appt: 9/30/2025    Last Lipid:   Lab Results   Component Value Date/Time    CHOL 177 05/01/2025 08:46 AM    TRIG 180 05/01/2025 08:46 AM    HDL 60 05/01/2025 08:46 AM

## 2025-07-15 DIAGNOSIS — I10 ESSENTIAL HYPERTENSION: ICD-10-CM

## 2025-07-15 RX ORDER — HYDRALAZINE HYDROCHLORIDE 100 MG/1
100 TABLET, FILM COATED ORAL 3 TIMES DAILY
Qty: 270 TABLET | Refills: 1 | Status: SHIPPED | OUTPATIENT
Start: 2025-07-15

## 2025-07-15 NOTE — TELEPHONE ENCOUNTER
Medication:   Requested Prescriptions     Pending Prescriptions Disp Refills    hydrALAZINE (APRESOLINE) 100 MG tablet [Pharmacy Med Name: hydrALAZINE 100 MG TABLET] 90 tablet 1     Sig: TAKE 1 TABLET BY MOUTH 3 TIMES A DAY     Last Filled:  04/24/25    Last appt: 6/10/2025   Next appt: 9/30/2025    Last OARRS:        No data to display

## 2025-07-29 ENCOUNTER — CARE COORDINATION (OUTPATIENT)
Dept: CARE COORDINATION | Age: 70
End: 2025-07-29

## 2025-07-29 NOTE — CARE COORDINATION
Ambulatory Care Coordination Note     2025 2:11 PM     Patient Current Location:  Home: [address] [place] [address]     This patient was received as a referral from [facility] .    ACM contacted the patient by telephone. Verified name and  with patient as identifiers. Provided introduction to self, and explanation of the ACM role.   Patient accepted care management services at this time.          ACM: Angelita River RN     Challenges to be reviewed by the provider   Additional needs identified to be addressed with provider No                 Method of communication with provider: chart routing.    Utilization: Initial Call - N/A    Care Summary Note: ACM outreached patient who was pleasant and engaging.  Patient reported that she does check her weight 3-4x/week ans she watches her feet/ankles for swelling.  Pt has PTN lasix and K+ which she reports only needing 2-3x/month.  Pt reports that she would like to get a better understanding of her HF and management.  Pt reports that her BP has been well controlled, she does not check on a regular basis.  Pt confirms that she does have a BP monitor available to use. Pt reports that her BP does elevate at times as she is under some stressors currently with selling her home and having a 91yo mother with Dementia in a memory care facility, she recently brought in hospice care. Patient is agreeable to receiving reference materials via [employer].  Patient has an appointment with a new cardiologist on , which she is looking forward to.  Patient denied cp, sob, cough, dizziness, headache, n/v, diarrhea, abdominal pains, fever, or chills. Patient report that appetite and fluid intake is good and denied any problems with bowel or bladder. Patient reported that she is taking all medications as ordered. Patient denied any other needs at this time. Patient instructed to continue to monitor s/s, reporting any that may present to MD immediately

## 2025-08-13 ENCOUNTER — CARE COORDINATION (OUTPATIENT)
Dept: CARE COORDINATION | Age: 70
End: 2025-08-13

## 2025-08-19 ENCOUNTER — CARE COORDINATION (OUTPATIENT)
Dept: CARE COORDINATION | Age: 70
End: 2025-08-19

## 2025-08-19 DIAGNOSIS — F41.9 ANXIETY: ICD-10-CM

## 2025-08-19 RX ORDER — BUSPIRONE HYDROCHLORIDE 5 MG/1
5 TABLET ORAL DAILY PRN
Qty: 90 TABLET | Refills: 0 | Status: SHIPPED | OUTPATIENT
Start: 2025-08-19

## 2025-08-28 ENCOUNTER — CARE COORDINATION (OUTPATIENT)
Dept: CARE COORDINATION | Age: 70
End: 2025-08-28

## 2025-08-28 SDOH — SOCIAL STABILITY: SOCIAL NETWORK: HOW OFTEN DO YOU ATTEND CHURCH OR RELIGIOUS SERVICES?: 1 TO 4 TIMES PER YEAR

## 2025-08-28 SDOH — HEALTH STABILITY: PHYSICAL HEALTH: ON AVERAGE, HOW MANY DAYS PER WEEK DO YOU ENGAGE IN MODERATE TO STRENUOUS EXERCISE (LIKE A BRISK WALK)?: 0 DAYS

## 2025-08-28 SDOH — SOCIAL STABILITY: SOCIAL NETWORK
IN A TYPICAL WEEK, HOW MANY TIMES DO YOU TALK ON THE PHONE WITH FAMILY, FRIENDS, OR NEIGHBORS?: MORE THAN THREE TIMES A WEEK

## 2025-08-28 SDOH — ECONOMIC STABILITY: HOUSING INSECURITY: IN THE LAST 12 MONTHS, WAS THERE A TIME WHEN YOU WERE NOT ABLE TO PAY THE MORTGAGE OR RENT ON TIME?: NO

## 2025-08-28 SDOH — SOCIAL STABILITY: SOCIAL INSECURITY: ARE YOU MARRIED, WIDOWED, DIVORCED, SEPARATED, NEVER MARRIED, OR LIVING WITH A PARTNER?: MARRIED

## 2025-08-28 SDOH — HEALTH STABILITY: MENTAL HEALTH: HOW MANY DRINKS CONTAINING ALCOHOL DO YOU HAVE ON A TYPICAL DAY WHEN YOU ARE DRINKING?: 1 OR 2

## 2025-08-28 SDOH — ECONOMIC STABILITY: FOOD INSECURITY: WITHIN THE PAST 12 MONTHS, YOU WORRIED THAT YOUR FOOD WOULD RUN OUT BEFORE YOU GOT THE MONEY TO BUY MORE.: NEVER TRUE

## 2025-08-28 SDOH — HEALTH STABILITY: MENTAL HEALTH
DO YOU FEEL STRESS - TENSE, RESTLESS, NERVOUS, OR ANXIOUS, OR UNABLE TO SLEEP AT NIGHT BECAUSE YOUR MIND IS TROUBLED ALL THE TIME - THESE DAYS?: ONLY A LITTLE

## 2025-08-28 SDOH — HEALTH STABILITY: MENTAL HEALTH: HOW OFTEN DO YOU HAVE A DRINK CONTAINING ALCOHOL?: MONTHLY OR LESS

## 2025-08-28 SDOH — SOCIAL STABILITY: SOCIAL NETWORK: HOW OFTEN DO YOU ATTEND MEETINGS OF THE CLUBS OR ORGANIZATIONS YOU BELONG TO?: NEVER

## 2025-08-28 SDOH — SOCIAL STABILITY: SOCIAL NETWORK: HOW OFTEN DO YOU GET TOGETHER WITH FRIENDS OR RELATIVES?: ONCE A WEEK

## 2025-08-28 SDOH — ECONOMIC STABILITY: FOOD INSECURITY: HOW HARD IS IT FOR YOU TO PAY FOR THE VERY BASICS LIKE FOOD, HOUSING, MEDICAL CARE, AND HEATING?: NOT HARD AT ALL

## 2025-08-28 SDOH — ECONOMIC STABILITY: FOOD INSECURITY: WITHIN THE PAST 12 MONTHS, THE FOOD YOU BOUGHT JUST DIDN'T LAST AND YOU DIDN'T HAVE MONEY TO GET MORE.: NEVER TRUE

## 2025-08-28 SDOH — HEALTH STABILITY: PHYSICAL HEALTH: ON AVERAGE, HOW MANY MINUTES DO YOU ENGAGE IN EXERCISE AT THIS LEVEL?: 0 MIN

## 2025-08-28 SDOH — SOCIAL STABILITY: SOCIAL NETWORK
DO YOU BELONG TO ANY CLUBS OR ORGANIZATIONS SUCH AS CHURCH GROUPS, UNIONS, FRATERNAL OR ATHLETIC GROUPS, OR SCHOOL GROUPS?: NO

## 2025-08-28 SDOH — ECONOMIC STABILITY: TRANSPORTATION INSECURITY: IN THE PAST 12 MONTHS, HAS LACK OF TRANSPORTATION KEPT YOU FROM MEDICAL APPOINTMENTS OR FROM GETTING MEDICATIONS?: NO

## 2025-08-28 ASSESSMENT — ACTIVITIES OF DAILY LIVING (ADL): LACK_OF_TRANSPORTATION: NO

## (undated) DEVICE — KIT,ANTI FOG,W/SPONGE & FLUID,SOFT PACK: Brand: MEDLINE

## (undated) DEVICE — TISSUE RETRIEVAL SYSTEM: Brand: INZII RETRIEVAL SYSTEM

## (undated) DEVICE — DILATOR ENDOSCP L180CM DIA6FR BLLN L8CM DIA54-60FR

## (undated) DEVICE — ELECTRODE LAP L36CM PTFE WIRE J HK CLEANCOAT

## (undated) DEVICE — SET EXTN PRIMING 4.9ML L30IN INCL SLDE CLMP SPIN M LUERLOCK

## (undated) DEVICE — APPLICATOR PREP 26ML 0.7% IOD POVACRYLEX 74% ISO ALC ST

## (undated) DEVICE — CATHETER URET 4FR L70CM POLYUR WHSTL FLX TIP KINK RESIST W/

## (undated) DEVICE — AIR/WATER CLEANING ADAPTER FOR OLYMPUS® GI ENDOSCOPE: Brand: BULLDOG®

## (undated) DEVICE — [HIGH FLOW INSUFFLATOR,  DO NOT USE IF PACKAGE IS DAMAGED,  KEEP DRY,  KEEP AWAY FROM SUNLIGHT,  PROTECT FROM HEAT AND RADIOACTIVE SOURCES.]: Brand: PNEUMOSURE

## (undated) DEVICE — CLIP INT M L POLYMER LOK LIG HEM O LOK

## (undated) DEVICE — LARGE BORE STOPCOCK WITH ROTATING MALE LUER LOCK

## (undated) DEVICE — SYRINGE MED 50ML LUERLOCK TIP

## (undated) DEVICE — TROCAR: Brand: KII SHIELDED BLADED ACCESS SYSTEM

## (undated) DEVICE — SOLUTION IV IRRIG WATER 500ML POUR BRL ST 2F7113

## (undated) DEVICE — GLOVE SURG SZ 7 L12IN FNGR THK75MIL WHT LTX POLYMER BEAD

## (undated) DEVICE — TROCARS: Brand: KII® BALLOON BLUNT TIP SYSTEM

## (undated) DEVICE — CORD ES L10FT MPLR LAP

## (undated) DEVICE — ENDOSCOPIC KIT 6X3/16 FT COLON W/ 1.1 OZ 2 GWN W/O BRSH

## (undated) DEVICE — SURGICAL SET UP - SURE SET: Brand: MEDLINE INDUSTRIES, INC.

## (undated) DEVICE — ELECTROSURGICAL PENCIL ROCKER SWITCH NON COATED BLADE ELECTRODE 10 FT (3 M) CORD HOLSTER: Brand: MEGADYNE

## (undated) DEVICE — VALVE SUCTION AIR H2O SET ORCA POD + DISP

## (undated) DEVICE — DRAPE,LAP,CHOLE,W/TROUGHS,STERILE: Brand: MEDLINE

## (undated) DEVICE — JEWISH HOSPITAL TURNOVER KIT: Brand: MEDLINE INDUSTRIES, INC.

## (undated) DEVICE — 40583 XL ADVANCED TRENDELENBURG POSITIONING KIT: Brand: 40583 XL ADVANCED TRENDELENBURG POSITIONING KIT

## (undated) DEVICE — SURE SET-DOUBLE BASIN-LF: Brand: MEDLINE INDUSTRIES, INC.

## (undated) DEVICE — GARMENT,MEDLINE,DVT,INT,CALF,MED, GEN2: Brand: MEDLINE

## (undated) DEVICE — SOLUTION INJ LR VISIV 1000ML BG

## (undated) DEVICE — COVER LT HNDL BLU PLAS

## (undated) DEVICE — SYRINGE INFL 60ML DISP ALLIANCE II

## (undated) DEVICE — SUTURE VCRL SZ 0 L27IN ABSRB UD L26MM CT-2 1/2 CIR J270H

## (undated) DEVICE — SUTURE MCRYL SZ 4-0 L18IN ABSRB UD L19MM PS-2 3/8 CIR PRIM Y496G

## (undated) DEVICE — MOUTHPIECE ENDOSCP L CTRL OPN AND SIDE PORTS DISP

## (undated) DEVICE — STONE RETRIEVAL BASKET: Brand: SEGURA HEMISPHERE

## (undated) DEVICE — SYRINGE MED 10ML LUERLOCK TIP W/O SFTY DISP

## (undated) DEVICE — PLATE ES AD W 9FT CRD 2

## (undated) DEVICE — FORCEPS BX L240CM WRK CHN 2.8MM STD CAP W/ NDL MIC MESH

## (undated) DEVICE — SCISSORS ENDOSCP DIA5MM CRV MPLR CAUT W/ RATCH HNDL

## (undated) DEVICE — BW-412T DISP COMBO CLEANING BRUSH: Brand: SINGLE USE COMBINATION CLEANING BRUSH

## (undated) DEVICE — NEEDLE HYPO 22GA L1.5IN BLK POLYPR HUB S STL REG BVL STR

## (undated) DEVICE — ADHESIVE SKIN CLSR 0.7ML TOP DERMBND ADV

## (undated) DEVICE — C-ARM: Brand: UNBRANDED

## (undated) DEVICE — TROCAR: Brand: KII FIOS FIRST ENTRY

## (undated) DEVICE — PUMP SUC IRR TBNG L10FT W/ HNDPC ASSEMB STRYKEFLOW 2